# Patient Record
Sex: FEMALE | Race: WHITE | NOT HISPANIC OR LATINO
[De-identification: names, ages, dates, MRNs, and addresses within clinical notes are randomized per-mention and may not be internally consistent; named-entity substitution may affect disease eponyms.]

---

## 2018-02-27 ENCOUNTER — RESULT REVIEW (OUTPATIENT)
Age: 63
End: 2018-02-27

## 2020-02-10 ENCOUNTER — RESULT REVIEW (OUTPATIENT)
Age: 65
End: 2020-02-10

## 2021-04-12 ENCOUNTER — RESULT REVIEW (OUTPATIENT)
Age: 66
End: 2021-04-12

## 2022-07-09 ENCOUNTER — TRANSCRIPTION ENCOUNTER (OUTPATIENT)
Age: 67
End: 2022-07-09

## 2022-07-10 ENCOUNTER — RESULT REVIEW (OUTPATIENT)
Age: 67
End: 2022-07-10

## 2022-07-10 ENCOUNTER — TRANSCRIPTION ENCOUNTER (OUTPATIENT)
Age: 67
End: 2022-07-10

## 2022-07-10 ENCOUNTER — INPATIENT (INPATIENT)
Facility: HOSPITAL | Age: 67
LOS: 4 days | Discharge: HOME | DRG: 329 | End: 2022-07-15
Attending: SURGERY | Admitting: SURGERY
Payer: MEDICARE

## 2022-07-10 VITALS
OXYGEN SATURATION: 99 % | RESPIRATION RATE: 18 BRPM | WEIGHT: 143.96 LBS | SYSTOLIC BLOOD PRESSURE: 146 MMHG | DIASTOLIC BLOOD PRESSURE: 95 MMHG | HEART RATE: 88 BPM | TEMPERATURE: 98 F | HEIGHT: 62 IN

## 2022-07-10 DIAGNOSIS — E78.5 HYPERLIPIDEMIA, UNSPECIFIED: ICD-10-CM

## 2022-07-10 DIAGNOSIS — E03.9 HYPOTHYROIDISM, UNSPECIFIED: ICD-10-CM

## 2022-07-10 DIAGNOSIS — Z98.89 OTHER SPECIFIED POSTPROCEDURAL STATES: Chronic | ICD-10-CM

## 2022-07-10 DIAGNOSIS — Z90.49 ACQUIRED ABSENCE OF OTHER SPECIFIED PARTS OF DIGESTIVE TRACT: Chronic | ICD-10-CM

## 2022-07-10 DIAGNOSIS — K56.609 UNSPECIFIED INTESTINAL OBSTRUCTION, UNSPECIFIED AS TO PARTIAL VERSUS COMPLETE OBSTRUCTION: ICD-10-CM

## 2022-07-10 LAB
ALBUMIN SERPL ELPH-MCNC: 4.2 G/DL — SIGNIFICANT CHANGE UP (ref 3.3–5)
ALP SERPL-CCNC: 111 U/L — SIGNIFICANT CHANGE UP (ref 40–120)
ALT FLD-CCNC: 56 U/L — SIGNIFICANT CHANGE UP (ref 12–78)
ANION GAP SERPL CALC-SCNC: 7 MMOL/L — SIGNIFICANT CHANGE UP (ref 5–17)
APPEARANCE UR: ABNORMAL
APTT BLD: 32.7 SEC — SIGNIFICANT CHANGE UP (ref 27.5–35.5)
AST SERPL-CCNC: 37 U/L — SIGNIFICANT CHANGE UP (ref 15–37)
BACTERIA # UR AUTO: ABNORMAL
BASOPHILS # BLD AUTO: 0.03 K/UL — SIGNIFICANT CHANGE UP (ref 0–0.2)
BASOPHILS NFR BLD AUTO: 0.3 % — SIGNIFICANT CHANGE UP (ref 0–2)
BILIRUB SERPL-MCNC: 1 MG/DL — SIGNIFICANT CHANGE UP (ref 0.2–1.2)
BILIRUB UR-MCNC: NEGATIVE — SIGNIFICANT CHANGE UP
BLD GP AB SCN SERPL QL: SIGNIFICANT CHANGE UP
BUN SERPL-MCNC: 21 MG/DL — SIGNIFICANT CHANGE UP (ref 7–23)
CALCIUM SERPL-MCNC: 11.4 MG/DL — HIGH (ref 8.5–10.1)
CHLORIDE SERPL-SCNC: 99 MMOL/L — SIGNIFICANT CHANGE UP (ref 96–108)
CO2 SERPL-SCNC: 32 MMOL/L — HIGH (ref 22–31)
COLOR SPEC: YELLOW — SIGNIFICANT CHANGE UP
COMMENT - URINE: SIGNIFICANT CHANGE UP
CREAT SERPL-MCNC: 0.72 MG/DL — SIGNIFICANT CHANGE UP (ref 0.5–1.3)
DIFF PNL FLD: ABNORMAL
EGFR: 92 ML/MIN/1.73M2 — SIGNIFICANT CHANGE UP
EOSINOPHIL # BLD AUTO: 0.08 K/UL — SIGNIFICANT CHANGE UP (ref 0–0.5)
EOSINOPHIL NFR BLD AUTO: 0.9 % — SIGNIFICANT CHANGE UP (ref 0–6)
EPI CELLS # UR: SIGNIFICANT CHANGE UP
GLUCOSE SERPL-MCNC: 148 MG/DL — HIGH (ref 70–99)
GLUCOSE UR QL: NEGATIVE — SIGNIFICANT CHANGE UP
HCT VFR BLD CALC: 43.9 % — SIGNIFICANT CHANGE UP (ref 34.5–45)
HGB BLD-MCNC: 14.5 G/DL — SIGNIFICANT CHANGE UP (ref 11.5–15.5)
IMM GRANULOCYTES NFR BLD AUTO: 0.4 % — SIGNIFICANT CHANGE UP (ref 0–1.5)
INR BLD: 0.94 RATIO — SIGNIFICANT CHANGE UP (ref 0.88–1.16)
KETONES UR-MCNC: NEGATIVE — SIGNIFICANT CHANGE UP
LEUKOCYTE ESTERASE UR-ACNC: ABNORMAL
LIDOCAIN IGE QN: 172 U/L — SIGNIFICANT CHANGE UP (ref 73–393)
LYMPHOCYTES # BLD AUTO: 1.84 K/UL — SIGNIFICANT CHANGE UP (ref 1–3.3)
LYMPHOCYTES # BLD AUTO: 20 % — SIGNIFICANT CHANGE UP (ref 13–44)
MCHC RBC-ENTMCNC: 31.7 PG — SIGNIFICANT CHANGE UP (ref 27–34)
MCHC RBC-ENTMCNC: 33 GM/DL — SIGNIFICANT CHANGE UP (ref 32–36)
MCV RBC AUTO: 96.1 FL — SIGNIFICANT CHANGE UP (ref 80–100)
MONOCYTES # BLD AUTO: 0.82 K/UL — SIGNIFICANT CHANGE UP (ref 0–0.9)
MONOCYTES NFR BLD AUTO: 8.9 % — SIGNIFICANT CHANGE UP (ref 2–14)
NEUTROPHILS # BLD AUTO: 6.38 K/UL — SIGNIFICANT CHANGE UP (ref 1.8–7.4)
NEUTROPHILS NFR BLD AUTO: 69.5 % — SIGNIFICANT CHANGE UP (ref 43–77)
NITRITE UR-MCNC: NEGATIVE — SIGNIFICANT CHANGE UP
NRBC # BLD: 0 /100 WBCS — SIGNIFICANT CHANGE UP (ref 0–0)
PH UR: 7 — SIGNIFICANT CHANGE UP (ref 5–8)
PLATELET # BLD AUTO: 256 K/UL — SIGNIFICANT CHANGE UP (ref 150–400)
POTASSIUM SERPL-MCNC: 4.5 MMOL/L — SIGNIFICANT CHANGE UP (ref 3.5–5.3)
POTASSIUM SERPL-SCNC: 4.5 MMOL/L — SIGNIFICANT CHANGE UP (ref 3.5–5.3)
PROT SERPL-MCNC: 8 G/DL — SIGNIFICANT CHANGE UP (ref 6–8.3)
PROT UR-MCNC: 15
PROTHROM AB SERPL-ACNC: 11 SEC — SIGNIFICANT CHANGE UP (ref 10.5–13.4)
RBC # BLD: 4.57 M/UL — SIGNIFICANT CHANGE UP (ref 3.8–5.2)
RBC # FLD: 11.9 % — SIGNIFICANT CHANGE UP (ref 10.3–14.5)
RBC CASTS # UR COMP ASSIST: SIGNIFICANT CHANGE UP /HPF (ref 0–4)
SARS-COV-2 RNA SPEC QL NAA+PROBE: SIGNIFICANT CHANGE UP
SODIUM SERPL-SCNC: 138 MMOL/L — SIGNIFICANT CHANGE UP (ref 135–145)
SP GR SPEC: 1.01 — SIGNIFICANT CHANGE UP (ref 1.01–1.02)
UROBILINOGEN FLD QL: NEGATIVE — SIGNIFICANT CHANGE UP
WBC # BLD: 9.19 K/UL — SIGNIFICANT CHANGE UP (ref 3.8–10.5)
WBC # FLD AUTO: 9.19 K/UL — SIGNIFICANT CHANGE UP (ref 3.8–10.5)
WBC UR QL: SIGNIFICANT CHANGE UP

## 2022-07-10 PROCEDURE — G1004: CPT

## 2022-07-10 PROCEDURE — 93010 ELECTROCARDIOGRAM REPORT: CPT

## 2022-07-10 PROCEDURE — 74177 CT ABD & PELVIS W/CONTRAST: CPT | Mod: 26,ME

## 2022-07-10 PROCEDURE — 71045 X-RAY EXAM CHEST 1 VIEW: CPT | Mod: 26

## 2022-07-10 PROCEDURE — 99222 1ST HOSP IP/OBS MODERATE 55: CPT

## 2022-07-10 PROCEDURE — 99285 EMERGENCY DEPT VISIT HI MDM: CPT

## 2022-07-10 PROCEDURE — 88307 TISSUE EXAM BY PATHOLOGIST: CPT | Mod: 26

## 2022-07-10 PROCEDURE — 76705 ECHO EXAM OF ABDOMEN: CPT | Mod: 26

## 2022-07-10 PROCEDURE — 12345: CPT | Mod: NC,GC

## 2022-07-10 DEVICE — STAPLER COVIDIEN ENDO GIA 60-3.8MM BLUE: Type: IMPLANTABLE DEVICE | Status: FUNCTIONAL

## 2022-07-10 DEVICE — STAPLER COVIDIEN TA 45 BLUE: Type: IMPLANTABLE DEVICE | Status: FUNCTIONAL

## 2022-07-10 DEVICE — SEALANT VISTASEAL FIBRIN HUMAN 10ML 12/KIT: Type: IMPLANTABLE DEVICE | Status: FUNCTIONAL

## 2022-07-10 DEVICE — STAPLER COVIDIEN ENDO GIA 60-3.8MM BLUE RELOAD: Type: IMPLANTABLE DEVICE | Status: FUNCTIONAL

## 2022-07-10 RX ORDER — MORPHINE SULFATE 50 MG/1
4 CAPSULE, EXTENDED RELEASE ORAL ONCE
Refills: 0 | Status: DISCONTINUED | OUTPATIENT
Start: 2022-07-10 | End: 2022-07-10

## 2022-07-10 RX ORDER — LEVOTHYROXINE SODIUM 125 MCG
44 TABLET ORAL AT BEDTIME
Refills: 0 | Status: DISCONTINUED | OUTPATIENT
Start: 2022-07-10 | End: 2022-07-10

## 2022-07-10 RX ORDER — MORPHINE SULFATE 50 MG/1
2 CAPSULE, EXTENDED RELEASE ORAL EVERY 4 HOURS
Refills: 0 | Status: DISCONTINUED | OUTPATIENT
Start: 2022-07-10 | End: 2022-07-15

## 2022-07-10 RX ORDER — KETOROLAC TROMETHAMINE 30 MG/ML
30 SYRINGE (ML) INJECTION ONCE
Refills: 0 | Status: DISCONTINUED | OUTPATIENT
Start: 2022-07-10 | End: 2022-07-10

## 2022-07-10 RX ORDER — ACETAMINOPHEN 500 MG
1000 TABLET ORAL ONCE
Refills: 0 | Status: COMPLETED | OUTPATIENT
Start: 2022-07-11 | End: 2022-07-11

## 2022-07-10 RX ORDER — SODIUM CHLORIDE 9 MG/ML
1000 INJECTION INTRAMUSCULAR; INTRAVENOUS; SUBCUTANEOUS
Refills: 0 | Status: DISCONTINUED | OUTPATIENT
Start: 2022-07-10 | End: 2022-07-15

## 2022-07-10 RX ORDER — DEXTROSE MONOHYDRATE, SODIUM CHLORIDE, AND POTASSIUM CHLORIDE 50; .745; 4.5 G/1000ML; G/1000ML; G/1000ML
1000 INJECTION, SOLUTION INTRAVENOUS
Refills: 0 | Status: DISCONTINUED | OUTPATIENT
Start: 2022-07-10 | End: 2022-07-10

## 2022-07-10 RX ORDER — MORPHINE SULFATE 50 MG/1
2 CAPSULE, EXTENDED RELEASE ORAL EVERY 4 HOURS
Refills: 0 | Status: DISCONTINUED | OUTPATIENT
Start: 2022-07-10 | End: 2022-07-10

## 2022-07-10 RX ORDER — KETOROLAC TROMETHAMINE 30 MG/ML
15 SYRINGE (ML) INJECTION EVERY 6 HOURS
Refills: 0 | Status: DISCONTINUED | OUTPATIENT
Start: 2022-07-10 | End: 2022-07-11

## 2022-07-10 RX ORDER — ACETAMINOPHEN 500 MG
1000 TABLET ORAL ONCE
Refills: 0 | Status: COMPLETED | OUTPATIENT
Start: 2022-07-10 | End: 2022-07-10

## 2022-07-10 RX ORDER — PANTOPRAZOLE SODIUM 20 MG/1
40 TABLET, DELAYED RELEASE ORAL DAILY
Refills: 0 | Status: DISCONTINUED | OUTPATIENT
Start: 2022-07-10 | End: 2022-07-10

## 2022-07-10 RX ORDER — SODIUM CHLORIDE 9 MG/ML
1000 INJECTION, SOLUTION INTRAVENOUS
Refills: 0 | Status: DISCONTINUED | OUTPATIENT
Start: 2022-07-10 | End: 2022-07-10

## 2022-07-10 RX ORDER — LEVOTHYROXINE SODIUM 125 MCG
44 TABLET ORAL AT BEDTIME
Refills: 0 | Status: DISCONTINUED | OUTPATIENT
Start: 2022-07-10 | End: 2022-07-12

## 2022-07-10 RX ORDER — DIPHENHYDRAMINE HCL 50 MG
25 CAPSULE ORAL EVERY 6 HOURS
Refills: 0 | Status: DISCONTINUED | OUTPATIENT
Start: 2022-07-10 | End: 2022-07-10

## 2022-07-10 RX ORDER — METOCLOPRAMIDE HCL 10 MG
10 TABLET ORAL ONCE
Refills: 0 | Status: COMPLETED | OUTPATIENT
Start: 2022-07-10 | End: 2022-07-10

## 2022-07-10 RX ORDER — MORPHINE SULFATE 50 MG/1
4 CAPSULE, EXTENDED RELEASE ORAL EVERY 4 HOURS
Refills: 0 | Status: DISCONTINUED | OUTPATIENT
Start: 2022-07-10 | End: 2022-07-15

## 2022-07-10 RX ORDER — PANTOPRAZOLE SODIUM 20 MG/1
40 TABLET, DELAYED RELEASE ORAL DAILY
Refills: 0 | Status: DISCONTINUED | OUTPATIENT
Start: 2022-07-10 | End: 2022-07-12

## 2022-07-10 RX ORDER — MORPHINE SULFATE 50 MG/1
4 CAPSULE, EXTENDED RELEASE ORAL EVERY 4 HOURS
Refills: 0 | Status: DISCONTINUED | OUTPATIENT
Start: 2022-07-10 | End: 2022-07-10

## 2022-07-10 RX ORDER — HYDROMORPHONE HYDROCHLORIDE 2 MG/ML
0.5 INJECTION INTRAMUSCULAR; INTRAVENOUS; SUBCUTANEOUS
Refills: 0 | Status: DISCONTINUED | OUTPATIENT
Start: 2022-07-10 | End: 2022-07-10

## 2022-07-10 RX ORDER — KETOROLAC TROMETHAMINE 30 MG/ML
15 SYRINGE (ML) INJECTION EVERY 4 HOURS
Refills: 0 | Status: DISCONTINUED | OUTPATIENT
Start: 2022-07-10 | End: 2022-07-10

## 2022-07-10 RX ORDER — PIPERACILLIN AND TAZOBACTAM 4; .5 G/20ML; G/20ML
3.38 INJECTION, POWDER, LYOPHILIZED, FOR SOLUTION INTRAVENOUS EVERY 8 HOURS
Refills: 0 | Status: DISCONTINUED | OUTPATIENT
Start: 2022-07-10 | End: 2022-07-12

## 2022-07-10 RX ORDER — KETOROLAC TROMETHAMINE 30 MG/ML
15 SYRINGE (ML) INJECTION EVERY 6 HOURS
Refills: 0 | Status: DISCONTINUED | OUTPATIENT
Start: 2022-07-10 | End: 2022-07-10

## 2022-07-10 RX ORDER — MORPHINE SULFATE 50 MG/1
1 CAPSULE, EXTENDED RELEASE ORAL EVERY 4 HOURS
Refills: 0 | Status: DISCONTINUED | OUTPATIENT
Start: 2022-07-10 | End: 2022-07-10

## 2022-07-10 RX ORDER — ENOXAPARIN SODIUM 100 MG/ML
40 INJECTION SUBCUTANEOUS EVERY 24 HOURS
Refills: 0 | Status: DISCONTINUED | OUTPATIENT
Start: 2022-07-11 | End: 2022-07-15

## 2022-07-10 RX ORDER — DIPHENHYDRAMINE HCL 50 MG
25 CAPSULE ORAL AT BEDTIME
Refills: 0 | Status: DISCONTINUED | OUTPATIENT
Start: 2022-07-10 | End: 2022-07-15

## 2022-07-10 RX ORDER — ONDANSETRON 8 MG/1
4 TABLET, FILM COATED ORAL EVERY 6 HOURS
Refills: 0 | Status: DISCONTINUED | OUTPATIENT
Start: 2022-07-10 | End: 2022-07-15

## 2022-07-10 RX ORDER — ACETAMINOPHEN 500 MG
1000 TABLET ORAL ONCE
Refills: 0 | Status: DISCONTINUED | OUTPATIENT
Start: 2022-07-10 | End: 2022-07-10

## 2022-07-10 RX ORDER — CEFOTETAN DISODIUM 1 G
2 VIAL (EA) INJECTION EVERY 12 HOURS
Refills: 0 | Status: DISCONTINUED | OUTPATIENT
Start: 2022-07-10 | End: 2022-07-10

## 2022-07-10 RX ORDER — ONDANSETRON 8 MG/1
4 TABLET, FILM COATED ORAL ONCE
Refills: 0 | Status: COMPLETED | OUTPATIENT
Start: 2022-07-10 | End: 2022-07-10

## 2022-07-10 RX ORDER — ONDANSETRON 8 MG/1
4 TABLET, FILM COATED ORAL ONCE
Refills: 0 | Status: DISCONTINUED | OUTPATIENT
Start: 2022-07-10 | End: 2022-07-10

## 2022-07-10 RX ORDER — ONDANSETRON 8 MG/1
4 TABLET, FILM COATED ORAL EVERY 6 HOURS
Refills: 0 | Status: DISCONTINUED | OUTPATIENT
Start: 2022-07-10 | End: 2022-07-10

## 2022-07-10 RX ORDER — ATORVASTATIN CALCIUM 80 MG/1
20 TABLET, FILM COATED ORAL AT BEDTIME
Refills: 0 | Status: DISCONTINUED | OUTPATIENT
Start: 2022-07-10 | End: 2022-07-12

## 2022-07-10 RX ORDER — SODIUM CHLORIDE 9 MG/ML
1000 INJECTION INTRAMUSCULAR; INTRAVENOUS; SUBCUTANEOUS
Refills: 0 | Status: DISCONTINUED | OUTPATIENT
Start: 2022-07-10 | End: 2022-07-10

## 2022-07-10 RX ORDER — PIPERACILLIN AND TAZOBACTAM 4; .5 G/20ML; G/20ML
3.38 INJECTION, POWDER, LYOPHILIZED, FOR SOLUTION INTRAVENOUS ONCE
Refills: 0 | Status: COMPLETED | OUTPATIENT
Start: 2022-07-10 | End: 2022-07-10

## 2022-07-10 RX ORDER — SODIUM CHLORIDE 9 MG/ML
1000 INJECTION INTRAMUSCULAR; INTRAVENOUS; SUBCUTANEOUS ONCE
Refills: 0 | Status: COMPLETED | OUTPATIENT
Start: 2022-07-10 | End: 2022-07-10

## 2022-07-10 RX ADMIN — Medication 1000 MILLIGRAM(S): at 05:21

## 2022-07-10 RX ADMIN — MORPHINE SULFATE 1 MILLIGRAM(S): 50 CAPSULE, EXTENDED RELEASE ORAL at 13:19

## 2022-07-10 RX ADMIN — MORPHINE SULFATE 1 MILLIGRAM(S): 50 CAPSULE, EXTENDED RELEASE ORAL at 13:01

## 2022-07-10 RX ADMIN — MORPHINE SULFATE 2 MILLIGRAM(S): 50 CAPSULE, EXTENDED RELEASE ORAL at 15:49

## 2022-07-10 RX ADMIN — MORPHINE SULFATE 2 MILLIGRAM(S): 50 CAPSULE, EXTENDED RELEASE ORAL at 20:01

## 2022-07-10 RX ADMIN — SODIUM CHLORIDE 1000 MILLILITER(S): 9 INJECTION INTRAMUSCULAR; INTRAVENOUS; SUBCUTANEOUS at 02:19

## 2022-07-10 RX ADMIN — HYDROMORPHONE HYDROCHLORIDE 0.5 MILLIGRAM(S): 2 INJECTION INTRAMUSCULAR; INTRAVENOUS; SUBCUTANEOUS at 11:37

## 2022-07-10 RX ADMIN — MORPHINE SULFATE 4 MILLIGRAM(S): 50 CAPSULE, EXTENDED RELEASE ORAL at 04:21

## 2022-07-10 RX ADMIN — MORPHINE SULFATE 4 MILLIGRAM(S): 50 CAPSULE, EXTENDED RELEASE ORAL at 22:07

## 2022-07-10 RX ADMIN — PANTOPRAZOLE SODIUM 40 MILLIGRAM(S): 20 TABLET, DELAYED RELEASE ORAL at 15:48

## 2022-07-10 RX ADMIN — MORPHINE SULFATE 4 MILLIGRAM(S): 50 CAPSULE, EXTENDED RELEASE ORAL at 02:23

## 2022-07-10 RX ADMIN — HYDROMORPHONE HYDROCHLORIDE 0.5 MILLIGRAM(S): 2 INJECTION INTRAMUSCULAR; INTRAVENOUS; SUBCUTANEOUS at 11:26

## 2022-07-10 RX ADMIN — Medication 10 MILLIGRAM(S): at 02:08

## 2022-07-10 RX ADMIN — Medication 15 MILLIGRAM(S): at 05:20

## 2022-07-10 RX ADMIN — SODIUM CHLORIDE 75 MILLILITER(S): 9 INJECTION INTRAMUSCULAR; INTRAVENOUS; SUBCUTANEOUS at 06:25

## 2022-07-10 RX ADMIN — Medication 15 MILLIGRAM(S): at 21:12

## 2022-07-10 RX ADMIN — Medication 1000 MILLIGRAM(S): at 18:38

## 2022-07-10 RX ADMIN — Medication 15 MILLIGRAM(S): at 05:05

## 2022-07-10 RX ADMIN — SODIUM CHLORIDE 100 MILLILITER(S): 9 INJECTION, SOLUTION INTRAVENOUS at 10:45

## 2022-07-10 RX ADMIN — Medication 400 MILLIGRAM(S): at 05:06

## 2022-07-10 RX ADMIN — Medication 400 MILLIGRAM(S): at 23:48

## 2022-07-10 RX ADMIN — Medication 400 MILLIGRAM(S): at 18:00

## 2022-07-10 RX ADMIN — SODIUM CHLORIDE 80 MILLILITER(S): 9 INJECTION INTRAMUSCULAR; INTRAVENOUS; SUBCUTANEOUS at 23:18

## 2022-07-10 RX ADMIN — ONDANSETRON 4 MILLIGRAM(S): 8 TABLET, FILM COATED ORAL at 04:05

## 2022-07-10 RX ADMIN — MORPHINE SULFATE 4 MILLIGRAM(S): 50 CAPSULE, EXTENDED RELEASE ORAL at 04:06

## 2022-07-10 RX ADMIN — PIPERACILLIN AND TAZOBACTAM 25 GRAM(S): 4; .5 INJECTION, POWDER, LYOPHILIZED, FOR SOLUTION INTRAVENOUS at 18:00

## 2022-07-10 RX ADMIN — MORPHINE SULFATE 4 MILLIGRAM(S): 50 CAPSULE, EXTENDED RELEASE ORAL at 02:08

## 2022-07-10 RX ADMIN — Medication 400 MILLIGRAM(S): at 11:37

## 2022-07-10 RX ADMIN — Medication 25 MILLIGRAM(S): at 22:08

## 2022-07-10 RX ADMIN — ONDANSETRON 4 MILLIGRAM(S): 8 TABLET, FILM COATED ORAL at 15:48

## 2022-07-10 RX ADMIN — Medication 44 MICROGRAM(S): at 21:20

## 2022-07-10 RX ADMIN — Medication 100 GRAM(S): at 05:55

## 2022-07-10 RX ADMIN — PIPERACILLIN AND TAZOBACTAM 200 GRAM(S): 4; .5 INJECTION, POWDER, LYOPHILIZED, FOR SOLUTION INTRAVENOUS at 10:59

## 2022-07-10 RX ADMIN — SODIUM CHLORIDE 1000 MILLILITER(S): 9 INJECTION INTRAMUSCULAR; INTRAVENOUS; SUBCUTANEOUS at 01:19

## 2022-07-10 RX ADMIN — ONDANSETRON 4 MILLIGRAM(S): 8 TABLET, FILM COATED ORAL at 01:19

## 2022-07-10 NOTE — PROGRESS NOTE ADULT - SUBJECTIVE AND OBJECTIVE BOX
Patient is a 66y old  Female who presents with a chief complaint of High grade SBO (10 Jul 2022 05:59)      INTERVAL HPI/OVERNIGHT EVENTS: Pt was seen and examined accompanied by  at bedside. Pt is s/p ex-lap, LINDSAY and small bowel resection with Dr. Yanez this morning. Abdominal dressing c/d/i. NGT connected to wall suction, pappas in place. Pt is on a course of IV zosyn with anticipated stop date . Pt denies fevers, chills, ha, dizziness, cp, sob, nausea, vomitting, calf pain.       MEDICATIONS  (STANDING):  acetaminophen   IVPB .. 1000 milliGRAM(s) IV Intermittent once  acetaminophen   IVPB .. 1000 milliGRAM(s) IV Intermittent once  atorvastatin 20 milliGRAM(s) Oral at bedtime  lactated ringers. 1000 milliLiter(s) (75 mL/Hr) IV Continuous <Continuous>  lactated ringers. 1000 milliLiter(s) (100 mL/Hr) IV Continuous <Continuous>  levothyroxine Injectable 44 MICROGram(s) IV Push at bedtime  pantoprazole  Injectable 40 milliGRAM(s) IV Push daily  piperacillin/tazobactam IVPB.. 3.375 Gram(s) IV Intermittent every 8 hours    MEDICATIONS  (PRN):  diphenhydrAMINE Injectable 25 milliGRAM(s) IntraMuscular every 6 hours PRN Rash and/or Itching  HYDROmorphone  Injectable 0.5 milliGRAM(s) IV Push every 10 minutes PRN Moderate Pain (4 - 6)  ketorolac   Injectable 15 milliGRAM(s) IV Push every 6 hours PRN Moderate Pain (4 - 6)  morphine  - Injectable 1 milliGRAM(s) IV Push every 4 hours PRN Severe Pain (7 - 10)  ondansetron Injectable 4 milliGRAM(s) IV Push once PRN Nausea and/or Vomiting  ondansetron Injectable 4 milliGRAM(s) IV Push every 6 hours PRN Nausea and/or Vomiting      Allergies    tetanus toxoid (Anaphylaxis)    Intolerances        REVIEW OF SYSTEMS:  CONSTITUTIONAL: No fever or chills  HEENT:  No headache, no sore throat  RESPIRATORY: No cough, wheezing, or shortness of breath  CARDIOVASCULAR: No chest pain, palpitations  GASTROINTESTINAL: Postoperative abd pain, denies: nausea, vomiting, or diarrhea  GENITOURINARY: No dysuria, frequency, or hematuria  NEUROLOGICAL: no focal weakness or dizziness  MUSCULOSKELETAL: no myalgias     Vital Signs Last 24 Hrs  T(C): 37.2 (10 Jul 2022 12:49), Max: 37.2 (10 Jul 2022 12:49)  T(F): 98.9 (10 Jul 2022 12:49), Max: 98.9 (10 Jul 2022 12:49)  HR: 94 (10 Jul 2022 12:49) (73 - 103)  BP: 123/75 (10 Jul 2022 12:49) (120/72 - 157/90)  BP(mean): --  RR: 18 (10 Jul 2022 12:49) (14 - 18)  SpO2: 94% (10 Jul 2022 12:49) (94% - 100%)    Parameters below as of 10 Jul 2022 12:49  Patient On (Oxygen Delivery Method): nasal cannula  O2 Flow (L/min): 2      PHYSICAL EXAM:  GENERAL: NAD, NGT to wall suction  HEENT:  anicteric, moist mucous membranes  CHEST/LUNG:  CTA b/l, no rales, wheezes, or rhonchi  HEART:  RRR, S1, S2  ABDOMEN:  BS+, mildly tender 2/2 postoperative pain, nondistended  : pappas in place   EXTREMITIES: no edema, cyanosis, or calf tenderness  NERVOUS SYSTEM: answers questions and follows commands appropriately    LABS:                        14.5   9.19  )-----------( 256      ( 10 Jul 2022 01:22 )             43.9     CBC Full  -  ( 10 Jul 2022 01:22 )  WBC Count : 9.19 K/uL  Hemoglobin : 14.5 g/dL  Hematocrit : 43.9 %  Platelet Count - Automated : 256 K/uL  Mean Cell Volume : 96.1 fl  Mean Cell Hemoglobin : 31.7 pg  Mean Cell Hemoglobin Concentration : 33.0 gm/dL  Auto Neutrophil # : 6.38 K/uL  Auto Lymphocyte # : 1.84 K/uL  Auto Monocyte # : 0.82 K/uL  Auto Eosinophil # : 0.08 K/uL  Auto Basophil # : 0.03 K/uL  Auto Neutrophil % : 69.5 %  Auto Lymphocyte % : 20.0 %  Auto Monocyte % : 8.9 %  Auto Eosinophil % : 0.9 %  Auto Basophil % : 0.3 %    10 Jul 2022 01:22    138    |  99     |  21     ----------------------------<  148    4.5     |  32     |  0.72     Ca    11.4       10 Jul 2022 01:22    TPro  8.0    /  Alb  4.2    /  TBili  1.0    /  DBili  x      /  AST  37     /  ALT  56     /  AlkPhos  111    10 Jul 2022 01:22    PT/INR - ( 10 Jul 2022 01:22 )   PT: 11.0 sec;   INR: 0.94 ratio         PTT - ( 10 Jul 2022 01:22 )  PTT:32.7 sec  Urinalysis Basic - ( 10 Jul 2022 02:44 )    Color: Yellow / Appearance: Slightly Turbid / S.010 / pH: x  Gluc: x / Ketone: Negative  / Bili: Negative / Urobili: Negative   Blood: x / Protein: 15 / Nitrite: Negative   Leuk Esterase: Trace / RBC: 0-2 /HPF / WBC 3-5   Sq Epi: x / Non Sq Epi: Few / Bacteria: Few                  RADIOLOGY & ADDITIONAL TESTS:    Personally reviewed.     Consultant(s) Notes Reviewed:  [x] YES  [ ] NO     Patient is a 66y old  Female who presents with a chief complaint of High grade SBO (10 Jul 2022 05:59)      INTERVAL HPI/OVERNIGHT EVENTS: Pt was seen and examined accompanied by  at bedside. Pt is s/p ex-lap, LINDSAY and small bowel resection with Dr. Yanez this morning. Abdominal dressing c/d/i. NGT connected to wall suction, pappas in place. Pt is on a course of IV zosyn. Pt denies fevers, chills, ha, dizziness, cp, sob, nausea, vomiting, calf pain.       MEDICATIONS  (STANDING):  acetaminophen   IVPB .. 1000 milliGRAM(s) IV Intermittent once  acetaminophen   IVPB .. 1000 milliGRAM(s) IV Intermittent once  atorvastatin 20 milliGRAM(s) Oral at bedtime  lactated ringers. 1000 milliLiter(s) (75 mL/Hr) IV Continuous <Continuous>  lactated ringers. 1000 milliLiter(s) (100 mL/Hr) IV Continuous <Continuous>  levothyroxine Injectable 44 MICROGram(s) IV Push at bedtime  pantoprazole  Injectable 40 milliGRAM(s) IV Push daily  piperacillin/tazobactam IVPB.. 3.375 Gram(s) IV Intermittent every 8 hours    MEDICATIONS  (PRN):  diphenhydrAMINE Injectable 25 milliGRAM(s) IntraMuscular every 6 hours PRN Rash and/or Itching  HYDROmorphone  Injectable 0.5 milliGRAM(s) IV Push every 10 minutes PRN Moderate Pain (4 - 6)  ketorolac   Injectable 15 milliGRAM(s) IV Push every 6 hours PRN Moderate Pain (4 - 6)  morphine  - Injectable 1 milliGRAM(s) IV Push every 4 hours PRN Severe Pain (7 - 10)  ondansetron Injectable 4 milliGRAM(s) IV Push once PRN Nausea and/or Vomiting  ondansetron Injectable 4 milliGRAM(s) IV Push every 6 hours PRN Nausea and/or Vomiting      Allergies    tetanus toxoid (Anaphylaxis)    Intolerances        REVIEW OF SYSTEMS:  CONSTITUTIONAL: No fever or chills  HEENT:  No headache, no sore throat  RESPIRATORY: No cough, wheezing, or shortness of breath  CARDIOVASCULAR: No chest pain, palpitations  GASTROINTESTINAL: Postoperative abd pain, denies: nausea, vomiting, or diarrhea  GENITOURINARY: No dysuria, frequency, or hematuria  NEUROLOGICAL: no focal weakness or dizziness  MUSCULOSKELETAL: no myalgias     Vital Signs Last 24 Hrs  T(C): 37.2 (10 Jul 2022 12:49), Max: 37.2 (10 Jul 2022 12:49)  T(F): 98.9 (10 Jul 2022 12:49), Max: 98.9 (10 Jul 2022 12:49)  HR: 94 (10 Jul 2022 12:49) (73 - 103)  BP: 123/75 (10 Jul 2022 12:49) (120/72 - 157/90)  BP(mean): --  RR: 18 (10 Jul 2022 12:49) (14 - 18)  SpO2: 94% (10 Jul 2022 12:49) (94% - 100%)    Parameters below as of 10 Jul 2022 12:49  Patient On (Oxygen Delivery Method): nasal cannula  O2 Flow (L/min): 2      PHYSICAL EXAM:  GENERAL: NAD, NGT to wall suction  HEENT:  anicteric, moist mucous membranes  CHEST/LUNG:  CTA b/l, no rales, wheezes, or rhonchi  HEART:  RRR, S1, S2  ABDOMEN:  BS+, mildly tender 2/2 postoperative pain, nondistended  : pappas in place   EXTREMITIES: no edema, cyanosis, or calf tenderness  NERVOUS SYSTEM: answers questions and follows commands appropriately    LABS:                        14.5   9.19  )-----------( 256      ( 10 Jul 2022 01:22 )             43.9     CBC Full  -  ( 10 Jul 2022 01:22 )  WBC Count : 9.19 K/uL  Hemoglobin : 14.5 g/dL  Hematocrit : 43.9 %  Platelet Count - Automated : 256 K/uL  Mean Cell Volume : 96.1 fl  Mean Cell Hemoglobin : 31.7 pg  Mean Cell Hemoglobin Concentration : 33.0 gm/dL  Auto Neutrophil # : 6.38 K/uL  Auto Lymphocyte # : 1.84 K/uL  Auto Monocyte # : 0.82 K/uL  Auto Eosinophil # : 0.08 K/uL  Auto Basophil # : 0.03 K/uL  Auto Neutrophil % : 69.5 %  Auto Lymphocyte % : 20.0 %  Auto Monocyte % : 8.9 %  Auto Eosinophil % : 0.9 %  Auto Basophil % : 0.3 %    10 Jul 2022 01:22    138    |  99     |  21     ----------------------------<  148    4.5     |  32     |  0.72     Ca    11.4       10 Jul 2022 01:22    TPro  8.0    /  Alb  4.2    /  TBili  1.0    /  DBili  x      /  AST  37     /  ALT  56     /  AlkPhos  111    10 Jul 2022 01:22    PT/INR - ( 10 Jul 2022 01:22 )   PT: 11.0 sec;   INR: 0.94 ratio         PTT - ( 10 Jul 2022 01:22 )  PTT:32.7 sec  Urinalysis Basic - ( 10 Jul 2022 02:44 )    Color: Yellow / Appearance: Slightly Turbid / S.010 / pH: x  Gluc: x / Ketone: Negative  / Bili: Negative / Urobili: Negative   Blood: x / Protein: 15 / Nitrite: Negative   Leuk Esterase: Trace / RBC: 0-2 /HPF / WBC 3-5   Sq Epi: x / Non Sq Epi: Few / Bacteria: Few                  RADIOLOGY & ADDITIONAL TESTS:    Personally reviewed.     Consultant(s) Notes Reviewed:  [x] YES  [ ] NO

## 2022-07-10 NOTE — ED ADULT NURSE REASSESSMENT NOTE - NS ED NURSE REASSESS COMMENT FT1
16 [place] nasogastric tube placed in left nare by Surgical SINDY Boykin. Light brown drainage in suction canister. Pending confirmation x-ray. Lizzeth SMITH
Patient complaining of abdominal pain and dry heaving. Dr. Patton made aware. 4 mg morphine and 10 mg Reglan given per order. Pending Ultrasound result. Rani SMITH
received Pt a/ox4, NGT to low intermittent  wall suction, awaiting OR.  no signs of acute distress noted.

## 2022-07-10 NOTE — CONSULT NOTE ADULT - ASSESSMENT
66y year old Female with hypothyroidism (Hashimoto's), HLD, GERD, dry eyes, hx of colon resection in 2017 ( for repair of rectal prolapse), presenting with diffuse abd pain, nausea, vomiting.   CT abd/pelvis reports high-grade small bowel obstruction.   NGT was placed in the ED, pt drained about 600cc fluid.    High grade small bowel obstruction  Pt with Hypothyroidism, HLD, stable medical issues    Pt is admitted to Surg service  NGT, NPO  IVfluids, analgesics prn, antiemetics prn  Cont Levothyroxine - will give IV while NPO (44 mcg), once able to take po, will resume oral dose of 88 mcg  Would cont Pantoprazole- would give IV while NPO  Pt on statin and fish oil, while NPO, it's ok to skip dose  Cont eye drop regimen  Pt is ASA II, she is medically stable and cleared for the possible surgery today.  66y year old Female with hypothyroidism (Hashimoto's), HLD, GERD, dry eyes, hx of colon resection in 2017 ( for repair of rectal prolapse), presenting with diffuse abd pain, nausea, vomiting.   CT abd/pelvis reports high-grade small bowel obstruction.   NGT was placed in the ED, pt drained about 600cc fluid.    High grade small bowel obstruction  Pt with Hypothyroidism, HLD, stable medical issues    Pt is admitted to Surg service  NGT, NPO  IVfluids, analgesics prn, antiemetics prn  Cont Levothyroxine - will give IV while NPO (44 mcg), once able to take po, will resume oral dose of 88 mcg  Would cont Pantoprazole- would give IV while NPO  Pt on statin and fish oil, while NPO, it's ok to skip dose  Cont eye drop regimen  Pt is ASA II, she is medically stable and cleared for the possible surgery (explor lab w/LINDSAY) today.  66y year old Female with hypothyroidism (Hashimoto's), HLD, GERD, dry eyes, hx of colon resection in 2017 ( for repair of rectal prolapse), presenting with diffuse abd pain, nausea, vomiting.   CT abd/pelvis reports high-grade small bowel obstruction.   NGT was placed in the ED, pt drained about 600cc fluid.    Small bowel obstruction  Pt with Hypothyroidism, HLD, GERD, stable    Pt is admitted to Surg service  NGT, NPO  IVfluids, analgesics prn, antiemetics prn  Cont Levothyroxine - will give IV while NPO (44 mcg), once able to take po, will resume oral dose of 88 mcg  Would cont Pantoprazole- would give IV while NPO  Pt on statin and fish oil, while NPO, it's ok to skip dose  Cont eye drop regimen  Pt is ASA II, she is medically stable and cleared for the possible surgery (explor lab w/LINDSAY) today.  66y year old Female with hypothyroidism (Hashimoto's), HLD, GERD, dry eyes, hx of colon resection in 2017 ( for repair of rectal prolapse), presenting with diffuse abd pain, nausea, vomiting.   CT abd/pelvis reports high-grade small bowel obstruction.   NGT was placed in the ED, pt drained about 600cc fluid.    Small bowel obstruction  Pt with Hypothyroidism, HLD, GERD, stable    Pt is admitted to Surg service  NGT, NPO  IVfluids, analgesics prn, antiemetics prn  Preop antibx per Surg  Cont Levothyroxine - will give IV while NPO (44 mcg), once able to take po, will resume oral dose of 88 mcg  Would cont Pantoprazole- would give IV while NPO  Pt on statin and fish oil, while NPO, it's ok to skip dose  Cont eye drop regimen  Pt is ASA II, she is medically stable and cleared for the possible surgery (explor lab w/LINDSAY) today.

## 2022-07-10 NOTE — ED PROVIDER NOTE - OBJECTIVE STATEMENT
67yo female with sudden onset of abd pain, diffuse upper pain, non radiating with nausea and vomiting no fever, chills, no sick contacts or recent travel

## 2022-07-10 NOTE — H&P ADULT - NSHPPHYSICALEXAM_GEN_ALL_CORE
GENERAL: No acute distress, well-developed  HEAD:  Atraumatic, Normocephalic  ABDOMEN: Soft, diffusely tender, distended, midline incision infraumbilically and one port incision in upper abdomen.  NEUROLOGY: A&O x 3, no focal deficits

## 2022-07-10 NOTE — DISCHARGE NOTE PROVIDER - NSDCFUADDAPPT_GEN_ALL_CORE_FT
Please make an appointment to follow up with Dr. Yanez outpatient. Follow up with Dr. Yanez in 7-10 days. Call the office to schedule an appointment.

## 2022-07-10 NOTE — H&P ADULT - ASSESSMENT
This is a 66y year old Female with PMHx of hashimoto's dz, HLD, rectal prolapse s/p colon resection (approx 2017) presenting with HgSBO.

## 2022-07-10 NOTE — H&P ADULT - NSHPLABSRESULTS_GEN_ALL_CORE
Data:  T(C): 36.8 (07-10-22 @ 03:59), Max: 36.9 (07-10-22 @ 00:54)  HR: 90 (07-10-22 @ 03:59) (88 - 90)  BP: 157/90 (07-10-22 @ 03:59) (146/95 - 157/90)  RR: 17 (07-10-22 @ 03:59) (17 - 18)  SpO2: 98% (07-10-22 @ 03:59) (98% - 99%)                        14.5   9.19  )-----------( 256      ( 10 Jul 2022 01:22 )             43.9     07-10    138  |  99  |  21  ----------------------------<  148<H>  4.5   |  32<H>  |  0.72    Ca    11.4<H>      10 Jul 2022 01:22    TPro  8.0  /  Alb  4.2  /  TBili  1.0  /  DBili  x   /  AST  37  /  ALT  56  /  AlkPhos  111  07-10      LIVER FUNCTIONS - ( 10 Jul 2022 01:22 )  Alb: 4.2 g/dL / Pro: 8.0 g/dL / ALK PHOS: 111 U/L / ALT: 56 U/L / AST: 37 U/L / GGT: x           Urinalysis Basic - ( 10 Jul 2022 02:44 )    Color: Yellow / Appearance: Slightly Turbid / S.010 / pH: x  Gluc: x / Ketone: Negative  / Bili: Negative / Urobili: Negative   Blood: x / Protein: 15 / Nitrite: Negative   Leuk Esterase: Trace / RBC: 0-2 /HPF / WBC 3-5   Sq Epi: x / Non Sq Epi: Few / Bacteria: Few    Radiology:  < from: CT Abdomen and Pelvis w/ IV Cont (07.10.22 @ 03:33) >    FINDINGS:  LOWER CHEST: No consolidation or pleuraleffusion. Small hiatal hernia or   wall thickening of the distal thoracic esophagus.    LIVER: Steatosis.  BILE DUCTS: Normal caliber.  GALLBLADDER: Within normal limits.  SPLEEN: Within normal limits.  PANCREAS: Within normal limits.  ADRENALS: Within normal limits.  KIDNEYS/URETERS: Within normal limits.    BLADDER: Mild wall thickening, difficult to assess secondary to   inadequate distention.  REPRODUCTIVE ORGANS: Calcified fibroid uterus. No suspicious adnexal   masses.    BOWEL: Multiple dilated fluid-filled small bowel loops throughout the   abdomen, compatible with high-grade small bowel obstruction. Transition   point identified at the level of the distal jejunal folds in the left   lower abdomen (28:602). There is wall thickening/hyperemia of jejunal   fold distal to the transition point with mild mesenteric edema. Query   pneumatosis involving distal jejunal folds in the left lower quadrant.   Surgical anastomosis identified at the level of the rectosigmoid   junction. Wall thickening of the body/antrum portion of the stomach for   which nonemergent upper endoscopy evaluation is advised as underlying   malignancy. Normal appendix.  PERITONEUM: Mild ascites.  VESSELS: Atherosclerotic changes. Normal aortic caliber. 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 SMA axis appears patent.  RETROPERITONEUM/LYMPH NODES: No lymphadenopathy.  ABDOMINAL WALL: Within normal limits.  BONES: Degenerative changes.    IMPRESSION:    Findings compatible with high-grade small bowel obstruction. Transition   point identified at the level of the distal jejunal folds in the left   lower abdomen (28:602). Wall thickening/hyperemia of jejunal fold distal   to the transition point with mild mesenteric edema. Query pneumatosis   involving distal jejunal folds in the left lower quadrant. Vascular   insult in appropriate clinical setting difficult to exclude. Correlate   with lactic acid.    Wall thickening of the body/antrum portion of the stomach for which   nonemergent upper endoscopy evaluation is advised as underlying   malignancy.    These results were discussed via telephone at 7/10/2022 4:04 AM by Dr. Wade of radiology with Dr. Long, read-back was followed.    --- End of Report ---    CALVIN WADE MD; Attending Radiologist  This document has been electronically signed. Jul 10 2022  4:06AM    < end of copied text >

## 2022-07-10 NOTE — DISCHARGE NOTE PROVIDER - NSDCFUADDINST_GEN_ALL_CORE_FT
May shower- allow soapy water to run over abdomen and pat dry  Ambulation- regularly  Deep breathing exercises/Incentive spirometer  Low fiber diet.   May take tylenol- 325 2 tabs every 4 hours, or 500 mg 2 tabs every 6 hours- Do not exceed 4000mg of acetaminophen (tylenol) daily  Drink plenty of fluids  No strenuous activity  No pools, hot tubs, or large bodies of latent water, no beaches   May shower- allow soapy water to run over abdomen and pat dry.  Ambulation- regularly.  Deep breathing exercises/Incentive spirometer.  Low fiber diet.  May take tylenol- up to 1000mg every 6 hours- Do not exceed 4000mg of acetaminophen (tylenol) daily. You may take motrin 600mg every 6 hours as needed for pain.  Drink plenty of fluids.  No strenuous activity.  No pools, hot tubs, or large bodies of latent water, no beaches.   May shower- allow soapy water to run over abdomen and pat dry.  Ambulation- regularly.  Deep breathing exercises/Incentive spirometer.  Low fiber diet.    May take tylenol- up to 1000mg every 6 hours- Do not exceed 4000mg of acetaminophen (tylenol) daily.  You may take motrin 600mg every 6 hours as needed for pain.  A prescription for percocet was sent to your pharmacy to take as needed for severe pain.    Drink plenty of fluids.  No strenuous activity.  No pools, hot tubs, or large bodies of latent water, no beaches.

## 2022-07-10 NOTE — ED ADULT NURSE NOTE - OBJECTIVE STATEMENT
Patient alert and oriented X 3. Complaining of epigastric pain, nausea since yesterday. Denies chest pain, shortness of breath, headache, dizziness and fever. Lungs clears bilaterally. Respirations even and not labored. Abdomen soft  tender to lower abdomen.

## 2022-07-10 NOTE — PROGRESS NOTE ADULT - SUBJECTIVE AND OBJECTIVE BOX
POST OPERATIVE NOTE  Patient: JONNA KANG 66y (1955) Female   MRN: [id number]  Visit: 07-10-22 Inpatient  Date: 07-10-22 @ 15:40    Procedure: S/P ex lap with SBR & LINDSAY.    Subjective: Patient seen and examined at bedside. Reports feeling uncomfortable, admits to moderate abdominal pain, localized to the surgical incision  NPO with NGT to suction (about 200 cc beige gastric contents in canister), pappas catheter in place (300 cc in bag).  Denies dizziness, chest pain, palpitations, SOB, nausea, vomiting, or urinary complaints.    Objective:  Vitals: T(F): 98.9 (07-10-22 @ 12:49), Max: 98.9 (07-10-22 @ 12:49)  HR: 94 (07-10-22 @ 12:49)  BP: 123/75 (07-10-22 @ 12:49) (120/72 - 157/90)  RR: 18 (07-10-22 @ 12:49)  SpO2: 94% (07-10-22 @ 12:49)    In:   07-10-22 @ 07:01  -  07-10-22 @ 15:40  --------------------------------------------------------  IN: 300 mL    IV Fluids: lactated ringers. 1000 milliLiter(s) (75 mL/Hr) IV Continuous <Continuous>  lactated ringers. 1000 milliLiter(s) (100 mL/Hr) IV Continuous <Continuous>    Out:   07-10-22 @ 07:01  -  07-10-22 @ 15:40  --------------------------------------------------------  OUT: 70 mL    Physical Exam:  GENERAL: NAD, resting comfortably in bed  HEENT:  Atraumatic, normocephalic; conjunctiva and sclera clear; neck supple; NGT in place  CHEST/LUNG: CTA B/L, good inspiratory effort  HEART: Rate & rhythm regular; +S1/S2  ABDOMEN: Soft, nondistended, tender to palpation, dressing in place midline - clean, dry & intact  EXTREMITIES:  2+ peripheral pulses, no clubbing, cyanosis, or edema  PSYCH: A&O x3  NEUROLOGY: Motor & sensory grossly intact  SKIN: Warm & dry    Medications: [Standing]  acetaminophen   IVPB .. 1000 milliGRAM(s) IV Intermittent once  acetaminophen   IVPB .. 1000 milliGRAM(s) IV Intermittent once  atorvastatin 20 milliGRAM(s) Oral at bedtime  diphenhydrAMINE Injectable 25 milliGRAM(s) IntraMuscular every 6 hours PRN  HYDROmorphone  Injectable 0.5 milliGRAM(s) IV Push every 10 minutes PRN  ketorolac   Injectable 15 milliGRAM(s) IV Push every 6 hours PRN  lactated ringers. 1000 milliLiter(s) IV Continuous <Continuous>  lactated ringers. 1000 milliLiter(s) IV Continuous <Continuous>  levothyroxine Injectable 44 MICROGram(s) IV Push at bedtime  morphine  - Injectable 2 milliGRAM(s) IV Push every 4 hours PRN  ondansetron Injectable 4 milliGRAM(s) IV Push once PRN  ondansetron Injectable 4 milliGRAM(s) IV Push every 6 hours PRN  pantoprazole  Injectable 40 milliGRAM(s) IV Push daily  piperacillin/tazobactam IVPB.. 3.375 Gram(s) IV Intermittent every 8 hours    Medications: [PRN]  acetaminophen   IVPB .. 1000 milliGRAM(s) IV Intermittent once  acetaminophen   IVPB .. 1000 milliGRAM(s) IV Intermittent once  atorvastatin 20 milliGRAM(s) Oral at bedtime  diphenhydrAMINE Injectable 25 milliGRAM(s) IntraMuscular every 6 hours PRN  HYDROmorphone  Injectable 0.5 milliGRAM(s) IV Push every 10 minutes PRN  ketorolac   Injectable 15 milliGRAM(s) IV Push every 6 hours PRN  lactated ringers. 1000 milliLiter(s) IV Continuous <Continuous>  lactated ringers. 1000 milliLiter(s) IV Continuous <Continuous>  levothyroxine Injectable 44 MICROGram(s) IV Push at bedtime  morphine  - Injectable 2 milliGRAM(s) IV Push every 4 hours PRN  ondansetron Injectable 4 milliGRAM(s) IV Push once PRN  ondansetron Injectable 4 milliGRAM(s) IV Push every 6 hours PRN  pantoprazole  Injectable 40 milliGRAM(s) IV Push daily  piperacillin/tazobactam IVPB.. 3.375 Gram(s) IV Intermittent every 8 hours    Labs:                        14.5   9.19  )-----------( 256      ( 10 Jul 2022 01:22 )             43.9     07-10    138  |  99  |  21  ----------------------------<  148<H>  4.5   |  32<H>  |  0.72    Ca    11.4<H>      10 Jul 2022 01:22    TPro  8.0  /  Alb  4.2  /  TBili  1.0  /  DBili  x   /  AST  37  /  ALT  56  /  AlkPhos  111  07-10    PT/INR - ( 10 Jul 2022 01:22 )   PT: 11.0 sec;   INR: 0.94 ratio    PTT - ( 10 Jul 2022 01:22 )  PTT:32.7 sec    Imaging:  No post-op imaging studies

## 2022-07-10 NOTE — ED ADULT TRIAGE NOTE - CHIEF COMPLAINT QUOTE
Patient A/Ox4 with clear speech and a steady gait. C/o epigastric pain that began yesterday. Denies radiation of pain. Also endorses diarrhea. Denies fever, n/v. Pain is worse after eating. Accompanied by her . Last took Tylenol at 2000h. Patient A/Ox4 with clear speech and a steady gait. C/o intermittent epigastric "squeezing" pain that began yesterday. Denies radiation of pain. Also endorses diarrhea. Denies fever, n/v. Pain is worse after eating. Accompanied by her . Last took Tylenol at 2000h. Patient A/Ox4 with clear speech and a steady gait. C/o intermittent epigastric "squeezing" pain that began yesterday. Denies radiation of pain. Also endorses bloating and diarrhea. Denies fever, n/v. Pain is worse after eating. Accompanied by her . Last took Tylenol at 2000h. Patient A/Ox4 with clear speech and a steady gait. C/o intermittent epigastric "squeezing" pain that began yesterday. Denies radiation of pain. Also endorses bloating and diarrhea. Denies fever, n/v. Pain is worse after eating and area is tender to palpation. Accompanied by her . Last took Tylenol at 2000h.

## 2022-07-10 NOTE — DISCHARGE NOTE PROVIDER - CARE PROVIDER_API CALL
Dustin Yanez (MD)  Surgery  [address] [facility], [address]  [place] [address]  Phone: [phone]  Fax: [phone]  Follow Up Time:

## 2022-07-10 NOTE — H&P ADULT - PROBLEM SELECTOR PLAN 1
-Discussed with Dr. Yanez  -Plan to go to OR 8AM for ex lap, LINDSAY poss SBR  -NPO, IVF, supportive care  -IV antibiotics started    Surgical Team Contact Information  Spectralink: Ext: [phone] or [phone]  Pager: [phone]

## 2022-07-10 NOTE — DISCHARGE NOTE PROVIDER - HOSPITAL COURSE
HPI: This is a 66y year old Female with PMHx of hashimoto's dz, HLD, rectal prolapse s/p colon resection (approx 2017) presenting with complaint of abdominal pain today. Pt states she was in [facility] until Wednesday when she began feeling bloated/gassy. Pt today began to have twisting, 10/10, generalized mid-abdominal discomfort and decided to come to ER. Pt had limited her intake of food since Wednesday providing mild relief, but had a bagel today and became nauseous with bloating. This evening pt began having nausea vomiting and came to ER. Last colonoscopy/EGD approx 5 years ago, normal. Denies history of, chest pain, shortness of breath, urinary complaints.  (10 Jul 2022 04:42)    Hospital Course:  Pt was found to have a high grade SBO on CT. She underwent successful ex lap with small bowel resection & lysis of adhesions, tolerated it well and was transferred to the PACU then to the floor for monitoring.   Once hemodynamically stable, pt was able to ambulate with assistance.  Mg removed on POD# __  NGT was clamped and removed on POD#__.    Throughout hospital stay, patient was continued on antibiotics, pain was managed adequately.   Diet was advanced appropriately until return of normal GI function was obtained as evidence by passing of flatus and BM in addition to toleration of diet.  Lab values were monitored with resolution of elevated Wc, electrolytes were repleted as indicated.    Dispo: discharge home, to follow up with Dr. Yanez outpatient in 1 week.     HPI: This is a 66y year old Female with PMHx of hashimoto's dz, HLD, rectal prolapse s/p colon resection (approx 2017) presenting with complaint of abdominal pain today. Pt states she was in [facility] until Wednesday when she began feeling bloated/gassy. Pt today began to have twisting, 10/10, generalized mid-abdominal discomfort and decided to come to ER. Pt had limited her intake of food since Wednesday providing mild relief, but had a bagel today and became nauseous with bloating. This evening pt began having nausea vomiting and came to ER. Last colonoscopy/EGD approx 5 years ago, normal. Denies history of, chest pain, shortness of breath, urinary complaints.  (10 Jul 2022 04:42)    Hospital Course:  Pt was found to have a high grade SBO on CT. She underwent successful ex lap with small bowel resection & lysis of adhesions, tolerated it well and was transferred to the PACU then to the floor for monitoring.   Once hemodynamically stable, pt was able to ambulate with assistance.  Mg removed on POD#1 with successful trial of void.   NGT was clamped and removed on POD#2.    Throughout hospital stay, patient was continued on antibiotics, pain was managed adequately.   Diet was advanced appropriately until return of normal GI function was obtained as evidence by passing of flatus and BM in addition to toleration of diet.  Lab values were monitored with resolution of elevated Wc, electrolytes were repleted as indicated.    Dispo: discharge home, to follow up with Dr. Yanez outpatient in 1 week.     HPI: This is a 66y year old Female with PMHx of hashimoto's dz, HLD, rectal prolapse s/p colon resection (approx 2017) presenting with complaint of abdominal pain today. Pt states she was in [facility] until Wednesday when she began feeling bloated/gassy. Pt today began to have twisting, 10/10, generalized mid-abdominal discomfort and decided to come to ER. Pt had limited her intake of food since Wednesday providing mild relief, but had a bagel today and became nauseous with bloating. This evening pt began having nausea vomiting and came to ER. Last colonoscopy/EGD approx 5 years ago, normal. Denies history of, chest pain, shortness of breath, urinary complaints.  (10 Jul 2022 04:42)    Hospital Course:  Pt was found to have a high grade SBO on CT. She underwent successful ex lap with small bowel resection & lysis of adhesions, tolerated it well and was transferred to the PACU then to the floor for monitoring.   Once hemodynamically stable, pt was able to ambulate with assistance.  Mg removed on POD#1 with successful trial of void.   NGT was clamped and removed on POD#2.    Throughout hospital stay, patient was continued on antibiotics, pain was managed adequately.   Diet was advanced appropriately until return of normal GI function was obtained as evidence by passing of flatus and BM in addition to toleration of diet.  Lab values were monitored with resolution of elevated Wc, electrolytes were repleted as indicated.    Dispo: discharge home, to follow up with Dr. Yanez outpatient in 7-10 days.

## 2022-07-10 NOTE — ED ADULT TRIAGE NOTE - NS ED TRIAGE AVPU SCALE
Initial Anesthesia Post-op Note    Patient: Xin Guzman  Procedure(s) Performed: TOTAL COLECTOMYILEOSTOMY  Anesthesia type: General      PATIENT LOCATION: ICU  POST-OP VITAL SIGNS: stable  LEVEL OF CONSCIOUSNESS: alert  RESPIRATORY STATUS: spontaneous ventilation  CARDIOVASCULAR: stable  HYDRATION: euvolemic    PAIN MANAGEMENT: adequately controlled  NAUSEA: None  AIRWAY PATENCY: patent  POST-OP ASSESSMENT: no complications  COMPLICATIONS: none  HANDOFF:  Handoff to receiving nurse was performed and questions were answered       Alert-The patient is alert, awake and responds to voice. The patient is oriented to time, place, and person. The triage nurse is able to obtain subjective information.

## 2022-07-10 NOTE — BRIEF OPERATIVE NOTE - NSICDXBRIEFPROCEDURE_GEN_ALL_CORE_FT
PROCEDURES:  Laparotomy, exploratory 10-Jul-2022 10:03:10  Dustin Yanez  Open lysis of intestinal adhesions 10-Jul-2022 10:03:28  Dustin Yanez  Small bowel resection 10-Jul-2022 10:03:36  Dustin Yanez

## 2022-07-10 NOTE — DISCHARGE NOTE PROVIDER - NSDCCPTREATMENT_GEN_ALL_CORE_FT
PRINCIPAL PROCEDURE  Procedure: Laparotomy, exploratory  Findings and Treatment:       SECONDARY PROCEDURE  Procedure: Small bowel resection  Findings and Treatment:

## 2022-07-10 NOTE — CONSULT NOTE ADULT - NSCONSULTADDITIONALINFOA_GEN_ALL_CORE
Labs reviewed:        7/10/22               14.5   9.19  )-----------( 256                   43.9   138  |  99  |  21  ----------------------------<  148<H>  4.5   |  32<H>  |  0.72    Ca    11.4<H>       TPro  8.0  /  Alb  4.2  /  TBili  1.0  /  DBili  x   /  AST  37  /  ALT  56  /  AlkPhos  111  07-10  PT/INR -   PT: 11.0 sec;   INR: 0.94 ratio    PTT -  PTT:32.7 sec    EK/10/22 NSR 92/min  Cxray: Unremarkable

## 2022-07-10 NOTE — ED ADULT NURSE NOTE - CHIEF COMPLAINT QUOTE
Patient A/Ox4 with clear speech and a steady gait. C/o epigastric pain that began yesterday. Denies radiation of pain. Also endorses diarrhea. Denies fever, n/v. Pain is worse after eating. Accompanied by her . Last took Tylenol at 2000h.

## 2022-07-10 NOTE — PROGRESS NOTE ADULT - PROBLEM SELECTOR PLAN 1
-Discussed with Dr. Yanez  -Ex lap, LINDSAY poss SBR with Dr. Yanez 7/10  -NPO, IVF, supportive care  -IV antibiotics started Zosyn (stop date 7/17)    Surgical Team Contact Information  Spectralink: Ext: [phone] or [phone]  Pager: [phone] -CT showing high grade SBO  -Patient POD#0 s/p ex-lap, lysis of adhesions and small bowel resection  -NPO, IVF, supportive care  -Started on IV zosyn, continue  -monitor NGT output  -Pain control per primary team

## 2022-07-10 NOTE — H&P ADULT - HISTORY OF PRESENT ILLNESS
This is a 66y year old Female with PMHx of hashimoto's dz, HLD, rectal prolapse s/p colon resection (approx 2017) presenting with complaint of abdominal pain today. Pt states she was in [facility] until Wednesday when she began feeling bloated/gassy. Pt today began to have twisting, 10/10, generalized mid-abdominal discomfort and decided to come to ER. Pt had limited her intake of food since Wednesday providing mild relief, but had a bagel today and became nauseous with bloating. This evening pt began having nausea vomiting and came to ER. Last colonoscopy/EGD approx 5 years ago, normal. Denies history of, chest pain, shortness of breath, urinary complaints.

## 2022-07-10 NOTE — DISCHARGE NOTE PROVIDER - NSDCMRMEDTOKEN_GEN_ALL_CORE_FT
levothyroxine 88 mcg (0.088 mg) oral tablet: 1 tab(s) orally once a day  pantoprazole 40 mg oral delayed release tablet: 1 tab(s) orally once a day  rosuvastatin 5 mg oral tablet: 1 tab(s) orally once a day   levothyroxine 88 mcg (0.088 mg) oral tablet: 1 tab(s) orally once a day  oxycodone-acetaminophen 5 mg-325 mg oral tablet: 1-2 tab(s) orally every 4-6 hours, As Needed -for severe pain MDD:6  pantoprazole 40 mg oral delayed release tablet: 1 tab(s) orally once a day  rosuvastatin 5 mg oral tablet: 1 tab(s) orally once a day

## 2022-07-11 LAB
ALBUMIN SERPL ELPH-MCNC: 3 G/DL — LOW (ref 3.3–5)
ALP SERPL-CCNC: 63 U/L — SIGNIFICANT CHANGE UP (ref 40–120)
ALT FLD-CCNC: 36 U/L — SIGNIFICANT CHANGE UP (ref 12–78)
ANION GAP SERPL CALC-SCNC: 7 MMOL/L — SIGNIFICANT CHANGE UP (ref 5–17)
AST SERPL-CCNC: 25 U/L — SIGNIFICANT CHANGE UP (ref 15–37)
BASOPHILS # BLD AUTO: 0.01 K/UL — SIGNIFICANT CHANGE UP (ref 0–0.2)
BASOPHILS NFR BLD AUTO: 0.1 % — SIGNIFICANT CHANGE UP (ref 0–2)
BILIRUB SERPL-MCNC: 1.5 MG/DL — HIGH (ref 0.2–1.2)
BUN SERPL-MCNC: 16 MG/DL — SIGNIFICANT CHANGE UP (ref 7–23)
CALCIUM SERPL-MCNC: 8.8 MG/DL — SIGNIFICANT CHANGE UP (ref 8.5–10.1)
CHLORIDE SERPL-SCNC: 107 MMOL/L — SIGNIFICANT CHANGE UP (ref 96–108)
CO2 SERPL-SCNC: 27 MMOL/L — SIGNIFICANT CHANGE UP (ref 22–31)
CREAT SERPL-MCNC: 0.69 MG/DL — SIGNIFICANT CHANGE UP (ref 0.5–1.3)
EGFR: 96 ML/MIN/1.73M2 — SIGNIFICANT CHANGE UP
EOSINOPHIL # BLD AUTO: 0.01 K/UL — SIGNIFICANT CHANGE UP (ref 0–0.5)
EOSINOPHIL NFR BLD AUTO: 0.1 % — SIGNIFICANT CHANGE UP (ref 0–6)
GLUCOSE SERPL-MCNC: 108 MG/DL — HIGH (ref 70–99)
HCT VFR BLD CALC: 30.5 % — LOW (ref 34.5–45)
HCT VFR BLD CALC: 31.5 % — LOW (ref 34.5–45)
HCT VFR BLD CALC: 33.4 % — LOW (ref 34.5–45)
HCV AB S/CO SERPL IA: 0.09 S/CO — SIGNIFICANT CHANGE UP (ref 0–0.99)
HCV AB SERPL-IMP: SIGNIFICANT CHANGE UP
HGB BLD-MCNC: 10.9 G/DL — LOW (ref 11.5–15.5)
HGB BLD-MCNC: 9.7 G/DL — LOW (ref 11.5–15.5)
HGB BLD-MCNC: 9.9 G/DL — LOW (ref 11.5–15.5)
IMM GRANULOCYTES NFR BLD AUTO: 0.5 % — SIGNIFICANT CHANGE UP (ref 0–1.5)
LYMPHOCYTES # BLD AUTO: 1.37 K/UL — SIGNIFICANT CHANGE UP (ref 1–3.3)
LYMPHOCYTES # BLD AUTO: 16.2 % — SIGNIFICANT CHANGE UP (ref 13–44)
MAGNESIUM SERPL-MCNC: 2.4 MG/DL — SIGNIFICANT CHANGE UP (ref 1.6–2.6)
MCHC RBC-ENTMCNC: 31.4 GM/DL — LOW (ref 32–36)
MCHC RBC-ENTMCNC: 31.4 PG — SIGNIFICANT CHANGE UP (ref 27–34)
MCHC RBC-ENTMCNC: 31.7 PG — SIGNIFICANT CHANGE UP (ref 27–34)
MCHC RBC-ENTMCNC: 31.8 GM/DL — LOW (ref 32–36)
MCHC RBC-ENTMCNC: 31.8 PG — SIGNIFICANT CHANGE UP (ref 27–34)
MCHC RBC-ENTMCNC: 32.6 GM/DL — SIGNIFICANT CHANGE UP (ref 32–36)
MCV RBC AUTO: 100 FL — SIGNIFICANT CHANGE UP (ref 80–100)
MCV RBC AUTO: 97.4 FL — SIGNIFICANT CHANGE UP (ref 80–100)
MCV RBC AUTO: 99.7 FL — SIGNIFICANT CHANGE UP (ref 80–100)
MONOCYTES # BLD AUTO: 0.71 K/UL — SIGNIFICANT CHANGE UP (ref 0–0.9)
MONOCYTES NFR BLD AUTO: 8.4 % — SIGNIFICANT CHANGE UP (ref 2–14)
NEUTROPHILS # BLD AUTO: 6.32 K/UL — SIGNIFICANT CHANGE UP (ref 1.8–7.4)
NEUTROPHILS NFR BLD AUTO: 74.7 % — SIGNIFICANT CHANGE UP (ref 43–77)
NRBC # BLD: 0 /100 WBCS — SIGNIFICANT CHANGE UP (ref 0–0)
PHOSPHATE SERPL-MCNC: 3.6 MG/DL — SIGNIFICANT CHANGE UP (ref 2.5–4.5)
PLATELET # BLD AUTO: 167 K/UL — SIGNIFICANT CHANGE UP (ref 150–400)
PLATELET # BLD AUTO: 177 K/UL — SIGNIFICANT CHANGE UP (ref 150–400)
PLATELET # BLD AUTO: 199 K/UL — SIGNIFICANT CHANGE UP (ref 150–400)
POTASSIUM SERPL-MCNC: 3.6 MMOL/L — SIGNIFICANT CHANGE UP (ref 3.5–5.3)
POTASSIUM SERPL-SCNC: 3.6 MMOL/L — SIGNIFICANT CHANGE UP (ref 3.5–5.3)
PROT SERPL-MCNC: 6.2 G/DL — SIGNIFICANT CHANGE UP (ref 6–8.3)
RBC # BLD: 3.06 M/UL — LOW (ref 3.8–5.2)
RBC # BLD: 3.15 M/UL — LOW (ref 3.8–5.2)
RBC # BLD: 3.43 M/UL — LOW (ref 3.8–5.2)
RBC # FLD: 12.1 % — SIGNIFICANT CHANGE UP (ref 10.3–14.5)
SODIUM SERPL-SCNC: 141 MMOL/L — SIGNIFICANT CHANGE UP (ref 135–145)
WBC # BLD: 7.84 K/UL — SIGNIFICANT CHANGE UP (ref 3.8–10.5)
WBC # BLD: 7.87 K/UL — SIGNIFICANT CHANGE UP (ref 3.8–10.5)
WBC # BLD: 8.46 K/UL — SIGNIFICANT CHANGE UP (ref 3.8–10.5)
WBC # FLD AUTO: 7.84 K/UL — SIGNIFICANT CHANGE UP (ref 3.8–10.5)
WBC # FLD AUTO: 7.87 K/UL — SIGNIFICANT CHANGE UP (ref 3.8–10.5)
WBC # FLD AUTO: 8.46 K/UL — SIGNIFICANT CHANGE UP (ref 3.8–10.5)

## 2022-07-11 PROCEDURE — 99233 SBSQ HOSP IP/OBS HIGH 50: CPT | Mod: GC

## 2022-07-11 PROCEDURE — 93010 ELECTROCARDIOGRAM REPORT: CPT

## 2022-07-11 RX ORDER — ACETAMINOPHEN 500 MG
1000 TABLET ORAL ONCE
Refills: 0 | Status: COMPLETED | OUTPATIENT
Start: 2022-07-12 | End: 2022-07-12

## 2022-07-11 RX ORDER — ACETAMINOPHEN 500 MG
1000 TABLET ORAL ONCE
Refills: 0 | Status: COMPLETED | OUTPATIENT
Start: 2022-07-11 | End: 2022-07-11

## 2022-07-11 RX ORDER — KETOROLAC TROMETHAMINE 30 MG/ML
15 SYRINGE (ML) INJECTION EVERY 6 HOURS
Refills: 0 | Status: DISCONTINUED | OUTPATIENT
Start: 2022-07-11 | End: 2022-07-11

## 2022-07-11 RX ORDER — KETOROLAC TROMETHAMINE 30 MG/ML
15 SYRINGE (ML) INJECTION EVERY 6 HOURS
Refills: 0 | Status: DISCONTINUED | OUTPATIENT
Start: 2022-07-11 | End: 2022-07-15

## 2022-07-11 RX ADMIN — MORPHINE SULFATE 4 MILLIGRAM(S): 50 CAPSULE, EXTENDED RELEASE ORAL at 16:11

## 2022-07-11 RX ADMIN — MORPHINE SULFATE 4 MILLIGRAM(S): 50 CAPSULE, EXTENDED RELEASE ORAL at 02:07

## 2022-07-11 RX ADMIN — Medication 400 MILLIGRAM(S): at 21:47

## 2022-07-11 RX ADMIN — MORPHINE SULFATE 4 MILLIGRAM(S): 50 CAPSULE, EXTENDED RELEASE ORAL at 21:07

## 2022-07-11 RX ADMIN — Medication 15 MILLIGRAM(S): at 04:52

## 2022-07-11 RX ADMIN — PIPERACILLIN AND TAZOBACTAM 25 GRAM(S): 4; .5 INJECTION, POWDER, LYOPHILIZED, FOR SOLUTION INTRAVENOUS at 02:05

## 2022-07-11 RX ADMIN — SODIUM CHLORIDE 100 MILLILITER(S): 9 INJECTION INTRAMUSCULAR; INTRAVENOUS; SUBCUTANEOUS at 13:11

## 2022-07-11 RX ADMIN — MORPHINE SULFATE 4 MILLIGRAM(S): 50 CAPSULE, EXTENDED RELEASE ORAL at 21:22

## 2022-07-11 RX ADMIN — MORPHINE SULFATE 4 MILLIGRAM(S): 50 CAPSULE, EXTENDED RELEASE ORAL at 03:41

## 2022-07-11 RX ADMIN — Medication 15 MILLIGRAM(S): at 14:32

## 2022-07-11 RX ADMIN — PIPERACILLIN AND TAZOBACTAM 25 GRAM(S): 4; .5 INJECTION, POWDER, LYOPHILIZED, FOR SOLUTION INTRAVENOUS at 09:55

## 2022-07-11 RX ADMIN — Medication 400 MILLIGRAM(S): at 05:02

## 2022-07-11 RX ADMIN — PANTOPRAZOLE SODIUM 40 MILLIGRAM(S): 20 TABLET, DELAYED RELEASE ORAL at 13:11

## 2022-07-11 RX ADMIN — Medication 44 MICROGRAM(S): at 21:47

## 2022-07-11 RX ADMIN — MORPHINE SULFATE 4 MILLIGRAM(S): 50 CAPSULE, EXTENDED RELEASE ORAL at 11:15

## 2022-07-11 RX ADMIN — Medication 400 MILLIGRAM(S): at 17:17

## 2022-07-11 RX ADMIN — Medication 15 MILLIGRAM(S): at 20:16

## 2022-07-11 RX ADMIN — Medication 1000 MILLIGRAM(S): at 18:00

## 2022-07-11 RX ADMIN — Medication 1 DROP(S): at 20:16

## 2022-07-11 RX ADMIN — MORPHINE SULFATE 2 MILLIGRAM(S): 50 CAPSULE, EXTENDED RELEASE ORAL at 03:41

## 2022-07-11 RX ADMIN — PIPERACILLIN AND TAZOBACTAM 25 GRAM(S): 4; .5 INJECTION, POWDER, LYOPHILIZED, FOR SOLUTION INTRAVENOUS at 17:03

## 2022-07-11 RX ADMIN — MORPHINE SULFATE 4 MILLIGRAM(S): 50 CAPSULE, EXTENDED RELEASE ORAL at 17:04

## 2022-07-11 RX ADMIN — ONDANSETRON 4 MILLIGRAM(S): 8 TABLET, FILM COATED ORAL at 02:15

## 2022-07-11 RX ADMIN — Medication 15 MILLIGRAM(S): at 03:41

## 2022-07-11 RX ADMIN — MORPHINE SULFATE 4 MILLIGRAM(S): 50 CAPSULE, EXTENDED RELEASE ORAL at 10:45

## 2022-07-11 RX ADMIN — Medication 1000 MILLIGRAM(S): at 03:41

## 2022-07-11 RX ADMIN — Medication 25 MILLIGRAM(S): at 21:47

## 2022-07-11 RX ADMIN — MORPHINE SULFATE 4 MILLIGRAM(S): 50 CAPSULE, EXTENDED RELEASE ORAL at 06:45

## 2022-07-11 RX ADMIN — Medication 15 MILLIGRAM(S): at 05:22

## 2022-07-11 RX ADMIN — Medication 15 MILLIGRAM(S): at 20:20

## 2022-07-11 RX ADMIN — Medication 1000 MILLIGRAM(S): at 22:15

## 2022-07-11 RX ADMIN — Medication 15 MILLIGRAM(S): at 13:32

## 2022-07-11 RX ADMIN — ENOXAPARIN SODIUM 40 MILLIGRAM(S): 100 INJECTION SUBCUTANEOUS at 02:18

## 2022-07-11 NOTE — PROGRESS NOTE ADULT - PROBLEM SELECTOR PLAN 1
-CT showing high grade SBO  -Patient POD#1 s/p ex-lap, lysis of adhesions and small bowel resection  -NPO, IVF, supportive care  -Started on IV zosyn, continue  -monitor NGT output  -Pain control per primary team  - Hgb 10.4 (7/11) down from 14.9 (7/10) f/u repeat cbc   - f/u urination/stooling/pass gas

## 2022-07-11 NOTE — PROGRESS NOTE ADULT - SUBJECTIVE AND OBJECTIVE BOX
Patient is a 66y old  Female who presents with a chief complaint of HgSBO (2022 07:21)      INTERVAL HPI/OVERNIGHT EVENTS: Pt was seen and examined at bedside. Pt POD1 ex-lap, LINDSAY and SBR. No overnight events. Pt is resting comfortably in bed, NGT on wall suction, abdominal binder in place. Pappas removed. Pt reports pain is controlled. Complaining of mild bloating and midline incisional pain. Pt denies urinating, stooling or passing gas at this time. Pt denies ha, dizziness, cp, sob, fevers, chills, n/v/d, calf pain. Pt's Hgb dropped from 14.9 to 10.9, repeat cbc pending.    MEDICATIONS  (STANDING):  atorvastatin 20 milliGRAM(s) Oral at bedtime  enoxaparin Injectable 40 milliGRAM(s) SubCutaneous every 24 hours  levothyroxine Injectable 44 MICROGram(s) IV Push at bedtime  pantoprazole  Injectable 40 milliGRAM(s) IV Push daily  piperacillin/tazobactam IVPB.. 3.375 Gram(s) IV Intermittent every 8 hours  sodium chloride 0.9%. 1000 milliLiter(s) (100 mL/Hr) IV Continuous <Continuous>    MEDICATIONS  (PRN):  diphenhydrAMINE Injectable 25 milliGRAM(s) IV Push at bedtime PRN Insomnia  ketorolac   Injectable 15 milliGRAM(s) IV Push every 6 hours PRN Mild Pain (1 - 3)  morphine  - Injectable 2 milliGRAM(s) IV Push every 4 hours PRN Moderate Pain (4 - 6)  morphine  - Injectable 4 milliGRAM(s) IV Push every 4 hours PRN Severe Pain (7 - 10)  ondansetron Injectable 4 milliGRAM(s) IV Push every 6 hours PRN Nausea and/or Vomiting      Allergies    tetanus toxoid (Anaphylaxis)    Intolerances        REVIEW OF SYSTEMS:  CONSTITUTIONAL: No fever or chills  HEENT:  No headache, no sore throat  RESPIRATORY: No cough, wheezing, or shortness of breath  CARDIOVASCULAR: No chest pain, palpitations  GASTROINTESTINAL: Incisional abd pain, complains of mild bloating. Denies nausea, vomiting, or diarrhea. Denies stooling or passing gas  GENITOURINARY: pappas no longer in place, denies urinating on own at this time  NEUROLOGICAL: no focal weakness or dizziness  MUSCULOSKELETAL: no myalgias     Vital Signs Last 24 Hrs  T(C): 36.7 (2022 05:39), Max: 37.5 (10 Jul 2022 20:39)  T(F): 98 (2022 05:39), Max: 99.5 (10 Jul 2022 20:39)  HR: 78 (2022 05:39) (78 - 98)  BP: 101/63 (2022 05:39) (93/51 - 123/75)  BP(mean): --  RR: 20 (2022 05:39) (18 - 20)  SpO2: 94% (2022 05:39) (94% - 94%)    Parameters below as of 2022 05:39  Patient On (Oxygen Delivery Method): nasal cannula  O2 Flow (L/min): 2      PHYSICAL EXAM:  GENERAL: NAD, NGT to wall suction, abd binder in place  HEENT:  anicteric, moist mucous membranes  CHEST/LUNG:  CTA b/l, no rales, wheezes, or rhonchi  HEART:  RRR, S1, S2  ABDOMEN:  BS+, soft, mildly tender to palpation, midline incision tenderness, nondistended  EXTREMITIES: no edema, cyanosis, or calf tenderness  NERVOUS SYSTEM: answers questions and follows commands appropriately    LABS:                        10.9   8.46  )-----------( 199      ( 2022 08:15 )             33.4     CBC Full  -  ( 2022 08:15 )  WBC Count : 8.46 K/uL  Hemoglobin : 10.9 g/dL  Hematocrit : 33.4 %  Platelet Count - Automated : 199 K/uL  Mean Cell Volume : 97.4 fl  Mean Cell Hemoglobin : 31.8 pg  Mean Cell Hemoglobin Concentration : 32.6 gm/dL  Auto Neutrophil # : 6.32 K/uL  Auto Lymphocyte # : 1.37 K/uL  Auto Monocyte # : 0.71 K/uL  Auto Eosinophil # : 0.01 K/uL  Auto Basophil # : 0.01 K/uL  Auto Neutrophil % : 74.7 %  Auto Lymphocyte % : 16.2 %  Auto Monocyte % : 8.4 %  Auto Eosinophil % : 0.1 %  Auto Basophil % : 0.1 %    2022 08:15    141    |  107    |  16     ----------------------------<  108    3.6     |  27     |  0.69     Ca    8.8        2022 08:15  Phos  3.6       2022 08:15  Mg     2.4       2022 08:15    TPro  6.2    /  Alb  3.0    /  TBili  1.5    /  DBili  x      /  AST  25     /  ALT  36     /  AlkPhos  63     2022 08:15    PT/INR - ( 10 Jul 2022 01:22 )   PT: 11.0 sec;   INR: 0.94 ratio         PTT - ( 10 Jul 2022 01:22 )  PTT:32.7 sec  Urinalysis Basic - ( 10 Jul 2022 02:44 )    Color: Yellow / Appearance: Slightly Turbid / S.010 / pH: x  Gluc: x / Ketone: Negative  / Bili: Negative / Urobili: Negative   Blood: x / Protein: 15 / Nitrite: Negative   Leuk Esterase: Trace / RBC: 0-2 /HPF / WBC 3-5   Sq Epi: x / Non Sq Epi: Few / Bacteria: Few      CAPILLARY BLOOD GLUCOSE              RADIOLOGY & ADDITIONAL TESTS:    Personally reviewed.     Consultant(s) Notes Reviewed:  [x] YES  [ ] NO

## 2022-07-11 NOTE — PROGRESS NOTE ADULT - SUBJECTIVE AND OBJECTIVE BOX
SURGERY PA NOTE ON BEHALF OF DR. YANEZ    Patient seen and examined at bedside.  States her pain is controlled, with reduced abdominal pain this am.  NGT tube in place, pappas catheter removed this am.  Denies N/V, flatus, bowel movements, chest pain, SOB, calf pain.      MEDICATIONS:  atorvastatin 20 milliGRAM(s) Oral at bedtime  diphenhydrAMINE Injectable 25 milliGRAM(s) IV Push at bedtime PRN  enoxaparin Injectable 40 milliGRAM(s) SubCutaneous every 24 hours  ketorolac   Injectable 15 milliGRAM(s) IV Push every 6 hours PRN  levothyroxine Injectable 44 MICROGram(s) IV Push at bedtime  morphine  - Injectable 2 milliGRAM(s) IV Push every 4 hours PRN  morphine  - Injectable 4 milliGRAM(s) IV Push every 4 hours PRN  ondansetron Injectable 4 milliGRAM(s) IV Push every 6 hours PRN  pantoprazole  Injectable 40 milliGRAM(s) IV Push daily  piperacillin/tazobactam IVPB.. 3.375 Gram(s) IV Intermittent every 8 hours  sodium chloride 0.9%. 1000 milliLiter(s) IV Continuous <Continuous>      O:  Vital Signs Last 24 Hrs  T(C): 36.7 (11 Jul 2022 05:39), Max: 37.5 (10 Jul 2022 20:39)  T(F): 98 (11 Jul 2022 05:39), Max: 99.5 (10 Jul 2022 20:39)  HR: 78 (11 Jul 2022 05:39) (73 - 98)  BP: 101/63 (11 Jul 2022 05:39) (93/51 - 154/76)  BP(mean): --  RR: 20 (11 Jul 2022 05:39) (14 - 20)  SpO2: 94% (11 Jul 2022 05:39) (94% - 100%)    Parameters below as of 11 Jul 2022 05:39  Patient On (Oxygen Delivery Method): nasal cannula  O2 Flow (L/min): 2      I&O SUMMARY:    07-10-22 @ 07:01  -  07-11-22 @ 07:00  --------------------------------------------------------  IN: 300 mL / OUT: 1270 mL / NET: -970 mL        PHYSICAL EXAM:  Lungs: CTA bilat without W/R/R  Card: S1S2  Abd: Moderate tenderness in all 4 quadrants.  +BS x 4.  No rebound/guarding.    Ext: Calves soft, NT, without edema bilat    LABS:                        14.5   9.19  )-----------( 256      ( 10 Jul 2022 01:22 )             43.9     07-10    138  |  99  |  21  ----------------------------<  148<H>  4.5   |  32<H>  |  0.72    Ca    11.4<H>      10 Jul 2022 01:22    TPro  8.0  /  Alb  4.2  /  TBili  1.0  /  DBili  x   /  AST  37  /  ALT  56  /  AlkPhos  111  07-10    PT/INR - ( 10 Jul 2022 01:22 )   PT: 11.0 sec;   INR: 0.94 ratio         PTT - ( 10 Jul 2022 01:22 )  PTT:32.7 sec    RADIOLOGY:    Assessment:  66 year old female s/p ex lap with SBR & LINDSAY POD #1 clinically improving    Plan:  - Reassuring Vital signs   - D/C pappas   - F/U am labs  - Continue NGT to suction, monitor output, consider clamp trial?    - NPO  - IVF  - Pain control/OOB  - Encouraged IS use   - SCDs for DVT ppx  - Will discuss above with Dr. Yanez

## 2022-07-12 LAB
ALBUMIN SERPL ELPH-MCNC: 2.8 G/DL — LOW (ref 3.3–5)
ALP SERPL-CCNC: 66 U/L — SIGNIFICANT CHANGE UP (ref 40–120)
ALT FLD-CCNC: 33 U/L — SIGNIFICANT CHANGE UP (ref 12–78)
ANION GAP SERPL CALC-SCNC: 7 MMOL/L — SIGNIFICANT CHANGE UP (ref 5–17)
AST SERPL-CCNC: 30 U/L — SIGNIFICANT CHANGE UP (ref 15–37)
BILIRUB SERPL-MCNC: 1 MG/DL — SIGNIFICANT CHANGE UP (ref 0.2–1.2)
BUN SERPL-MCNC: 16 MG/DL — SIGNIFICANT CHANGE UP (ref 7–23)
CALCIUM SERPL-MCNC: 8.6 MG/DL — SIGNIFICANT CHANGE UP (ref 8.5–10.1)
CHLORIDE SERPL-SCNC: 109 MMOL/L — HIGH (ref 96–108)
CO2 SERPL-SCNC: 26 MMOL/L — SIGNIFICANT CHANGE UP (ref 22–31)
CREAT SERPL-MCNC: 0.59 MG/DL — SIGNIFICANT CHANGE UP (ref 0.5–1.3)
EGFR: 99 ML/MIN/1.73M2 — SIGNIFICANT CHANGE UP
GLUCOSE SERPL-MCNC: 74 MG/DL — SIGNIFICANT CHANGE UP (ref 70–99)
HCT VFR BLD CALC: 32.1 % — LOW (ref 34.5–45)
HGB BLD-MCNC: 10 G/DL — LOW (ref 11.5–15.5)
MAGNESIUM SERPL-MCNC: 2.4 MG/DL — SIGNIFICANT CHANGE UP (ref 1.6–2.6)
MCHC RBC-ENTMCNC: 31.2 GM/DL — LOW (ref 32–36)
MCHC RBC-ENTMCNC: 31.6 PG — SIGNIFICANT CHANGE UP (ref 27–34)
MCV RBC AUTO: 101.6 FL — HIGH (ref 80–100)
NRBC # BLD: 0 /100 WBCS — SIGNIFICANT CHANGE UP (ref 0–0)
PHOSPHATE SERPL-MCNC: 2.5 MG/DL — SIGNIFICANT CHANGE UP (ref 2.5–4.5)
PLATELET # BLD AUTO: 170 K/UL — SIGNIFICANT CHANGE UP (ref 150–400)
POTASSIUM SERPL-MCNC: 3.4 MMOL/L — LOW (ref 3.5–5.3)
POTASSIUM SERPL-SCNC: 3.4 MMOL/L — LOW (ref 3.5–5.3)
PROT SERPL-MCNC: 6.3 G/DL — SIGNIFICANT CHANGE UP (ref 6–8.3)
RBC # BLD: 3.16 M/UL — LOW (ref 3.8–5.2)
RBC # FLD: 12.1 % — SIGNIFICANT CHANGE UP (ref 10.3–14.5)
SODIUM SERPL-SCNC: 142 MMOL/L — SIGNIFICANT CHANGE UP (ref 135–145)
WBC # BLD: 7.82 K/UL — SIGNIFICANT CHANGE UP (ref 3.8–10.5)
WBC # FLD AUTO: 7.82 K/UL — SIGNIFICANT CHANGE UP (ref 3.8–10.5)

## 2022-07-12 PROCEDURE — 74018 RADEX ABDOMEN 1 VIEW: CPT | Mod: 26

## 2022-07-12 PROCEDURE — 99232 SBSQ HOSP IP/OBS MODERATE 35: CPT | Mod: GC

## 2022-07-12 RX ORDER — PANTOPRAZOLE SODIUM 20 MG/1
40 TABLET, DELAYED RELEASE ORAL
Refills: 0 | Status: DISCONTINUED | OUTPATIENT
Start: 2022-07-12 | End: 2022-07-15

## 2022-07-12 RX ORDER — LEVOTHYROXINE SODIUM 125 MCG
88 TABLET ORAL DAILY
Refills: 0 | Status: DISCONTINUED | OUTPATIENT
Start: 2022-07-12 | End: 2022-07-15

## 2022-07-12 RX ORDER — ATORVASTATIN CALCIUM 80 MG/1
20 TABLET, FILM COATED ORAL AT BEDTIME
Refills: 0 | Status: DISCONTINUED | OUTPATIENT
Start: 2022-07-12 | End: 2022-07-15

## 2022-07-12 RX ADMIN — MORPHINE SULFATE 4 MILLIGRAM(S): 50 CAPSULE, EXTENDED RELEASE ORAL at 12:28

## 2022-07-12 RX ADMIN — Medication 1 DROP(S): at 11:13

## 2022-07-12 RX ADMIN — Medication 15 MILLIGRAM(S): at 08:28

## 2022-07-12 RX ADMIN — Medication 15 MILLIGRAM(S): at 13:31

## 2022-07-12 RX ADMIN — ENOXAPARIN SODIUM 40 MILLIGRAM(S): 100 INJECTION SUBCUTANEOUS at 02:50

## 2022-07-12 RX ADMIN — Medication 25 MILLIGRAM(S): at 21:34

## 2022-07-12 RX ADMIN — SODIUM CHLORIDE 100 MILLILITER(S): 9 INJECTION INTRAMUSCULAR; INTRAVENOUS; SUBCUTANEOUS at 07:55

## 2022-07-12 RX ADMIN — Medication 15 MILLIGRAM(S): at 07:54

## 2022-07-12 RX ADMIN — MORPHINE SULFATE 4 MILLIGRAM(S): 50 CAPSULE, EXTENDED RELEASE ORAL at 11:26

## 2022-07-12 RX ADMIN — Medication 15 MILLIGRAM(S): at 21:00

## 2022-07-12 RX ADMIN — Medication 88 MICROGRAM(S): at 11:13

## 2022-07-12 RX ADMIN — PIPERACILLIN AND TAZOBACTAM 25 GRAM(S): 4; .5 INJECTION, POWDER, LYOPHILIZED, FOR SOLUTION INTRAVENOUS at 00:51

## 2022-07-12 RX ADMIN — Medication 15 MILLIGRAM(S): at 02:50

## 2022-07-12 RX ADMIN — MORPHINE SULFATE 4 MILLIGRAM(S): 50 CAPSULE, EXTENDED RELEASE ORAL at 18:11

## 2022-07-12 RX ADMIN — Medication 400 MILLIGRAM(S): at 11:12

## 2022-07-12 RX ADMIN — Medication 1000 MILLIGRAM(S): at 13:28

## 2022-07-12 RX ADMIN — Medication 15 MILLIGRAM(S): at 20:16

## 2022-07-12 RX ADMIN — Medication 400 MILLIGRAM(S): at 04:20

## 2022-07-12 RX ADMIN — MORPHINE SULFATE 4 MILLIGRAM(S): 50 CAPSULE, EXTENDED RELEASE ORAL at 18:45

## 2022-07-12 RX ADMIN — PANTOPRAZOLE SODIUM 40 MILLIGRAM(S): 20 TABLET, DELAYED RELEASE ORAL at 11:13

## 2022-07-12 RX ADMIN — Medication 15 MILLIGRAM(S): at 14:00

## 2022-07-12 RX ADMIN — Medication 1000 MILLIGRAM(S): at 04:50

## 2022-07-12 RX ADMIN — Medication 15 MILLIGRAM(S): at 03:25

## 2022-07-12 RX ADMIN — ATORVASTATIN CALCIUM 20 MILLIGRAM(S): 80 TABLET, FILM COATED ORAL at 21:34

## 2022-07-12 RX ADMIN — MORPHINE SULFATE 4 MILLIGRAM(S): 50 CAPSULE, EXTENDED RELEASE ORAL at 01:15

## 2022-07-12 RX ADMIN — SODIUM CHLORIDE 50 MILLILITER(S): 9 INJECTION INTRAMUSCULAR; INTRAVENOUS; SUBCUTANEOUS at 11:26

## 2022-07-12 RX ADMIN — MORPHINE SULFATE 4 MILLIGRAM(S): 50 CAPSULE, EXTENDED RELEASE ORAL at 01:40

## 2022-07-12 NOTE — PROGRESS NOTE ADULT - ATTENDING COMMENTS
This is a 66y year old Female with PMHx of hashimoto's dz, HLD, rectal prolapse s/p colon resection (approx 2017) presenting with high grade SBO.    Patient seen and examined at bedside post-op. Patient is POD#0 s/p ex-lap with lysis of adhesions and small bowel resection. Patient complaining of mild abdominal pain, denies any chest pain, SOB. Patient with NGT in place, currently NPO. Pain control and management of NGT per surgery. Continue levothyroxine- switched to IV as patient NPO. Continue protonix IV.
This is a 66y year old Female with PMHx of hashimoto's dz, HLD, rectal prolapse s/p colon resection (approx 2017) presenting with high grade SBO.    Patient seen and examined at bedside post-op. Patient is POD#1 s/p ex-lap with lysis of adhesions and small bowel resection. Patient complaining of mild abdominal pain, denies any chest pain, SOB. Patient with NGT in place, currently NPO. Pain control and management of NGT per surgery. Continue levothyroxine- switched to IV as patient NPO. Continue protonix IV. Patient noted to have drop in Hgb, repeat Hgb with further drop. Patient evaluated in the afternoon, complaining of feeling weak and jittery, will transfuse 1 unit PRBC as patient with likely symptomatic anemia, patient agreeable to blood transfusion.
This is a 66y year old Female with PMHx of hashimoto's dz, HLD, rectal prolapse s/p colon resection (approx 2017) presenting with high grade SBO.    Patient seen and examined at bedside. Patient is POD#2 s/p ex-lap with lysis of adhesions and small bowel resection. Patient states she feels a bit better today, passing gas, tolerated clear liquids for breakfast (NGT removed by Surgery this AM). States she has minimal abdominal pain. Patient with stable Hgb, did not receive blood transfusion yesterday. Switch IV meds to PO. Encourage incentive spirometer, OOB and ambulation. Advance diet per Surgery. Will sign off at this time. Please reconsult as needed.

## 2022-07-12 NOTE — PROGRESS NOTE ADULT - SUBJECTIVE AND OBJECTIVE BOX
Patient is a 66y old  Female who presents with a chief complaint of HgSBO (12 Jul 2022 07:27)      INTERVAL HPI/OVERNIGHT EVENTS: Patient seen and examined at bedside. Patient's repeat H/H from yesterday showed stable hgb of 9.7, no unit was given. Patient has no complaints at this time other than expected abdominal pain from surgery. Denies fevers, chills, headache, lightheadedness, chest pain, dyspnea, n/v/d/c. Patient states she feels better, admits to passing gas. NGT currently clamped.     MEDICATIONS  (STANDING):  acetaminophen   IVPB .. 1000 milliGRAM(s) IV Intermittent once  artificial  tears Solution 1 Drop(s) Both EYES daily  atorvastatin 20 milliGRAM(s) Oral at bedtime  enoxaparin Injectable 40 milliGRAM(s) SubCutaneous every 24 hours  ketorolac   Injectable 15 milliGRAM(s) IV Push every 6 hours  levothyroxine Injectable 44 MICROGram(s) IV Push at bedtime  pantoprazole  Injectable 40 milliGRAM(s) IV Push daily  piperacillin/tazobactam IVPB.. 3.375 Gram(s) IV Intermittent every 8 hours  sodium chloride 0.9%. 1000 milliLiter(s) (100 mL/Hr) IV Continuous <Continuous>    MEDICATIONS  (PRN):  diphenhydrAMINE Injectable 25 milliGRAM(s) IV Push at bedtime PRN Insomnia  morphine  - Injectable 2 milliGRAM(s) IV Push every 4 hours PRN Moderate Pain (4 - 6)  morphine  - Injectable 4 milliGRAM(s) IV Push every 4 hours PRN Severe Pain (7 - 10)  ondansetron Injectable 4 milliGRAM(s) IV Push every 6 hours PRN Nausea and/or Vomiting      Allergies    tetanus toxoid (Anaphylaxis)    Intolerances        REVIEW OF SYSTEMS:  CONSTITUTIONAL: No fever or chills  HEENT:  No headache, no sore throat  RESPIRATORY: No cough, wheezing, or shortness of breath  CARDIOVASCULAR: No chest pain, palpitations  GASTROINTESTINAL: +abd pain, no nausea, vomiting, or diarrhea  GENITOURINARY: No dysuria, frequency, or hematuria  NEUROLOGICAL: no focal weakness or dizziness  MUSCULOSKELETAL: no myalgias     Vital Signs Last 24 Hrs  T(C): 36.7 (12 Jul 2022 05:13), Max: 36.9 (11 Jul 2022 20:07)  T(F): 98 (12 Jul 2022 05:13), Max: 98.4 (11 Jul 2022 20:07)  HR: 69 (12 Jul 2022 05:13) (67 - 69)  BP: 99/61 (12 Jul 2022 05:13) (95/57 - 102/56)  BP(mean): --  RR: 18 (12 Jul 2022 05:13) (18 - 18)  SpO2: 96% (12 Jul 2022 05:13) (96% - 97%)    Parameters below as of 12 Jul 2022 05:13  Patient On (Oxygen Delivery Method): nasal cannula  O2 Flow (L/min): 2      PHYSICAL EXAM:  GENERAL: NAD, NGT clamped  HEENT:  anicteric, moist mucous membranes, PERRL  CHEST/LUNG:  CTA b/l, no rales, wheezes, or rhonchi  HEART:  RRR, S1, S2  ABDOMEN:  BS+, soft, mildly tender to palpation, midline incision tenderness, nondistended  EXTREMITIES: no edema, cyanosis, or calf tenderness  NERVOUS SYSTEM: A&Ox3, no focal sensory deficits, good muscle strenght of UE and LE, answers questions and follows commands appropriately    LABS:                        9.7    7.84  )-----------( 167      ( 11 Jul 2022 18:20 )             30.5     CBC Full  -  ( 11 Jul 2022 18:20 )  WBC Count : 7.84 K/uL  Hemoglobin : 9.7 g/dL  Hematocrit : 30.5 %  Platelet Count - Automated : 167 K/uL  Mean Cell Volume : 99.7 fl  Mean Cell Hemoglobin : 31.7 pg  Mean Cell Hemoglobin Concentration : 31.8 gm/dL  Auto Neutrophil # : x  Auto Lymphocyte # : x  Auto Monocyte # : x  Auto Eosinophil # : x  Auto Basophil # : x  Auto Neutrophil % : x  Auto Lymphocyte % : x  Auto Monocyte % : x  Auto Eosinophil % : x  Auto Basophil % : x      Ca    8.8        11 Jul 2022 08:15      RADIOLOGY & ADDITIONAL TESTS:  No new studies in the past 24 hours  Personally reviewed.     Consultant(s) Notes Reviewed:  [x] YES  [ ] NO

## 2022-07-12 NOTE — PROGRESS NOTE ADULT - PROBLEM SELECTOR PLAN 3
-On statin at home, can continue via NGT if ok with primary team

## 2022-07-12 NOTE — PROGRESS NOTE ADULT - PROBLEM SELECTOR PLAN 2
-History of Hashimoto's disease  -On levothyroxine, switched to IV as unable to tolerate PO currently, switch back to PO when able to tolerate diet

## 2022-07-12 NOTE — PROGRESS NOTE ADULT - PROBLEM SELECTOR PLAN 1
Pain control, supportive care  OOB/Ambulate as tolerated  NG clamped, possible removal later today  NPO, IV fluids, possible advancement to clears later today  Incentive spirometry   Continue Zosyn  Continue Lovenox, SCDs for DVT ppx,   F/U AM labs, trend H/H, replete lytes PRN  F/U AM [place]  Will discuss with Dr. Yanez

## 2022-07-12 NOTE — PROGRESS NOTE ADULT - SUBJECTIVE AND OBJECTIVE BOX
SUBJECTIVE:  Seen and examined at bedside. Patient feeling much better this morning, states she got a good night sleep. She is c/o of some abdominal pain, but reports feeling less bloated. Admits to passing gas. Denies fever, nausea, vomiting, BM.    Vital Signs Last 24 Hrs  T(C): 36.7 (12 Jul 2022 05:13), Max: 36.9 (11 Jul 2022 20:07)  T(F): 98 (12 Jul 2022 05:13), Max: 98.4 (11 Jul 2022 20:07)  HR: 69 (12 Jul 2022 05:13) (67 - 69)  BP: 99/61 (12 Jul 2022 05:13) (95/57 - 102/56)  BP(mean): --  RR: 18 (12 Jul 2022 05:13) (18 - 18)  SpO2: 96% (12 Jul 2022 05:13) (96% - 97%)    Parameters below as of 12 Jul 2022 05:13  Patient On (Oxygen Delivery Method): nasal cannula  O2 Flow (L/min): 2    PHYSICAL EXAM:  GENERAL: NAD, resting comfortably. NG in place.   HEAD:  Atraumatic, Normocephalic  ABDOMEN: Dressing to midline incision, clean, dry and intact. Soft, mildly distended. Mild tenderness surrounding incision. +BS  EXT: NO calf tenderness b/l.   NEUROLOGY: A&O x 3    MEDICATIONS  (STANDING):  acetaminophen   IVPB .. 1000 milliGRAM(s) IV Intermittent once  artificial  tears Solution 1 Drop(s) Both EYES daily  atorvastatin 20 milliGRAM(s) Oral at bedtime  enoxaparin Injectable 40 milliGRAM(s) SubCutaneous every 24 hours  ketorolac   Injectable 15 milliGRAM(s) IV Push every 6 hours  levothyroxine Injectable 44 MICROGram(s) IV Push at bedtime  pantoprazole  Injectable 40 milliGRAM(s) IV Push daily  piperacillin/tazobactam IVPB.. 3.375 Gram(s) IV Intermittent every 8 hours  sodium chloride 0.9%. 1000 milliLiter(s) (100 mL/Hr) IV Continuous <Continuous>    MEDICATIONS  (PRN):  diphenhydrAMINE Injectable 25 milliGRAM(s) IV Push at bedtime PRN Insomnia  morphine  - Injectable 2 milliGRAM(s) IV Push every 4 hours PRN Moderate Pain (4 - 6)  morphine  - Injectable 4 milliGRAM(s) IV Push every 4 hours PRN Severe Pain (7 - 10)  ondansetron Injectable 4 milliGRAM(s) IV Push every 6 hours PRN Nausea and/or Vomiting      LABS: 7/12 AM labs pending     RADIOLOGY: AM KUB pending

## 2022-07-12 NOTE — PROGRESS NOTE ADULT - PROBLEM SELECTOR PLAN 1
-CT showing high grade SBO  -Patient POD#1 s/p ex-lap, lysis of adhesions and small bowel resection  -NPO, IVF, supportive care  -Started on IV zosyn, continue  -NGT clamped, possible removal later today   -Pain control per primary team  - Hgb 10.4 (7/11) down from 14.9 (7/10), repeat CBC showed hgb stable in high 9s, no unit given   - patient passing gas this AM

## 2022-07-13 LAB
ANION GAP SERPL CALC-SCNC: 6 MMOL/L — SIGNIFICANT CHANGE UP (ref 5–17)
BUN SERPL-MCNC: 7 MG/DL — SIGNIFICANT CHANGE UP (ref 7–23)
CALCIUM SERPL-MCNC: 9 MG/DL — SIGNIFICANT CHANGE UP (ref 8.5–10.1)
CHLORIDE SERPL-SCNC: 109 MMOL/L — HIGH (ref 96–108)
CO2 SERPL-SCNC: 27 MMOL/L — SIGNIFICANT CHANGE UP (ref 22–31)
CREAT SERPL-MCNC: 0.59 MG/DL — SIGNIFICANT CHANGE UP (ref 0.5–1.3)
EGFR: 99 ML/MIN/1.73M2 — SIGNIFICANT CHANGE UP
GLUCOSE SERPL-MCNC: 114 MG/DL — HIGH (ref 70–99)
HCT VFR BLD CALC: 35.4 % — SIGNIFICANT CHANGE UP (ref 34.5–45)
HGB BLD-MCNC: 11.1 G/DL — LOW (ref 11.5–15.5)
MAGNESIUM SERPL-MCNC: 2.4 MG/DL — SIGNIFICANT CHANGE UP (ref 1.6–2.6)
MCHC RBC-ENTMCNC: 30.8 PG — SIGNIFICANT CHANGE UP (ref 27–34)
MCHC RBC-ENTMCNC: 31.4 GM/DL — LOW (ref 32–36)
MCV RBC AUTO: 98.3 FL — SIGNIFICANT CHANGE UP (ref 80–100)
NRBC # BLD: 0 /100 WBCS — SIGNIFICANT CHANGE UP (ref 0–0)
PHOSPHATE SERPL-MCNC: 2.1 MG/DL — LOW (ref 2.5–4.5)
PLATELET # BLD AUTO: 230 K/UL — SIGNIFICANT CHANGE UP (ref 150–400)
POTASSIUM SERPL-MCNC: 3.1 MMOL/L — LOW (ref 3.5–5.3)
POTASSIUM SERPL-SCNC: 3.1 MMOL/L — LOW (ref 3.5–5.3)
RBC # BLD: 3.6 M/UL — LOW (ref 3.8–5.2)
RBC # FLD: 11.9 % — SIGNIFICANT CHANGE UP (ref 10.3–14.5)
SODIUM SERPL-SCNC: 142 MMOL/L — SIGNIFICANT CHANGE UP (ref 135–145)
WBC # BLD: 5.47 K/UL — SIGNIFICANT CHANGE UP (ref 3.8–10.5)
WBC # FLD AUTO: 5.47 K/UL — SIGNIFICANT CHANGE UP (ref 3.8–10.5)

## 2022-07-13 RX ADMIN — MORPHINE SULFATE 4 MILLIGRAM(S): 50 CAPSULE, EXTENDED RELEASE ORAL at 13:01

## 2022-07-13 RX ADMIN — ENOXAPARIN SODIUM 40 MILLIGRAM(S): 100 INJECTION SUBCUTANEOUS at 02:50

## 2022-07-13 RX ADMIN — Medication 25 MILLIGRAM(S): at 22:31

## 2022-07-13 RX ADMIN — PANTOPRAZOLE SODIUM 40 MILLIGRAM(S): 20 TABLET, DELAYED RELEASE ORAL at 05:32

## 2022-07-13 RX ADMIN — ATORVASTATIN CALCIUM 20 MILLIGRAM(S): 80 TABLET, FILM COATED ORAL at 21:17

## 2022-07-13 RX ADMIN — Medication 15 MILLIGRAM(S): at 11:59

## 2022-07-13 RX ADMIN — Medication 88 MICROGRAM(S): at 05:32

## 2022-07-13 RX ADMIN — Medication 15 MILLIGRAM(S): at 16:13

## 2022-07-13 RX ADMIN — MORPHINE SULFATE 4 MILLIGRAM(S): 50 CAPSULE, EXTENDED RELEASE ORAL at 17:46

## 2022-07-13 RX ADMIN — Medication 15 MILLIGRAM(S): at 17:10

## 2022-07-13 RX ADMIN — Medication 1 DROP(S): at 11:23

## 2022-07-13 RX ADMIN — Medication 15 MILLIGRAM(S): at 11:05

## 2022-07-13 RX ADMIN — Medication 15 MILLIGRAM(S): at 21:40

## 2022-07-13 RX ADMIN — Medication 15 MILLIGRAM(S): at 04:30

## 2022-07-13 RX ADMIN — MORPHINE SULFATE 4 MILLIGRAM(S): 50 CAPSULE, EXTENDED RELEASE ORAL at 01:41

## 2022-07-13 RX ADMIN — MORPHINE SULFATE 4 MILLIGRAM(S): 50 CAPSULE, EXTENDED RELEASE ORAL at 12:06

## 2022-07-13 RX ADMIN — SODIUM CHLORIDE 50 MILLILITER(S): 9 INJECTION INTRAMUSCULAR; INTRAVENOUS; SUBCUTANEOUS at 11:06

## 2022-07-13 RX ADMIN — Medication 15 MILLIGRAM(S): at 21:17

## 2022-07-13 RX ADMIN — Medication 10 MILLIGRAM(S): at 09:46

## 2022-07-13 RX ADMIN — Medication 15 MILLIGRAM(S): at 04:06

## 2022-07-13 RX ADMIN — MORPHINE SULFATE 4 MILLIGRAM(S): 50 CAPSULE, EXTENDED RELEASE ORAL at 02:15

## 2022-07-13 NOTE — DIETITIAN INITIAL EVALUATION ADULT - PERTINENT MEDS FT
MEDICATIONS  (STANDING):  artificial  tears Solution 1 Drop(s) Both EYES daily  atorvastatin 20 milliGRAM(s) Oral at bedtime  bisacodyl Suppository 10 milliGRAM(s) Rectal once  enoxaparin Injectable 40 milliGRAM(s) SubCutaneous every 24 hours  ketorolac   Injectable 15 milliGRAM(s) IV Push every 6 hours  levothyroxine 88 MICROGram(s) Oral daily  pantoprazole    Tablet 40 milliGRAM(s) Oral before breakfast  sodium chloride 0.9%. 1000 milliLiter(s) (50 mL/Hr) IV Continuous <Continuous>    MEDICATIONS  (PRN):  diphenhydrAMINE Injectable 25 milliGRAM(s) IV Push at bedtime PRN Insomnia  morphine  - Injectable 2 milliGRAM(s) IV Push every 4 hours PRN Moderate Pain (4 - 6)  morphine  - Injectable 4 milliGRAM(s) IV Push every 4 hours PRN Severe Pain (7 - 10)  ondansetron Injectable 4 milliGRAM(s) IV Push every 6 hours PRN Nausea and/or Vomiting

## 2022-07-13 NOTE — DIETITIAN INITIAL EVALUATION ADULT - FUNCTIONAL SCREEN CURRENT LEVEL: SWALLOWING (IF SCORE 2 OR MORE FOR ANY ITEM, CONSULT REHAB SERVICES), MLM)
0 = swallows foods/liquids without difficulty [Negative] : Heme/Lymph [As Per HPI] : Genitourinary as per HPI.

## 2022-07-13 NOTE — PROGRESS NOTE ADULT - ASSESSMENT
65 y/o F POD2 s/p ex-lap with SBR & LINDSAY            
66y Female patient S/P ex lap with SBR & LINDSAY.    Plan:  - Remain NPO with NGT to low suction  - Monitor I&Os, pappas catheter  - Continue Zosyn  - Pain control PRN, Zofran PRN, supportive care  - Incentive spirometry, abd binder, encourage safe ambulation  - SCDs  - Follow up AM labs  - Will continue to monitor    Surgical Team: [id number]
This is a 66y year old Female with PMHx of hashimoto's dz, HLD, rectal prolapse s/p colon resection (approx 2017) presenting with HgSBO.
This is a 66y year old Female with PMHx of hashimoto's dz, HLD, rectal prolapse s/p colon resection (approx 2017) presenting with HgSBO.
65 YO Female POD#3 s/p Ex lap w/ SBR & LINDSAY. Pt passing flatus but no BMs. Vitals stable at present. 
This is a 66y year old Female with PMHx of hashimoto's dz, HLD, rectal prolapse s/p colon resection (approx 2017) presenting with HgSBO.

## 2022-07-13 NOTE — DIETITIAN INITIAL EVALUATION ADULT - OTHER INFO
67 YO Female POD#3 s/p Ex lap w/ SBR & LINDSAY. Pt passing flatus but no BMs. Vitals stable at present.        Problem/Plan - 1:  ·  Problem: SBO (small bowel obstruction).  ( excerpt from MD progress note ) 65 YO Female POD#3 s/p Ex lap w/ SBR & LINDSAY. Pt passing flatus but no BMs. Vitals stable at present.     Pt reports she is 5'2", -142# does not know of any recent wt change " might have lost a few due to this surgery and still on clears "   diet office called by undersigned RD as pt has preferences within the clear liquid diet

## 2022-07-13 NOTE — PROGRESS NOTE ADULT - PROBLEM SELECTOR PLAN 1
- F/u AM labs  - Cont CLD. Consider advancement  - Monitor GI fxn  - DVT ppx: scds, LVX  - pain control, supportive care, OOB, incentive spirometer  - Case to be discussed with attending who is covering for Dr. Yanez    Surgical Team Contact Information  Spectralink: Ext: [phone] or [phone]  Pager: [phone].

## 2022-07-13 NOTE — DIETITIAN INITIAL EVALUATION ADULT - PERTINENT LABORATORY DATA
07-12    142  |  109<H>  |  16  ----------------------------<  74  3.4<L>   |  26  |  0.59    Ca    8.6      12 Jul 2022 07:50  Phos  2.5     07-12  Mg     2.4     07-12    TPro  6.3  /  Alb  2.8<L>  /  TBili  1.0  /  DBili  x   /  AST  30  /  ALT  33  /  AlkPhos  66  07-12

## 2022-07-13 NOTE — PROGRESS NOTE ADULT - SUBJECTIVE AND OBJECTIVE BOX
POST OPERATIVE DAY #3  STATUS POST: Ex lap w/ SBR & LINDSAY    SUBJECTIVE: Patient seen and examined at bedside.   Vital Signs Last 24 Hrs  T(C): 37 (13 Jul 2022 04:41), Max: 37 (13 Jul 2022 04:41)  T(F): 98.6 (13 Jul 2022 04:41), Max: 98.6 (13 Jul 2022 04:41)  HR: 67 (13 Jul 2022 04:41) (67 - 68)  BP: 137/82 (13 Jul 2022 04:41) (110/71 - 137/82)  BP(mean): --  RR: 18 (13 Jul 2022 04:41) (18 - 18)  SpO2: 94% (13 Jul 2022 04:41) (92% - 96%)    Parameters below as of 13 Jul 2022 04:41  Patient On (Oxygen Delivery Method): nasal cannula    PHYSICAL EXAM:  GENERAL: No acute distress, well-developed  HEAD:  Atraumatic, Normocephalic  ABDOMEN:   NEUROLOGY: A&O x 3, no focal deficits    I&O's Summary    11 Jul 2022 07:01  -  12 Jul 2022 07:00  --------------------------------------------------------  IN: 0 mL / OUT: 150 mL / NET: -150 mL    12 Jul 2022 07:01  -  13 Jul 2022 05:42  --------------------------------------------------------  IN: 800 mL / OUT: 0 mL / NET: 800 mL      MEDICATIONS  (STANDING):  artificial  tears Solution 1 Drop(s) Both EYES daily  atorvastatin 20 milliGRAM(s) Oral at bedtime  enoxaparin Injectable 40 milliGRAM(s) SubCutaneous every 24 hours  ketorolac   Injectable 15 milliGRAM(s) IV Push every 6 hours  levothyroxine 88 MICROGram(s) Oral daily  pantoprazole    Tablet 40 milliGRAM(s) Oral before breakfast  sodium chloride 0.9%. 1000 milliLiter(s) (50 mL/Hr) IV Continuous <Continuous>    MEDICATIONS  (PRN):  diphenhydrAMINE Injectable 25 milliGRAM(s) IV Push at bedtime PRN Insomnia  morphine  - Injectable 2 milliGRAM(s) IV Push every 4 hours PRN Moderate Pain (4 - 6)  morphine  - Injectable 4 milliGRAM(s) IV Push every 4 hours PRN Severe Pain (7 - 10)  ondansetron Injectable 4 milliGRAM(s) IV Push every 6 hours PRN Nausea and/or Vomiting    LABS:                        10.0   7.82  )-----------( 170      ( 12 Jul 2022 07:50 )             32.1     07-12    142  |  109<H>  |  16  ----------------------------<  74  3.4<L>   |  26  |  0.59    Ca    8.6      12 Jul 2022 07:50  Phos  2.5     07-12  Mg     2.4     07-12    TPro  6.3  /  Alb  2.8<L>  /  TBili  1.0  /  DBili  x   /  AST  30  /  ALT  33  /  AlkPhos  66  07-12    RADIOLOGY & ADDITIONAL STUDIES:     POST OPERATIVE DAY #3  STATUS POST: Ex lap w/ SBR & LINDSAY    SUBJECTIVE: Patient seen and examined at bedside. Pt states that she feels well, c/o bloating after eating. Ambulating well and voiding. Tolerating CLD, admits to passing a "substantial amount of" flatus but no BM. Denies abdominal pain, N/V.     Vital Signs Last 24 Hrs  T(C): 37 (13 Jul 2022 04:41), Max: 37 (13 Jul 2022 04:41)  T(F): 98.6 (13 Jul 2022 04:41), Max: 98.6 (13 Jul 2022 04:41)  HR: 67 (13 Jul 2022 04:41) (67 - 68)  BP: 137/82 (13 Jul 2022 04:41) (110/71 - 137/82)  BP(mean): --  RR: 18 (13 Jul 2022 04:41) (18 - 18)  SpO2: 94% (13 Jul 2022 04:41) (92% - 96%)    Parameters below as of 13 Jul 2022 04:41  Patient On (Oxygen Delivery Method): nasal cannula    PHYSICAL EXAM:  GENERAL: No acute distress, well-developed  HEAD:  Atraumatic, Normocephalic  ABDOMEN: Softly distended, appropriate bonifacio-incisional ttp. Dressing over incision site C/D/I. Erythema noted at skin surrounding dressing.   NEUROLOGY: A&O x 3, no focal deficits    I&O's Summary    11 Jul 2022 07:01  -  12 Jul 2022 07:00  --------------------------------------------------------  IN: 0 mL / OUT: 150 mL / NET: -150 mL    12 Jul 2022 07:01  -  13 Jul 2022 05:42  --------------------------------------------------------  IN: 800 mL / OUT: 0 mL / NET: 800 mL      MEDICATIONS  (STANDING):  artificial  tears Solution 1 Drop(s) Both EYES daily  atorvastatin 20 milliGRAM(s) Oral at bedtime  enoxaparin Injectable 40 milliGRAM(s) SubCutaneous every 24 hours  ketorolac   Injectable 15 milliGRAM(s) IV Push every 6 hours  levothyroxine 88 MICROGram(s) Oral daily  pantoprazole    Tablet 40 milliGRAM(s) Oral before breakfast  sodium chloride 0.9%. 1000 milliLiter(s) (50 mL/Hr) IV Continuous <Continuous>    MEDICATIONS  (PRN):  diphenhydrAMINE Injectable 25 milliGRAM(s) IV Push at bedtime PRN Insomnia  morphine  - Injectable 2 milliGRAM(s) IV Push every 4 hours PRN Moderate Pain (4 - 6)  morphine  - Injectable 4 milliGRAM(s) IV Push every 4 hours PRN Severe Pain (7 - 10)  ondansetron Injectable 4 milliGRAM(s) IV Push every 6 hours PRN Nausea and/or Vomiting    LABS:                        10.0   7.82  )-----------( 170      ( 12 Jul 2022 07:50 )             32.1     07-12    142  |  109<H>  |  16  ----------------------------<  74  3.4<L>   |  26  |  0.59    Ca    8.6      12 Jul 2022 07:50  Phos  2.5     07-12  Mg     2.4     07-12    TPro  6.3  /  Alb  2.8<L>  /  TBili  1.0  /  DBili  x   /  AST  30  /  ALT  33  /  AlkPhos  66  07-12    RADIOLOGY & ADDITIONAL STUDIES:

## 2022-07-14 LAB
ANION GAP SERPL CALC-SCNC: 7 MMOL/L — SIGNIFICANT CHANGE UP (ref 5–17)
BUN SERPL-MCNC: 6 MG/DL — LOW (ref 7–23)
CALCIUM SERPL-MCNC: 9.1 MG/DL — SIGNIFICANT CHANGE UP (ref 8.5–10.1)
CHLORIDE SERPL-SCNC: 109 MMOL/L — HIGH (ref 96–108)
CO2 SERPL-SCNC: 28 MMOL/L — SIGNIFICANT CHANGE UP (ref 22–31)
CREAT SERPL-MCNC: 0.53 MG/DL — SIGNIFICANT CHANGE UP (ref 0.5–1.3)
EGFR: 102 ML/MIN/1.73M2 — SIGNIFICANT CHANGE UP
GLUCOSE SERPL-MCNC: 119 MG/DL — HIGH (ref 70–99)
HCT VFR BLD CALC: 33.8 % — LOW (ref 34.5–45)
HGB BLD-MCNC: 11 G/DL — LOW (ref 11.5–15.5)
MAGNESIUM SERPL-MCNC: 2.3 MG/DL — SIGNIFICANT CHANGE UP (ref 1.6–2.6)
MCHC RBC-ENTMCNC: 31.5 PG — SIGNIFICANT CHANGE UP (ref 27–34)
MCHC RBC-ENTMCNC: 32.5 GM/DL — SIGNIFICANT CHANGE UP (ref 32–36)
MCV RBC AUTO: 96.8 FL — SIGNIFICANT CHANGE UP (ref 80–100)
NRBC # BLD: 0 /100 WBCS — SIGNIFICANT CHANGE UP (ref 0–0)
PHOSPHATE SERPL-MCNC: 2.5 MG/DL — SIGNIFICANT CHANGE UP (ref 2.5–4.5)
PLATELET # BLD AUTO: 263 K/UL — SIGNIFICANT CHANGE UP (ref 150–400)
POTASSIUM SERPL-MCNC: 3.3 MMOL/L — LOW (ref 3.5–5.3)
POTASSIUM SERPL-SCNC: 3.3 MMOL/L — LOW (ref 3.5–5.3)
RBC # BLD: 3.49 M/UL — LOW (ref 3.8–5.2)
RBC # FLD: 11.9 % — SIGNIFICANT CHANGE UP (ref 10.3–14.5)
SODIUM SERPL-SCNC: 144 MMOL/L — SIGNIFICANT CHANGE UP (ref 135–145)
WBC # BLD: 5.2 K/UL — SIGNIFICANT CHANGE UP (ref 3.8–10.5)
WBC # FLD AUTO: 5.2 K/UL — SIGNIFICANT CHANGE UP (ref 3.8–10.5)

## 2022-07-14 RX ORDER — POTASSIUM CHLORIDE 20 MEQ
40 PACKET (EA) ORAL EVERY 4 HOURS
Refills: 0 | Status: COMPLETED | OUTPATIENT
Start: 2022-07-14 | End: 2022-07-14

## 2022-07-14 RX ADMIN — MORPHINE SULFATE 4 MILLIGRAM(S): 50 CAPSULE, EXTENDED RELEASE ORAL at 09:34

## 2022-07-14 RX ADMIN — Medication 88 MICROGRAM(S): at 05:24

## 2022-07-14 RX ADMIN — Medication 15 MILLIGRAM(S): at 11:40

## 2022-07-14 RX ADMIN — MORPHINE SULFATE 4 MILLIGRAM(S): 50 CAPSULE, EXTENDED RELEASE ORAL at 03:02

## 2022-07-14 RX ADMIN — Medication 15 MILLIGRAM(S): at 05:25

## 2022-07-14 RX ADMIN — MORPHINE SULFATE 4 MILLIGRAM(S): 50 CAPSULE, EXTENDED RELEASE ORAL at 21:00

## 2022-07-14 RX ADMIN — Medication 40 MILLIEQUIVALENT(S): at 15:09

## 2022-07-14 RX ADMIN — Medication 15 MILLIGRAM(S): at 18:28

## 2022-07-14 RX ADMIN — MORPHINE SULFATE 4 MILLIGRAM(S): 50 CAPSULE, EXTENDED RELEASE ORAL at 15:03

## 2022-07-14 RX ADMIN — PANTOPRAZOLE SODIUM 40 MILLIGRAM(S): 20 TABLET, DELAYED RELEASE ORAL at 05:24

## 2022-07-14 RX ADMIN — MORPHINE SULFATE 4 MILLIGRAM(S): 50 CAPSULE, EXTENDED RELEASE ORAL at 03:30

## 2022-07-14 RX ADMIN — MORPHINE SULFATE 4 MILLIGRAM(S): 50 CAPSULE, EXTENDED RELEASE ORAL at 09:49

## 2022-07-14 RX ADMIN — Medication 15 MILLIGRAM(S): at 06:00

## 2022-07-14 RX ADMIN — ATORVASTATIN CALCIUM 20 MILLIGRAM(S): 80 TABLET, FILM COATED ORAL at 21:31

## 2022-07-14 RX ADMIN — Medication 1 DROP(S): at 11:39

## 2022-07-14 RX ADMIN — ENOXAPARIN SODIUM 40 MILLIGRAM(S): 100 INJECTION SUBCUTANEOUS at 05:25

## 2022-07-14 RX ADMIN — Medication 25 MILLIGRAM(S): at 22:34

## 2022-07-14 RX ADMIN — Medication 40 MILLIEQUIVALENT(S): at 18:28

## 2022-07-14 RX ADMIN — MORPHINE SULFATE 4 MILLIGRAM(S): 50 CAPSULE, EXTENDED RELEASE ORAL at 20:33

## 2022-07-14 NOTE — PROGRESS NOTE ADULT - SUBJECTIVE AND OBJECTIVE BOX
SURGERY PA NOTE ON BEHALF OF DR. MCKINNEY:    S: Patient seen and examined at bedside.   No acute events overnight.  Patient reports less abdominal pain this am, voiding, ambulating the unit, passing flatus, small BM yesterday.  Denies fevers, chills, N/V/C/D, chest pain, SOB, palpitations, calf pain.      MEDICATIONS:  artificial  tears Solution 1 Drop(s) Both EYES daily  atorvastatin 20 milliGRAM(s) Oral at bedtime  diphenhydrAMINE Injectable 25 milliGRAM(s) IV Push at bedtime PRN  enoxaparin Injectable 40 milliGRAM(s) SubCutaneous every 24 hours  ketorolac   Injectable 15 milliGRAM(s) IV Push every 6 hours  levothyroxine 88 MICROGram(s) Oral daily  morphine  - Injectable 2 milliGRAM(s) IV Push every 4 hours PRN  morphine  - Injectable 4 milliGRAM(s) IV Push every 4 hours PRN  ondansetron Injectable 4 milliGRAM(s) IV Push every 6 hours PRN  pantoprazole    Tablet 40 milliGRAM(s) Oral before breakfast  sodium chloride 0.9%. 1000 milliLiter(s) IV Continuous <Continuous>      O:  Vital Signs Last 24 Hrs  T(C): 37.1 (14 Jul 2022 05:01), Max: 37.2 (13 Jul 2022 20:25)  T(F): 98.8 (14 Jul 2022 05:01), Max: 98.9 (13 Jul 2022 20:25)  HR: 68 (14 Jul 2022 05:01) (67 - 78)  BP: 134/82 (14 Jul 2022 05:01) (122/76 - 134/82)  BP(mean): --  RR: 18 (14 Jul 2022 05:01) (18 - 18)  SpO2: 96% (14 Jul 2022 05:01) (92% - 96%)    Parameters below as of 14 Jul 2022 05:01  Patient On (Oxygen Delivery Method): room air        I&O SUMMARY:    07-13-22 @ 07:01  -  07-14-22 @ 07:00  --------------------------------------------------------  IN: 1800 mL / OUT: 0 mL / NET: 1800 mL        PHYSICAL EXAM:  Lungs: CTA bilat without W/R/R  Card: S1S2  Abd: Soft, NT, ND.  +BS x 4.  No rebound/guarding.    Ext: Calves soft, NT, without edema bilat    LABS:                        11.1   5.47  )-----------( 230      ( 13 Jul 2022 09:00 )             35.4     07-13    142  |  109<H>  |  7   ----------------------------<  114<H>  3.1<L>   |  27  |  0.59    Ca    9.0      13 Jul 2022 09:00  Phos  2.1     07-13  Mg     2.4     07-13      RADIOLOGY:    Assessment:  66 year old female admitted for HgSBO now s/p ex lap with SBR & LINDSAY POD # 4 progressing well in recovery.      Plan:  - consider advance diet to full liquids  - f/u am labs   - Monitor GI function   - Encouraged IS use  - OOB/Ambulate  - Lovenox/SCDs for DVT ppx   - Will discuss above with Dr. Mckinney (covering for Dr. Yanez)

## 2022-07-15 ENCOUNTER — TRANSCRIPTION ENCOUNTER (OUTPATIENT)
Age: 67
End: 2022-07-15

## 2022-07-15 VITALS
RESPIRATION RATE: 18 BRPM | SYSTOLIC BLOOD PRESSURE: 138 MMHG | OXYGEN SATURATION: 95 % | DIASTOLIC BLOOD PRESSURE: 66 MMHG | HEART RATE: 78 BPM | TEMPERATURE: 98 F

## 2022-07-15 LAB
ANION GAP SERPL CALC-SCNC: 6 MMOL/L — SIGNIFICANT CHANGE UP (ref 5–17)
BUN SERPL-MCNC: 6 MG/DL — LOW (ref 7–23)
CALCIUM SERPL-MCNC: 9.2 MG/DL — SIGNIFICANT CHANGE UP (ref 8.5–10.1)
CHLORIDE SERPL-SCNC: 108 MMOL/L — SIGNIFICANT CHANGE UP (ref 96–108)
CO2 SERPL-SCNC: 27 MMOL/L — SIGNIFICANT CHANGE UP (ref 22–31)
CREAT SERPL-MCNC: 0.52 MG/DL — SIGNIFICANT CHANGE UP (ref 0.5–1.3)
EGFR: 102 ML/MIN/1.73M2 — SIGNIFICANT CHANGE UP
GLUCOSE SERPL-MCNC: 89 MG/DL — SIGNIFICANT CHANGE UP (ref 70–99)
HCT VFR BLD CALC: 35.8 % — SIGNIFICANT CHANGE UP (ref 34.5–45)
HGB BLD-MCNC: 11.5 G/DL — SIGNIFICANT CHANGE UP (ref 11.5–15.5)
MAGNESIUM SERPL-MCNC: 2.1 MG/DL — SIGNIFICANT CHANGE UP (ref 1.6–2.6)
MCHC RBC-ENTMCNC: 30.9 PG — SIGNIFICANT CHANGE UP (ref 27–34)
MCHC RBC-ENTMCNC: 32.1 GM/DL — SIGNIFICANT CHANGE UP (ref 32–36)
MCV RBC AUTO: 96.2 FL — SIGNIFICANT CHANGE UP (ref 80–100)
NRBC # BLD: 0 /100 WBCS — SIGNIFICANT CHANGE UP (ref 0–0)
PHOSPHATE SERPL-MCNC: 3 MG/DL — SIGNIFICANT CHANGE UP (ref 2.5–4.5)
PLATELET # BLD AUTO: 301 K/UL — SIGNIFICANT CHANGE UP (ref 150–400)
POTASSIUM SERPL-MCNC: 3.6 MMOL/L — SIGNIFICANT CHANGE UP (ref 3.5–5.3)
POTASSIUM SERPL-SCNC: 3.6 MMOL/L — SIGNIFICANT CHANGE UP (ref 3.5–5.3)
RBC # BLD: 3.72 M/UL — LOW (ref 3.8–5.2)
RBC # FLD: 11.8 % — SIGNIFICANT CHANGE UP (ref 10.3–14.5)
SODIUM SERPL-SCNC: 141 MMOL/L — SIGNIFICANT CHANGE UP (ref 135–145)
WBC # BLD: 6.55 K/UL — SIGNIFICANT CHANGE UP (ref 3.8–10.5)
WBC # FLD AUTO: 6.55 K/UL — SIGNIFICANT CHANGE UP (ref 3.8–10.5)

## 2022-07-15 PROCEDURE — 83690 ASSAY OF LIPASE: CPT

## 2022-07-15 PROCEDURE — 85025 COMPLETE CBC W/AUTO DIFF WBC: CPT

## 2022-07-15 PROCEDURE — 76705 ECHO EXAM OF ABDOMEN: CPT

## 2022-07-15 PROCEDURE — 84100 ASSAY OF PHOSPHORUS: CPT

## 2022-07-15 PROCEDURE — 86850 RBC ANTIBODY SCREEN: CPT

## 2022-07-15 PROCEDURE — 81001 URINALYSIS AUTO W/SCOPE: CPT

## 2022-07-15 PROCEDURE — 74018 RADEX ABDOMEN 1 VIEW: CPT

## 2022-07-15 PROCEDURE — 85027 COMPLETE CBC AUTOMATED: CPT

## 2022-07-15 PROCEDURE — 80053 COMPREHEN METABOLIC PANEL: CPT

## 2022-07-15 PROCEDURE — 87635 SARS-COV-2 COVID-19 AMP PRB: CPT

## 2022-07-15 PROCEDURE — 85730 THROMBOPLASTIN TIME PARTIAL: CPT

## 2022-07-15 PROCEDURE — 80048 BASIC METABOLIC PNL TOTAL CA: CPT

## 2022-07-15 PROCEDURE — 86901 BLOOD TYPING SEROLOGIC RH(D): CPT

## 2022-07-15 PROCEDURE — 83735 ASSAY OF MAGNESIUM: CPT

## 2022-07-15 PROCEDURE — C1889: CPT

## 2022-07-15 PROCEDURE — 96374 THER/PROPH/DIAG INJ IV PUSH: CPT

## 2022-07-15 PROCEDURE — 36415 COLL VENOUS BLD VENIPUNCTURE: CPT

## 2022-07-15 PROCEDURE — 71045 X-RAY EXAM CHEST 1 VIEW: CPT

## 2022-07-15 PROCEDURE — 86900 BLOOD TYPING SEROLOGIC ABO: CPT

## 2022-07-15 PROCEDURE — 85610 PROTHROMBIN TIME: CPT

## 2022-07-15 PROCEDURE — 99285 EMERGENCY DEPT VISIT HI MDM: CPT | Mod: 25

## 2022-07-15 PROCEDURE — 96375 TX/PRO/DX INJ NEW DRUG ADDON: CPT

## 2022-07-15 PROCEDURE — 96376 TX/PRO/DX INJ SAME DRUG ADON: CPT

## 2022-07-15 PROCEDURE — 88307 TISSUE EXAM BY PATHOLOGIST: CPT

## 2022-07-15 PROCEDURE — 74177 CT ABD & PELVIS W/CONTRAST: CPT | Mod: ME

## 2022-07-15 PROCEDURE — G1004: CPT

## 2022-07-15 PROCEDURE — 93005 ELECTROCARDIOGRAM TRACING: CPT

## 2022-07-15 PROCEDURE — 86803 HEPATITIS C AB TEST: CPT

## 2022-07-15 RX ADMIN — Medication 1 DROP(S): at 12:25

## 2022-07-15 RX ADMIN — MORPHINE SULFATE 4 MILLIGRAM(S): 50 CAPSULE, EXTENDED RELEASE ORAL at 02:05

## 2022-07-15 RX ADMIN — PANTOPRAZOLE SODIUM 40 MILLIGRAM(S): 20 TABLET, DELAYED RELEASE ORAL at 06:08

## 2022-07-15 RX ADMIN — ENOXAPARIN SODIUM 40 MILLIGRAM(S): 100 INJECTION SUBCUTANEOUS at 06:08

## 2022-07-15 RX ADMIN — MORPHINE SULFATE 4 MILLIGRAM(S): 50 CAPSULE, EXTENDED RELEASE ORAL at 06:07

## 2022-07-15 RX ADMIN — MORPHINE SULFATE 4 MILLIGRAM(S): 50 CAPSULE, EXTENDED RELEASE ORAL at 11:22

## 2022-07-15 RX ADMIN — Medication 88 MICROGRAM(S): at 06:08

## 2022-07-15 RX ADMIN — MORPHINE SULFATE 4 MILLIGRAM(S): 50 CAPSULE, EXTENDED RELEASE ORAL at 01:45

## 2022-07-15 NOTE — PROGRESS NOTE ADULT - SUBJECTIVE AND OBJECTIVE BOX
SURGERY PA NOTE ON BEHALF OF DR. MCKINNEY:    S: Patient seen and examined at bedside.   No acute events overnight.  Patient states abdominal pain is improving, she is tolerating low residue diet, ambulating, voiding, no BMs yesterday.  Patient denies fevers, chills, N/V, chest pain, SOB, palpitations, calf pain.      MEDICATIONS:  artificial  tears Solution 1 Drop(s) Both EYES daily  atorvastatin 20 milliGRAM(s) Oral at bedtime  diphenhydrAMINE Injectable 25 milliGRAM(s) IV Push at bedtime PRN  enoxaparin Injectable 40 milliGRAM(s) SubCutaneous every 24 hours  ketorolac   Injectable 15 milliGRAM(s) IV Push every 6 hours  levothyroxine 88 MICROGram(s) Oral daily  morphine  - Injectable 2 milliGRAM(s) IV Push every 4 hours PRN  morphine  - Injectable 4 milliGRAM(s) IV Push every 4 hours PRN  ondansetron Injectable 4 milliGRAM(s) IV Push every 6 hours PRN  pantoprazole    Tablet 40 milliGRAM(s) Oral before breakfast  sodium chloride 0.9%. 1000 milliLiter(s) IV Continuous <Continuous>      O:  Vital Signs Last 24 Hrs  T(C): 36.8 (15 Jul 2022 04:40), Max: 37.1 (14 Jul 2022 13:51)  T(F): 98.3 (15 Jul 2022 04:40), Max: 98.8 (14 Jul 2022 13:51)  HR: 68 (15 Jul 2022 04:40) (68 - 77)  BP: 123/70 (15 Jul 2022 04:40) (105/53 - 123/70)  BP(mean): --  RR: 19 (15 Jul 2022 04:40) (17 - 19)  SpO2: 94% (15 Jul 2022 04:40) (94% - 96%)    Parameters below as of 15 Jul 2022 04:40  Patient On (Oxygen Delivery Method): room air        I&O SUMMARY:      PHYSICAL EXAM:  Lungs: CTA bilat without W/R/R  Card: S1S2  Abd: Soft, NT, ND.  +BS x 4.  No rebound/guarding.  Midline abdominal incision clean, dry, intact   Ext: Calves soft, NT, without edema bilat    LABS:                        11.0   5.20  )-----------( 263      ( 14 Jul 2022 08:15 )             33.8     07-14    144  |  109<H>  |  6<L>  ----------------------------<  119<H>  3.3<L>   |  28  |  0.53    Ca    9.1      14 Jul 2022 08:15  Phos  2.5     07-14  Mg     2.3     07-14      Assessment:  66 year old female admitted for HgSBO now s/p ex lap with SBR & LINDSAY POD # 5 progressing well in recovery now tolerating solid PO diet.      Plan:  - Discharge planning   - C/w low residue diet   - monitor for BMs/ GI function   - f/u am labs   - Encouraged IS use  - OOB/Ambulate  - Lovenox/SCDs for DVT ppx   - Will discuss above with Dr. Mckinney (covering for Dr. Yanez)

## 2022-07-15 NOTE — DISCHARGE NOTE NURSING/CASE MANAGEMENT/SOCIAL WORK - PATIENT PORTAL LINK FT
You can access the FollowMyHealth Patient Portal offered by [facility] by registering at the following website: [email or web address]. By joining [employer]s FollowMyHealth portal, you will also be able to view your health information using other applications (apps) compatible with our system.

## 2022-08-24 PROBLEM — E78.5 HYPERLIPIDEMIA, UNSPECIFIED: Chronic | Status: ACTIVE | Noted: 2022-07-10

## 2022-09-04 ENCOUNTER — INPATIENT (INPATIENT)
Facility: HOSPITAL | Age: 67
LOS: 5 days | Discharge: HOME | DRG: 390 | End: 2022-09-10
Attending: SURGERY | Admitting: SURGERY
Payer: MEDICARE

## 2022-09-04 VITALS
RESPIRATION RATE: 18 BRPM | HEIGHT: 62 IN | HEART RATE: 99 BPM | WEIGHT: 139.99 LBS | TEMPERATURE: 97 F | DIASTOLIC BLOOD PRESSURE: 117 MMHG | SYSTOLIC BLOOD PRESSURE: 170 MMHG | OXYGEN SATURATION: 97 %

## 2022-09-04 DIAGNOSIS — Z90.49 ACQUIRED ABSENCE OF OTHER SPECIFIED PARTS OF DIGESTIVE TRACT: Chronic | ICD-10-CM

## 2022-09-04 DIAGNOSIS — K56.609 UNSPECIFIED INTESTINAL OBSTRUCTION, UNSPECIFIED AS TO PARTIAL VERSUS COMPLETE OBSTRUCTION: ICD-10-CM

## 2022-09-04 DIAGNOSIS — Z98.89 OTHER SPECIFIED POSTPROCEDURAL STATES: Chronic | ICD-10-CM

## 2022-09-04 LAB
ALBUMIN SERPL ELPH-MCNC: 4 G/DL — SIGNIFICANT CHANGE UP (ref 3.3–5)
ALP SERPL-CCNC: 104 U/L — SIGNIFICANT CHANGE UP (ref 40–120)
ALT FLD-CCNC: 75 U/L — SIGNIFICANT CHANGE UP (ref 12–78)
ANION GAP SERPL CALC-SCNC: 6 MMOL/L — SIGNIFICANT CHANGE UP (ref 5–17)
APPEARANCE UR: CLEAR — SIGNIFICANT CHANGE UP
APTT BLD: 30.4 SEC — SIGNIFICANT CHANGE UP (ref 27.5–35.5)
AST SERPL-CCNC: 49 U/L — HIGH (ref 15–37)
BASOPHILS # BLD AUTO: 0.04 K/UL — SIGNIFICANT CHANGE UP (ref 0–0.2)
BASOPHILS NFR BLD AUTO: 0.6 % — SIGNIFICANT CHANGE UP (ref 0–2)
BILIRUB SERPL-MCNC: 0.7 MG/DL — SIGNIFICANT CHANGE UP (ref 0.2–1.2)
BILIRUB UR-MCNC: NEGATIVE — SIGNIFICANT CHANGE UP
BLD GP AB SCN SERPL QL: SIGNIFICANT CHANGE UP
BUN SERPL-MCNC: 17 MG/DL — SIGNIFICANT CHANGE UP (ref 7–23)
CALCIUM SERPL-MCNC: 9 MG/DL — SIGNIFICANT CHANGE UP (ref 8.5–10.1)
CHLORIDE SERPL-SCNC: 106 MMOL/L — SIGNIFICANT CHANGE UP (ref 96–108)
CO2 SERPL-SCNC: 30 MMOL/L — SIGNIFICANT CHANGE UP (ref 22–31)
COLOR SPEC: SIGNIFICANT CHANGE UP
CREAT SERPL-MCNC: 0.71 MG/DL — SIGNIFICANT CHANGE UP (ref 0.5–1.3)
DIFF PNL FLD: NEGATIVE — SIGNIFICANT CHANGE UP
EGFR: 93 ML/MIN/1.73M2 — SIGNIFICANT CHANGE UP
EOSINOPHIL # BLD AUTO: 0.09 K/UL — SIGNIFICANT CHANGE UP (ref 0–0.5)
EOSINOPHIL NFR BLD AUTO: 1.4 % — SIGNIFICANT CHANGE UP (ref 0–6)
EPI CELLS # UR: SIGNIFICANT CHANGE UP
GLUCOSE SERPL-MCNC: 131 MG/DL — HIGH (ref 70–99)
GLUCOSE UR QL: NEGATIVE — SIGNIFICANT CHANGE UP
HCT VFR BLD CALC: 41.5 % — SIGNIFICANT CHANGE UP (ref 34.5–45)
HGB BLD-MCNC: 13.3 G/DL — SIGNIFICANT CHANGE UP (ref 11.5–15.5)
IMM GRANULOCYTES NFR BLD AUTO: 0.6 % — SIGNIFICANT CHANGE UP (ref 0–1.5)
INR BLD: 0.88 RATIO — SIGNIFICANT CHANGE UP (ref 0.88–1.16)
KETONES UR-MCNC: NEGATIVE — SIGNIFICANT CHANGE UP
LACTATE SERPL-SCNC: 1.2 MMOL/L — SIGNIFICANT CHANGE UP (ref 0.7–2)
LEUKOCYTE ESTERASE UR-ACNC: ABNORMAL
LIDOCAIN IGE QN: 289 U/L — SIGNIFICANT CHANGE UP (ref 73–393)
LYMPHOCYTES # BLD AUTO: 1.95 K/UL — SIGNIFICANT CHANGE UP (ref 1–3.3)
LYMPHOCYTES # BLD AUTO: 30.3 % — SIGNIFICANT CHANGE UP (ref 13–44)
MCHC RBC-ENTMCNC: 30.5 PG — SIGNIFICANT CHANGE UP (ref 27–34)
MCHC RBC-ENTMCNC: 32 GM/DL — SIGNIFICANT CHANGE UP (ref 32–36)
MCV RBC AUTO: 95.2 FL — SIGNIFICANT CHANGE UP (ref 80–100)
MONOCYTES # BLD AUTO: 0.57 K/UL — SIGNIFICANT CHANGE UP (ref 0–0.9)
MONOCYTES NFR BLD AUTO: 8.9 % — SIGNIFICANT CHANGE UP (ref 2–14)
NEUTROPHILS # BLD AUTO: 3.74 K/UL — SIGNIFICANT CHANGE UP (ref 1.8–7.4)
NEUTROPHILS NFR BLD AUTO: 58.2 % — SIGNIFICANT CHANGE UP (ref 43–77)
NITRITE UR-MCNC: NEGATIVE — SIGNIFICANT CHANGE UP
NRBC # BLD: 0 /100 WBCS — SIGNIFICANT CHANGE UP (ref 0–0)
PH UR: 6.5 — SIGNIFICANT CHANGE UP (ref 5–8)
PLATELET # BLD AUTO: 272 K/UL — SIGNIFICANT CHANGE UP (ref 150–400)
POTASSIUM SERPL-MCNC: 3.8 MMOL/L — SIGNIFICANT CHANGE UP (ref 3.5–5.3)
POTASSIUM SERPL-SCNC: 3.8 MMOL/L — SIGNIFICANT CHANGE UP (ref 3.5–5.3)
PROT SERPL-MCNC: 7.9 G/DL — SIGNIFICANT CHANGE UP (ref 6–8.3)
PROT UR-MCNC: NEGATIVE — SIGNIFICANT CHANGE UP
PROTHROM AB SERPL-ACNC: 10.3 SEC — LOW (ref 10.5–13.4)
RBC # BLD: 4.36 M/UL — SIGNIFICANT CHANGE UP (ref 3.8–5.2)
RBC # FLD: 13.2 % — SIGNIFICANT CHANGE UP (ref 10.3–14.5)
RBC CASTS # UR COMP ASSIST: SIGNIFICANT CHANGE UP /HPF (ref 0–4)
SARS-COV-2 RNA SPEC QL NAA+PROBE: SIGNIFICANT CHANGE UP
SODIUM SERPL-SCNC: 142 MMOL/L — SIGNIFICANT CHANGE UP (ref 135–145)
SP GR SPEC: 1.01 — SIGNIFICANT CHANGE UP (ref 1.01–1.02)
UROBILINOGEN FLD QL: NEGATIVE — SIGNIFICANT CHANGE UP
WBC # BLD: 6.43 K/UL — SIGNIFICANT CHANGE UP (ref 3.8–10.5)
WBC # FLD AUTO: 6.43 K/UL — SIGNIFICANT CHANGE UP (ref 3.8–10.5)
WBC UR QL: SIGNIFICANT CHANGE UP

## 2022-09-04 PROCEDURE — 99285 EMERGENCY DEPT VISIT HI MDM: CPT

## 2022-09-04 PROCEDURE — 74019 RADEX ABDOMEN 2 VIEWS: CPT | Mod: 26

## 2022-09-04 PROCEDURE — 71046 X-RAY EXAM CHEST 2 VIEWS: CPT | Mod: 26

## 2022-09-04 PROCEDURE — 74177 CT ABD & PELVIS W/CONTRAST: CPT | Mod: 26,MA

## 2022-09-04 RX ORDER — ONDANSETRON 8 MG/1
4 TABLET, FILM COATED ORAL ONCE
Refills: 0 | Status: COMPLETED | OUTPATIENT
Start: 2022-09-04 | End: 2022-09-04

## 2022-09-04 RX ORDER — INFLUENZA VIRUS VACCINE 15; 15; 15; 15 UG/.5ML; UG/.5ML; UG/.5ML; UG/.5ML
0.7 SUSPENSION INTRAMUSCULAR ONCE
Refills: 0 | Status: DISCONTINUED | OUTPATIENT
Start: 2022-09-04 | End: 2022-09-10

## 2022-09-04 RX ORDER — SODIUM CHLORIDE 9 MG/ML
1000 INJECTION INTRAMUSCULAR; INTRAVENOUS; SUBCUTANEOUS
Refills: 0 | Status: DISCONTINUED | OUTPATIENT
Start: 2022-09-04 | End: 2022-09-06

## 2022-09-04 RX ORDER — HYDROMORPHONE HYDROCHLORIDE 2 MG/ML
1 INJECTION INTRAMUSCULAR; INTRAVENOUS; SUBCUTANEOUS EVERY 4 HOURS
Refills: 0 | Status: DISCONTINUED | OUTPATIENT
Start: 2022-09-04 | End: 2022-09-05

## 2022-09-04 RX ORDER — LEVOTHYROXINE SODIUM 125 MCG
88 TABLET ORAL DAILY
Refills: 0 | Status: DISCONTINUED | OUTPATIENT
Start: 2022-09-04 | End: 2022-09-10

## 2022-09-04 RX ORDER — SODIUM CHLORIDE 9 MG/ML
1000 INJECTION INTRAMUSCULAR; INTRAVENOUS; SUBCUTANEOUS
Refills: 0 | Status: COMPLETED | OUTPATIENT
Start: 2022-09-04 | End: 2022-09-04

## 2022-09-04 RX ORDER — MORPHINE SULFATE 50 MG/1
4 CAPSULE, EXTENDED RELEASE ORAL ONCE
Refills: 0 | Status: DISCONTINUED | OUTPATIENT
Start: 2022-09-04 | End: 2022-09-04

## 2022-09-04 RX ORDER — PANTOPRAZOLE SODIUM 20 MG/1
40 TABLET, DELAYED RELEASE ORAL
Refills: 0 | Status: DISCONTINUED | OUTPATIENT
Start: 2022-09-04 | End: 2022-09-10

## 2022-09-04 RX ORDER — HEPARIN SODIUM 5000 [USP'U]/ML
5000 INJECTION INTRAVENOUS; SUBCUTANEOUS EVERY 8 HOURS
Refills: 0 | Status: DISCONTINUED | OUTPATIENT
Start: 2022-09-04 | End: 2022-09-10

## 2022-09-04 RX ORDER — ONDANSETRON 8 MG/1
4 TABLET, FILM COATED ORAL EVERY 6 HOURS
Refills: 0 | Status: DISCONTINUED | OUTPATIENT
Start: 2022-09-04 | End: 2022-09-10

## 2022-09-04 RX ORDER — ATORVASTATIN CALCIUM 80 MG/1
20 TABLET, FILM COATED ORAL AT BEDTIME
Refills: 0 | Status: DISCONTINUED | OUTPATIENT
Start: 2022-09-04 | End: 2022-09-10

## 2022-09-04 RX ORDER — HYDROMORPHONE HYDROCHLORIDE 2 MG/ML
0.5 INJECTION INTRAMUSCULAR; INTRAVENOUS; SUBCUTANEOUS ONCE
Refills: 0 | Status: DISCONTINUED | OUTPATIENT
Start: 2022-09-04 | End: 2022-09-04

## 2022-09-04 RX ORDER — DIATRIZOATE MEGLUMINE 180 MG/ML
30 INJECTION, SOLUTION INTRAVESICAL ONCE
Refills: 0 | Status: COMPLETED | OUTPATIENT
Start: 2022-09-04 | End: 2022-09-04

## 2022-09-04 RX ADMIN — SODIUM CHLORIDE 1000 MILLILITER(S): 9 INJECTION INTRAMUSCULAR; INTRAVENOUS; SUBCUTANEOUS at 08:41

## 2022-09-04 RX ADMIN — MORPHINE SULFATE 4 MILLIGRAM(S): 50 CAPSULE, EXTENDED RELEASE ORAL at 09:15

## 2022-09-04 RX ADMIN — DIATRIZOATE MEGLUMINE 30 MILLILITER(S): 180 INJECTION, SOLUTION INTRAVESICAL at 08:21

## 2022-09-04 RX ADMIN — SODIUM CHLORIDE 100 MILLILITER(S): 9 INJECTION INTRAMUSCULAR; INTRAVENOUS; SUBCUTANEOUS at 17:07

## 2022-09-04 RX ADMIN — ONDANSETRON 4 MILLIGRAM(S): 8 TABLET, FILM COATED ORAL at 13:01

## 2022-09-04 RX ADMIN — HYDROMORPHONE HYDROCHLORIDE 0.5 MILLIGRAM(S): 2 INJECTION INTRAMUSCULAR; INTRAVENOUS; SUBCUTANEOUS at 20:11

## 2022-09-04 RX ADMIN — SODIUM CHLORIDE 1000 MILLILITER(S): 9 INJECTION INTRAMUSCULAR; INTRAVENOUS; SUBCUTANEOUS at 08:43

## 2022-09-04 RX ADMIN — ATORVASTATIN CALCIUM 20 MILLIGRAM(S): 80 TABLET, FILM COATED ORAL at 22:10

## 2022-09-04 RX ADMIN — PANTOPRAZOLE SODIUM 40 MILLIGRAM(S): 20 TABLET, DELAYED RELEASE ORAL at 17:07

## 2022-09-04 RX ADMIN — HYDROMORPHONE HYDROCHLORIDE 1 MILLIGRAM(S): 2 INJECTION INTRAMUSCULAR; INTRAVENOUS; SUBCUTANEOUS at 17:27

## 2022-09-04 RX ADMIN — MORPHINE SULFATE 4 MILLIGRAM(S): 50 CAPSULE, EXTENDED RELEASE ORAL at 07:33

## 2022-09-04 RX ADMIN — ONDANSETRON 4 MILLIGRAM(S): 8 TABLET, FILM COATED ORAL at 19:43

## 2022-09-04 RX ADMIN — ONDANSETRON 4 MILLIGRAM(S): 8 TABLET, FILM COATED ORAL at 09:15

## 2022-09-04 RX ADMIN — ONDANSETRON 4 MILLIGRAM(S): 8 TABLET, FILM COATED ORAL at 07:33

## 2022-09-04 RX ADMIN — HEPARIN SODIUM 5000 UNIT(S): 5000 INJECTION INTRAVENOUS; SUBCUTANEOUS at 15:11

## 2022-09-04 RX ADMIN — SODIUM CHLORIDE 1000 MILLILITER(S): 9 INJECTION INTRAMUSCULAR; INTRAVENOUS; SUBCUTANEOUS at 07:41

## 2022-09-04 RX ADMIN — HEPARIN SODIUM 5000 UNIT(S): 5000 INJECTION INTRAVENOUS; SUBCUTANEOUS at 22:09

## 2022-09-04 RX ADMIN — HYDROMORPHONE HYDROCHLORIDE 1 MILLIGRAM(S): 2 INJECTION INTRAMUSCULAR; INTRAVENOUS; SUBCUTANEOUS at 17:07

## 2022-09-04 RX ADMIN — HYDROMORPHONE HYDROCHLORIDE 1 MILLIGRAM(S): 2 INJECTION INTRAMUSCULAR; INTRAVENOUS; SUBCUTANEOUS at 13:32

## 2022-09-04 RX ADMIN — HYDROMORPHONE HYDROCHLORIDE 1 MILLIGRAM(S): 2 INJECTION INTRAMUSCULAR; INTRAVENOUS; SUBCUTANEOUS at 22:11

## 2022-09-04 RX ADMIN — HYDROMORPHONE HYDROCHLORIDE 1 MILLIGRAM(S): 2 INJECTION INTRAMUSCULAR; INTRAVENOUS; SUBCUTANEOUS at 13:02

## 2022-09-04 RX ADMIN — SODIUM CHLORIDE 100 MILLILITER(S): 9 INJECTION INTRAMUSCULAR; INTRAVENOUS; SUBCUTANEOUS at 13:06

## 2022-09-04 NOTE — H&P ADULT - HISTORY OF PRESENT ILLNESS
Patient is a 66yo female complaining of abdominal pian nausea and vomiting Patient has had a history of SBO and had surgery by Dr Yanez 9 months ago. Patient states that she had Greek food last night at 8pm and hasn't ate since then. Patient denies sick contacts Patient   ate the same food with no affect. Patient states that ht pain is not constant it comes in waves    Patient is 67 year old female w/ PSHx colon resection w/ Dr. Yanez, history of SBO, HLD, hypothyroidism, presenting to [place] ED with abdominal pain.  Patient states pain come on suddenly, and described as similar to the pain when she had an SBO previously.  Patient last ate around 8 pm last night.  States she is passing flatus, no BMs.  Denies fevers, chills, chest pain, SOB, palpitations.   Patient is a 68yo female complaining of abdominal pian nausea and vomiting Patient has had a history of SBO and had surgery by Dr Yanez 9 months ago. Patient states that she had Greek food last night at 8pm and hasn't ate since then. Patient denies sick contacts Patient   ate the same food with no affect. Patient states that ht pain is not constant it comes in waves    Patient is 67 year old female w/ PSHx colon resection w/ Dr. Yanez, history of SBO, HLD, hypothyroidism, presenting to [place] [facility] with abdominal pain.  Patient states pain come on suddenly that woke her from sleeping at 5 am, and described as similar to the pain when she had an SBO previously.  Patient last ate around 8 pm last night.  States she is passing flatus, no BMs.  Denies fevers, chills, chest pain, SOB, palpitations.

## 2022-09-04 NOTE — ED PROVIDER NOTE - PROGRESS NOTE DETAILS
Pt doing well, she is prepping for CT - pt seen by Dr Yanez , will await CT. Pt seen by Dr Yanez, who then placed NGT after CT report. He accepts admission.

## 2022-09-04 NOTE — ED ADULT NURSE NOTE - OBJECTIVE STATEMENT
Patient is a 66yo female complaining of abdominal pian nausea and vomiting Patient has had a history of SBO and had surgery by Dr Yanez 9 months ago. Patient states that she had Greek food last night at 8pm and hasn't ate since then. Patient denies sick contacts Patient   ate the same food with no affect. Patient states that ht pain is not constant it comes in waves

## 2022-09-04 NOTE — PATIENT PROFILE ADULT - FALL HARM RISK - RISK INTERVENTIONS
Assistance OOB with selected safe patient handling equipment/Assistance with ambulation/Communicate Fall Risk and Risk Factors to all staff, patient, and family/Discuss with provider need for PT consult/Monitor gait and stability/Reinforce activity limits and safety measures with patient and family/Visual Cue: Yellow wristband/Bed in lowest position, wheels locked, appropriate side rails in place/Call bell, personal items and telephone in reach/Instruct patient to call for assistance before getting out of bed or chair/Non-slip footwear when patient is out of bed/[place] to call system/Physically safe environment - no spills, clutter or unnecessary equipment/Purposeful Proactive Rounding/Room/bathroom lighting operational, light cord in reach

## 2022-09-04 NOTE — PATIENT PROFILE ADULT - IS THERE A SUSPICION OF ABUSE/NEGLIGENCE?
alfuzosin 10 mg oral tablet, extended release: 1 tab(s) orally once a day  allopurinol 100 mg oral tablet: 1 tab(s) orally once a day at pm  Aspirin Enteric Coated 81 mg oral delayed release tablet: 1 tab(s) orally once a day  atorvastatin 20 mg oral tablet: 1 tab(s) orally once a day (at bedtime)  CellCept 500 mg oral tablet: 2 tab(s) orally 2 times a day  enalapril 10 mg oral tablet: 1 tab(s) orally 2 times a day  finasteride 5 mg oral tablet: 1 tab(s) orally once a day  insulin aspart: Pump settings  Basal - 18 units  12am - 0.7 u/hr  5am - 0.8 u/hr  IC   12am - 1:10  11pm - 1:15  ISF: 1:50  BG Target   Active insulin time: 4 hrs        labetalol 200 mg oral tablet: 1 tab(s) orally 3 times a day  Myrbetriq 50 mg oral tablet, extended release: 1 tab(s) orally once a day  NovoLOG 100 units/mL injectable solution: 10 unit(s) injectable 3 times a day (after meals)  Ozempic (1 mg dose) 4 mg/3 mL subcutaneous solution: 0.5 milligram(s) subcutaneously once a week   polyethylene glycol 3350 oral powder for reconstitution: 17 gram(s) orally once a day  potassium chloride 20 mEq oral tablet, extended release: 1 tab(s) orally once a day  Procardia XL 30 mg oral tablet, extended release: 1 tab(s) orally once a day (at bedtime)  Procardia XL 60 mg oral tablet, extended release: 1 tab(s) orally once a day in am  tacrolimus 1 mg oral tablet, extended release: Take orally twice a day 1 tab(s) (in the morning).     **total day dosage of 2.5 mg   tacrolimus 1 mg oral tablet, extended release: 1.5 tab(s) orally once a day (in the evening)    ***total daily dose of 2.5 mg  torsemide 10 mg oral tablet: 1 tab(s) orally every other day   alfuzosin 10 mg oral tablet, extended release: 1 tab(s) orally once a day  allopurinol 100 mg oral tablet: 1 tab(s) orally once a day at pm  Aspirin Enteric Coated 81 mg oral delayed release tablet: 1 tab(s) orally once a day  atorvastatin 20 mg oral tablet: 1 tab(s) orally once a day (at bedtime)  enalapril 10 mg oral tablet: 1 tab(s) orally once a day  finasteride 5 mg oral tablet: 1 tab(s) orally once a day  insulin aspart: Pump settings  Basal - 18 units  12am - 0.7 u/hr  5am - 0.8 u/hr  IC   12am - 1:10  11pm - 1:15  ISF: 1:50  BG Target   Active insulin time: 4 hrs        labetalol 200 mg oral tablet: 1 tab(s) orally 3 times a day  Myrbetriq 50 mg oral tablet, extended release: 1 tab(s) orally once a day  NIFEdipine 30 mg oral tablet, extended release: 1 tab(s) orally once a day (at bedtime)  NIFEdipine 60 mg oral tablet, extended release: 1 tab(s) orally once a day  NovoLOG 100 units/mL injectable solution: 10 unit(s) injectable 3 times a day (after meals)  Ozempic (1 mg dose) 4 mg/3 mL subcutaneous solution: 0.5 milligram(s) subcutaneously once a week   potassium chloride 20 mEq oral tablet, extended release: 1 tab(s) orally once a day  tacrolimus 1 mg oral tablet, extended release: Take orally twice a day 1 tab(s) (in the morning).     **total day dosage of 2.5 mg   tacrolimus 1 mg oral tablet, extended release: 1.5 tab(s) orally once a day (in the evening)    ***total daily dose of 2.5 mg  torsemide 10 mg oral tablet: 1 tab(s) orally every other day   alfuzosin 10 mg oral tablet, extended release: 1 tab(s) orally once a day  allopurinol 100 mg oral tablet: 1 tab(s) orally once a day at pm  Aspirin Enteric Coated 81 mg oral delayed release tablet: 1 tab(s) orally once a day  atorvastatin 20 mg oral tablet: 1 tab(s) orally once a day (at bedtime)  CBC with differential: CBC with differential weekly     Fax results to Dr. Humphreys (Infectious DIsease) at [phone]  cefTRIAXone 2 g/50 mL-iso-osmotic dextrose intravenous solution: 2 gram(s) intravenously once a day until Sept 20 2022  CMP: CMP weekly    Fax results to Dr. Humphreys (Infectious DIsease) at [phone]  enalapril 10 mg oral tablet: 1 tab(s) orally once a day  finasteride 5 mg oral tablet: 1 tab(s) orally once a day  insulin aspart: Pump settings  Basal - 18 units  12am - 0.7 u/hr  5am - 0.8 u/hr  IC   12am - 1:10  11pm - 1:15  ISF: 1:50  BG Target   Active insulin time: 4 hrs        labetalol 200 mg oral tablet: 1 tab(s) orally 3 times a day  Myrbetriq 50 mg oral tablet, extended release: 1 tab(s) orally once a day  NIFEdipine 30 mg oral tablet, extended release: 1 tab(s) orally once a day (at bedtime)  NIFEdipine 60 mg oral tablet, extended release: 1 tab(s) orally once a day  NovoLOG 100 units/mL injectable solution: 10 unit(s) injectable 3 times a day (after meals)  Ozempic (1 mg dose) 4 mg/3 mL subcutaneous solution: 0.5 milligram(s) subcutaneously once a week   tacrolimus 1 mg oral tablet, extended release: Take orally twice a day 1 tab(s) (in the morning).     **total day dosage of 2.5 mg   tacrolimus 1 mg oral tablet, extended release: 1.5 tab(s) orally once a day (in the evening)    ***total daily dose of 2.5 mg  torsemide 10 mg oral tablet: 1 tab(s) orally every other day   no

## 2022-09-04 NOTE — H&P ADULT - NSHPPHYSICALEXAM_GEN_ALL_CORE
PHYSICAL EXAM:  GENERAL: NAD, lying in bed comfortably  HEAD:  Atraumatic, Normocephalic  EYES: EOMI, PERRLA, conjunctiva and sclera clear  ENT: Moist mucous membranes  NECK: Supple, No JVD  CHEST/LUNG: Clear to auscultation bilaterally; No rales, rhonchi, wheezing, or rubs. Unlabored respirations  HEART: Regular rate and rhythm; No murmurs, rubs, or gallops  ABDOMEN: + abdominal distention, Tender to deep palpation throughout, active bowel sounds in all 4 quadrants   EXTREMITIES:  2+ Peripheral Pulses, brisk capillary refill. No clubbing, cyanosis, or edema  NERVOUS SYSTEM:  Alert & Oriented X3, speech clear. No deficits   MSK: FROM all 4 extremities, full and equal strength  SKIN: No rashes or lesions

## 2022-09-04 NOTE — ED PROVIDER NOTE - OBJECTIVE STATEMENT
67-year-old female with a history of hypothyroidism, history of prolapsed rectum/bowel resection 9 years ago, status post SBO 9 weeks ago with surgical intervention and partial small bowel resection with Dr. Yanez presents with moderate mid abdominal pain, nausea and vomiting for past 2 hours.  Onset at rest.  No acute diarrhea.  No weakness or dizziness.  No chest pains or shortness of breath.  No palpitations/dizziness.  No neck or back pain.  No dysuria/hematuria.  Patient states her current symptoms are consistent with the symptoms she had with her most recent SBO, although states pain is worse in this instance.  Patient is fully vaccinated for COVID.  No recent exposures.  No other acute complaints at this time.

## 2022-09-04 NOTE — ED PROVIDER NOTE - IV ALTEPLASE INCLUSION HIDDEN
show Complex Repair Preamble Text (Leave Blank If You Do Not Want): Extensive wide undermining was performed.

## 2022-09-04 NOTE — H&P ADULT - PROBLEM SELECTOR PLAN 1
- NGT tube placed and set to LCWS  - NPO/IVF  - Analgesia for pain control  - serial abdominal exams  - monitor for BMs  - Admit to Dr. Yanez

## 2022-09-04 NOTE — H&P ADULT - NSHPLABSRESULTS_GEN_ALL_CORE
13.3   6.43  )-----------( 272      ( 04 Sep 2022 07:13 )             41.5     09-04    142  |  106  |  17  ----------------------------<  131<H>  3.8   |  30  |  0.71    Ca    9.0      04 Sep 2022 07:13    TPro  7.9  /  Alb  4.0  /  TBili  0.7  /  DBili  x   /  AST  49<H>  /  ALT  75  /  AlkPhos  104  09-04      ACC: [id number] EXAM:  CT ABDOMEN AND PELVIS OC IC                          PROCEDURE DATE:  09/04/2022    INTERPRETATION:  CLINICAL INFORMATION: Abdominal pain. Evaluate for small   bowel obstruction. Concerning for closed loop small bowel obstruction.    COMPARISON: 7/10/2022    CONTRAST/COMPLICATIONS:  IV Contrast: Omnipaque 350  90 cc administered   10 cc discarded  Oral Contrast: Gastroview  Complications: None reported at time of study completion    PROCEDURE:  CT of the Abdomen and Pelvis was performed.  Sagittal and coronal reformats were performed.    FINDINGS:  LOWER CHEST: Scarring versus atelectasis at the right lung base.    LIVER: Fatty infiltration of the liver.  BILE DUCTS: Normal caliber.  GALLBLADDER: Within normal limits.  SPLEEN: Within normal limits.  PANCREAS: Within normal limits.  ADRENALS: Within normal limits.  KIDNEYS/URETERS: Within normal limits.    BLADDER: Within normal limits.  REPRODUCTIVE ORGANS: Calcified uterine myoma. Bilateral adnexa within   normal limits.    BOWEL: No bowel obstruction. Appendix is normal. Dilatation of small   bowel loops with fecalization of the more distal small bowel contents to   a single transition point at the level of the small bowel anastomosis,   consistent with bowel obstruction. Mild edema of the adjacent mesentery.   No evidence of bowel wall pneumatosis. Enhancement of the bowel is   normal. No evidence of closed loop. Rectal anastomosis.  PERITONEUM: Small amount of pelvic ascites.  VESSELS: Atherosclerotic calcifications.  RETROPERITONEUM/LYMPH NODES: No lymphadenopathy.  ABDOMINAL WALL: Postoperative change  BONES: Degenerative changes.    IMPRESSION:  Small bowel obstruction with transition point at the level of the   patient's small bowel anastomosis.    No evidence of closed loop small bowel obstruction, as clinically   questioned.    Mesenteric edema adjacent to the dilated small bowel loops and small   amount of free fluid in the pelvis.    EMILY NOLASCO MD; Attending Radiologist  This document has been electronically signed. Sep  4 2022 10:34AM

## 2022-09-04 NOTE — H&P ADULT - NSHPREVIEWOFSYSTEMS_GEN_ALL_CORE
REVIEW OF SYSTEMS:    CONSTITUTIONAL: No weakness, fevers or chills  EYES/ENT: No visual changes;  No vertigo or throat pain   NECK: No pain or stiffness  RESPIRATORY: No cough, wheezing, hemoptysis; No shortness of breath  CARDIOVASCULAR: No chest pain or palpitations  GASTROINTESTINAL: + ABDOMINAL PAIN, N/V.  No hematemesis; No diarrhea or constipation. No melena or hematochezia.  GENITOURINARY: No dysuria, frequency or hematuria  NEUROLOGICAL: No numbness or weakness  SKIN: No itching, burning, rashes, or lesions   All other review of systems is negative unless indicated above.

## 2022-09-05 LAB
ANION GAP SERPL CALC-SCNC: 5 MMOL/L — SIGNIFICANT CHANGE UP (ref 5–17)
BASOPHILS # BLD AUTO: 0.02 K/UL — SIGNIFICANT CHANGE UP (ref 0–0.2)
BASOPHILS NFR BLD AUTO: 0.3 % — SIGNIFICANT CHANGE UP (ref 0–2)
BUN SERPL-MCNC: 16 MG/DL — SIGNIFICANT CHANGE UP (ref 7–23)
CALCIUM SERPL-MCNC: 8 MG/DL — LOW (ref 8.5–10.1)
CHLORIDE SERPL-SCNC: 110 MMOL/L — HIGH (ref 96–108)
CO2 SERPL-SCNC: 30 MMOL/L — SIGNIFICANT CHANGE UP (ref 22–31)
CREAT SERPL-MCNC: 0.71 MG/DL — SIGNIFICANT CHANGE UP (ref 0.5–1.3)
EGFR: 93 ML/MIN/1.73M2 — SIGNIFICANT CHANGE UP
EOSINOPHIL # BLD AUTO: 0.05 K/UL — SIGNIFICANT CHANGE UP (ref 0–0.5)
EOSINOPHIL NFR BLD AUTO: 0.8 % — SIGNIFICANT CHANGE UP (ref 0–6)
GLUCOSE SERPL-MCNC: 106 MG/DL — HIGH (ref 70–99)
HCT VFR BLD CALC: 32.9 % — LOW (ref 34.5–45)
HGB BLD-MCNC: 10.6 G/DL — LOW (ref 11.5–15.5)
IMM GRANULOCYTES NFR BLD AUTO: 0.5 % — SIGNIFICANT CHANGE UP (ref 0–1.5)
LYMPHOCYTES # BLD AUTO: 1.96 K/UL — SIGNIFICANT CHANGE UP (ref 1–3.3)
LYMPHOCYTES # BLD AUTO: 30.4 % — SIGNIFICANT CHANGE UP (ref 13–44)
MAGNESIUM SERPL-MCNC: 2.2 MG/DL — SIGNIFICANT CHANGE UP (ref 1.6–2.6)
MCHC RBC-ENTMCNC: 31.3 PG — SIGNIFICANT CHANGE UP (ref 27–34)
MCHC RBC-ENTMCNC: 32.2 GM/DL — SIGNIFICANT CHANGE UP (ref 32–36)
MCV RBC AUTO: 97.1 FL — SIGNIFICANT CHANGE UP (ref 80–100)
MONOCYTES # BLD AUTO: 0.77 K/UL — SIGNIFICANT CHANGE UP (ref 0–0.9)
MONOCYTES NFR BLD AUTO: 12 % — SIGNIFICANT CHANGE UP (ref 2–14)
NEUTROPHILS # BLD AUTO: 3.61 K/UL — SIGNIFICANT CHANGE UP (ref 1.8–7.4)
NEUTROPHILS NFR BLD AUTO: 56 % — SIGNIFICANT CHANGE UP (ref 43–77)
NRBC # BLD: 0 /100 WBCS — SIGNIFICANT CHANGE UP (ref 0–0)
PHOSPHATE SERPL-MCNC: 2.6 MG/DL — SIGNIFICANT CHANGE UP (ref 2.5–4.5)
PLATELET # BLD AUTO: 211 K/UL — SIGNIFICANT CHANGE UP (ref 150–400)
POTASSIUM SERPL-MCNC: 3.3 MMOL/L — LOW (ref 3.5–5.3)
POTASSIUM SERPL-SCNC: 3.3 MMOL/L — LOW (ref 3.5–5.3)
RBC # BLD: 3.39 M/UL — LOW (ref 3.8–5.2)
RBC # FLD: 13.4 % — SIGNIFICANT CHANGE UP (ref 10.3–14.5)
SODIUM SERPL-SCNC: 145 MMOL/L — SIGNIFICANT CHANGE UP (ref 135–145)
WBC # BLD: 6.44 K/UL — SIGNIFICANT CHANGE UP (ref 3.8–10.5)
WBC # FLD AUTO: 6.44 K/UL — SIGNIFICANT CHANGE UP (ref 3.8–10.5)

## 2022-09-05 PROCEDURE — 74018 RADEX ABDOMEN 1 VIEW: CPT | Mod: 26

## 2022-09-05 RX ORDER — KETOROLAC TROMETHAMINE 30 MG/ML
30 SYRINGE (ML) INJECTION ONCE
Refills: 0 | Status: DISCONTINUED | OUTPATIENT
Start: 2022-09-05 | End: 2022-09-05

## 2022-09-05 RX ORDER — ACETAMINOPHEN 500 MG
1000 TABLET ORAL ONCE
Refills: 0 | Status: COMPLETED | OUTPATIENT
Start: 2022-09-05 | End: 2022-09-05

## 2022-09-05 RX ORDER — MORPHINE SULFATE 50 MG/1
2 CAPSULE, EXTENDED RELEASE ORAL EVERY 4 HOURS
Refills: 0 | Status: DISCONTINUED | OUTPATIENT
Start: 2022-09-05 | End: 2022-09-10

## 2022-09-05 RX ORDER — DIPHENHYDRAMINE HCL 50 MG
25 CAPSULE ORAL ONCE
Refills: 0 | Status: COMPLETED | OUTPATIENT
Start: 2022-09-05 | End: 2022-09-05

## 2022-09-05 RX ORDER — MORPHINE SULFATE 50 MG/1
2 CAPSULE, EXTENDED RELEASE ORAL ONCE
Refills: 0 | Status: DISCONTINUED | OUTPATIENT
Start: 2022-09-05 | End: 2022-09-05

## 2022-09-05 RX ORDER — ACETAMINOPHEN 500 MG
1000 TABLET ORAL ONCE
Refills: 0 | Status: COMPLETED | OUTPATIENT
Start: 2022-09-06 | End: 2022-09-06

## 2022-09-05 RX ORDER — MORPHINE SULFATE 50 MG/1
4 CAPSULE, EXTENDED RELEASE ORAL EVERY 6 HOURS
Refills: 0 | Status: DISCONTINUED | OUTPATIENT
Start: 2022-09-05 | End: 2022-09-05

## 2022-09-05 RX ORDER — HYDROMORPHONE HYDROCHLORIDE 2 MG/ML
0.5 INJECTION INTRAMUSCULAR; INTRAVENOUS; SUBCUTANEOUS EVERY 6 HOURS
Refills: 0 | Status: DISCONTINUED | OUTPATIENT
Start: 2022-09-05 | End: 2022-09-05

## 2022-09-05 RX ORDER — HYDROMORPHONE HYDROCHLORIDE 2 MG/ML
1 INJECTION INTRAMUSCULAR; INTRAVENOUS; SUBCUTANEOUS EVERY 6 HOURS
Refills: 0 | Status: DISCONTINUED | OUTPATIENT
Start: 2022-09-05 | End: 2022-09-05

## 2022-09-05 RX ORDER — POTASSIUM CHLORIDE 20 MEQ
10 PACKET (EA) ORAL
Refills: 0 | Status: COMPLETED | OUTPATIENT
Start: 2022-09-05 | End: 2022-09-05

## 2022-09-05 RX ORDER — DIPHENHYDRAMINE HCL 50 MG
25 CAPSULE ORAL AT BEDTIME
Refills: 0 | Status: DISCONTINUED | OUTPATIENT
Start: 2022-09-05 | End: 2022-09-10

## 2022-09-05 RX ORDER — MORPHINE SULFATE 50 MG/1
4 CAPSULE, EXTENDED RELEASE ORAL EVERY 4 HOURS
Refills: 0 | Status: DISCONTINUED | OUTPATIENT
Start: 2022-09-05 | End: 2022-09-10

## 2022-09-05 RX ADMIN — MORPHINE SULFATE 2 MILLIGRAM(S): 50 CAPSULE, EXTENDED RELEASE ORAL at 23:03

## 2022-09-05 RX ADMIN — Medication 400 MILLIGRAM(S): at 12:37

## 2022-09-05 RX ADMIN — MORPHINE SULFATE 2 MILLIGRAM(S): 50 CAPSULE, EXTENDED RELEASE ORAL at 20:09

## 2022-09-05 RX ADMIN — MORPHINE SULFATE 4 MILLIGRAM(S): 50 CAPSULE, EXTENDED RELEASE ORAL at 05:00

## 2022-09-05 RX ADMIN — HEPARIN SODIUM 5000 UNIT(S): 5000 INJECTION INTRAVENOUS; SUBCUTANEOUS at 22:11

## 2022-09-05 RX ADMIN — Medication 25 MILLIGRAM(S): at 01:22

## 2022-09-05 RX ADMIN — MORPHINE SULFATE 2 MILLIGRAM(S): 50 CAPSULE, EXTENDED RELEASE ORAL at 15:45

## 2022-09-05 RX ADMIN — PANTOPRAZOLE SODIUM 40 MILLIGRAM(S): 20 TABLET, DELAYED RELEASE ORAL at 05:07

## 2022-09-05 RX ADMIN — Medication 100 MILLIEQUIVALENT(S): at 11:16

## 2022-09-05 RX ADMIN — HEPARIN SODIUM 5000 UNIT(S): 5000 INJECTION INTRAVENOUS; SUBCUTANEOUS at 05:07

## 2022-09-05 RX ADMIN — Medication 30 MILLIGRAM(S): at 22:48

## 2022-09-05 RX ADMIN — MORPHINE SULFATE 2 MILLIGRAM(S): 50 CAPSULE, EXTENDED RELEASE ORAL at 20:14

## 2022-09-05 RX ADMIN — Medication 30 MILLIGRAM(S): at 23:03

## 2022-09-05 RX ADMIN — Medication 400 MILLIGRAM(S): at 05:00

## 2022-09-05 RX ADMIN — MORPHINE SULFATE 2 MILLIGRAM(S): 50 CAPSULE, EXTENDED RELEASE ORAL at 22:48

## 2022-09-05 RX ADMIN — Medication 25 MILLIGRAM(S): at 22:11

## 2022-09-05 RX ADMIN — Medication 100 MILLIEQUIVALENT(S): at 12:55

## 2022-09-05 RX ADMIN — Medication 88 MICROGRAM(S): at 05:07

## 2022-09-05 RX ADMIN — ATORVASTATIN CALCIUM 20 MILLIGRAM(S): 80 TABLET, FILM COATED ORAL at 22:11

## 2022-09-05 RX ADMIN — PANTOPRAZOLE SODIUM 40 MILLIGRAM(S): 20 TABLET, DELAYED RELEASE ORAL at 17:44

## 2022-09-05 RX ADMIN — MORPHINE SULFATE 2 MILLIGRAM(S): 50 CAPSULE, EXTENDED RELEASE ORAL at 09:36

## 2022-09-05 RX ADMIN — MORPHINE SULFATE 2 MILLIGRAM(S): 50 CAPSULE, EXTENDED RELEASE ORAL at 15:15

## 2022-09-05 RX ADMIN — Medication 100 MILLIEQUIVALENT(S): at 14:29

## 2022-09-05 RX ADMIN — SODIUM CHLORIDE 100 MILLILITER(S): 9 INJECTION INTRAMUSCULAR; INTRAVENOUS; SUBCUTANEOUS at 19:30

## 2022-09-05 RX ADMIN — Medication 400 MILLIGRAM(S): at 17:43

## 2022-09-05 RX ADMIN — MORPHINE SULFATE 2 MILLIGRAM(S): 50 CAPSULE, EXTENDED RELEASE ORAL at 09:09

## 2022-09-05 RX ADMIN — Medication 1000 MILLIGRAM(S): at 13:00

## 2022-09-05 NOTE — PROGRESS NOTE ADULT - SUBJECTIVE AND OBJECTIVE BOX
Pt seen and examined at bedside, denies any overnight events. Vital signs stable.  Admits to intermittent abdominal pain (10/10 at its worse). Reports that she felt better pain control w/ Morphine over Dilaudid.  Reports nausea overnight as well, relieved w/ Zofran. Remains NPO w/ NGT to low wall suction, 600cc yellow-tinged drainage noted in cannister. Passing flatus overnight.  Denies headache, chest pain, palpitations, shortness of breath, weakness or fatigue.    T(C): 36.8 (22 @ 04:47), Max: 36.9 (22 @ 16:20)  HR: 76 (22 @ 04:47) (75 - 99)  BP: 117/63 (22 @ 04:47) (114/80 - 170/117)  RR: 17 (22 @ 04:47) (16 - 18)  SpO2: 92% (22 @ 04:47) (92% - 98%)    PHYSICAL EXAM:  General: no acute distress, appears comfortable, NGT  Pulm: non labored respirations  Abdomen: soft, tender to palpation, worse in epigastric/upper abdomen, distended, hypoactive BS, healed midline incision, two areas of ecchymosis (likely 2/2 to heparin injections)  Extremities: no calf tenderness noted    I&O's Detail    04 Sep 2022 07:01  -  05 Sep 2022 05:32  --------------------------------------------------------  IN:    sodium chloride 0.9%: 300 mL  Total IN: 300 mL    OUT:    Nasogastric/Oral tube (mL): 700 mL    Voided (mL): 500 mL  Total OUT: 1200 mL    Total NET: -900 mL    LABS:                        13.3   6.43  )-----------( 272      ( 04 Sep 2022 07:13 )             41.5         142  |  106  |  17  ----------------------------<  131<H>  3.8   |  30  |  0.71    Ca    9.0      04 Sep 2022 07:13    TPro  7.9  /  Alb  4.0  /  TBili  0.7  /  DBili  x   /  AST  49<H>  /  ALT  75  /  AlkPhos  104  09-04    PT/INR - ( 04 Sep 2022 07:13 )   PT: 10.3 sec;   INR: 0.88 ratio         PTT - ( 04 Sep 2022 07:13 )  PTT:30.4 sec  Urinalysis Basic - ( 04 Sep 2022 07:37 )    Color: Pale Yellow / Appearance: Clear / S.010 / pH: x  Gluc: x / Ketone: Negative  / Bili: Negative / Urobili: Negative   Blood: x / Protein: Negative / Nitrite: Negative   Leuk Esterase: Small / RBC: 0-2 /HPF / WBC 3-5   Sq Epi: x / Non Sq Epi: Few / Bacteria: x    MEDICATIONS:  atorvastatin 20 milliGRAM(s) Oral at bedtime  heparin   Injectable 5000 Unit(s) SubCutaneous every 8 hours  influenza  Vaccine (HIGH DOSE) 0.7 milliLiter(s) IntraMuscular once  levothyroxine 88 MICROGram(s) Oral daily  morphine  - Injectable 2 milliGRAM(s) IV Push every 4 hours PRN  morphine  - Injectable 4 milliGRAM(s) IV Push every 6 hours PRN  ondansetron Injectable 4 milliGRAM(s) IV Push every 6 hours PRN  pantoprazole  Injectable 40 milliGRAM(s) IV Push two times a day  sodium chloride 0.9%. 1000 milliLiter(s) IV Continuous <Continuous>

## 2022-09-05 NOTE — PROGRESS NOTE ADULT - PROBLEM SELECTOR PLAN 1
- NPO, IVF, NG tube to low wall suction, monitor & document output  - Flush NG tube w/ saline daily  - Continue pain control (adjusted pain regimen) & antiemetics as needed  - Monitor for bowel function, serial abdominal exams  - DVT Prophylaxis with subq heparin & SCDs  - F/u AM KUB  - Above to be discussed with Dr. Yanez    Surgical Team  Spectralink: [id number]

## 2022-09-06 LAB
ANION GAP SERPL CALC-SCNC: 7 MMOL/L — SIGNIFICANT CHANGE UP (ref 5–17)
APPEARANCE UR: CLEAR — SIGNIFICANT CHANGE UP
BILIRUB UR-MCNC: NEGATIVE — SIGNIFICANT CHANGE UP
BUN SERPL-MCNC: 8 MG/DL — SIGNIFICANT CHANGE UP (ref 7–23)
CALCIUM SERPL-MCNC: 8.4 MG/DL — LOW (ref 8.5–10.1)
CHLORIDE SERPL-SCNC: 110 MMOL/L — HIGH (ref 96–108)
CO2 SERPL-SCNC: 28 MMOL/L — SIGNIFICANT CHANGE UP (ref 22–31)
COLOR SPEC: SIGNIFICANT CHANGE UP
CREAT SERPL-MCNC: 0.6 MG/DL — SIGNIFICANT CHANGE UP (ref 0.5–1.3)
DIFF PNL FLD: NEGATIVE — SIGNIFICANT CHANGE UP
EGFR: 98 ML/MIN/1.73M2 — SIGNIFICANT CHANGE UP
EPI CELLS # UR: SIGNIFICANT CHANGE UP
GLUCOSE SERPL-MCNC: 98 MG/DL — SIGNIFICANT CHANGE UP (ref 70–99)
GLUCOSE UR QL: NEGATIVE — SIGNIFICANT CHANGE UP
HCT VFR BLD CALC: 32.9 % — LOW (ref 34.5–45)
HGB BLD-MCNC: 10.5 G/DL — LOW (ref 11.5–15.5)
KETONES UR-MCNC: NEGATIVE — SIGNIFICANT CHANGE UP
LEUKOCYTE ESTERASE UR-ACNC: ABNORMAL
MAGNESIUM SERPL-MCNC: 2.2 MG/DL — SIGNIFICANT CHANGE UP (ref 1.6–2.6)
MCHC RBC-ENTMCNC: 30.9 PG — SIGNIFICANT CHANGE UP (ref 27–34)
MCHC RBC-ENTMCNC: 31.9 GM/DL — LOW (ref 32–36)
MCV RBC AUTO: 96.8 FL — SIGNIFICANT CHANGE UP (ref 80–100)
NITRITE UR-MCNC: NEGATIVE — SIGNIFICANT CHANGE UP
NRBC # BLD: 0 /100 WBCS — SIGNIFICANT CHANGE UP (ref 0–0)
PH UR: 7 — SIGNIFICANT CHANGE UP (ref 5–8)
PHOSPHATE SERPL-MCNC: 2.1 MG/DL — LOW (ref 2.5–4.5)
PLATELET # BLD AUTO: 202 K/UL — SIGNIFICANT CHANGE UP (ref 150–400)
POTASSIUM SERPL-MCNC: 3.2 MMOL/L — LOW (ref 3.5–5.3)
POTASSIUM SERPL-SCNC: 3.2 MMOL/L — LOW (ref 3.5–5.3)
PROT UR-MCNC: NEGATIVE — SIGNIFICANT CHANGE UP
RBC # BLD: 3.4 M/UL — LOW (ref 3.8–5.2)
RBC # FLD: 13 % — SIGNIFICANT CHANGE UP (ref 10.3–14.5)
RBC CASTS # UR COMP ASSIST: SIGNIFICANT CHANGE UP /HPF (ref 0–4)
SARS-COV-2 RNA SPEC QL NAA+PROBE: SIGNIFICANT CHANGE UP
SODIUM SERPL-SCNC: 145 MMOL/L — SIGNIFICANT CHANGE UP (ref 135–145)
SP GR SPEC: 1 — LOW (ref 1.01–1.02)
UROBILINOGEN FLD QL: NEGATIVE — SIGNIFICANT CHANGE UP
WBC # BLD: 4.87 K/UL — SIGNIFICANT CHANGE UP (ref 3.8–10.5)
WBC # FLD AUTO: 4.87 K/UL — SIGNIFICANT CHANGE UP (ref 3.8–10.5)
WBC UR QL: SIGNIFICANT CHANGE UP

## 2022-09-06 PROCEDURE — 74018 RADEX ABDOMEN 1 VIEW: CPT | Mod: 26

## 2022-09-06 RX ORDER — SODIUM,POTASSIUM PHOSPHATES 278-250MG
1 POWDER IN PACKET (EA) ORAL ONCE
Refills: 0 | Status: DISCONTINUED | OUTPATIENT
Start: 2022-09-06 | End: 2022-09-06

## 2022-09-06 RX ORDER — BENZOCAINE 10 %
1 GEL (GRAM) MUCOUS MEMBRANE THREE TIMES A DAY
Refills: 0 | Status: DISCONTINUED | OUTPATIENT
Start: 2022-09-06 | End: 2022-09-10

## 2022-09-06 RX ORDER — SODIUM CHLORIDE 9 MG/ML
1000 INJECTION, SOLUTION INTRAVENOUS
Refills: 0 | Status: DISCONTINUED | OUTPATIENT
Start: 2022-09-06 | End: 2022-09-07

## 2022-09-06 RX ORDER — POTASSIUM PHOSPHATE, MONOBASIC POTASSIUM PHOSPHATE, DIBASIC 236; 224 MG/ML; MG/ML
15 INJECTION, SOLUTION INTRAVENOUS ONCE
Refills: 0 | Status: COMPLETED | OUTPATIENT
Start: 2022-09-06 | End: 2022-09-06

## 2022-09-06 RX ORDER — POTASSIUM PHOSPHATE, MONOBASIC POTASSIUM PHOSPHATE, DIBASIC 236; 224 MG/ML; MG/ML
15 INJECTION, SOLUTION INTRAVENOUS ONCE
Refills: 0 | Status: DISCONTINUED | OUTPATIENT
Start: 2022-09-06 | End: 2022-09-06

## 2022-09-06 RX ORDER — POTASSIUM CHLORIDE 20 MEQ
10 PACKET (EA) ORAL
Refills: 0 | Status: COMPLETED | OUTPATIENT
Start: 2022-09-06 | End: 2022-09-06

## 2022-09-06 RX ORDER — DIATRIZOATE MEGLUMINE 180 MG/ML
100 INJECTION, SOLUTION INTRAVESICAL ONCE
Refills: 0 | Status: DISCONTINUED | OUTPATIENT
Start: 2022-09-06 | End: 2022-09-06

## 2022-09-06 RX ADMIN — MORPHINE SULFATE 4 MILLIGRAM(S): 50 CAPSULE, EXTENDED RELEASE ORAL at 15:14

## 2022-09-06 RX ADMIN — Medication 400 MILLIGRAM(S): at 06:12

## 2022-09-06 RX ADMIN — Medication 100 MILLIEQUIVALENT(S): at 11:43

## 2022-09-06 RX ADMIN — Medication 100 MILLIEQUIVALENT(S): at 13:21

## 2022-09-06 RX ADMIN — POTASSIUM PHOSPHATE, MONOBASIC POTASSIUM PHOSPHATE, DIBASIC 62.5 MILLIMOLE(S): 236; 224 INJECTION, SOLUTION INTRAVENOUS at 16:21

## 2022-09-06 RX ADMIN — HEPARIN SODIUM 5000 UNIT(S): 5000 INJECTION INTRAVENOUS; SUBCUTANEOUS at 13:20

## 2022-09-06 RX ADMIN — MORPHINE SULFATE 4 MILLIGRAM(S): 50 CAPSULE, EXTENDED RELEASE ORAL at 14:52

## 2022-09-06 RX ADMIN — HEPARIN SODIUM 5000 UNIT(S): 5000 INJECTION INTRAVENOUS; SUBCUTANEOUS at 05:05

## 2022-09-06 RX ADMIN — Medication 1000 MILLIGRAM(S): at 06:42

## 2022-09-06 RX ADMIN — Medication 88 MICROGRAM(S): at 05:04

## 2022-09-06 RX ADMIN — PANTOPRAZOLE SODIUM 40 MILLIGRAM(S): 20 TABLET, DELAYED RELEASE ORAL at 17:54

## 2022-09-06 RX ADMIN — ATORVASTATIN CALCIUM 20 MILLIGRAM(S): 80 TABLET, FILM COATED ORAL at 21:05

## 2022-09-06 RX ADMIN — SODIUM CHLORIDE 150 MILLILITER(S): 9 INJECTION, SOLUTION INTRAVENOUS at 17:27

## 2022-09-06 RX ADMIN — MORPHINE SULFATE 4 MILLIGRAM(S): 50 CAPSULE, EXTENDED RELEASE ORAL at 21:30

## 2022-09-06 RX ADMIN — Medication 100 MILLIEQUIVALENT(S): at 14:46

## 2022-09-06 RX ADMIN — MORPHINE SULFATE 4 MILLIGRAM(S): 50 CAPSULE, EXTENDED RELEASE ORAL at 10:23

## 2022-09-06 RX ADMIN — MORPHINE SULFATE 4 MILLIGRAM(S): 50 CAPSULE, EXTENDED RELEASE ORAL at 21:03

## 2022-09-06 RX ADMIN — ONDANSETRON 4 MILLIGRAM(S): 8 TABLET, FILM COATED ORAL at 07:41

## 2022-09-06 RX ADMIN — MORPHINE SULFATE 4 MILLIGRAM(S): 50 CAPSULE, EXTENDED RELEASE ORAL at 10:35

## 2022-09-06 RX ADMIN — Medication 25 MILLIGRAM(S): at 21:04

## 2022-09-06 RX ADMIN — HEPARIN SODIUM 5000 UNIT(S): 5000 INJECTION INTRAVENOUS; SUBCUTANEOUS at 21:06

## 2022-09-06 RX ADMIN — PANTOPRAZOLE SODIUM 40 MILLIGRAM(S): 20 TABLET, DELAYED RELEASE ORAL at 05:05

## 2022-09-06 RX ADMIN — SODIUM CHLORIDE 150 MILLILITER(S): 9 INJECTION, SOLUTION INTRAVENOUS at 11:06

## 2022-09-06 NOTE — PROVIDER CONTACT NOTE (OTHER) - REASON
Subjective:       Patient ID: Saida Jones is a 27 y.o.  at 35w4d     Chief Complaint:  Routine Prenatal Visit      History of Present Illness  No complaints. Reports normal sx. Labs and history reviewed with pt.         Review of Systems  Denies n/v, f/c, dysuria, contractions,   VD, VB, round ligament pain, headaches, preE ROS       Objective:     Vitals:    22 0954   BP: 126/78   Pulse: 84     Wt Readings from Last 3 Encounters:   22 64 kg (141 lb)   22 64 kg (141 lb)   22 62.6 kg (138 lb)       nad  NCAT  pupils normal size  Skin nml no rashes or lesions  No resp distress, resp even and unlabored  Gravid nt, no rebound no guarding  No cyanosis or clubbing, edema appropriate for pregn    FHT: 150's      Assessment:        1. Encounter for supervision of normal pregnancy in multigravida in third trimester    2. Monozygotic twins, antepartum    3. History of  delivery                Plan:        Encouraged PNV  Pain, fever, bleeding precautions   RTC 1 weeks      CS friday   Blood in NGT

## 2022-09-06 NOTE — PROGRESS NOTE ADULT - SUBJECTIVE AND OBJECTIVE BOX
HPI:  Patient is a 66yo female complaining of abdominal pian nausea and vomiting Patient has had a history of SBO and had surgery by Dr Yanez 9 months ago. Patient states that she had Greek food last night at 8pm and hasn't ate since then. Patient denies sick contacts Patient   ate the same food with no affect. Patient states that ht pain is not constant it comes in waves    Patient is 67 year old female w/ PSHx colon resection w/ Dr. Yanez, history of SBO, HLD, hypothyroidism, presenting to [facility] with abdominal pain.  Patient states pain come on suddenly that woke her from sleeping at 5 am, and described as similar to the pain when she had an SBO previously.  Patient last ate around 8 pm last night.  States she is passing flatus, no BMs.  Denies fevers, chills, chest pain, SOB, palpitations.   (04 Sep 2022 11:19)    FORMAL PROGRESS NOTED TO FOLLOW// IN PROGRESS    SUBJECTIVE:  Patient seen and examined at bedside. Patient reporting blood in NG tube this AM when going to bathroom. Bedside RN endorses this as well and noted blood-tinged enteric contents in tube canister. Patient reports net improvement in symptoms however still feels "lousy".  Admits to flatus and BM.  Voiding, ambulating and tolerating diet.  Patient denies any fever, chills, chest pain, shortness of breath, nausea, vomiting, or urinary complaints.    VITALS  Vital Signs Last 24 Hrs  T(C): 37 (06 Sep 2022 04:56), Max: 37 (05 Sep 2022 11:46)  T(F): 98.6 (06 Sep 2022 04:56), Max: 98.6 (05 Sep 2022 11:46)  HR: 71 (06 Sep 2022 04:56) (68 - 72)  BP: 124/81 (06 Sep 2022 04:56) (116/86 - 138/78)  BP(mean): --  RR: 18 (06 Sep 2022 04:56) (17 - 18)  SpO2: 91% (06 Sep 2022 04:56) (91% - 93%)    Parameters below as of 06 Sep 2022 04:56  Patient On (Oxygen Delivery Method): room air        PHYSICAL EXAM  GENERAL:  Well-nourished, well-developed Female lying comfortably in bed in NAD.  HEENT:  NC/AT. Sclera white. Mucous membranes moist.  CARDIO:  Regular rate and rhythm.  No murmur, gallop or rub appreciated.  RESPIRATORY:  Nonlabored breathing, no accessory muscle use. Lungs clear to auscultation bilaterally.  No wheezing, rales or rhonchi appreciated.  ABDOMEN:  Soft, nondistended, nontender. BS appreciated on auscultation.  No rebound tenderness or guarding.  EXTREMITIES: No calf tenderness bilaterally.  SKIN:  No jaundice, pallor, or cyanosis  NEURO:  A&O x 3    INTAKE & OUTPUT  I&O's Summary    05 Sep 2022 07:01  -  06 Sep 2022 07:00  --------------------------------------------------------  IN: 1100 mL / OUT: 1350 mL / NET: -250 mL      I&O's Detail    05 Sep 2022 07:01  -  06 Sep 2022 07:00  --------------------------------------------------------  IN:    sodium chloride 0.9%: 1100 mL  Total IN: 1100 mL    OUT:    Nasogastric/Oral tube (mL): 1350 mL  Total OUT: 1350 mL    Total NET: -250 mL          MEDICATIONS  MEDICATIONS  (STANDING):  atorvastatin 20 milliGRAM(s) Oral at bedtime  diatrizoate meglumine/diatrizoate sodium. 100 milliLiter(s) Enteral Tube once  diphenhydrAMINE Injectable 25 milliGRAM(s) IV Push at bedtime  heparin   Injectable 5000 Unit(s) SubCutaneous every 8 hours  influenza  Vaccine (HIGH DOSE) 0.7 milliLiter(s) IntraMuscular once  levothyroxine 88 MICROGram(s) Oral daily  pantoprazole  Injectable 40 milliGRAM(s) IV Push two times a day  sodium chloride 0.9%. 1000 milliLiter(s) (100 mL/Hr) IV Continuous <Continuous>    MEDICATIONS  (PRN):  morphine  - Injectable 4 milliGRAM(s) IV Push every 4 hours PRN Severe Pain (7 - 10)  morphine  - Injectable 2 milliGRAM(s) IV Push every 4 hours PRN Moderate Pain (4 - 6)  ondansetron Injectable 4 milliGRAM(s) IV Push every 6 hours PRN Nausea      LABS:                        10.5   4.87  )-----------( 202      ( 06 Sep 2022 06:54 )             32.9     09-06    145  |  110<H>  |  8   ----------------------------<  98  3.2<L>   |  28  |  0.60    Ca    8.4<L>      06 Sep 2022 06:54  Phos  2.1     09-06  Mg     2.2     09-06            RADIOLOGY & ADDITIONAL STUDIES:    ASSESSMENT & PLAN   67y Female POD# s/p    - Continue diet  - Continue antibiotics  - Serial abdominal exams  - Is & Os  - DVT prophylaxis with  - OOB, pain control  - Incentive spirometry  - Follow up AM labs  - Case to be discussed with   SUBJECTIVE:  Patient seen and examined at bedside. Patient reporting blood in NG tube this AM when going to bathroom. Bedside RN endorses this as well and noted blood-tinged enteric contents in tube canister. Patient reports net improvement in symptoms however still feels "lousy".  Admits to flatus but no BM. Voiding appropriately.  Patient denies any fever, chills, chest pain, shortness of breath, nausea, vomiting, or urinary complaints.    VITALS  Vital Signs Last 24 Hrs  T(C): 37 (06 Sep 2022 04:56), Max: 37 (05 Sep 2022 11:46)  T(F): 98.6 (06 Sep 2022 04:56), Max: 98.6 (05 Sep 2022 11:46)  HR: 71 (06 Sep 2022 04:56) (68 - 72)  BP: 124/81 (06 Sep 2022 04:56) (116/86 - 138/78)  BP(mean): --  RR: 18 (06 Sep 2022 04:56) (17 - 18)  SpO2: 91% (06 Sep 2022 04:56) (91% - 93%)    Parameters below as of 06 Sep 2022 04:56  Patient On (Oxygen Delivery Method): room air    PHYSICAL EXAM  GENERAL:  Well-nourished, well-developed elderly Female sitting comfortably in bed in [facility].  HEENT:  NC/AT. Sclera white. Mucous membranes moist.  CARDIO:  Regular rate and rhythm.  No murmur, gallop or rub appreciated.  RESPIRATORY:  Nonlabored breathing, no accessory muscle use. Lungs clear to auscultation bilaterally.  No wheezing, rales or rhonchi appreciated.    ABDOMEN: NG tube in place, flushed and irrigated with aspiration of mostly clear gastric contents. NG canister with blood-tinged enteric contents, ~100 ccs. Reconnected to LCWS. Soft, minimally distended, ttp bonifacio-umbilically. Hypoactive BS appreciated on auscultation. No rebound tenderness or guarding.    EXTREMITIES: No calf tenderness bilaterally.  SKIN:  No jaundice, pallor, or cyanosis  NEURO:  A&O x 3    INTAKE & OUTPUT  I&O's Summary    05 Sep 2022 07:01  -  06 Sep 2022 07:00  --------------------------------------------------------  IN: 1100 mL / OUT: 1350 mL / NET: -250 mL    I&O's Detail    05 Sep 2022 07:01  -  06 Sep 2022 07:00  --------------------------------------------------------  IN:    sodium chloride 0.9%: 1100 mL  Total IN: 1100 mL    OUT:    Nasogastric/Oral tube (mL): 1350 mL  Total OUT: 1350 mL    Total NET: -250 mL    MEDICATIONS  MEDICATIONS  (STANDING):  atorvastatin 20 milliGRAM(s) Oral at bedtime  diatrizoate meglumine/diatrizoate sodium. 100 milliLiter(s) Enteral Tube once  diphenhydrAMINE Injectable 25 milliGRAM(s) IV Push at bedtime  heparin   Injectable 5000 Unit(s) SubCutaneous every 8 hours  influenza  Vaccine (HIGH DOSE) 0.7 milliLiter(s) IntraMuscular once  levothyroxine 88 MICROGram(s) Oral daily  pantoprazole  Injectable 40 milliGRAM(s) IV Push two times a day  sodium chloride 0.9%. 1000 milliLiter(s) (100 mL/Hr) IV Continuous <Continuous>    MEDICATIONS  (PRN):  morphine  - Injectable 4 milliGRAM(s) IV Push every 4 hours PRN Severe Pain (7 - 10)  morphine  - Injectable 2 milliGRAM(s) IV Push every 4 hours PRN Moderate Pain (4 - 6)  ondansetron Injectable 4 milliGRAM(s) IV Push every 6 hours PRN Nausea    LABS:                      10.5   4.87  )-----------( 202      ( 06 Sep 2022 06:54 )             32.9     09-06    145  |  110<H>  |  8   ----------------------------<  98  3.2<L>   |  28  |  0.60    Ca    8.4<L>      06 Sep 2022 06:54  Phos  2.1     09-06  Mg     2.2     09-06    ASSESSMENT & PLAN  67 year old female w/ PMH/PSH SB resection (Dr. Yanez, 07/2022) history of SBO, HLD, hypothyroidism, a/w  SBO at previous anastomosis site. NGT outpt with 1350 ccs/24 hours. Reassuring VS, WBC WNLs. KUB obtained this AM, pending official read.    - NPO, NGT  - Is & Os  - DVT prophylaxis with SQH  - OOB, pain control, minimize narcotic use.  - Follow up AM labs & KUB read.  - Radiology reviewed personally with Surgery attending and Dr. Staley from radiology.  - Case to be discussed with Dr. Yanez SUBJECTIVE:  Patient seen and examined at bedside. Patient reporting blood in NG tube this AM when going to bathroom. Bedside RN endorses this as well and noted blood-tinged enteric contents in tube canister. Patient reports net improvement in symptoms however still feels "lousy".  Admits to flatus but no BM. Voiding appropriately.  Patient denies any fever, chills, chest pain, shortness of breath, nausea, vomiting, or urinary complaints.    VITALS  Vital Signs Last 24 Hrs  T(C): 37 (06 Sep 2022 04:56), Max: 37 (05 Sep 2022 11:46)  T(F): 98.6 (06 Sep 2022 04:56), Max: 98.6 (05 Sep 2022 11:46)  HR: 71 (06 Sep 2022 04:56) (68 - 72)  BP: 124/81 (06 Sep 2022 04:56) (116/86 - 138/78)  BP(mean): --  RR: 18 (06 Sep 2022 04:56) (17 - 18)  SpO2: 91% (06 Sep 2022 04:56) (91% - 93%)    Parameters below as of 06 Sep 2022 04:56  Patient On (Oxygen Delivery Method): room air    PHYSICAL EXAM  GENERAL:  Well-nourished, well-developed elderly Female sitting comfortably in bed in [facility].  HEENT:  NC/AT. Sclera white. Mucous membranes moist.  CARDIO:  Regular rate and rhythm.  No murmur, gallop or rub appreciated.  RESPIRATORY:  Nonlabored breathing, no accessory muscle use. Lungs clear to auscultation bilaterally.  No wheezing, rales or rhonchi appreciated.    ABDOMEN: NG tube in place, flushed and irrigated with aspiration of mostly clear gastric contents. NG canister with blood-tinged enteric contents, ~100 ccs. Reconnected to LCWS. Soft, minimally distended, ttp bonifacio-umbilically. Hypoactive BS appreciated on auscultation. No rebound tenderness or guarding.    EXTREMITIES: No calf tenderness bilaterally.  SKIN:  No jaundice, pallor, or cyanosis  NEURO:  A&O x 3    INTAKE & OUTPUT  I&O's Summary    05 Sep 2022 07:01  -  06 Sep 2022 07:00  --------------------------------------------------------  IN: 1100 mL / OUT: 1350 mL / NET: -250 mL    I&O's Detail    05 Sep 2022 07:01  -  06 Sep 2022 07:00  --------------------------------------------------------  IN:    sodium chloride 0.9%: 1100 mL  Total IN: 1100 mL    OUT:    Nasogastric/Oral tube (mL): 1350 mL  Total OUT: 1350 mL    Total NET: -250 mL    MEDICATIONS  MEDICATIONS  (STANDING):  atorvastatin 20 milliGRAM(s) Oral at bedtime  diatrizoate meglumine/diatrizoate sodium. 100 milliLiter(s) Enteral Tube once  diphenhydrAMINE Injectable 25 milliGRAM(s) IV Push at bedtime  heparin   Injectable 5000 Unit(s) SubCutaneous every 8 hours  influenza  Vaccine (HIGH DOSE) 0.7 milliLiter(s) IntraMuscular once  levothyroxine 88 MICROGram(s) Oral daily  pantoprazole  Injectable 40 milliGRAM(s) IV Push two times a day  sodium chloride 0.9%. 1000 milliLiter(s) (100 mL/Hr) IV Continuous <Continuous>    MEDICATIONS  (PRN):  morphine  - Injectable 4 milliGRAM(s) IV Push every 4 hours PRN Severe Pain (7 - 10)  morphine  - Injectable 2 milliGRAM(s) IV Push every 4 hours PRN Moderate Pain (4 - 6)  ondansetron Injectable 4 milliGRAM(s) IV Push every 6 hours PRN Nausea    LABS:                      10.5   4.87  )-----------( 202      ( 06 Sep 2022 06:54 )             32.9     09-06    145  |  110<H>  |  8   ----------------------------<  98  3.2<L>   |  28  |  0.60    Ca    8.4<L>      06 Sep 2022 06:54  Phos  2.1     09-06  Mg     2.2     09-06    ASSESSMENT & PLAN  67 year old female w/ PMH/PSH SB resection (Dr. Yanez, 07/2022) history of SBO, HLD, hypothyroidism, a/w  SBO at previous anastomosis site. NGT outpt with 1350 ccs/24 hours. Reassuring VS, WBC WNLs. KUB obtained this AM, pending official read.    - NPO, NGT  - Is & Os  - DVT prophylaxis with SQH  - OOB, pain control, minimize narcotic use. Replete K+ & phosphorus  - Follow up AM labs & KUB read.  - Radiology reviewed personally with Surgery attending and Dr. Staley from radiology.  - Case discussed with Dr. Yanez

## 2022-09-07 LAB
ANION GAP SERPL CALC-SCNC: 8 MMOL/L — SIGNIFICANT CHANGE UP (ref 5–17)
BUN SERPL-MCNC: 6 MG/DL — LOW (ref 7–23)
CALCIUM SERPL-MCNC: 8.9 MG/DL — SIGNIFICANT CHANGE UP (ref 8.5–10.1)
CHLORIDE SERPL-SCNC: 103 MMOL/L — SIGNIFICANT CHANGE UP (ref 96–108)
CO2 SERPL-SCNC: 30 MMOL/L — SIGNIFICANT CHANGE UP (ref 22–31)
CREAT SERPL-MCNC: 0.6 MG/DL — SIGNIFICANT CHANGE UP (ref 0.5–1.3)
EGFR: 98 ML/MIN/1.73M2 — SIGNIFICANT CHANGE UP
GLUCOSE SERPL-MCNC: 91 MG/DL — SIGNIFICANT CHANGE UP (ref 70–99)
HCT VFR BLD CALC: 33.7 % — LOW (ref 34.5–45)
HGB BLD-MCNC: 11 G/DL — LOW (ref 11.5–15.5)
MAGNESIUM SERPL-MCNC: 2.1 MG/DL — SIGNIFICANT CHANGE UP (ref 1.6–2.6)
MCHC RBC-ENTMCNC: 30.7 PG — SIGNIFICANT CHANGE UP (ref 27–34)
MCHC RBC-ENTMCNC: 32.6 GM/DL — SIGNIFICANT CHANGE UP (ref 32–36)
MCV RBC AUTO: 94.1 FL — SIGNIFICANT CHANGE UP (ref 80–100)
NRBC # BLD: 0 /100 WBCS — SIGNIFICANT CHANGE UP (ref 0–0)
PHOSPHATE SERPL-MCNC: 3.2 MG/DL — SIGNIFICANT CHANGE UP (ref 2.5–4.5)
PLATELET # BLD AUTO: 220 K/UL — SIGNIFICANT CHANGE UP (ref 150–400)
POTASSIUM SERPL-MCNC: 3.1 MMOL/L — LOW (ref 3.5–5.3)
POTASSIUM SERPL-SCNC: 3.1 MMOL/L — LOW (ref 3.5–5.3)
RBC # BLD: 3.58 M/UL — LOW (ref 3.8–5.2)
RBC # FLD: 12.5 % — SIGNIFICANT CHANGE UP (ref 10.3–14.5)
SODIUM SERPL-SCNC: 141 MMOL/L — SIGNIFICANT CHANGE UP (ref 135–145)
WBC # BLD: 6.34 K/UL — SIGNIFICANT CHANGE UP (ref 3.8–10.5)
WBC # FLD AUTO: 6.34 K/UL — SIGNIFICANT CHANGE UP (ref 3.8–10.5)

## 2022-09-07 PROCEDURE — 74250 X-RAY XM SM INT 1CNTRST STD: CPT | Mod: 26

## 2022-09-07 PROCEDURE — 74018 RADEX ABDOMEN 1 VIEW: CPT | Mod: 26,59

## 2022-09-07 RX ORDER — POTASSIUM CHLORIDE 20 MEQ
10 PACKET (EA) ORAL
Refills: 0 | Status: COMPLETED | OUTPATIENT
Start: 2022-09-07 | End: 2022-09-07

## 2022-09-07 RX ORDER — SODIUM CHLORIDE 9 MG/ML
1000 INJECTION, SOLUTION INTRAVENOUS
Refills: 0 | Status: DISCONTINUED | OUTPATIENT
Start: 2022-09-07 | End: 2022-09-10

## 2022-09-07 RX ORDER — DIATRIZOATE MEGLUMINE 180 MG/ML
50 INJECTION, SOLUTION INTRAVESICAL ONCE
Refills: 0 | Status: COMPLETED | OUTPATIENT
Start: 2022-09-07 | End: 2022-09-07

## 2022-09-07 RX ORDER — DIPHENHYDRAMINE HCL 50 MG
25 CAPSULE ORAL ONCE
Refills: 0 | Status: COMPLETED | OUTPATIENT
Start: 2022-09-07 | End: 2022-09-07

## 2022-09-07 RX ADMIN — MORPHINE SULFATE 4 MILLIGRAM(S): 50 CAPSULE, EXTENDED RELEASE ORAL at 18:06

## 2022-09-07 RX ADMIN — HEPARIN SODIUM 5000 UNIT(S): 5000 INJECTION INTRAVENOUS; SUBCUTANEOUS at 05:34

## 2022-09-07 RX ADMIN — MORPHINE SULFATE 4 MILLIGRAM(S): 50 CAPSULE, EXTENDED RELEASE ORAL at 05:33

## 2022-09-07 RX ADMIN — MORPHINE SULFATE 4 MILLIGRAM(S): 50 CAPSULE, EXTENDED RELEASE ORAL at 10:30

## 2022-09-07 RX ADMIN — MORPHINE SULFATE 4 MILLIGRAM(S): 50 CAPSULE, EXTENDED RELEASE ORAL at 06:30

## 2022-09-07 RX ADMIN — MORPHINE SULFATE 4 MILLIGRAM(S): 50 CAPSULE, EXTENDED RELEASE ORAL at 10:12

## 2022-09-07 RX ADMIN — ATORVASTATIN CALCIUM 20 MILLIGRAM(S): 80 TABLET, FILM COATED ORAL at 22:20

## 2022-09-07 RX ADMIN — HEPARIN SODIUM 5000 UNIT(S): 5000 INJECTION INTRAVENOUS; SUBCUTANEOUS at 22:20

## 2022-09-07 RX ADMIN — Medication 100 MILLIEQUIVALENT(S): at 10:16

## 2022-09-07 RX ADMIN — PANTOPRAZOLE SODIUM 40 MILLIGRAM(S): 20 TABLET, DELAYED RELEASE ORAL at 05:33

## 2022-09-07 RX ADMIN — PANTOPRAZOLE SODIUM 40 MILLIGRAM(S): 20 TABLET, DELAYED RELEASE ORAL at 17:16

## 2022-09-07 RX ADMIN — Medication 25 MILLIGRAM(S): at 13:16

## 2022-09-07 RX ADMIN — Medication 100 MILLIEQUIVALENT(S): at 13:27

## 2022-09-07 RX ADMIN — MORPHINE SULFATE 4 MILLIGRAM(S): 50 CAPSULE, EXTENDED RELEASE ORAL at 18:36

## 2022-09-07 RX ADMIN — HEPARIN SODIUM 5000 UNIT(S): 5000 INJECTION INTRAVENOUS; SUBCUTANEOUS at 13:29

## 2022-09-07 RX ADMIN — Medication 88 MICROGRAM(S): at 05:34

## 2022-09-07 RX ADMIN — DIATRIZOATE MEGLUMINE 50 MILLILITER(S): 180 INJECTION, SOLUTION INTRAVESICAL at 12:28

## 2022-09-07 RX ADMIN — MORPHINE SULFATE 4 MILLIGRAM(S): 50 CAPSULE, EXTENDED RELEASE ORAL at 22:19

## 2022-09-07 RX ADMIN — Medication 100 MILLIEQUIVALENT(S): at 12:16

## 2022-09-07 RX ADMIN — Medication 25 MILLIGRAM(S): at 23:10

## 2022-09-07 RX ADMIN — MORPHINE SULFATE 4 MILLIGRAM(S): 50 CAPSULE, EXTENDED RELEASE ORAL at 23:00

## 2022-09-07 NOTE — DIETITIAN INITIAL EVALUATION ADULT - PERSON TAUGHT/METHOD
verbal instruction/written material/individual instruction/teach back - (Patient repeats in own words)/patient instructed

## 2022-09-07 NOTE — DIETITIAN INITIAL EVALUATION ADULT - PERTINENT LABORATORY DATA
09-07    141  |  103  |  6<L>  ----------------------------<  91  3.1<L>   |  30  |  0.60    Ca    8.9      07 Sep 2022 07:00  Phos  3.2     09-07  Mg     2.1     09-07

## 2022-09-07 NOTE — DIETITIAN INITIAL EVALUATION ADULT - PERTINENT MEDS FT
MEDICATIONS  (STANDING):  atorvastatin 20 milliGRAM(s) Oral at bedtime  diphenhydrAMINE Injectable 25 milliGRAM(s) IV Push at bedtime  heparin   Injectable 5000 Unit(s) SubCutaneous every 8 hours  influenza  Vaccine (HIGH DOSE) 0.7 milliLiter(s) IntraMuscular once  lactated ringers. 1000 milliLiter(s) (150 mL/Hr) IV Continuous <Continuous>  levothyroxine 88 MICROGram(s) Oral daily  pantoprazole  Injectable 40 milliGRAM(s) IV Push two times a day  potassium chloride  10 mEq/100 mL IVPB 10 milliEquivalent(s) IV Intermittent every 2 hours    MEDICATIONS  (PRN):  benzocaine 20% Spray 1 Spray(s) Topical three times a day PRN Sore throat from NG tube  morphine  - Injectable 4 milliGRAM(s) IV Push every 4 hours PRN Severe Pain (7 - 10)  morphine  - Injectable 2 milliGRAM(s) IV Push every 4 hours PRN Moderate Pain (4 - 6)  ondansetron Injectable 4 milliGRAM(s) IV Push every 6 hours PRN Nausea

## 2022-09-07 NOTE — DIETITIAN INITIAL EVALUATION ADULT - REASON FOR ADMISSION
Intestinal obstruction    Patient is 67 year old female w/ PSHx colon resection w/ Dr. Yanez, history of SBO, HLD, hypothyroidism, presenting to [facility] ED with abdominal pain.

## 2022-09-07 NOTE — DIETITIAN INITIAL EVALUATION ADULT - NSFNSGIIOFT_GEN_A_CORE
09-06-22 @ 07:01  -  09-07-22 @ 07:00  --------------------------------------------------------  OUT:    Nasogastric/Oral tube (mL): 1600 mL  Total OUT: 1600 mL    Total NET: -1600 mL

## 2022-09-07 NOTE — CHART NOTE - NSCHARTNOTEFT_GEN_A_CORE
called by nurse to evaluate patient post gastrografin injection approximately an hour ago with complaints of itchiness.   patient was seen in bed , awake and alert , stated that the itching started 30 minute after administration of the gg via NGT, no other symptoms of sob, palpitation, difficulty breathing had been noted by patient, pruritis is limited to the bilateral arms , chest and abdomin.   patient noted to have patchy erythema/ urticaria around the above sites, no preoral edema , mucosal edema , sob, tachycardia noted   vitals remained unchanged         T(C): 37.1 (07 Sep 2022 13:05), Max: 37.1 (06 Sep 2022 20:02)  T(F): 98.7 (07 Sep 2022 13:05), Max: 98.7 (06 Sep 2022 20:02)  HR: 72 (07 Sep 2022 13:05) (65 - 77)  BP: 144/72 (07 Sep 2022 13:05) (144/72 - 161/77)  RR: 16 (07 Sep 2022 13:05) (16 - 20)  SpO2: 91% (07 Sep 2022 13:05) (91% - 94%)    O2 Parameters below as of 07 Sep 2022 13:05  Patient On (Oxygen Delivery Method): room air        will administer IV benadryl  monitor vitals closely  avoid further administration o GG , radiology was made aware of   will follow called by nurse to evaluate patient post gastrografin injection approximately an hour ago with complaints of itchiness.   patient was seen in bed , awake and alert , stated that the itching started 30 minute after administration of the gg via NGT, no other symptoms of sob, palpitation, difficulty breathing had been noted by patient, pruritis is limited to the bilateral arms , chest and abdomin.   patient noted to have patchy erythema/ urticaria around the above sites, no preoral edema , mucosal edema , sob, tachycardia noted   vitals remained unchanged         T(C): 37.1 (07 Sep 2022 13:05), Max: 37.1 (06 Sep 2022 20:02)  T(F): 98.7 (07 Sep 2022 13:05), Max: 98.7 (06 Sep 2022 20:02)  HR: 72 (07 Sep 2022 13:05) (65 - 77)  BP: 144/72 (07 Sep 2022 13:05) (144/72 - 161/77)  RR: 16 (07 Sep 2022 13:05) (16 - 20)  SpO2: 95%     O2 Parameters below as of 07 Sep 2022 13:05  Patient On (Oxygen Delivery Method): room air        will administer IV benadryl  monitor vitals closely  avoid further administration o GG , radiology was made aware of   will follow

## 2022-09-07 NOTE — PROGRESS NOTE ADULT - SUBJECTIVE AND OBJECTIVE BOX
SURGERY PA NOTE ON BEHALF OF DR. YANEZ:    S: Patient seen and examined at bedside.   No acute events overnight.  Patient reports abdominal pain/distention improved this am.  Passing flatus, no BMs.  Denies fevers, chills, n/v, chest pain, SOB, palpitations, calf pain.      MEDICATIONS:  atorvastatin 20 milliGRAM(s) Oral at bedtime  benzocaine 20% Spray 1 Spray(s) Topical three times a day PRN  diphenhydrAMINE Injectable 25 milliGRAM(s) IV Push at bedtime  heparin   Injectable 5000 Unit(s) SubCutaneous every 8 hours  influenza  Vaccine (HIGH DOSE) 0.7 milliLiter(s) IntraMuscular once  lactated ringers. 1000 milliLiter(s) IV Continuous <Continuous>  levothyroxine 88 MICROGram(s) Oral daily  morphine  - Injectable 4 milliGRAM(s) IV Push every 4 hours PRN  morphine  - Injectable 2 milliGRAM(s) IV Push every 4 hours PRN  ondansetron Injectable 4 milliGRAM(s) IV Push every 6 hours PRN  pantoprazole  Injectable 40 milliGRAM(s) IV Push two times a day      O:  Vital Signs Last 24 Hrs  T(C): 36.9 (07 Sep 2022 04:30), Max: 37.1 (06 Sep 2022 20:02)  T(F): 98.5 (07 Sep 2022 04:30), Max: 98.7 (06 Sep 2022 20:02)  HR: 77 (07 Sep 2022 04:30) (67 - 77)  BP: 155/82 (07 Sep 2022 04:30) (117/73 - 155/82)  BP(mean): --  RR: 19 (07 Sep 2022 04:30) (18 - 20)  SpO2: 94% (07 Sep 2022 04:30) (93% - 94%)    Parameters below as of 07 Sep 2022 04:30  Patient On (Oxygen Delivery Method): room air        I&O SUMMARY:    09-06-22 @ 07:01  -  09-07-22 @ 07:00  --------------------------------------------------------  IN: 2070 mL / OUT: 2850 mL / NET: -780 mL        PHYSICAL EXAM:  Lungs: CTA bilat without W/R/R  Card: S1S2  Abd: Soft.  +BS x 4.  No rebound/guarding.  Mild tenderness to palpation in RUQ & LUQ.  NGT in place to LCWS.    Ext: Calves soft, NT, without edema bilat    LABS:                        11.0   6.34  )-----------( 220      ( 07 Sep 2022 07:00 )             33.7     09-07    141  |  103  |  6<L>  ----------------------------<  91  3.1<L>   |  30  |  0.60    Ca    8.9      07 Sep 2022 07:00  Phos  3.2     09-07  Mg     2.1     09-07                RADIOLOGY:    Assessment:  67 year old female a/w SBO.  NGT in place to LCWS w/ 1600cc output over 24hours.   Patient clinically improving with improved abdominal exam and less abdominal distention.      Plan:  - Continue NGT to LCWS  - Monitor output  - Hypokalemia - replete K, maintain > 4  - Serial abdominal exams  - VTE PPx  - Discussed with Dr. Yanez

## 2022-09-07 NOTE — DIETITIAN INITIAL EVALUATION ADULT - ORAL INTAKE PTA/DIET HISTORY
Pt eats ~3 meals a day, eats relatively healthy, generally higher fiber. Bfst cottage cheese and fruit, lunch 1/2 turkey wrap with lettuce and tomato, has steak ~3 times a week, fish.  Eating a lot of cucumbers, tomato with sesame seeds.  -145#.  Last admission 7/22 144# and 141# upon discharge.

## 2022-09-08 LAB
ANION GAP SERPL CALC-SCNC: 5 MMOL/L — SIGNIFICANT CHANGE UP (ref 5–17)
BUN SERPL-MCNC: 8 MG/DL — SIGNIFICANT CHANGE UP (ref 7–23)
CALCIUM SERPL-MCNC: 8.8 MG/DL — SIGNIFICANT CHANGE UP (ref 8.5–10.1)
CHLORIDE SERPL-SCNC: 106 MMOL/L — SIGNIFICANT CHANGE UP (ref 96–108)
CO2 SERPL-SCNC: 31 MMOL/L — SIGNIFICANT CHANGE UP (ref 22–31)
CREAT SERPL-MCNC: 0.69 MG/DL — SIGNIFICANT CHANGE UP (ref 0.5–1.3)
EGFR: 95 ML/MIN/1.73M2 — SIGNIFICANT CHANGE UP
GLUCOSE SERPL-MCNC: 113 MG/DL — HIGH (ref 70–99)
HCT VFR BLD CALC: 32 % — LOW (ref 34.5–45)
HGB BLD-MCNC: 10.6 G/DL — LOW (ref 11.5–15.5)
MAGNESIUM SERPL-MCNC: 2 MG/DL — SIGNIFICANT CHANGE UP (ref 1.6–2.6)
MCHC RBC-ENTMCNC: 31.3 PG — SIGNIFICANT CHANGE UP (ref 27–34)
MCHC RBC-ENTMCNC: 33.1 GM/DL — SIGNIFICANT CHANGE UP (ref 32–36)
MCV RBC AUTO: 94.4 FL — SIGNIFICANT CHANGE UP (ref 80–100)
NRBC # BLD: 0 /100 WBCS — SIGNIFICANT CHANGE UP (ref 0–0)
PHOSPHATE SERPL-MCNC: 3.9 MG/DL — SIGNIFICANT CHANGE UP (ref 2.5–4.5)
PLATELET # BLD AUTO: 228 K/UL — SIGNIFICANT CHANGE UP (ref 150–400)
POTASSIUM SERPL-MCNC: 3.6 MMOL/L — SIGNIFICANT CHANGE UP (ref 3.5–5.3)
POTASSIUM SERPL-SCNC: 3.6 MMOL/L — SIGNIFICANT CHANGE UP (ref 3.5–5.3)
RBC # BLD: 3.39 M/UL — LOW (ref 3.8–5.2)
RBC # FLD: 12.5 % — SIGNIFICANT CHANGE UP (ref 10.3–14.5)
SODIUM SERPL-SCNC: 142 MMOL/L — SIGNIFICANT CHANGE UP (ref 135–145)
WBC # BLD: 6.01 K/UL — SIGNIFICANT CHANGE UP (ref 3.8–10.5)
WBC # FLD AUTO: 6.01 K/UL — SIGNIFICANT CHANGE UP (ref 3.8–10.5)

## 2022-09-08 PROCEDURE — 74018 RADEX ABDOMEN 1 VIEW: CPT | Mod: 26

## 2022-09-08 RX ADMIN — MORPHINE SULFATE 4 MILLIGRAM(S): 50 CAPSULE, EXTENDED RELEASE ORAL at 17:05

## 2022-09-08 RX ADMIN — HEPARIN SODIUM 5000 UNIT(S): 5000 INJECTION INTRAVENOUS; SUBCUTANEOUS at 05:39

## 2022-09-08 RX ADMIN — ONDANSETRON 4 MILLIGRAM(S): 8 TABLET, FILM COATED ORAL at 09:17

## 2022-09-08 RX ADMIN — MORPHINE SULFATE 4 MILLIGRAM(S): 50 CAPSULE, EXTENDED RELEASE ORAL at 23:00

## 2022-09-08 RX ADMIN — HEPARIN SODIUM 5000 UNIT(S): 5000 INJECTION INTRAVENOUS; SUBCUTANEOUS at 13:05

## 2022-09-08 RX ADMIN — MORPHINE SULFATE 4 MILLIGRAM(S): 50 CAPSULE, EXTENDED RELEASE ORAL at 05:39

## 2022-09-08 RX ADMIN — MORPHINE SULFATE 4 MILLIGRAM(S): 50 CAPSULE, EXTENDED RELEASE ORAL at 10:02

## 2022-09-08 RX ADMIN — ATORVASTATIN CALCIUM 20 MILLIGRAM(S): 80 TABLET, FILM COATED ORAL at 22:27

## 2022-09-08 RX ADMIN — PANTOPRAZOLE SODIUM 40 MILLIGRAM(S): 20 TABLET, DELAYED RELEASE ORAL at 05:40

## 2022-09-08 RX ADMIN — Medication 88 MICROGRAM(S): at 05:39

## 2022-09-08 RX ADMIN — HEPARIN SODIUM 5000 UNIT(S): 5000 INJECTION INTRAVENOUS; SUBCUTANEOUS at 22:27

## 2022-09-08 RX ADMIN — PANTOPRAZOLE SODIUM 40 MILLIGRAM(S): 20 TABLET, DELAYED RELEASE ORAL at 17:32

## 2022-09-08 RX ADMIN — MORPHINE SULFATE 4 MILLIGRAM(S): 50 CAPSULE, EXTENDED RELEASE ORAL at 17:20

## 2022-09-08 RX ADMIN — MORPHINE SULFATE 4 MILLIGRAM(S): 50 CAPSULE, EXTENDED RELEASE ORAL at 22:27

## 2022-09-08 RX ADMIN — MORPHINE SULFATE 4 MILLIGRAM(S): 50 CAPSULE, EXTENDED RELEASE ORAL at 10:15

## 2022-09-08 RX ADMIN — MORPHINE SULFATE 4 MILLIGRAM(S): 50 CAPSULE, EXTENDED RELEASE ORAL at 06:00

## 2022-09-08 RX ADMIN — Medication 25 MILLIGRAM(S): at 22:28

## 2022-09-08 NOTE — PROGRESS NOTE ADULT - SUBJECTIVE AND OBJECTIVE BOX
SURGERY PA NOTE ON BEHALF OF DR. YANEZ / GENERAL SURGERY:    S: Patient seen and examined at bedside earlier at 0820 hours, then again at 0945 hours (due to an episode of vomiting).      0820 - No overnight events noted.  Reports improvement in abdominal pain and spasm.  S/P GG SBFT yesterday - reports frequent diarrhea since early evening yesterday.  Admits to flatus and urination without issue.  Reports some mild, generalized weakness, otherwise denies CP/SOB/PENA/palpitations/dizziness/lightheadedness.       0945 - Upon re-evaluation after vomiting episode: Reports that breakfast today was the first full tray she's taken PO since admission - finished everything on the tray, and felt full and bloated in the supraumbilical region.  Status otherwise unchanged from earlier this morning.      MEDICATIONS:  atorvastatin 20 milliGRAM(s) Oral at bedtime  benzocaine 20% Spray 1 Spray(s) Topical three times a day PRN  diphenhydrAMINE Injectable 25 milliGRAM(s) IV Push at bedtime  heparin   Injectable 5000 Unit(s) SubCutaneous every 8 hours  influenza  Vaccine (HIGH DOSE) 0.7 milliLiter(s) IntraMuscular once  lactated ringers. 1000 milliLiter(s) IV Continuous <Continuous>  levothyroxine 88 MICROGram(s) Oral daily  morphine  - Injectable 2 milliGRAM(s) IV Push every 4 hours PRN  morphine  - Injectable 4 milliGRAM(s) IV Push every 4 hours PRN  ondansetron Injectable 4 milliGRAM(s) IV Push every 6 hours PRN  pantoprazole  Injectable 40 milliGRAM(s) IV Push two times a day    O:  Vital Signs Last 24 Hrs  T(C): 37.2 (08 Sep 2022 04:40), Max: 38.1 (07 Sep 2022 20:31)  T(F): 99 (08 Sep 2022 04:40), Max: 100.5 (07 Sep 2022 20:31)  HR: 65 (08 Sep 2022 04:40) (65 - 80)  BP: 122/74 (08 Sep 2022 04:40) (122/74 - 161/77)  RR: 18 (08 Sep 2022 04:40) (16 - 18)  SpO2: 94% (08 Sep 2022 04:40) (91% - 95%)  Parameters below as of 08 Sep 2022 04:40  Patient On (Oxygen Delivery Method): room air    PHYSICAL EXAM:  at 0820 hours:  Lungs: CTA bilat without W/R/R  Card: S1S2  Abd: Soft, NT, ND.  +BS x 4.  No rebound/guarding.    Ext: Calves soft, NT, without edema bilat    On re-evaluation at 0945, mild upper abdominal tenderness noted, with associated mild distention in the bilat upper quadrants.      LABS:                        10.6   6.01  )-----------( 228      ( 08 Sep 2022 07:00 )             32.0     09-08    142  |  106  |  8   ----------------------------<  113<H>  3.6   |  31  |  0.69    Ca    8.8      08 Sep 2022 07:00  Phos  3.9     09-08  Mg     2.0     09-08    RADIOLOGY:  ACC: [id number] EXAM:  XR SM INTEST SNGL CON STDY                        PROCEDURE DATE:  09/07/2022    INTERPRETATION:  Small bowel series.  Limited film shows NG tube and slight residual contrast in rectosigmoid.   Lower lumbar degeneration is noted.  100 cc of Gastrografin was instilled.  Subsequently there is a mild allergic reaction which was treated by the   housestaff.  At 1 hour most of the small bowel has filled. At 2 hours the small bowel   has filled on the right colon is present film. No bowel distention is evident.  At 4 hours the small bowel is emptied in the colon has filled. NG tube is   been removed.  IMPRESSION: Normal time of transit. No bowel distention. NG tube removed.  FINA SANABRIA MD; Attending Radiologist  This document has been electronically signed. Sep  7 2022  4:44PM    A:  67-yo female with h/o HLD, hypothyroidism; s/p prior SBR for SBO 7/2022  Here with SBO with TP at anastomosis     P:  Patient demonstrating improvement earlier this morning, though did have a vomiting episode later in the morning - still having loose BM's  WC remains normalized, with stable H&H  LG temp elevation noted overnight, tmax 100.5 at 2030 hours - has otherwise been afebrile   Lytes stable   STAT KUB ordered following vomiting episode   Suspect patient took too much breakfast PO too quickly - will regress to CLD for lunch, then advance for dinner if tolerated  Continue current care   Will d/w Dr. Yanez, will follow

## 2022-09-09 ENCOUNTER — TRANSCRIPTION ENCOUNTER (OUTPATIENT)
Age: 67
End: 2022-09-09

## 2022-09-09 LAB — LACTATE SERPL-SCNC: 1 MMOL/L — SIGNIFICANT CHANGE UP (ref 0.7–2)

## 2022-09-09 PROCEDURE — 74177 CT ABD & PELVIS W/CONTRAST: CPT | Mod: 26

## 2022-09-09 RX ORDER — DIATRIZOATE MEGLUMINE 180 MG/ML
30 INJECTION, SOLUTION INTRAVESICAL ONCE
Refills: 0 | Status: DISCONTINUED | OUTPATIENT
Start: 2022-09-09 | End: 2022-09-09

## 2022-09-09 RX ORDER — DIPHENHYDRAMINE HCL 50 MG
25 CAPSULE ORAL ONCE
Refills: 0 | Status: COMPLETED | OUTPATIENT
Start: 2022-09-09 | End: 2022-09-09

## 2022-09-09 RX ORDER — RADIOPAQUE PVC MARKERS/BARIUM 24MARKERS
900 CAPSULE ORAL ONCE
Refills: 0 | Status: COMPLETED | OUTPATIENT
Start: 2022-09-09 | End: 2022-09-09

## 2022-09-09 RX ADMIN — MORPHINE SULFATE 4 MILLIGRAM(S): 50 CAPSULE, EXTENDED RELEASE ORAL at 21:20

## 2022-09-09 RX ADMIN — Medication 25 MILLIGRAM(S): at 22:14

## 2022-09-09 RX ADMIN — Medication 88 MICROGRAM(S): at 05:44

## 2022-09-09 RX ADMIN — HEPARIN SODIUM 5000 UNIT(S): 5000 INJECTION INTRAVENOUS; SUBCUTANEOUS at 05:44

## 2022-09-09 RX ADMIN — HEPARIN SODIUM 5000 UNIT(S): 5000 INJECTION INTRAVENOUS; SUBCUTANEOUS at 21:12

## 2022-09-09 RX ADMIN — PANTOPRAZOLE SODIUM 40 MILLIGRAM(S): 20 TABLET, DELAYED RELEASE ORAL at 05:44

## 2022-09-09 RX ADMIN — MORPHINE SULFATE 4 MILLIGRAM(S): 50 CAPSULE, EXTENDED RELEASE ORAL at 15:08

## 2022-09-09 RX ADMIN — ATORVASTATIN CALCIUM 20 MILLIGRAM(S): 80 TABLET, FILM COATED ORAL at 21:12

## 2022-09-09 RX ADMIN — MORPHINE SULFATE 4 MILLIGRAM(S): 50 CAPSULE, EXTENDED RELEASE ORAL at 05:43

## 2022-09-09 RX ADMIN — MORPHINE SULFATE 4 MILLIGRAM(S): 50 CAPSULE, EXTENDED RELEASE ORAL at 21:02

## 2022-09-09 RX ADMIN — MORPHINE SULFATE 4 MILLIGRAM(S): 50 CAPSULE, EXTENDED RELEASE ORAL at 15:25

## 2022-09-09 RX ADMIN — Medication 900 MILLILITER(S): at 11:15

## 2022-09-09 RX ADMIN — HEPARIN SODIUM 5000 UNIT(S): 5000 INJECTION INTRAVENOUS; SUBCUTANEOUS at 13:08

## 2022-09-09 RX ADMIN — Medication 25 MILLIGRAM(S): at 11:15

## 2022-09-09 RX ADMIN — PANTOPRAZOLE SODIUM 40 MILLIGRAM(S): 20 TABLET, DELAYED RELEASE ORAL at 17:30

## 2022-09-09 NOTE — DISCHARGE NOTE PROVIDER - NSDCMRMEDTOKEN_GEN_ALL_CORE_FT
levothyroxine 88 mcg (0.088 mg) oral tablet: 1 tab(s) orally once a day  oxycodone-acetaminophen 5 mg-325 mg oral tablet: 1-2 tab(s) orally every 4-6 hours, As Needed -for severe pain MDD:6  pantoprazole 40 mg oral delayed release tablet: 1 tab(s) orally once a day  rosuvastatin 5 mg oral tablet: 1 tab(s) orally once a day   acetaminophen 500 mg oral tablet: 2 tab(s) orally every 6 hours, As Needed for pain  ibuprofen 600 mg oral tablet: 1 tab(s) orally every 6 hours, As needed, Mild Pain (1 - 3)  levothyroxine 88 mcg (0.088 mg) oral tablet: 1 tab(s) orally once a day  pantoprazole 40 mg oral delayed release tablet: 1 tab(s) orally once a day  rosuvastatin 5 mg oral tablet: 1 tab(s) orally once a day

## 2022-09-09 NOTE — PROGRESS NOTE ADULT - SUBJECTIVE AND OBJECTIVE BOX
SURGERY PA NOTE ON BEHALF OF DR. YANEZ:    S: Patient seen and examined at bedside.   Patient had 2 episodes of vomiting following liquid diet yesterday.  Patient reports last episode yesterday afternoon after eating lunch.  Reports no nausea or vomiting overnight.  Passing gas and having BMs.  Denies fevers, chills, chest pain, SOB, palpitations, calf pain.      MEDICATIONS:  atorvastatin 20 milliGRAM(s) Oral at bedtime  benzocaine 20% Spray 1 Spray(s) Topical three times a day PRN  diphenhydrAMINE Injectable 25 milliGRAM(s) IV Push at bedtime  heparin   Injectable 5000 Unit(s) SubCutaneous every 8 hours  influenza  Vaccine (HIGH DOSE) 0.7 milliLiter(s) IntraMuscular once  lactated ringers. 1000 milliLiter(s) IV Continuous <Continuous>  levothyroxine 88 MICROGram(s) Oral daily  morphine  - Injectable 2 milliGRAM(s) IV Push every 4 hours PRN  morphine  - Injectable 4 milliGRAM(s) IV Push every 4 hours PRN  ondansetron Injectable 4 milliGRAM(s) IV Push every 6 hours PRN  pantoprazole  Injectable 40 milliGRAM(s) IV Push two times a day      O:  Vital Signs Last 24 Hrs  T(C): 37.2 (09 Sep 2022 04:53), Max: 37.3 (08 Sep 2022 20:32)  T(F): 98.9 (09 Sep 2022 04:53), Max: 99.1 (08 Sep 2022 20:32)  HR: 69 (09 Sep 2022 04:53) (69 - 77)  BP: 146/78 (09 Sep 2022 04:53) (119/63 - 146/78)  BP(mean): --  RR: 19 (09 Sep 2022 04:53) (18 - 20)  SpO2: 94% (09 Sep 2022 04:53) (94% - 98%)    Parameters below as of 09 Sep 2022 04:53  Patient On (Oxygen Delivery Method): room air        I&O SUMMARY:      PHYSICAL EXAM:  Lungs: CTA bilat without W/R/R  Card: S1S2  Abd: Soft, ND.  +BS x 4.  No rebound/guarding.  +tenderness to deep palpation in RUQ and RLQ.    Ext: Calves soft, NT, without edema bilat    LABS:                        10.6   6.01  )-----------( 228      ( 08 Sep 2022 07:00 )             32.0     09-08    142  |  106  |  8   ----------------------------<  113<H>  3.6   |  31  |  0.69    Ca    8.8      08 Sep 2022 07:00  Phos  3.9     09-08  Mg     2.0     09-08    Assessment:  67 year old a/w SBO hospital course day 5.  Patient remains NPO for vomiting.  Vital signs stable and labs unremarkable.      Plan:  - CT abdomen and pelvis today - assess SBO  - f/u am labs  - serial abdomen exams  - vte ppx  - encouraged OOB/ambulation/IS use  - Discussed with Dr. Yanez

## 2022-09-09 NOTE — DISCHARGE NOTE PROVIDER - CARE PROVIDER_API CALL
Dustin Yanez (MD)  Surgery  [address] [place] [address]  Phone: [phone]  Fax: [phone]  Follow Up Time:

## 2022-09-09 NOTE — DISCHARGE NOTE PROVIDER - HOSPITAL COURSE
HPI:  Patient is a 68yo female complaining of abdominal pian nausea and vomiting Patient has had a history of SBO and had surgery by Dr Yanez 9 months ago. Patient states that she had Greek food last night at 8pm and hasn't ate since then. Patient denies sick contacts Patient   ate the same food with no affect. Patient states that ht pain is not constant it comes in waves    Patient is 67 year old female w/ PSHx colon resection w/ Dr. Yanez, history of SBO, HLD, hypothyroidism, presenting to [facility] ED with abdominal pain.  Patient states pain come on suddenly that woke her from sleeping at 5 am, and described as similar to the pain when she had an SBO previously.  Patient last ate around 8 pm last night.  States she is passing flatus, no BMs.  Denies fevers, chills, chest pain, SOB, palpitations.   (04 Sep 2022 11:19)    Hospital Course:  Pt admitted for SBO with a transition at the anastamosis site.     HPI:  Patient is a 68yo female complaining of abdominal pian nausea and vomiting Patient has had a history of SBO and had surgery by Dr Yanez 9 months ago. Patient states that she had Greek food last night at 8pm and hasn't ate since then. Patient denies sick contacts Patient   ate the same food with no affect. Patient states that ht pain is not constant it comes in waves    Patient is 67 year old female w/ PSHx colon resection w/ Dr. Yanez, history of SBO, HLD, hypothyroidism, presenting to [place] ED with abdominal pain.  Patient states pain come on suddenly that woke her from sleeping at 5 am, and described as similar to the pain when she had an SBO previously.  Patient last ate around 8 pm last night.  States she is passing flatus, no BMs.  Denies fevers, chills, chest pain, SOB, palpitations.   (04 Sep 2022 11:19)    Hospital Course:  Pt admitted for SBO with a transition at the anastamosis site.  Pt managed conservatively with NPO, IV fluids, NGT.  Had VTE prophylaxis with SQ heparin.  NGT removed on hospital day 3, diet was advanced. Pt began vomiting, diet was regressed back to NPO.  Again attempts made to advance diet, but patient had episodes of vomiting.  CT scan ordered, and patient maintained at NPO status.  CT showing_________________________________________        Diet was advanced as tolerated. GI function returned to include flatus and BM's.  Voiding spontaneously.  At this time, patient was stable for discharge home    DISPO:  Stable for Discharge with outpatient follow up in 1-2 weeks, with Dr. Yanez. HPI:  Patient is a 66yo female complaining of abdominal pian nausea and vomiting Patient has had a history of SBO and had surgery by Dr Yanez 9 months ago. Patient states that she had Greek food last night at 8pm and hasn't ate since then. Patient denies sick contacts Patient   ate the same food with no affect. Patient states that ht pain is not constant it comes in waves    Patient is 67 year old female w/ PSHx colon resection w/ Dr. Yanez, history of SBO, HLD, hypothyroidism, presenting to [facility] ED with abdominal pain.  Patient states pain come on suddenly that woke her from sleeping at 5 am, and described as similar to the pain when she had an SBO previously.  Patient last ate around 8 pm last night.  States she is passing flatus, no BMs.  Denies fevers, chills, chest pain, SOB, palpitations.   (04 Sep 2022 11:19)    Hospital Course:  Pt admitted for SBO with a transition at the anastamosis site.  Pt managed conservatively with NPO, IV fluids, NGT.  Had VTE prophylaxis with SQ heparin.  NGT removed on hospital day 3, diet was advanced. Pt began vomiting, diet was regressed back to NPO.  Again attempts made to advance diet, but patient had episodes of vomiting.  CT scan ordered, and patient maintained at NPO status.  CT showing cecal pneumatosis.  Patient continued to have GI function, evidenced by passing flatus and having serial bowel movements.  Patient was advanced to low fiber diet.  Patient now stable for discharge.      Diet was advanced as tolerated. GI function returned to include flatus and BM's.  Voiding spontaneously.  At this time, patient was stable for discharge home    DISPO:  Stable for Discharge with outpatient follow up in 1-2 weeks, with Dr. Yanez.

## 2022-09-09 NOTE — DISCHARGE NOTE PROVIDER - NSDCFUSCHEDAPPT_GEN_ALL_CORE_FT
[facility] [address]  Scheduled Appointment: 10/12/2022

## 2022-09-09 NOTE — CHART NOTE - NSCHARTNOTEFT_GEN_A_CORE
Assessment: patient seen for follow up NPO   67y old  Female who presents with a chief complaint of abdominal pain CT pelvis/abd assess SBO with vomiting yesterday  + BM's and gas  LR IV  2 episodes vomiting following liquids (full) 9/8        Factors impacting intake: [ ] none [ ] nausea  [x ] vomiting [ ] diarrhea [ ] constipation  [ ]chewing problems [ ] swallowing issues  [ ] other:     Diet Prescription Diet, NPO:   Except Medications (09-08-22 @ 18:15)    Current Weight: Weight 9/9 wt 150.1# 9/8 153#      Pertinent Medications: MEDICATIONS  (STANDING):  atorvastatin 20 milliGRAM(s) Oral at bedtime  diphenhydrAMINE Injectable 25 milliGRAM(s) IV Push at bedtime  heparin   Injectable 5000 Unit(s) SubCutaneous every 8 hours  influenza  Vaccine (HIGH DOSE) 0.7 milliLiter(s) IntraMuscular once  lactated ringers. 1000 milliLiter(s) (42 mL/Hr) IV Continuous <Continuous>  levothyroxine 88 MICROGram(s) Oral daily  pantoprazole  Injectable 40 milliGRAM(s) IV Push two times a day    MEDICATIONS  (PRN):  benzocaine 20% Spray 1 Spray(s) Topical three times a day PRN Sore throat from NG tube  morphine  - Injectable 4 milliGRAM(s) IV Push every 4 hours PRN Severe Pain (7 - 10)  morphine  - Injectable 2 milliGRAM(s) IV Push every 4 hours PRN Moderate Pain (4 - 6)  ondansetron Injectable 4 milliGRAM(s) IV Push every 6 hours PRN Nausea    Skin no pressure injury no edema    Estimated Needs:   [x ] no change since previous assessment based on 140# 1587-1905kcals 25-30kcals/kg and 63-76gms protein 1-1.2gms/kg  [ ] recalculated:     Previous Nutrition Diagnosis:   [ ] Inadequate Energy Intake [ ]Inadequate Oral Intake [ ] Excessive Energy Intake   [ ] Underweight [ ] Increased Nutrient Needs [ ] Overweight/Obesity   [x ] Altered GI Function [ ] Unintended Weight Loss [ ] Food & Nutrition Related Knowledge Deficit [ ] Malnutrition     Nutrition Diagnosis is [ x] ongoing  [ ] resolved [ ] not applicable     New Nutrition Diagnosis: [x ] not applicable       Interventions:   Recommend  [ ] Change Diet To:  [ ] Nutrition Supplement  [ ] Nutrition Support  [x ] Other:  recommend advance to clear liquids to full fluids to low fiber . patient educated on low fiber diet this admit  Monitoring and Evaluation:   [ ] PO intake [ x ] Tolerance to diet prescription [ x ] weights [ x ] labs[ x ] follow up per protocol  [ ] other:

## 2022-09-09 NOTE — CONSULT NOTE ADULT - ASSESSMENT
SBO  Nausea/vomiting    Repeat CT noted, no obstruction  Will order gastric emptying study for Monday  Cont clears  Anti-emetics prn  Will follow up  D/w patient    I reviewed the overnight course of events on the unit, re-confirming the patient history. I discussed the care with the patient and their family  Differential diagnosis and plan of care discussed with patient after the evaluation  35 minutes spent on total encounter of which more than fifty percent of the encounter was spent counseling and/or coordinating care by the attending physician.  Advanced care planning was discussed with patient and family.  Advanced care planning forms were reviewed and discussed.  Risks, benefits and alternatives of gastroenterologic procedures were discussed in detail and all questions were answered.   SBO  Nausea/vomiting    Repeat CT noted, no obstruction  Will order gastric emptying study for Monday  Please make NPO after midnight on Sunday night  No narcotics or motility medications for 24 hours prior to study  Cont clears  Anti-emetics prn  Will follow up  D/w patient    I reviewed the overnight course of events on the unit, re-confirming the patient history. I discussed the care with the patient and their family  Differential diagnosis and plan of care discussed with patient after the evaluation  35 minutes spent on total encounter of which more than fifty percent of the encounter was spent counseling and/or coordinating care by the attending physician.  Advanced care planning was discussed with patient and family.  Advanced care planning forms were reviewed and discussed.  Risks, benefits and alternatives of gastroenterologic procedures were discussed in detail and all questions were answered.

## 2022-09-09 NOTE — CONSULT NOTE ADULT - SUBJECTIVE AND OBJECTIVE BOX
[facility]  J Luis Almanza PA-C  [address] [place] [id number]  [phone]      Chief Complaint:  Patient is a 67y old  Female who presents with a chief complaint of abdominal pain (09 Sep 2022 10:16)      HPI:  66yo female complaining of abdominal pian nausea and vomiting Patient has had a history of SBO and had surgery by Dr Yanez 9 months ago. Patient states that she had Greek food last night at 8pm and hasn't ate since then. Patient denies sick contacts Patient   ate the same food with no affect. Patient states that ht pain is not constant it comes in waves    Patient is 67 year old female w/ PSHx colon resection w/ Dr. Yanez, history of SBO, HLD, hypothyroidism, presenting to [facility] with abdominal pain.  Patient states pain come on suddenly that woke her from sleeping at 5 am, and described as similar to the pain when she had an SBO previously.  Patient last ate around 8 pm last night.  States she is passing flatus, no BMs.  Denies fevers, chills, chest pain, SOB, palpitations.     INTERVAL HPI:  Pt s/e  Reports history as above  Pt states she has had issues with dysphagia, diarrhea and constipation in the past  She has had endoscopy and colonoscopy before but no significant results  She states she tolerated full liquids at first but then started to vomit when she tried to have anything  Also reports pain in epigastric region  No further GI complaints    Allergies:  tetanus toxoid (Anaphylaxis)      Medications:  atorvastatin 20 milliGRAM(s) Oral at bedtime  benzocaine 20% Spray 1 Spray(s) Topical three times a day PRN  diphenhydrAMINE Injectable 25 milliGRAM(s) IV Push at bedtime  heparin   Injectable 5000 Unit(s) SubCutaneous every 8 hours  influenza  Vaccine (HIGH DOSE) 0.7 milliLiter(s) IntraMuscular once  lactated ringers. 1000 milliLiter(s) IV Continuous <Continuous>  levothyroxine 88 MICROGram(s) Oral daily  morphine  - Injectable 2 milliGRAM(s) IV Push every 4 hours PRN  morphine  - Injectable 4 milliGRAM(s) IV Push every 4 hours PRN  ondansetron Injectable 4 milliGRAM(s) IV Push every 6 hours PRN  pantoprazole  Injectable 40 milliGRAM(s) IV Push two times a day      PMHX/PSHX:  Hypothyroidism    HLD (hyperlipidemia)    History of colon resection      ROS:   General:  No wt loss, fevers, chills, night sweats, fatigue,   Eyes:  Good vision, no reported pain  ENT:  No sore throat, pain, runny nose, dysphagia  CV:  No pain, palpitations, hypo/hypertension  Resp:  No dyspnea, cough, tachypnea, wheezing  GI:  +pain, +nausea, +vomiting, No diarrhea, No constipation, No weight loss, No fever, No pruritis, No rectal bleeding, No tarry stools, No dysphagia,  :  No pain, bleeding, incontinence, nocturia  Muscle:  No pain, weakness  Neuro:  No weakness, tingling, memory problems  Psych:  No fatigue, insomnia, mood problems, depression  Endocrine:  No polyuria, polydipsia, cold/heat intolerance  Heme:  No petechiae, ecchymosis, easy bruisability  Skin:  No rash, tattoos, scars, edema      PHYSICAL EXAM:   Vital Signs:  Vital Signs Last 24 Hrs  T(C): 36.9 (09 Sep 2022 11:40), Max: 37.3 (08 Sep 2022 20:32)  T(F): 98.4 (09 Sep 2022 11:40), Max: 99.1 (08 Sep 2022 20:32)  HR: 70 (09 Sep 2022 11:40) (69 - 70)  BP: 121/64 (09 Sep 2022 11:40) (119/63 - 146/78)  BP(mean): --  RR: 18 (09 Sep 2022 11:40) (18 - 20)  SpO2: 94% (09 Sep 2022 11:40) (94% - 98%)    Parameters below as of 09 Sep 2022 11:40  Patient On (Oxygen Delivery Method): room air      Daily     Daily Weight in k.1 (09 Sep 2022 04:53)    GENERAL:  Appears stated age  ABDOMEN:  Soft, +TTP epigastric region, non-distended  NEURO:  Alert    LABS:                        10.6   6.01  )-----------( 228      ( 08 Sep 2022 07:00 )             32.0     09-08    142  |  106  |  8   ----------------------------<  113<H>  3.6   |  31  |  0.69    Ca    8.8      08 Sep 2022 07:00  Phos  3.9     -  Mg     2.0             Imaging:    < from: CT Abdomen and Pelvis w/ Oral Cont and w/ IV Cont (22 @ 13:38) >    ACC: [id number] EXAM:  CT ABDOMEN AND PELVIS OC IC                          PROCEDURE DATE:  2022          INTERPRETATION:  CLINICAL INFORMATION: Vomiting. Evaluate for small bowel   obstruction.    COMPARISON: CT abdomen pelvis 2022    CONTRAST/COMPLICATIONS:  IV Contrast: Omnipaque 350  90 cc administered   10 cc discarded  Oral Contrast: Gastroview  Complications: None reported at time of study completion    PROCEDURE:  CT of the Abdomen and Pelvis was performed.  Sagittal and coronal reformats were performed.    FINDINGS:    LOWER CHEST: Bibasilar atelectasis/scarring. No visualized pleural   effusion    LIVER: Steatosis.  BILE DUCTS: No distention  GALLBLADDER: Unremarkable  SPLEEN: Normal size  PANCREAS: No acute peripancreatic inflammation  ADRENALS: Unremarkable  KIDNEYS/URETERS: No hydronephrosis    BLADDER: Minimally distended.  REPRODUCTIVE ORGANS: Fibroid of the uterus. Uterus and adnexa otherwise   suboptimally characterized on CT.    BOWEL: Stomach is underdistended. No small bowel distention. Oral   contrast passes to the cecum. There is cecal pneumatosis, which is new,   of unclear significance. Mild stool burden of the colon limits of   elevation of the colonic mucosa. Nondistended noninflamed appendix. Small   bowel and rectosigmoid anastomoses noted.  PERITONEUM: No ascites  VESSELS: No abdominal aortic aneurysm. Mild atheromatous changes.   Circumaortic left renal vein.  RETROPERITONEUM/LYMPH NODES: Small volume nodes.  ABDOMINAL WALL: Postsurgical changes.  BONES: Osseous demineralization and degenerative changes.    IMPRESSION:    New cecal pneumatosis, of unclear clinical significance. Correlate with   clinical exam and lactate value, especially if there is concern for   ischemia.    Oral contrast passes to the cecum. No small bowel obstruction.    Dr. Dennis discussed these findings with SINDY Lang from surgery service   on 2022 at 1:59 PM, with read back.    --- End of Report ---      DENNIS DENNIS M.D., ATTENDING RADIOLOGIST  This document has been electronically signed. Sep  9 2022  2:01PM    < end of copied text >

## 2022-09-10 ENCOUNTER — TRANSCRIPTION ENCOUNTER (OUTPATIENT)
Age: 67
End: 2022-09-10

## 2022-09-10 VITALS
DIASTOLIC BLOOD PRESSURE: 59 MMHG | TEMPERATURE: 99 F | HEART RATE: 78 BPM | RESPIRATION RATE: 18 BRPM | SYSTOLIC BLOOD PRESSURE: 107 MMHG | OXYGEN SATURATION: 95 %

## 2022-09-10 LAB
ALBUMIN SERPL ELPH-MCNC: 3.2 G/DL — LOW (ref 3.3–5)
ALP SERPL-CCNC: 160 U/L — HIGH (ref 40–120)
ALT FLD-CCNC: 65 U/L — SIGNIFICANT CHANGE UP (ref 12–78)
ANION GAP SERPL CALC-SCNC: 6 MMOL/L — SIGNIFICANT CHANGE UP (ref 5–17)
AST SERPL-CCNC: 62 U/L — HIGH (ref 15–37)
BILIRUB SERPL-MCNC: 1.1 MG/DL — SIGNIFICANT CHANGE UP (ref 0.2–1.2)
BUN SERPL-MCNC: 4 MG/DL — LOW (ref 7–23)
CALCIUM SERPL-MCNC: 9.2 MG/DL — SIGNIFICANT CHANGE UP (ref 8.5–10.1)
CHLORIDE SERPL-SCNC: 105 MMOL/L — SIGNIFICANT CHANGE UP (ref 96–108)
CO2 SERPL-SCNC: 30 MMOL/L — SIGNIFICANT CHANGE UP (ref 22–31)
CREAT SERPL-MCNC: 0.66 MG/DL — SIGNIFICANT CHANGE UP (ref 0.5–1.3)
EGFR: 96 ML/MIN/1.73M2 — SIGNIFICANT CHANGE UP
GLUCOSE SERPL-MCNC: 108 MG/DL — HIGH (ref 70–99)
HCT VFR BLD CALC: 33.2 % — LOW (ref 34.5–45)
HGB BLD-MCNC: 11 G/DL — LOW (ref 11.5–15.5)
MAGNESIUM SERPL-MCNC: 1.9 MG/DL — SIGNIFICANT CHANGE UP (ref 1.6–2.6)
MCHC RBC-ENTMCNC: 31.1 PG — SIGNIFICANT CHANGE UP (ref 27–34)
MCHC RBC-ENTMCNC: 33.1 GM/DL — SIGNIFICANT CHANGE UP (ref 32–36)
MCV RBC AUTO: 93.8 FL — SIGNIFICANT CHANGE UP (ref 80–100)
NRBC # BLD: 0 /100 WBCS — SIGNIFICANT CHANGE UP (ref 0–0)
PHOSPHATE SERPL-MCNC: 3.7 MG/DL — SIGNIFICANT CHANGE UP (ref 2.5–4.5)
PLATELET # BLD AUTO: 242 K/UL — SIGNIFICANT CHANGE UP (ref 150–400)
POTASSIUM SERPL-MCNC: 3.5 MMOL/L — SIGNIFICANT CHANGE UP (ref 3.5–5.3)
POTASSIUM SERPL-SCNC: 3.5 MMOL/L — SIGNIFICANT CHANGE UP (ref 3.5–5.3)
PROT SERPL-MCNC: 6.6 G/DL — SIGNIFICANT CHANGE UP (ref 6–8.3)
RBC # BLD: 3.54 M/UL — LOW (ref 3.8–5.2)
RBC # FLD: 12.4 % — SIGNIFICANT CHANGE UP (ref 10.3–14.5)
SODIUM SERPL-SCNC: 141 MMOL/L — SIGNIFICANT CHANGE UP (ref 135–145)
WBC # BLD: 6.15 K/UL — SIGNIFICANT CHANGE UP (ref 3.8–10.5)
WBC # FLD AUTO: 6.15 K/UL — SIGNIFICANT CHANGE UP (ref 3.8–10.5)

## 2022-09-10 PROCEDURE — 85730 THROMBOPLASTIN TIME PARTIAL: CPT

## 2022-09-10 PROCEDURE — 85027 COMPLETE CBC AUTOMATED: CPT

## 2022-09-10 PROCEDURE — 96375 TX/PRO/DX INJ NEW DRUG ADDON: CPT

## 2022-09-10 PROCEDURE — 99285 EMERGENCY DEPT VISIT HI MDM: CPT

## 2022-09-10 PROCEDURE — 81001 URINALYSIS AUTO W/SCOPE: CPT

## 2022-09-10 PROCEDURE — 74250 X-RAY XM SM INT 1CNTRST STD: CPT

## 2022-09-10 PROCEDURE — 96374 THER/PROPH/DIAG INJ IV PUSH: CPT

## 2022-09-10 PROCEDURE — 83690 ASSAY OF LIPASE: CPT

## 2022-09-10 PROCEDURE — 86850 RBC ANTIBODY SCREEN: CPT

## 2022-09-10 PROCEDURE — 80048 BASIC METABOLIC PNL TOTAL CA: CPT

## 2022-09-10 PROCEDURE — 74018 RADEX ABDOMEN 1 VIEW: CPT | Mod: 26

## 2022-09-10 PROCEDURE — 74177 CT ABD & PELVIS W/CONTRAST: CPT

## 2022-09-10 PROCEDURE — 83735 ASSAY OF MAGNESIUM: CPT

## 2022-09-10 PROCEDURE — 74018 RADEX ABDOMEN 1 VIEW: CPT

## 2022-09-10 PROCEDURE — 87635 SARS-COV-2 COVID-19 AMP PRB: CPT

## 2022-09-10 PROCEDURE — 83605 ASSAY OF LACTIC ACID: CPT

## 2022-09-10 PROCEDURE — 85610 PROTHROMBIN TIME: CPT

## 2022-09-10 PROCEDURE — 36415 COLL VENOUS BLD VENIPUNCTURE: CPT

## 2022-09-10 PROCEDURE — 74019 RADEX ABDOMEN 2 VIEWS: CPT

## 2022-09-10 PROCEDURE — 80053 COMPREHEN METABOLIC PANEL: CPT

## 2022-09-10 PROCEDURE — 86900 BLOOD TYPING SEROLOGIC ABO: CPT

## 2022-09-10 PROCEDURE — 85025 COMPLETE CBC W/AUTO DIFF WBC: CPT

## 2022-09-10 PROCEDURE — 86901 BLOOD TYPING SEROLOGIC RH(D): CPT

## 2022-09-10 PROCEDURE — 84100 ASSAY OF PHOSPHORUS: CPT

## 2022-09-10 PROCEDURE — 96376 TX/PRO/DX INJ SAME DRUG ADON: CPT

## 2022-09-10 PROCEDURE — 71046 X-RAY EXAM CHEST 2 VIEWS: CPT

## 2022-09-10 RX ORDER — ACETAMINOPHEN 500 MG
1000 TABLET ORAL EVERY 6 HOURS
Refills: 0 | Status: DISCONTINUED | OUTPATIENT
Start: 2022-09-10 | End: 2022-09-10

## 2022-09-10 RX ORDER — IBUPROFEN 200 MG
1 TABLET ORAL
Qty: 0 | Refills: 0 | DISCHARGE
Start: 2022-09-10

## 2022-09-10 RX ORDER — ACETAMINOPHEN 500 MG
2 TABLET ORAL
Qty: 0 | Refills: 0 | DISCHARGE
Start: 2022-09-10

## 2022-09-10 RX ORDER — IBUPROFEN 200 MG
600 TABLET ORAL EVERY 6 HOURS
Refills: 0 | Status: DISCONTINUED | OUTPATIENT
Start: 2022-09-10 | End: 2022-09-10

## 2022-09-10 RX ORDER — OXYCODONE HYDROCHLORIDE 5 MG/1
5 TABLET ORAL EVERY 4 HOURS
Refills: 0 | Status: DISCONTINUED | OUTPATIENT
Start: 2022-09-10 | End: 2022-09-10

## 2022-09-10 RX ADMIN — OXYCODONE HYDROCHLORIDE 5 MILLIGRAM(S): 5 TABLET ORAL at 06:25

## 2022-09-10 RX ADMIN — HEPARIN SODIUM 5000 UNIT(S): 5000 INJECTION INTRAVENOUS; SUBCUTANEOUS at 06:01

## 2022-09-10 RX ADMIN — Medication 1000 MILLIGRAM(S): at 13:45

## 2022-09-10 RX ADMIN — Medication 1000 MILLIGRAM(S): at 17:34

## 2022-09-10 RX ADMIN — HEPARIN SODIUM 5000 UNIT(S): 5000 INJECTION INTRAVENOUS; SUBCUTANEOUS at 13:50

## 2022-09-10 RX ADMIN — OXYCODONE HYDROCHLORIDE 5 MILLIGRAM(S): 5 TABLET ORAL at 10:33

## 2022-09-10 RX ADMIN — PANTOPRAZOLE SODIUM 40 MILLIGRAM(S): 20 TABLET, DELAYED RELEASE ORAL at 17:34

## 2022-09-10 RX ADMIN — Medication 1000 MILLIGRAM(S): at 06:25

## 2022-09-10 RX ADMIN — OXYCODONE HYDROCHLORIDE 5 MILLIGRAM(S): 5 TABLET ORAL at 18:00

## 2022-09-10 RX ADMIN — OXYCODONE HYDROCHLORIDE 5 MILLIGRAM(S): 5 TABLET ORAL at 17:33

## 2022-09-10 RX ADMIN — Medication 88 MICROGRAM(S): at 06:01

## 2022-09-10 RX ADMIN — SODIUM CHLORIDE 42 MILLILITER(S): 9 INJECTION, SOLUTION INTRAVENOUS at 10:33

## 2022-09-10 RX ADMIN — PANTOPRAZOLE SODIUM 40 MILLIGRAM(S): 20 TABLET, DELAYED RELEASE ORAL at 06:01

## 2022-09-10 RX ADMIN — Medication 1000 MILLIGRAM(S): at 11:43

## 2022-09-10 RX ADMIN — OXYCODONE HYDROCHLORIDE 5 MILLIGRAM(S): 5 TABLET ORAL at 11:00

## 2022-09-10 RX ADMIN — Medication 1000 MILLIGRAM(S): at 18:00

## 2022-09-10 NOTE — PROGRESS NOTE ADULT - SUBJECTIVE AND OBJECTIVE BOX
SUBJECTIVE:  Patient seen and examined at bedside.  No overnight events.  Patient with no new complaints at this time, tolerating clear diet, admits to flatus and loose bowel movement.  Patient denies any fevers, chills, chest pain, shortness of breath, nausea, vomiting or diarrhea.    Vital Signs Last 24 Hrs  T(C): 37.2 (10 Sep 2022 04:32), Max: 37.2 (09 Sep 2022 20:29)  T(F): 99 (10 Sep 2022 04:32), Max: 99 (10 Sep 2022 04:32)  HR: 79 (10 Sep 2022 04:32) (70 - 85)  BP: 120/67 (10 Sep 2022 04:32) (104/66 - 121/64)  BP(mean): --  RR: 18 (10 Sep 2022 04:32) (18 - 18)  SpO2: 94% (10 Sep 2022 04:32) (94% - 98%)    Parameters below as of 10 Sep 2022 04:32  Patient On (Oxygen Delivery Method): room air        PHYSICAL EXAM:  GENERAL: No acute distress, well-developed  HEAD:  Atraumatic, Normocephalic  ABDOMEN: Soft, minimally-tender, minimally-distended; bowel sounds+  NEUROLOGY: A&O x 3, no focal deficits    I&O's Summary    I&O's Detail    MEDICATIONS  (STANDING):  atorvastatin 20 milliGRAM(s) Oral at bedtime  diphenhydrAMINE Injectable 25 milliGRAM(s) IV Push at bedtime  heparin   Injectable 5000 Unit(s) SubCutaneous every 8 hours  influenza  Vaccine (HIGH DOSE) 0.7 milliLiter(s) IntraMuscular once  lactated ringers. 1000 milliLiter(s) (42 mL/Hr) IV Continuous <Continuous>  levothyroxine 88 MICROGram(s) Oral daily  pantoprazole  Injectable 40 milliGRAM(s) IV Push two times a day    MEDICATIONS  (PRN):  benzocaine 20% Spray 1 Spray(s) Topical three times a day PRN Sore throat from NG tube  morphine  - Injectable 4 milliGRAM(s) IV Push every 4 hours PRN Severe Pain (7 - 10)  morphine  - Injectable 2 milliGRAM(s) IV Push every 4 hours PRN Moderate Pain (4 - 6)  ondansetron Injectable 4 milliGRAM(s) IV Push every 6 hours PRN Nausea    LABS:                        10.6   6.01  )-----------( 228      ( 08 Sep 2022 07:00 )             32.0     09-08    142  |  106  |  8   ----------------------------<  113<H>  3.6   |  31  |  0.69    Ca    8.8      08 Sep 2022 07:00  Phos  3.9     09-08  Mg     2.0     09-08          RADIOLOGY & ADDITIONAL STUDIES:  < from: CT Abdomen and Pelvis w/ Oral Cont and w/ IV Cont (09.09.22 @ 13:38) >  FINDINGS:    LOWER CHEST: Bibasilar atelectasis/scarring. No visualized pleural   effusion    LIVER: Steatosis.  BILE DUCTS: No distention  GALLBLADDER: Unremarkable  SPLEEN: Normal size  PANCREAS: No acute peripancreatic inflammation  ADRENALS: Unremarkable  KIDNEYS/URETERS: No hydronephrosis    BLADDER: Minimally distended.  REPRODUCTIVE ORGANS: Fibroid of the uterus. Uterus and adnexa otherwise   suboptimally characterized on CT.    BOWEL: Stomach is underdistended. No small bowel distention. Oral   contrast passes to the cecum. There is cecal pneumatosis, which is new,   of unclear significance. Mild stool burden of the colon limits of   elevation of the colonic mucosa. Nondistended noninflamed appendix. Small   bowel and rectosigmoid anastomoses noted.  PERITONEUM: No ascites  VESSELS: No abdominal aortic aneurysm. Mild atheromatous changes.   Circumaortic left renal vein.  RETROPERITONEUM/LYMPH NODES: Small volume nodes.  ABDOMINAL WALL: Postsurgical changes.  BONES: Osseous demineralization and degenerative changes.    IMPRESSION:    New cecal pneumatosis, of unclear clinical significance. Correlate with   clinical exam and lactate value, especially if there is concern for   ischemia.    Oral contrast passes to the cecum. No small bowel obstruction.    Dr. Dennis discussed these findings with SINDY Lang from surgery service   on 9/9/2022 at 1:59 PM, with read back.    --- End of Report ---      DENNIS DENNIS M.D., ATTENDING RADIOLOGIST  This document has been electronically signed. Sep  9 2022  2:01PM    < end of copied text >

## 2022-09-10 NOTE — DISCHARGE NOTE NURSING/CASE MANAGEMENT/SOCIAL WORK - MODE OF TRANSPORTATION
Patient called to remind to get prescription refill order to pharmacy.  As of this morning pharmacy hasn't received order.   Wheelchair/Stroller

## 2022-09-10 NOTE — DISCHARGE NOTE NURSING/CASE MANAGEMENT/SOCIAL WORK - NSDCPEFALRISK_GEN_ALL_CORE
For information on Fall & Injury Prevention, visit: [email or web address] OR  [email or web address] OR  https://www.cdc.gov/steadi/patient.html

## 2022-09-10 NOTE — PROGRESS NOTE ADULT - SUBJECTIVE AND OBJECTIVE BOX
INTERVAL HPI/OVERNIGHT EVENTS:  No new overnight event.  No N/V/D.  Tolerating diet clears had a loose bm  c/o gas on narcotics and ges cant be ordered and done was canceleed  gas on physical exam    Allergies    tetanus toxoid (Anaphylaxis)    Intolerances          General:  No wt loss, fevers, chills, night sweats, fatigue,   Eyes:  Good vision, no reported pain  ENT:  No sore throat, pain, runny nose, dysphagia  CV:  No pain, palpitations, hypo/hypertension  Resp:  No dyspnea, cough, tachypnea, wheezing  GI:  No pain, No nausea, No vomiting, No diarrhea, No constipation, No weight loss, No fever, No pruritis, No rectal bleeding, No tarry stools, No dysphagia,  :  No pain, bleeding, incontinence, nocturia  Muscle:  No pain, weakness  Neuro:  No weakness, tingling, memory problems  Psych:  No fatigue, insomnia, mood problems, depression  Endocrine:  No polyuria, polydipsia, cold/heat intolerance  Heme:  No petechiae, ecchymosis, easy bruisability  Skin:  No rash, tattoos, scars, edema      PHYSICAL EXAM:   Vital Signs:  Vital Signs Last 24 Hrs  T(C): 37.1 (10 Sep 2022 11:39), Max: 37.2 (09 Sep 2022 20:29)  T(F): 98.8 (10 Sep 2022 11:39), Max: 99 (10 Sep 2022 04:32)  HR: 78 (10 Sep 2022 11:39) (78 - 85)  BP: 107/59 (10 Sep 2022 11:39) (104/66 - 120/67)  BP(mean): --  RR: 18 (10 Sep 2022 11:39) (18 - 18)  SpO2: 95% (10 Sep 2022 11:39) (94% - 98%)    Parameters below as of 10 Sep 2022 11:39  Patient On (Oxygen Delivery Method): room air      Daily     Daily Weight in k.1 (10 Sep 2022 04:32)I&O's Summary    09 Sep 2022 07:01  -  10 Sep 2022 07:00  --------------------------------------------------------  IN: 504 mL / OUT: 0 mL / NET: 504 mL        GENERAL:  Appears stated age, well-groomed, well-nourished, no distress  HEENT:  NC/AT,  conjunctivae clear and pink, no thyromegaly, nodules, adenopathy, no JVD, sclera -anicteric  CHEST:  Full & symmetric excursion, no increased effort, breath sounds clear  HEART:  Regular rhythm, S1, S2, no murmur/rub/S3/S4, no abdominal bruit, no edema  ABDOMEN:  Soft, non-tender, non-distended, normoactive bowel sounds,  no masses ,no hepato-splenomegaly, no signs of chronic liver disease  EXTEREMITIES:  no cyanosis,clubbing or edema  SKIN:  No rash/erythema/ecchymoses/petechiae/wounds/abscess/warm/dry  NEURO:  Alert, oriented, no asterixis, no tremor, no encephalopathy      LABS:                        11.0   6.15  )-----------( 242      ( 10 Sep 2022 05:59 )             33.2     -10    141  |  105  |  4<L>  ----------------------------<  108<H>  3.5   |  30  |  0.66    Ca    9.2      10 Sep 2022 05:59  Phos  3.7     09-10  Mg     1.9     09-10    TPro  6.6  /  Alb  3.2<L>  /  TBili  1.1  /  DBili  x   /  AST  62<H>  /  ALT  65  /  AlkPhos  160<H>  10        amylase   lipase  RADIOLOGY & ADDITIONAL TESTS:

## 2022-09-10 NOTE — PROGRESS NOTE ADULT - PROBLEM SELECTOR PLAN 1
- CT abdomen and pelvis today reassuring  - adv to FLD  - f/u am labs  - serial abdomen exams  - vte ppx  - encouraged OOB/ambulation/IS use

## 2022-09-10 NOTE — DISCHARGE NOTE NURSING/CASE MANAGEMENT/SOCIAL WORK - PATIENT PORTAL LINK FT
You can access the FollowMyHealth Patient Portal offered by [facility] by registering at the following website: [email or web address]. By joining [facility] FollowMyHealth portal, you will also be able to view your health information using other applications (apps) compatible with our system.

## 2022-09-10 NOTE — PROGRESS NOTE ADULT - ASSESSMENT
67 year old a/w SBO hospital course day 6.  Patient bobbi CLD.  Vital signs stable and labs unremarkable.  
66 y/o F w/ hx of colon resection presenting w/ SBO.
sbo  pneumaotosis  chr constipation    plan  axr if red would recmonned bowel regimen  full liquids  in an dout  to follow up clinical status  ppi once a day  may adv diet of axr is better    Advanced care planning was discussed with patient and family.  Advanced care planning forms were reviewed and discussed.  Risks, benefits and alternatives of gastroenterologic procedures were discussed in detail and all questions were answered.    30 minutes spent.

## 2022-09-25 ENCOUNTER — INPATIENT (INPATIENT)
Facility: HOSPITAL | Age: 67
LOS: 7 days | Discharge: HOME | DRG: 331 | End: 2022-10-03
Attending: SURGERY | Admitting: SURGERY
Payer: MEDICARE

## 2022-09-25 VITALS
DIASTOLIC BLOOD PRESSURE: 91 MMHG | OXYGEN SATURATION: 96 % | SYSTOLIC BLOOD PRESSURE: 143 MMHG | RESPIRATION RATE: 18 BRPM | TEMPERATURE: 98 F | HEART RATE: 96 BPM | HEIGHT: 62 IN

## 2022-09-25 DIAGNOSIS — K56.600 PARTIAL INTESTINAL OBSTRUCTION, UNSPECIFIED AS TO CAUSE: ICD-10-CM

## 2022-09-25 DIAGNOSIS — K56.609 UNSPECIFIED INTESTINAL OBSTRUCTION, UNSPECIFIED AS TO PARTIAL VERSUS COMPLETE OBSTRUCTION: ICD-10-CM

## 2022-09-25 DIAGNOSIS — Z90.49 ACQUIRED ABSENCE OF OTHER SPECIFIED PARTS OF DIGESTIVE TRACT: Chronic | ICD-10-CM

## 2022-09-25 DIAGNOSIS — Z98.89 OTHER SPECIFIED POSTPROCEDURAL STATES: Chronic | ICD-10-CM

## 2022-09-25 LAB
ALBUMIN SERPL ELPH-MCNC: 3.9 G/DL — SIGNIFICANT CHANGE UP (ref 3.3–5)
ALP SERPL-CCNC: 120 U/L — SIGNIFICANT CHANGE UP (ref 40–120)
ALT FLD-CCNC: 50 U/L — SIGNIFICANT CHANGE UP (ref 12–78)
ANION GAP SERPL CALC-SCNC: 5 MMOL/L — SIGNIFICANT CHANGE UP (ref 5–17)
APPEARANCE UR: ABNORMAL
APTT BLD: 31.6 SEC — SIGNIFICANT CHANGE UP (ref 27.5–35.5)
AST SERPL-CCNC: 34 U/L — SIGNIFICANT CHANGE UP (ref 15–37)
BACTERIA # UR AUTO: ABNORMAL
BASOPHILS # BLD AUTO: 0.04 K/UL — SIGNIFICANT CHANGE UP (ref 0–0.2)
BASOPHILS NFR BLD AUTO: 0.7 % — SIGNIFICANT CHANGE UP (ref 0–2)
BILIRUB SERPL-MCNC: 0.4 MG/DL — SIGNIFICANT CHANGE UP (ref 0.2–1.2)
BILIRUB UR-MCNC: NEGATIVE — SIGNIFICANT CHANGE UP
BLD GP AB SCN SERPL QL: SIGNIFICANT CHANGE UP
BUN SERPL-MCNC: 18 MG/DL — SIGNIFICANT CHANGE UP (ref 7–23)
CALCIUM SERPL-MCNC: 9.7 MG/DL — SIGNIFICANT CHANGE UP (ref 8.5–10.1)
CHLORIDE SERPL-SCNC: 108 MMOL/L — SIGNIFICANT CHANGE UP (ref 96–108)
CO2 SERPL-SCNC: 32 MMOL/L — HIGH (ref 22–31)
COLOR SPEC: YELLOW — SIGNIFICANT CHANGE UP
CREAT SERPL-MCNC: 0.68 MG/DL — SIGNIFICANT CHANGE UP (ref 0.5–1.3)
DIFF PNL FLD: ABNORMAL
EGFR: 95 ML/MIN/1.73M2 — SIGNIFICANT CHANGE UP
EOSINOPHIL # BLD AUTO: 0.19 K/UL — SIGNIFICANT CHANGE UP (ref 0–0.5)
EOSINOPHIL NFR BLD AUTO: 3.5 % — SIGNIFICANT CHANGE UP (ref 0–6)
EPI CELLS # UR: ABNORMAL
GLUCOSE SERPL-MCNC: 109 MG/DL — HIGH (ref 70–99)
GLUCOSE UR QL: NEGATIVE — SIGNIFICANT CHANGE UP
HCT VFR BLD CALC: 38.3 % — SIGNIFICANT CHANGE UP (ref 34.5–45)
HGB BLD-MCNC: 12.6 G/DL — SIGNIFICANT CHANGE UP (ref 11.5–15.5)
IMM GRANULOCYTES NFR BLD AUTO: 1.5 % — HIGH (ref 0–0.9)
INR BLD: 0.9 RATIO — SIGNIFICANT CHANGE UP (ref 0.88–1.16)
KETONES UR-MCNC: NEGATIVE — SIGNIFICANT CHANGE UP
LACTATE SERPL-SCNC: 0.7 MMOL/L — SIGNIFICANT CHANGE UP (ref 0.7–2)
LEUKOCYTE ESTERASE UR-ACNC: ABNORMAL
LIDOCAIN IGE QN: 375 U/L — SIGNIFICANT CHANGE UP (ref 73–393)
LYMPHOCYTES # BLD AUTO: 1.97 K/UL — SIGNIFICANT CHANGE UP (ref 1–3.3)
LYMPHOCYTES # BLD AUTO: 36.4 % — SIGNIFICANT CHANGE UP (ref 13–44)
MCHC RBC-ENTMCNC: 30.7 PG — SIGNIFICANT CHANGE UP (ref 27–34)
MCHC RBC-ENTMCNC: 32.9 GM/DL — SIGNIFICANT CHANGE UP (ref 32–36)
MCV RBC AUTO: 93.4 FL — SIGNIFICANT CHANGE UP (ref 80–100)
MONOCYTES # BLD AUTO: 0.51 K/UL — SIGNIFICANT CHANGE UP (ref 0–0.9)
MONOCYTES NFR BLD AUTO: 9.4 % — SIGNIFICANT CHANGE UP (ref 2–14)
NEUTROPHILS # BLD AUTO: 2.62 K/UL — SIGNIFICANT CHANGE UP (ref 1.8–7.4)
NEUTROPHILS NFR BLD AUTO: 48.5 % — SIGNIFICANT CHANGE UP (ref 43–77)
NITRITE UR-MCNC: NEGATIVE — SIGNIFICANT CHANGE UP
NRBC # BLD: 0 /100 WBCS — SIGNIFICANT CHANGE UP (ref 0–0)
PH UR: 6 — SIGNIFICANT CHANGE UP (ref 5–8)
PLATELET # BLD AUTO: 344 K/UL — SIGNIFICANT CHANGE UP (ref 150–400)
POTASSIUM SERPL-MCNC: 3.5 MMOL/L — SIGNIFICANT CHANGE UP (ref 3.5–5.3)
POTASSIUM SERPL-SCNC: 3.5 MMOL/L — SIGNIFICANT CHANGE UP (ref 3.5–5.3)
PROT SERPL-MCNC: 7.4 G/DL — SIGNIFICANT CHANGE UP (ref 6–8.3)
PROT UR-MCNC: NEGATIVE — SIGNIFICANT CHANGE UP
PROTHROM AB SERPL-ACNC: 10.5 SEC — SIGNIFICANT CHANGE UP (ref 10.5–13.4)
RBC # BLD: 4.1 M/UL — SIGNIFICANT CHANGE UP (ref 3.8–5.2)
RBC # FLD: 12.6 % — SIGNIFICANT CHANGE UP (ref 10.3–14.5)
RBC CASTS # UR COMP ASSIST: ABNORMAL /HPF (ref 0–4)
SARS-COV-2 RNA SPEC QL NAA+PROBE: SIGNIFICANT CHANGE UP
SODIUM SERPL-SCNC: 145 MMOL/L — SIGNIFICANT CHANGE UP (ref 135–145)
SP GR SPEC: 1.01 — SIGNIFICANT CHANGE UP (ref 1.01–1.02)
UROBILINOGEN FLD QL: NEGATIVE — SIGNIFICANT CHANGE UP
WBC # BLD: 5.41 K/UL — SIGNIFICANT CHANGE UP (ref 3.8–10.5)
WBC # FLD AUTO: 5.41 K/UL — SIGNIFICANT CHANGE UP (ref 3.8–10.5)
WBC UR QL: ABNORMAL

## 2022-09-25 PROCEDURE — 71045 X-RAY EXAM CHEST 1 VIEW: CPT | Mod: 26

## 2022-09-25 PROCEDURE — 99285 EMERGENCY DEPT VISIT HI MDM: CPT

## 2022-09-25 PROCEDURE — 93010 ELECTROCARDIOGRAM REPORT: CPT

## 2022-09-25 PROCEDURE — 74177 CT ABD & PELVIS W/CONTRAST: CPT | Mod: 26,MA

## 2022-09-25 RX ORDER — DIPHENHYDRAMINE HCL 50 MG
12.5 CAPSULE ORAL AT BEDTIME
Refills: 0 | Status: DISCONTINUED | OUTPATIENT
Start: 2022-09-25 | End: 2022-09-27

## 2022-09-25 RX ORDER — ATORVASTATIN CALCIUM 80 MG/1
20 TABLET, FILM COATED ORAL AT BEDTIME
Refills: 0 | Status: DISCONTINUED | OUTPATIENT
Start: 2022-09-25 | End: 2022-09-27

## 2022-09-25 RX ORDER — CEFTRIAXONE 500 MG/1
1000 INJECTION, POWDER, FOR SOLUTION INTRAMUSCULAR; INTRAVENOUS ONCE
Refills: 0 | Status: COMPLETED | OUTPATIENT
Start: 2022-09-25 | End: 2022-09-25

## 2022-09-25 RX ORDER — MORPHINE SULFATE 50 MG/1
4 CAPSULE, EXTENDED RELEASE ORAL ONCE
Refills: 0 | Status: DISCONTINUED | OUTPATIENT
Start: 2022-09-25 | End: 2022-09-25

## 2022-09-25 RX ORDER — SODIUM CHLORIDE 9 MG/ML
1000 INJECTION INTRAMUSCULAR; INTRAVENOUS; SUBCUTANEOUS
Refills: 0 | Status: COMPLETED | OUTPATIENT
Start: 2022-09-25 | End: 2022-09-25

## 2022-09-25 RX ORDER — SODIUM CHLORIDE 9 MG/ML
1000 INJECTION, SOLUTION INTRAVENOUS
Refills: 0 | Status: DISCONTINUED | OUTPATIENT
Start: 2022-09-25 | End: 2022-09-27

## 2022-09-25 RX ORDER — ONDANSETRON 8 MG/1
4 TABLET, FILM COATED ORAL EVERY 6 HOURS
Refills: 0 | Status: DISCONTINUED | OUTPATIENT
Start: 2022-09-25 | End: 2022-09-27

## 2022-09-25 RX ORDER — KETOROLAC TROMETHAMINE 30 MG/ML
15 SYRINGE (ML) INJECTION ONCE
Refills: 0 | Status: DISCONTINUED | OUTPATIENT
Start: 2022-09-25 | End: 2022-09-27

## 2022-09-25 RX ORDER — LEVOTHYROXINE SODIUM 125 MCG
88 TABLET ORAL DAILY
Refills: 0 | Status: DISCONTINUED | OUTPATIENT
Start: 2022-09-25 | End: 2022-09-27

## 2022-09-25 RX ORDER — INFLUENZA VIRUS VACCINE 15; 15; 15; 15 UG/.5ML; UG/.5ML; UG/.5ML; UG/.5ML
0.7 SUSPENSION INTRAMUSCULAR ONCE
Refills: 0 | Status: DISCONTINUED | OUTPATIENT
Start: 2022-09-25 | End: 2022-10-03

## 2022-09-25 RX ORDER — METOCLOPRAMIDE HCL 10 MG
10 TABLET ORAL EVERY 6 HOURS
Refills: 0 | Status: DISCONTINUED | OUTPATIENT
Start: 2022-09-25 | End: 2022-09-27

## 2022-09-25 RX ORDER — ACETAMINOPHEN 500 MG
1000 TABLET ORAL ONCE
Refills: 0 | Status: COMPLETED | OUTPATIENT
Start: 2022-09-25 | End: 2022-09-25

## 2022-09-25 RX ORDER — ONDANSETRON 8 MG/1
4 TABLET, FILM COATED ORAL ONCE
Refills: 0 | Status: COMPLETED | OUTPATIENT
Start: 2022-09-25 | End: 2022-09-25

## 2022-09-25 RX ORDER — HYDROMORPHONE HYDROCHLORIDE 2 MG/ML
1 INJECTION INTRAMUSCULAR; INTRAVENOUS; SUBCUTANEOUS EVERY 4 HOURS
Refills: 0 | Status: DISCONTINUED | OUTPATIENT
Start: 2022-09-25 | End: 2022-09-27

## 2022-09-25 RX ORDER — METOCLOPRAMIDE HCL 10 MG
10 TABLET ORAL ONCE
Refills: 0 | Status: COMPLETED | OUTPATIENT
Start: 2022-09-25 | End: 2022-09-25

## 2022-09-25 RX ADMIN — CEFTRIAXONE 100 MILLIGRAM(S): 500 INJECTION, POWDER, FOR SOLUTION INTRAMUSCULAR; INTRAVENOUS at 08:12

## 2022-09-25 RX ADMIN — MORPHINE SULFATE 4 MILLIGRAM(S): 50 CAPSULE, EXTENDED RELEASE ORAL at 06:51

## 2022-09-25 RX ADMIN — ONDANSETRON 4 MILLIGRAM(S): 8 TABLET, FILM COATED ORAL at 06:51

## 2022-09-25 RX ADMIN — SODIUM CHLORIDE 1000 MILLILITER(S): 9 INJECTION INTRAMUSCULAR; INTRAVENOUS; SUBCUTANEOUS at 06:45

## 2022-09-25 RX ADMIN — Medication 400 MILLIGRAM(S): at 09:08

## 2022-09-25 RX ADMIN — Medication 10 MILLIGRAM(S): at 11:26

## 2022-09-25 RX ADMIN — MORPHINE SULFATE 4 MILLIGRAM(S): 50 CAPSULE, EXTENDED RELEASE ORAL at 07:10

## 2022-09-25 RX ADMIN — HYDROMORPHONE HYDROCHLORIDE 1 MILLIGRAM(S): 2 INJECTION INTRAMUSCULAR; INTRAVENOUS; SUBCUTANEOUS at 19:32

## 2022-09-25 RX ADMIN — SODIUM CHLORIDE 75 MILLILITER(S): 9 INJECTION, SOLUTION INTRAVENOUS at 10:32

## 2022-09-25 RX ADMIN — HYDROMORPHONE HYDROCHLORIDE 1 MILLIGRAM(S): 2 INJECTION INTRAMUSCULAR; INTRAVENOUS; SUBCUTANEOUS at 10:31

## 2022-09-25 RX ADMIN — HYDROMORPHONE HYDROCHLORIDE 1 MILLIGRAM(S): 2 INJECTION INTRAMUSCULAR; INTRAVENOUS; SUBCUTANEOUS at 10:55

## 2022-09-25 RX ADMIN — ONDANSETRON 4 MILLIGRAM(S): 8 TABLET, FILM COATED ORAL at 05:26

## 2022-09-25 RX ADMIN — Medication 1000 MILLIGRAM(S): at 09:30

## 2022-09-25 RX ADMIN — MORPHINE SULFATE 4 MILLIGRAM(S): 50 CAPSULE, EXTENDED RELEASE ORAL at 05:25

## 2022-09-25 RX ADMIN — Medication 12.5 MILLIGRAM(S): at 21:25

## 2022-09-25 RX ADMIN — HYDROMORPHONE HYDROCHLORIDE 1 MILLIGRAM(S): 2 INJECTION INTRAMUSCULAR; INTRAVENOUS; SUBCUTANEOUS at 14:57

## 2022-09-25 RX ADMIN — ONDANSETRON 4 MILLIGRAM(S): 8 TABLET, FILM COATED ORAL at 18:06

## 2022-09-25 RX ADMIN — MORPHINE SULFATE 4 MILLIGRAM(S): 50 CAPSULE, EXTENDED RELEASE ORAL at 05:40

## 2022-09-25 RX ADMIN — SODIUM CHLORIDE 1000 MILLILITER(S): 9 INJECTION INTRAMUSCULAR; INTRAVENOUS; SUBCUTANEOUS at 05:25

## 2022-09-25 RX ADMIN — HYDROMORPHONE HYDROCHLORIDE 1 MILLIGRAM(S): 2 INJECTION INTRAMUSCULAR; INTRAVENOUS; SUBCUTANEOUS at 19:47

## 2022-09-25 RX ADMIN — HYDROMORPHONE HYDROCHLORIDE 1 MILLIGRAM(S): 2 INJECTION INTRAMUSCULAR; INTRAVENOUS; SUBCUTANEOUS at 23:53

## 2022-09-25 RX ADMIN — SODIUM CHLORIDE 1000 MILLILITER(S): 9 INJECTION INTRAMUSCULAR; INTRAVENOUS; SUBCUTANEOUS at 06:40

## 2022-09-25 NOTE — H&P ADULT - NSHPLABSRESULTS_GEN_ALL_CORE
12.6   5.41  )-----------( 344      ( 25 Sep 2022 05:30 )             38.3     09-25    145  |  108  |  18  ----------------------------<  109<H>  3.5   |  32<H>  |  0.68    Ca    9.7      25 Sep 2022 05:30    TPro  7.4  /  Alb  3.9  /  TBili  0.4  /  DBili  x   /  AST  34  /  ALT  50  /  AlkPhos  120  09-25      ACC: [id number] EXAM:  CT ABDOMEN AND PELVIS IC                          PROCEDURE DATE:  09/25/2022          INTERPRETATION:  CLINICAL INFORMATION: Abdominal pain with nausea and   vomiting and history of small bowel obstruction    COMPARISON: 9/9/2022.    CONTRAST/COMPLICATIONS:  IV Contrast: Omnipaque 350  90 cc administered   10 cc discarded  Oral Contrast: NONE  Complications: None reported at time of study completion    PROCEDURE:  CT of the Abdomen and Pelvis was performed.  Sagittal and coronal reformats were performed.    FINDINGS:  LOWER CHEST: There is right posterior medial osteophyte lung and mild   posterior dependent changes..    LIVER: There is fatty replacement of liver..  BILE DUCTS: Normal caliber.  GALLBLADDER: Within normal limits.  SPLEEN: Within normal limits.  PANCREAS: Within normal limits.  ADRENALS: Within normal limits.  KIDNEYS/URETERS: Within normal limits.    BLADDER: Within normal limits.  REPRODUCTIVE ORGANS: Calcified fibroid of the uterus is noted. Thereis   no abnormal adnexal enlargement.    BOWEL: No abnormally dilated loops of bowel appreciated. However,   proximal and mid small bowel loops appear fluid-filled and distended well   distal small bowel loops appear relatively under distended and   decompressed. There appears to be a transition in the region of the small   bowel anastomosis. There is associated mesenteric edema in this region.   Possibility of early or partial bowel obstruction cannot be excluded.   Appendix is normal. Previously noted cecal pneumatosis is no longer   visualized. Partial colectomy changes are appreciated, with a   rectosigmoid anastomosis noted. A large amount of stool seen throughout   the majority the colon. This may indicate constipation.  PERITONEUM: Small free fluid seen within the cul-de-sac.  VESSELS: Atherosclerotic changes.  RETROPERITONEUM/LYMPH NODES: No lymphadenopathy.  ABDOMINAL WALL: Postsurgical changes. There is a small fat-containing   ventral hernia the epigastric region.  BONES: Degenerative changes. Diffusely mottled appearance of the osseous   structures are appreciated likely related to osteopenia.    IMPRESSION:  No bowel dilatation, however there is a question of a transition of   caliber at the level of a small bowel anastomosis within the mid abdomen.   There is associated mesenteric edema. Possibility of early or partial   small bowel obstruction not excluded. Other differential considerations   include sequelae of enteritis.    Probable constipation.    Fatty liver.     HANG AYERS M.D., Attending Radiologist  This document has been electronically signed. Sep 25 2022  6:50AM

## 2022-09-25 NOTE — ED ADULT NURSE REASSESSMENT NOTE - NS ED NURSE REASSESS COMMENT FT1
Received pt from outgoing RN resting in stretcher.   Pt states abd discomfort is currently at an acceptable level, denies need for pain medication at present.   abd soft tender to palpation.  +flatus.   Pt ambulatory to BR voiding freely.  Denies n/v.   Denies cp/sob/palpitations.    Pt medicated with abx per stat order.  Safety maintained.  Will continue frequent monitoring. BN Received pt from outgoing RN resting in stretcher.   Pt states abd discomfort is currently at an acceptable level, denies need for pain medication at present.   abd soft tender to palpation.  +flatus.   Pt ambulatory to BR voiding freely.  Denies n/v.   Denies cp/sob/palpitations.    Pt medicated with abx per stat order.  Safety maintained.  Will continue frequent monitoring. GAMALIEL  1914:  Pt medicated for increased pain at this time.   Surgical PA at bedside to reassess pt and discuss plan of care at this time.   Pt calm, verbalizes understnading. Will continue to monitor. GAMALIEL

## 2022-09-25 NOTE — H&P ADULT - PROBLEM SELECTOR PLAN 1
- No indication for acute surgical intervention at this time  - Continue NPO and serial abdominal exams  - Will continue monitoring w/out NGT placement for now  - Admit to Dr. Yanez  - Discussed w/ Dr. Yanez

## 2022-09-25 NOTE — ED PROVIDER NOTE - OBJECTIVE STATEMENT
67-year-old female with history of Hashimoto's, SBO s/p operative repair July 10, status post recent admission for SBO 2 weeks ago presents with abdominal pain and nausea vomiting over past few hours.  Patient was in normal state of health last night.  No fevers or chills.  No cough/URI.  No recent COVID exposure.  Patient fully vaccinated for COVID.  No acute diarrhea.  No blood in the stool.  No dysuria/hematuria.  No aggravating or alleviating factors.  No other acute complaints at this time.

## 2022-09-25 NOTE — H&P ADULT - NSHPPHYSICALEXAM_GEN_ALL_CORE
PHYSICAL EXAM:  General: No acute distress, appears comfortable, well nourished, well-groomed, appears stated age  Head, Eyes, Ears, Nose, Throat: Normal cephalic/atraumatic, anicteric, conjunctiva-non injected and moist, vision grossly intact, hearing grossly intact, no nasal discharge, ears and nose symmetrical and atraumatic.  Nasal, oral, and oropharyngeal mucosa pink moist with no evidence of ulceration  Neck: Supple, carotids have good upstroke, trachea in the midline, without JVD or thyromegaly  Lymphatic: No evidence of masses or lymphadenopathy in the head, neck, trunk, axillary, inguinal, or supraclavicular regions  Chest: Lungs are clear to P&A, no wheezing, no rales, no ronchi, with good inspiratory effort  Heart: Heart rhythm regular, no murmurs  Abdomen: Tender to deep palpation in RLQ.  Soft good bowel sounds present in all four quadrants.  No guarding, rebound, and no peritoneal signs.  No evidence of hepatosplenomegaly.  No evidence of abdominal wall hernias.  Inguinal regions are unremarkable with no evidence of hernias. + healed midline abdominal incision.    Extremity: No swelling, or open sores, no gross deformities,  good range of motion, 2+ peripheral pulses bilat UE/LE, no edema,  negative Werner's sign, no lymphadenopathy  Neuro: Alert and oriented x3, motor and sensory intact  Psychiatric: Awake , alert, oriented x3 with an appropriate affect.   Skin: Good color, turgor, texture with no gross lesions, no eruptions, no rashes, no subcutaneous nodules and normal temperature.

## 2022-09-25 NOTE — ED ADULT NURSE NOTE - CHPI ED NUR TIMING2
[FreeTextEntry1] : Patient is a 63 year old man who presents with continued severe left-sided neck pain. We will resubmit for diagnostic medial branch injections at the levels of C3-4 and C4-5 on the left. Two levels are being requested because each facet receives innervation at the same level and from the level above. Patient is presenting with mainly axial pain with impairment in ADLs and functionality pointing to facet joints.  The pain has not responded to conservative care, including medications, stretching, as well as active modalities, such as physical therapy.  Imaging studies as well as physical exam findings corroborate the symptomatology and point to the facet joints.  We would like to proceed with diagnostic LEFT C3-4, C4-5 medial branch blocks with MAC. \par \par \par All of this patient's questions were answered and the conversation was understood well.\par \par No medications were given at today's visit.\par \par Patient had paravertebral tenderness and pain on spinal movement with facet loading.  Patient has trialed rehab (home exercise, physical therapy or chiropractic care) and medications.  I will schedule a left diagnostic cervical medial branch injection C3-4, C4-5 , if 70% immediate relief for greater than 30 minutes or 50% greater than 24 hours, would schedule RFA at CERVICAL medial branches.  If greater than 50% intermediate relief for 30 minutes, would schedule a second diagnostic CERVICAL medial branch block, and if greater than 50% intermediate relief for 30 minutes, would schedule a RFA at CERVICAL medial branches.\par \par The patient has severe anxiety of procedures that necessitates monitored anesthesia care (MAC). The procedure performed will be close to major nerves, arteries, and spinal cord and/or joint structures. Due to the proximity of these structures, we need the patient to be still during the procedure. With the help of MAC, this will be safely achieved and decrease the risk of any complications.\par \par Risk, benefits, pros and cons of procedure were explained to the patient using models and diagrams and their questions were answered.\par \par \par \par No imaging was ordered at this visit.\par \par We encourage a home exercise program, stressing walking and strengthening of the core. We have recommended exercises such as the modified plank, Jose Manuel exercise, elliptical , recumbent bike, as well as shoulder griddle strengthening. We discussed recreational activities such as swimming, Ubaldo Chi and yoga. Use things that heat like hot shower or icy heat before rehab and exercising and at the beginning of the day, and ice (ice in a bag never directly on the skin) after activity and at the end of the day.\par \par \par Topher Henderson Scribe: Dr. Huston *\par \par Entered by Topher Henderson, acting as scribe for Dr. Huston.\par  \par The documentation recorded by the scribe, in my presence, accurately reflects the\par service I personally performed, and the decisions made by me with my edits as appropriate.\par  \par Thank you for allowing me to assist in the management of this patient.\par  \par Best Regards,\par  \par Lissett Huston M.D., [facility]\par  \par Diplomate, American Board of Physical Medicine and Rehabilitation\par Diplomate, American Board of Pain Medicine Diplomate, American Board of Pain Management\par \par  sudden onset

## 2022-09-25 NOTE — ED ADULT NURSE NOTE - ALCOHOL PRE SCREEN (AUDIT - C)
[facility] [address], [place] [address]

Test Date:    2022               Test Time:    04:34:36

Pat Name:     PARISH RICO      Department:   

Patient ID:   [id number]           Room:          

Gender:       F                        Technician:   MEGAN

:          1952               Requested By: CHOLO CABELLO

Order Number: [id number]            Reading MD:     

                                 Measurements

Intervals                              Axis          

Rate:         59                       P:            64

AK:           176                      QRS:          -19

QRSD:         130                      T:            74

QT:           448                                    

QTc:          448                                    

                           Interpretive Statements

SINUS RHYTHM

LEFTWARD AXIS

NON SPECIFIC INTRAVENTRICULAR BLOCK

ABNORMAL ECG

RI6.02

No previous ECG available for comparison
[facility] [address], [place] [address]

Test Date:    2022               Test Time:    04:40:48

Pat Name:     PARISH RICO      Department:   

Patient ID:   [id number]           Room:          

Gender:       F                        Technician:   MEGAN

:          1952               Requested By: CHOLO CABELLO

Order Number: [id number]            Reading MD:     

                                 Measurements

Intervals                              Axis          

Rate:         56                       P:            55

IL:           170                      QRS:          -18

QRSD:         130                      T:            79

QT:           458                                    

QTc:          445                                    

                           Interpretive Statements

SINUS RHYTHM

LEFTWARD AXIS

NON SPECIFIC INTRAVENTRICULAR BLOCK

ABNORMAL ECG

RI6.02

Compared to ECG 2022 04:34:36

No significant changes
Statement Selected

## 2022-09-25 NOTE — H&P ADULT - ASSESSMENT
r old female p/w abdominal pain, found to have early to partial SBO on CT imaging.  Vital signs stable with reassuring labs.

## 2022-09-25 NOTE — PATIENT PROFILE ADULT - FALL HARM RISK - RISK INTERVENTIONS
Assistance OOB with selected safe patient handling equipment/Assistance with ambulation/Communicate Fall Risk and Risk Factors to all staff, patient, and family/Discuss with provider need for PT consult/Monitor gait and stability/Reinforce activity limits and safety measures with patient and family/Visual Cue: Yellow wristband/Bed in lowest position, wheels locked, appropriate side rails in place/Call bell, personal items and telephone in reach/Instruct patient to call for assistance before getting out of bed or chair/Non-slip footwear when patient is out of bed/[place] to call system/Physically safe environment - no spills, clutter or unnecessary equipment/Purposeful Proactive Rounding/Room/bathroom lighting operational, light cord in reach

## 2022-09-25 NOTE — ED ADULT TRIAGE NOTE - CHIEF COMPLAINT QUOTE
pt reports she feels like she has a bowel obstruction. had a partial obstruction and was admitted around labor day of this year. reports similar pain and vomiting, last BM this morning

## 2022-09-25 NOTE — ED PROVIDER NOTE - GASTROINTESTINAL, MLM
Abdomen soft, pos diffuse mid / lower abd tenderness, no guarding. No rebound. no hsm.no cvat. Lower midline scar intact.

## 2022-09-25 NOTE — CONSULT NOTE ADULT - SUBJECTIVE AND OBJECTIVE BOX
SURGERY PA CONSULT NOTE    CHIEF COMPLAINT:  Patient is a 67y old  Female who presents with a chief complaint of abdominal pain    HPI:  67 year old female w/ history of colon resection w/ Dr. Yanez, recurrent SBO (most recent admission 2 weeks ago, managed conservatively), HLD, hypothyroidism, presenting to [place]  ED with abdominal pain.  Patient states pain started around 3 am, described as sharp in her RLQ that radiated to her upper abdomen.  Endorses this is how she felt prior to her most recent hospital admission for a SBO.  Patient had nausea with multiple episodes of vomiting overnight and while in the ED.  Patient last ate Greek food for dinner at 7:30 pm, last BM yesterday morning.  Patient endorses since her last hospital admission she was in a good state of health and able to tolerate a regular diet.  Denies fevers, chills, chest pain, SOB, palpitations, calf pain.        PAST MEDICAL HISTORY:  PAST MEDICAL & SURGICAL HISTORY:  Hypothyroidism      HLD (hyperlipidemia)      History of colon resection          PAST SURGICAL HISTORY:    REVIEW OF SYSTEMS:  General/Constitutional: No acute distress, no headache, weakness, fevers, or chills   HEENT: Denies auditory or visual changes/disturbances, no vertigo, no throat pain, no dysphagia    Neck: Denies neck pain/stiffness, denies swelling/lumps/hoarseness   Lymphatic: Denies lumps or swelling in the axillae, groin, or neck bilaterally   Respiratory: Denies cough/hemoptysis, denies wheezing/SOB/dyspnea  Cardiac: Denies chest pain, palpitations  Abdomen: + ABDOMINAL PAIN/NAUSEA/VOMITING.    Extremities: Denies sores, swelling, discoloration bilat UE/LE  Genitourinary: Denies urinary issues or complaints, denies dysuria/hematuria  Neuro: Denies weakness, paraesthesias, paralysis, syncope, loss of vision  Skin: Denies pruritus, pain, rashes  Psych: Denies hallucinations, visual disturbances, or depression    MEDICATIONS:  Home Medications:  acetaminophen 500 mg oral tablet: 2 tab(s) orally every 6 hours, As Needed for pain (10 Sep 2022 19:04)  ibuprofen 600 mg oral tablet: 1 tab(s) orally every 6 hours, As needed, Mild Pain (1 - 3) (10 Sep 2022 19:04)  levothyroxine 88 mcg (0.088 mg) oral tablet: 1 tab(s) orally once a day (10 Jul 2022 05:04)  pantoprazole 40 mg oral delayed release tablet: 1 tab(s) orally once a day (10 Jul 2022 05:34)  rosuvastatin 5 mg oral tablet: 1 tab(s) orally once a day (10 Jul 2022 05:34)    MEDICATIONS  (STANDING):  cefTRIAXone   IVPB 1000 milliGRAM(s) IV Intermittent once    MEDICATIONS  (PRN):      ALLERGIES:  Allergies    tetanus toxoid (Anaphylaxis)    Intolerances        SOCIAL HISTORY:  Social History:    Smoking: Yes [ ]  No [x]   ETOH  Yes [ ]  No [x]  Social [ ]  DRUGS:  Yes [ ]  No [x]  if so what______________    FAMILY HISTORY:  FAMILY HISTORY:      VITAL SIGNS:  Vital Signs Last 24 Hrs  T(C): 36.4 (25 Sep 2022 04:49), Max: 36.4 (25 Sep 2022 04:49)  T(F): 97.5 (25 Sep 2022 04:49), Max: 97.5 (25 Sep 2022 04:49)  HR: 92 (25 Sep 2022 06:44) (92 - 96)  BP: 120/69 (25 Sep 2022 06:44) (120/69 - 143/91)  BP(mean): --  RR: 15 (25 Sep 2022 06:44) (15 - 18)  SpO2: 98% (25 Sep 2022 06:44) (96% - 98%)    Parameters below as of 25 Sep 2022 04:49  Patient On (Oxygen Delivery Method): room air        PHYSICAL EXAM:  General: No acute distress, appears comfortable, well nourished, well-groomed, appears stated age  Head, Eyes, Ears, Nose, Throat: Normal cephalic/atraumatic, anicteric, conjunctiva-non injected and moist, vision grossly intact, hearing grossly intact, no nasal discharge, ears and nose symmetrical and atraumatic.  Nasal, oral, and oropharyngeal mucosa pink moist with no evidence of ulceration  Neck: Supple, carotids have good upstroke, trachea in the midline, without JVD or thyromegaly  Lymphatic: No evidence of masses or lymphadenopathy in the head, neck, trunk, axillary, inguinal, or supraclavicular regions  Chest: Lungs are clear to P&A, no wheezing, no rales, no ronchi, with good inspiratory effort  Heart: Heart rhythm regular, no murmurs  Abdomen: Tender to deep palpation in RLQ.  Soft good bowel sounds present in all four quadrants.  No guarding, rebound, and no peritoneal signs.  No evidence of hepatosplenomegaly.  No evidence of abdominal wall hernias.  Inguinal regions are unremarkable with no evidence of hernias. + healed midline abdominal incision.    Extremity: No swelling, or open sores, no gross deformities,  good range of motion, 2+ peripheral pulses bilat UE/LE, no edema,  negative Werner's sign, no lymphadenopathy  Neuro: Alert and oriented x3, motor and sensory intact  Psychiatric: Awake , alert, oriented x3 with an appropriate affect.   Skin: Good color, turgor, texture with no gross lesions, no eruptions, no rashes, no subcutaneous nodules and normal temperature.     LABS:                        12.6   5.41  )-----------( 344      ( 25 Sep 2022 05:30 )             38.3         145  |  108  |  18  ----------------------------<  109<H>  3.5   |  32<H>  |  0.68    Ca    9.7      25 Sep 2022 05:30    TPro  7.4  /  Alb  3.9  /  TBili  0.4  /  DBili  x   /  AST  34  /  ALT  50  /  AlkPhos  120  -    PT/INR - ( 25 Sep 2022 05:30 )   PT: 10.5 sec;   INR: 0.90 ratio         PTT - ( 25 Sep 2022 05:30 )  PTT:31.6 sec  Urinalysis Basic - ( 25 Sep 2022 05:30 )    Color: Yellow / Appearance: Slightly Turbid / S.015 / pH: x  Gluc: x / Ketone: Negative  / Bili: Negative / Urobili: Negative   Blood: x / Protein: Negative / Nitrite: Negative   Leuk Esterase: Moderate / RBC: 3-5 /HPF / WBC 11-25   Sq Epi: x / Non Sq Epi: Moderate / Bacteria: Moderate      LIVER FUNCTIONS - ( 25 Sep 2022 05:30 )  Alb: 3.9 g/dL / Pro: 7.4 g/dL / ALK PHOS: 120 U/L / ALT: 50 U/L / AST: 34 U/L / GGT: x               RADIOLOGY & ADDITIONAL STUDIES:                          12.6   5.41  )-----------( 344      ( 25 Sep 2022 05:30 )             38.3         145  |  108  |  18  ----------------------------<  109<H>  3.5   |  32<H>  |  0.68    Ca    9.7      25 Sep 2022 05:30    TPro  7.4  /  Alb  3.9  /  TBili  0.4  /  DBili  x   /  AST  34  /  ALT  50  /  AlkPhos  120        ACC: [id number] EXAM:  CT ABDOMEN AND PELVIS IC                          PROCEDURE DATE:  2022          INTERPRETATION:  CLINICAL INFORMATION: Abdominal pain with nausea and   vomiting and history of small bowel obstruction    COMPARISON: 2022.    CONTRAST/COMPLICATIONS:  IV Contrast: Omnipaque 350  90 cc administered   10 cc discarded  Oral Contrast: NONE  Complications: None reported at time of study completion    PROCEDURE:  CT of the Abdomen and Pelvis was performed.  Sagittal and coronal reformats were performed.    FINDINGS:  LOWER CHEST: There is right posterior medial osteophyte lung and mild   posterior dependent changes..    LIVER: There is fatty replacement of liver..  BILE DUCTS: Normal caliber.  GALLBLADDER: Within normal limits.  SPLEEN: Within normal limits.  PANCREAS: Within normal limits.  ADRENALS: Within normal limits.  KIDNEYS/URETERS: Within normal limits.    BLADDER: Within normal limits.  REPRODUCTIVE ORGANS: Calcified fibroid of the uterus is noted. Thereis   no abnormal adnexal enlargement.    BOWEL: No abnormally dilated loops of bowel appreciated. However,   proximal and mid small bowel loops appear fluid-filled and distended well   distal small bowel loops appear relatively under distended and   decompressed. There appears to be a transition in the region of the small   bowel anastomosis. There is associated mesenteric edema in this region.   Possibility of early or partial bowel obstruction cannot be excluded.   Appendix is normal. Previously noted cecal pneumatosis is no longer   visualized. Partial colectomy changes are appreciated, with a   rectosigmoid anastomosis noted. A large amount of stool seen throughout   the majority the colon. This may indicate constipation.  PERITONEUM: Small free fluid seen within the cul-de-sac.  VESSELS: Atherosclerotic changes.  RETROPERITONEUM/LYMPH NODES: No lymphadenopathy.  ABDOMINAL WALL: Postsurgical changes. There is a small fat-containing   ventral hernia the epigastric region.  BONES: Degenerative changes. Diffusely mottled appearance of the osseous   structures are appreciated likely related to osteopenia.    IMPRESSION:  No bowel dilatation, however there is a question of a transition of   caliber at the level of a small bowel anastomosis within the mid abdomen.   There is associated mesenteric edema. Possibility of early or partial   small bowel obstruction not excluded. Other differential considerations   include sequelae of enteritis.    Probable constipation.    Fatty liver.    HANG AYERS M.D., Attending Radiologist  This document has been electronically signed. Sep 25 2022  6:50AM

## 2022-09-25 NOTE — ED PROVIDER NOTE - CLINICAL SUMMARY MEDICAL DECISION MAKING FREE TEXT BOX
Abdominal pain this morning with history of SBO status post recent admission.  Check labs, CT, IV fluids, pain control, surgical consultation.

## 2022-09-25 NOTE — H&P ADULT - HISTORY OF PRESENT ILLNESS
67 year old female w/ history of colon resection w/ Dr. Yanez, recurrent SBO (most recent admission 2 weeks ago, managed conservatively), HLD, hypothyroidism, presenting to [place]  ED with abdominal pain.  Patient states pain started around 3 am, described as sharp in her RLQ that radiated to her upper abdomen.  Endorses this is how she felt prior to her most recent hospital admission for a SBO.  Patient had nausea with multiple episodes of vomiting overnight and while in the ED.  Patient last ate Greek food for dinner at 7:30 pm, last BM yesterday morning.  Patient endorses since her last hospital admission she was in a good state of health and able to tolerate a regular diet.  Denies fevers, chills, chest pain, SOB, palpitations, calf pain.

## 2022-09-25 NOTE — H&P ADULT - NSHPREVIEWOFSYSTEMS_GEN_ALL_CORE
General/Constitutional: No acute distress, no headache, weakness, fevers, or chills   HEENT: Denies auditory or visual changes/disturbances, no vertigo, no throat pain, no dysphagia    Neck: Denies neck pain/stiffness, denies swelling/lumps/hoarseness   Lymphatic: Denies lumps or swelling in the axillae, groin, or neck bilaterally   Respiratory: Denies cough/hemoptysis, denies wheezing/SOB/dyspnea  Cardiac: Denies chest pain, palpitations  Abdomen: + ABDOMINAL PAIN/NAUSEA/VOMITING.    Extremities: Denies sores, swelling, discoloration bilat UE/LE  Genitourinary: Denies urinary issues or complaints, denies dysuria/hematuria  Neuro: Denies weakness, paraesthesias, paralysis, syncope, loss of vision  Skin: Denies pruritus, pain, rashes  Psych: Denies hallucinations, visual disturbances, or depression

## 2022-09-25 NOTE — ED ADULT NURSE NOTE - RECENT EXPOSURE TO
Pt arrived to ED via EMS. CC of chest pain, pt took 2 nitro from home, states pain went away completely. Pt feeling nauseous on ride to hospital, but is not nauseous on arrival. Pt has hx of MI about 2 years ago   none known

## 2022-09-26 ENCOUNTER — TRANSCRIPTION ENCOUNTER (OUTPATIENT)
Age: 67
End: 2022-09-26

## 2022-09-26 DIAGNOSIS — E78.5 HYPERLIPIDEMIA, UNSPECIFIED: ICD-10-CM

## 2022-09-26 DIAGNOSIS — N89.8 OTHER SPECIFIED NONINFLAMMATORY DISORDERS OF VAGINA: ICD-10-CM

## 2022-09-26 DIAGNOSIS — R03.0 ELEVATED BLOOD-PRESSURE READING, WITHOUT DIAGNOSIS OF HYPERTENSION: ICD-10-CM

## 2022-09-26 DIAGNOSIS — R39.89 OTHER SYMPTOMS AND SIGNS INVOLVING THE GENITOURINARY SYSTEM: ICD-10-CM

## 2022-09-26 DIAGNOSIS — E06.3 AUTOIMMUNE THYROIDITIS: ICD-10-CM

## 2022-09-26 DIAGNOSIS — Z29.9 ENCOUNTER FOR PROPHYLACTIC MEASURES, UNSPECIFIED: ICD-10-CM

## 2022-09-26 LAB
ANION GAP SERPL CALC-SCNC: 6 MMOL/L — SIGNIFICANT CHANGE UP (ref 5–17)
BASOPHILS # BLD AUTO: 0.04 K/UL — SIGNIFICANT CHANGE UP (ref 0–0.2)
BASOPHILS NFR BLD AUTO: 0.7 % — SIGNIFICANT CHANGE UP (ref 0–2)
BUN SERPL-MCNC: 10 MG/DL — SIGNIFICANT CHANGE UP (ref 7–23)
CALCIUM SERPL-MCNC: 9.1 MG/DL — SIGNIFICANT CHANGE UP (ref 8.5–10.1)
CHLORIDE SERPL-SCNC: 106 MMOL/L — SIGNIFICANT CHANGE UP (ref 96–108)
CO2 SERPL-SCNC: 32 MMOL/L — HIGH (ref 22–31)
CREAT SERPL-MCNC: 0.61 MG/DL — SIGNIFICANT CHANGE UP (ref 0.5–1.3)
EGFR: 98 ML/MIN/1.73M2 — SIGNIFICANT CHANGE UP
EOSINOPHIL # BLD AUTO: 0.1 K/UL — SIGNIFICANT CHANGE UP (ref 0–0.5)
EOSINOPHIL NFR BLD AUTO: 1.8 % — SIGNIFICANT CHANGE UP (ref 0–6)
GLUCOSE SERPL-MCNC: 96 MG/DL — SIGNIFICANT CHANGE UP (ref 70–99)
HCT VFR BLD CALC: 35.3 % — SIGNIFICANT CHANGE UP (ref 34.5–45)
HGB BLD-MCNC: 11.2 G/DL — LOW (ref 11.5–15.5)
IMM GRANULOCYTES NFR BLD AUTO: 0.4 % — SIGNIFICANT CHANGE UP (ref 0–0.9)
LYMPHOCYTES # BLD AUTO: 1.8 K/UL — SIGNIFICANT CHANGE UP (ref 1–3.3)
LYMPHOCYTES # BLD AUTO: 33.3 % — SIGNIFICANT CHANGE UP (ref 13–44)
MCHC RBC-ENTMCNC: 30.2 PG — SIGNIFICANT CHANGE UP (ref 27–34)
MCHC RBC-ENTMCNC: 31.7 GM/DL — LOW (ref 32–36)
MCV RBC AUTO: 95.1 FL — SIGNIFICANT CHANGE UP (ref 80–100)
MONOCYTES # BLD AUTO: 0.54 K/UL — SIGNIFICANT CHANGE UP (ref 0–0.9)
MONOCYTES NFR BLD AUTO: 10 % — SIGNIFICANT CHANGE UP (ref 2–14)
NEUTROPHILS # BLD AUTO: 2.91 K/UL — SIGNIFICANT CHANGE UP (ref 1.8–7.4)
NEUTROPHILS NFR BLD AUTO: 53.8 % — SIGNIFICANT CHANGE UP (ref 43–77)
NRBC # BLD: 0 /100 WBCS — SIGNIFICANT CHANGE UP (ref 0–0)
PLATELET # BLD AUTO: 262 K/UL — SIGNIFICANT CHANGE UP (ref 150–400)
POTASSIUM SERPL-MCNC: 3.8 MMOL/L — SIGNIFICANT CHANGE UP (ref 3.5–5.3)
POTASSIUM SERPL-SCNC: 3.8 MMOL/L — SIGNIFICANT CHANGE UP (ref 3.5–5.3)
RBC # BLD: 3.71 M/UL — LOW (ref 3.8–5.2)
RBC # FLD: 12.5 % — SIGNIFICANT CHANGE UP (ref 10.3–14.5)
SODIUM SERPL-SCNC: 144 MMOL/L — SIGNIFICANT CHANGE UP (ref 135–145)
WBC # BLD: 5.41 K/UL — SIGNIFICANT CHANGE UP (ref 3.8–10.5)
WBC # FLD AUTO: 5.41 K/UL — SIGNIFICANT CHANGE UP (ref 3.8–10.5)

## 2022-09-26 PROCEDURE — 74018 RADEX ABDOMEN 1 VIEW: CPT | Mod: 26

## 2022-09-26 PROCEDURE — 99222 1ST HOSP IP/OBS MODERATE 55: CPT

## 2022-09-26 RX ORDER — ACETAMINOPHEN 500 MG
1000 TABLET ORAL ONCE
Refills: 0 | Status: COMPLETED | OUTPATIENT
Start: 2022-09-26 | End: 2022-09-26

## 2022-09-26 RX ORDER — CEFOTETAN DISODIUM 1 G
2 VIAL (EA) INJECTION ONCE
Refills: 0 | Status: COMPLETED | OUTPATIENT
Start: 2022-09-27 | End: 2022-09-27

## 2022-09-26 RX ORDER — PANTOPRAZOLE SODIUM 20 MG/1
40 TABLET, DELAYED RELEASE ORAL DAILY
Refills: 0 | Status: DISCONTINUED | OUTPATIENT
Start: 2022-09-26 | End: 2022-09-27

## 2022-09-26 RX ORDER — LANOLIN ALCOHOL/MO/W.PET/CERES
5 CREAM (GRAM) TOPICAL AT BEDTIME
Refills: 0 | Status: DISCONTINUED | OUTPATIENT
Start: 2022-09-26 | End: 2022-09-27

## 2022-09-26 RX ORDER — ACETAMINOPHEN 500 MG
1000 TABLET ORAL ONCE
Refills: 0 | Status: COMPLETED | OUTPATIENT
Start: 2022-09-27 | End: 2022-09-27

## 2022-09-26 RX ORDER — CLOTRIMAZOLE AND BETAMETHASONE DIPROPIONATE 10; .5 MG/G; MG/G
1 CREAM TOPICAL
Refills: 0 | Status: DISCONTINUED | OUTPATIENT
Start: 2022-09-26 | End: 2022-09-27

## 2022-09-26 RX ORDER — LACTOBACILLUS ACIDOPHILUS 100MM CELL
1 CAPSULE ORAL
Refills: 0 | Status: DISCONTINUED | OUTPATIENT
Start: 2022-09-26 | End: 2022-09-27

## 2022-09-26 RX ADMIN — Medication 5 MILLIGRAM(S): at 21:24

## 2022-09-26 RX ADMIN — HYDROMORPHONE HYDROCHLORIDE 1 MILLIGRAM(S): 2 INJECTION INTRAMUSCULAR; INTRAVENOUS; SUBCUTANEOUS at 10:45

## 2022-09-26 RX ADMIN — PANTOPRAZOLE SODIUM 40 MILLIGRAM(S): 20 TABLET, DELAYED RELEASE ORAL at 11:38

## 2022-09-26 RX ADMIN — Medication 400 MILLIGRAM(S): at 07:05

## 2022-09-26 RX ADMIN — HYDROMORPHONE HYDROCHLORIDE 1 MILLIGRAM(S): 2 INJECTION INTRAMUSCULAR; INTRAVENOUS; SUBCUTANEOUS at 20:14

## 2022-09-26 RX ADMIN — Medication 1000 MILLIGRAM(S): at 18:25

## 2022-09-26 RX ADMIN — Medication 400 MILLIGRAM(S): at 21:23

## 2022-09-26 RX ADMIN — Medication 12.5 MILLIGRAM(S): at 22:38

## 2022-09-26 RX ADMIN — HYDROMORPHONE HYDROCHLORIDE 1 MILLIGRAM(S): 2 INJECTION INTRAMUSCULAR; INTRAVENOUS; SUBCUTANEOUS at 10:30

## 2022-09-26 RX ADMIN — ATORVASTATIN CALCIUM 20 MILLIGRAM(S): 80 TABLET, FILM COATED ORAL at 21:23

## 2022-09-26 RX ADMIN — Medication 1 TABLET(S): at 18:21

## 2022-09-26 RX ADMIN — HYDROMORPHONE HYDROCHLORIDE 1 MILLIGRAM(S): 2 INJECTION INTRAMUSCULAR; INTRAVENOUS; SUBCUTANEOUS at 05:08

## 2022-09-26 RX ADMIN — Medication 400 MILLIGRAM(S): at 14:00

## 2022-09-26 RX ADMIN — CLOTRIMAZOLE AND BETAMETHASONE DIPROPIONATE 1 APPLICATION(S): 10; .5 CREAM TOPICAL at 15:33

## 2022-09-26 RX ADMIN — HYDROMORPHONE HYDROCHLORIDE 1 MILLIGRAM(S): 2 INJECTION INTRAMUSCULAR; INTRAVENOUS; SUBCUTANEOUS at 00:08

## 2022-09-26 RX ADMIN — Medication 1000 MILLIGRAM(S): at 22:00

## 2022-09-26 RX ADMIN — HYDROMORPHONE HYDROCHLORIDE 1 MILLIGRAM(S): 2 INJECTION INTRAMUSCULAR; INTRAVENOUS; SUBCUTANEOUS at 21:00

## 2022-09-26 RX ADMIN — Medication 88 MICROGRAM(S): at 05:05

## 2022-09-26 NOTE — PROGRESS NOTE ADULT - ASSESSMENT
68 yo with abdominal pain following ex lap with SBR(7/10).  PT did well postop then developed abdominal pain 2 weeks ago. Found to have sbo with edema and transition at anastomosis. PT eventually opened up and d/c . Pt returns 2 weeks later with similar symptoms.  Mesenteric edema at anastomosis evident again.  PT nervous about happening again.  Since 2nd episode with edema seen on CT will proceed with diagnostic laparoscopy and redo of anastomosis.  PRocedure d/w pt including r/b/a.

## 2022-09-26 NOTE — CHART NOTE - NSCHARTNOTEFT_GEN_A_CORE
Request for secondary opinion received from Dr. ANNA Yanez.  Surgical report from initial operation reviewed as well as imaging from recent hospitalization and then this admission.  In summary, 67 year old female now s/p small bowel obstruction with resultant small bowel resection in July for adhesive disease +/- food impaction by history/ clinical concern though pathology significant for adhesive disease only (no bezoar.)  Already admitted once and successfully managed conservatively for partial SBO centered around anastomosis some two weeks ago.  Now, with readmission again with similar clinical complaints and radiographic findings.  She reports still feeling "boated" and admits to a fear of eating secondary to the history as above and her current complaints.  Options of further watchful waiting with ongoing observation discussed with Ms. Toby Ferraro and  in detail versus diagnostic laparoscopy and anastomotic revision.  She is strongly in favor of the latter and wishes to proceed expeditiously and her  is supportive of this.  I have counseled them that all surgery bears the risk of further adhesions and recurrent obstruction.  However, given the relatively short period of time since her initial surgery and the necessity of two subsequent admissions for (at least partial) SBO, laparoscopy/ laparotomy PRN/ anastomotic revision is reasonable.  They are satisfied with this, comfortable with Dr. Yanez's care and remain available to proceed.  Will discuss further with Dr. Yanez personally/ directly, though proceeding to the OR in a non-urgent fashion in ideal circumstances seems most appropriate to me.

## 2022-09-26 NOTE — CONSULT NOTE ADULT - ASSESSMENT
67 year old female p/w abdominal pain, found to have early to partial SBO on CT imaging.  
67-year-old female with history of Hashimoto's, SBO s/p operative repair July 10, status post recent admission for SBO 2 weeks ago presents with abdominal pain and nausea vomiting over past few hours.  Patient was in normal state of health last night.  No fevers or chills.  No cough/URI.  No recent COVID exposure.  Patient fully vaccinated for COVID.  No acute diarrhea.  No blood in the stool.  No dysuria/hematuria.  No aggravating or alleviating factors.  No other acute complaints at this time Admitted with Small bowel obstruction, partial. Found to have positive ua and developed dysuria & vaginal pruruits  ,s/p ceftriaxone in ER ,ID cons requested   ·  Surgery is following ,KUB ordered  - NPO w/ IVF ,MAY REQUIRE SX intervention   - Continue pain control as needed  -  IV tylenol Q 6 hours & PPI  - Encourage OOB/ambulation  - Serial abdominal exams & monitor for bowel function  - Daily labs  - DVT Prophylaxis with SCDs

## 2022-09-26 NOTE — CONSULT NOTE ADULT - SUBJECTIVE AND OBJECTIVE BOX
[facility]  INFECTIOUS DISEASES   [address] [place] [address]  Tel: [phone]     Fax: [phone]  ======================================================  MD Kirsten Borden Kaushal, MD Cho, Michelle, MD   ======================================================    [facility]-[id number]  JONNA KANG     CC: Abdominal pain/SBO     HPI:  "67 year old female w/ history of colon resection w/ Dr. Yanez, recurrent SBO (most recent admission 2 weeks ago, managed conservatively), HLD, hypothyroidism, presenting to [place]  ED with abdominal pain.  Patient states pain started around 3 am, described as sharp in her RLQ that radiated to her upper abdomen.  Endorses this is how she felt prior to her most recent hospital admission for a SBO.  Patient had nausea with multiple episodes of vomiting overnight and while in the ED.  Patient last ate Greek food for dinner at 7:30 pm, last BM yesterday morning.  Patient endorses since her last hospital admission she was in a good state of health and able to tolerate a regular diet.  Denies fevers, chills, chest pain, SOB, palpitations, calf pain."      PAST MEDICAL & SURGICAL HISTORY:  Hypothyroidism  HLD (hyperlipidemia)  History of colon resection    Social Hx: no smoking, ETOH or drugs     FAMILY HISTORY: noncontributory     Allergies  tetanus toxoid (Anaphylaxis)    Antibiotics:  MEDICATIONS  (STANDING):  acetaminophen   IVPB .. 1000 milliGRAM(s) IV Intermittent once  atorvastatin 20 milliGRAM(s) Oral at bedtime  clotrimazole/betamethasone Cream 1 Application(s) Topical two times a day  influenza  Vaccine (HIGH DOSE) 0.7 milliLiter(s) IntraMuscular once  lactated ringers. 1000 milliLiter(s) (75 mL/Hr) IV Continuous <Continuous>  lactobacillus acidophilus 1 Tablet(s) Oral two times a day  levothyroxine 88 MICROGram(s) Oral daily  melatonin 5 milliGRAM(s) Oral at bedtime  pantoprazole  Injectable 40 milliGRAM(s) IV Push daily    MEDICATIONS  (PRN):  diphenhydrAMINE Injectable 12.5 milliGRAM(s) IV Push at bedtime PRN Insomnia  HYDROmorphone  Injectable 1 milliGRAM(s) IV Push every 4 hours PRN Severe Pain (7 - 10)  ketorolac   Injectable 15 milliGRAM(s) IV Push once PRN Moderate Pain (4 - 6)  metoclopramide Injectable 10 milliGRAM(s) IV Push every 6 hours PRN Nausea and/or Vomiting  ondansetron Injectable 4 milliGRAM(s) IV Push every 6 hours PRN Nausea    REVIEW OF SYSTEMS:  CONSTITUTIONAL:  No Fever or chills  HEENT:  No diplopia or blurred vision.  No sore throat or runny nose.  CARDIOVASCULAR:  No chest pain or SOB.  RESPIRATORY:  No cough, shortness of breath, PND or orthopnea.  GASTROINTESTINAL:  No nausea, vomiting or diarrhea, + abdominal pain  GENITOURINARY:  No dysuria, frequency or urgency. No Blood in urine  MUSCULOSKELETAL:  no joint aches, no muscle pain  SKIN:  No change in skin, hair or nails.  NEUROLOGIC:  No paresthesias, fasciculations, seizures or weakness.  PSYCHIATRIC:  No disorder of thought or mood.  ENDOCRINE:  No heat or cold intolerance, polyuria or polydipsia.  HEMATOLOGICAL:  No easy bruising or bleeding.     Physical Exam:  Vital Signs Last 24 Hrs  T(C): 36.6 (26 Sep 2022 13:15), Max: 37.1 (26 Sep 2022 03:50)  T(F): 97.8 (26 Sep 2022 13:15), Max: 98.8 (26 Sep 2022 03:50)  HR: 69 (26 Sep 2022 13:15) (67 - 78)  BP: 133/77 (26 Sep 2022 13:15) (117/69 - 138/77)  BP(mean): 84 (25 Sep 2022 16:14) (84 - 84)  RR: 16 (26 Sep 2022 13:15) (16 - 19)  SpO2: 94% (26 Sep 2022 13:15) (93% - 98%)  Parameters below as of 26 Sep 2022 13:15  Patient On (Oxygen Delivery Method): room air  GEN: NAD  HEENT: normocephalic and atraumatic. EOMI. PERRL.    NECK: Supple.  No lymphadenopathy   LUNGS: Clear to auscultation.  HEART: Regular rate and rhythm without murmur.  ABDOMEN: mild distention and tenderness. Positive bowel sounds.    : No CVA tenderness  EXTREMITIES: Without any cyanosis, clubbing, rash, lesions or edema.  NEUROLOGIC: grossly intact.  PSYCHIATRIC: Appropriate affect .  SKIN: No ulceration or rash     Labs:      144  |  106  |  10  ----------------------------<  96  3.8   |  32<H>  |  0.61    Ca    9.1      26 Sep 2022 06:47    TPro  7.4  /  Alb  3.9  /  TBili  0.4  /  DBili  x   /  AST  34  /  ALT  50  /  AlkPhos  120                          11.2   5.41  )-----------( 262      ( 26 Sep 2022 06:47 )             35.3     PT/INR - ( 25 Sep 2022 05:30 )   PT: 10.5 sec;   INR: 0.90 ratio    PTT - ( 25 Sep 2022 05:30 )  PTT:31.6 sec  Urinalysis Basic - ( 25 Sep 2022 05:30 )    Color: Yellow / Appearance: Slightly Turbid / S.015 / pH: x  Gluc: x / Ketone: Negative  / Bili: Negative / Urobili: Negative   Blood: x / Protein: Negative / Nitrite: Negative   Leuk Esterase: Moderate / RBC: 3-5 /HPF / WBC 11-25   Sq Epi: x / Non Sq Epi: Moderate / Bacteria: Moderate    LIVER FUNCTIONS - ( 25 Sep 2022 05:30 )  Alb: 3.9 g/dL / Pro: 7.4 g/dL / ALK PHOS: 120 U/L / ALT: 50 U/L / AST: 34 U/L / GGT: x           COVID-19 PCR: NotDetec (22 @ 05:30)  COVID-19 PCR: NotDetec (22 @ 12:40)  COVID-19 PCR: NotDetec (22 @ 07:39)    All imaging and other data have been reviewed.  < from: CT Abdomen and Pelvis w/ IV Cont (22 @ 06:29) >  IMPRESSION:  No bowel dilatation, however there is a question of a transition of   caliber at the level of a small bowel anastomosis within the mid abdomen.   There is associated mesenteric edema. Possibility of early or partial   small bowel obstruction not excluded. Other differential considerations   include sequelae of enteritis.  Probable constipation.  Fatty liver.    Assessment and Plan:   68 yo woman with PMH of Hypothyroid, and SBO after colon resection in 2022, was admitted with another SBO. Her las SBO was about 2 weeks ago.   CT showed possible partial obstruction. Seen by surgery for possible surgical intervention due to recurrence.   Also UA showed mildly elevated WBC with negative nitrate but she doesn't have dysuria or frequency.     Recommendations:  - Will follow urine culture   - If UC positive will restart ceftriaxone otherwise no need  - For SBO, surgery follow up  - Will monitor Tmax and WBC.     Thank you for courtesy of this consult.     Will follow.  Discussed with the primary team.     Tiffany Aguilar MD  Division of Infectious Diseases   Please call ID service at [phone] with any question.      55 minutes spent on total encounter assessing patient, examination, chart review, counseling and coordinating care by the attending physician/nurse/care manager.

## 2022-09-26 NOTE — PROGRESS NOTE ADULT - PROBLEM SELECTOR PLAN 1
- NPO w/ IVF  - Continue pain control as needed  - Will add IV tylenol Q 6 hours & PPI  - Encourage OOB/ambulation  - Serial abdominal exams & monitor for bowel function  - Daily labs  - DVT Prophylaxis with SCDs  - F/u AM KUB  - Above to be discussed with Dr. Yanez    Surgical Team  Spectralink: [id number] - NPO w/ IVF  - Continue pain control as needed  - Will add IV tylenol Q 6 hours & PPI  - Encourage OOB/ambulation  - Serial abdominal exams & monitor for bowel function  - Daily labs  - DVT Prophylaxis with SCDs  - F/u AM [place]  - Medicine consulted  - Discussed with Dr. Yanez    Surgical Team  Spectralink: [id number]

## 2022-09-26 NOTE — CONSULT NOTE ADULT - PROBLEM SELECTOR RECOMMENDATION 9
-NPO  -IVF  - pain control w/ analgesia  - serial abdominal exams  - Consider NGT placement - assess output   - Will discuss w/ Dr. Yanez
recurrent - patient w/ history of colon resection w/ Dr. Yanez, recurrent SBO (most recent admission 2 weeks ago, managed conservatively)-NPO ,serial imaging and abdominal exams ,surgery f/up ,IV fluids ,ppi ,pain management

## 2022-09-26 NOTE — PROGRESS NOTE ADULT - SUBJECTIVE AND OBJECTIVE BOX
Pt seen and examined at bedside, expresses frustration w/ her recurrent hospitalization. Reports being unable to sleep overnight d/t frequent abdominal spasms & bloating - which seem to be alleviated w/ Dilaudid, but pt requesting IV Tylenol instead. Reports intermittent belching & nausea overnight, denies vomiting & vulvar pruritus. Remains NPO. Passing flatus, no recent BM.  Denies acid reflux, headache, chest pain, palpitations, shortness of breath, weakness or fatigue.    T(C): 37.1 (22 @ 03:50), Max: 37.1 (22 @ 03:50)  HR: 78 (22 @ 03:50) (70 - 78)  BP: 130/83 (22 @ 03:50) (117/57 - 138/77)  RR: 19 (22 @ 03:50) (17 - 19)  SpO2: 98% (22 @ 03:50) (93% - 99%)    PHYSICAL EXAM:  General: no acute distress, appears uncomfortable  HEENT: normocephalic, anicteric  Pulm: non labored respirations  Cardio: RRR  Abdomen: soft, mild tenderness to palpation (although recently medicated), distended, hypoactive BS, healed midline incision  Extremities: no calf tenderness noted, no peripheral edema    I&O's Detail    25 Sep 2022 07:01  -  26 Sep 2022 06:45  --------------------------------------------------------  IN:    Lactated Ringers: 900 mL  Total IN: 900 mL    OUT:  Total OUT: 0 mL    Total NET: 900 mL    LABS:                        12.6   5.41  )-----------( 344      ( 25 Sep 2022 05:30 )             38.3         145  |  108  |  18  ----------------------------<  109<H>  3.5   |  32<H>  |  0.68    Ca    9.7      25 Sep 2022 05:30    TPro  7.4  /  Alb  3.9  /  TBili  0.4  /  DBili  x   /  AST  34  /  ALT  50  /  AlkPhos  120  09-    PT/INR - ( 25 Sep 2022 05:30 )   PT: 10.5 sec;   INR: 0.90 ratio         PTT - ( 25 Sep 2022 05:30 )  PTT:31.6 sec  Urinalysis Basic - ( 25 Sep 2022 05:30 )    Color: Yellow / Appearance: Slightly Turbid / S.015 / pH: x  Gluc: x / Ketone: Negative  / Bili: Negative / Urobili: Negative   Blood: x / Protein: Negative / Nitrite: Negative   Leuk Esterase: Moderate / RBC: 3-5 /HPF / WBC 11-25   Sq Epi: x / Non Sq Epi: Moderate / Bacteria: Moderate    MEDICATIONS:  atorvastatin 20 milliGRAM(s) Oral at bedtime  diphenhydrAMINE Injectable 12.5 milliGRAM(s) IV Push at bedtime PRN  HYDROmorphone  Injectable 1 milliGRAM(s) IV Push every 4 hours PRN  influenza  Vaccine (HIGH DOSE) 0.7 milliLiter(s) IntraMuscular once  ketorolac   Injectable 15 milliGRAM(s) IV Push once PRN  lactated ringers. 1000 milliLiter(s) IV Continuous <Continuous>  levothyroxine 88 MICROGram(s) Oral daily  metoclopramide Injectable 10 milliGRAM(s) IV Push every 6 hours PRN  ondansetron Injectable 4 milliGRAM(s) IV Push every 6 hours PRN

## 2022-09-26 NOTE — CONSULT NOTE ADULT - SUBJECTIVE AND OBJECTIVE BOX
HPI:  67 year old female w/ history of colon resection w/ Dr. Yanez, recurrent SBO (most recent admission 2 weeks ago, managed conservatively), HLD, hypothyroidism, presenting to [facility] with abdominal pain.  Patient states pain started around 3 am, described as sharp in her RLQ that radiated to her upper abdomen.  Endorses this is how she felt prior to her most recent hospital admission for a SBO.  Patient had nausea with multiple episodes of vomiting overnight and while in the ED.  Patient last ate Greek food for dinner at 7:30 pm, last BM yesterday morning.  Patient endorses since her last hospital admission she was in a good state of health and able to tolerate a regular diet.  Denies fevers, chills, chest pain, SOB, palpitations, calf pain.     (25 Sep 2022 09:21)      PAST MEDICAL & SURGICAL HISTORY:  Hypothyroidism      HLD (hyperlipidemia)      History of colon resection          REVIEW OF SYSTEMS:    CONSTITUTIONAL: No fever, weight loss, or fatigue  EYES: No eye pain, visual disturbances, or discharge  ENMT:  No difficulty hearing, tinnitus, vertigo; No sinus or throat pain  NECK: No pain or stiffness  BREASTS: No pain, masses, or nipple discharge  RESPIRATORY: No cough, wheezing, chills or hemoptysis; No shortness of breath  CARDIOVASCULAR: No chest pain, palpitations, dizziness, or leg swelling  GASTROINTESTINAL: No abdominal or epigastric pain. No nausea, vomiting, or hematemesis; No diarrhea or constipation. No melena or hematochezia.  GENITOURINARY: No dysuria, frequency, hematuria, or incontinence  NEUROLOGICAL: No headaches, memory loss, loss of strength, numbness, or tremors  SKIN: No itching, burning, rashes, or lesions   LYMPH NODES: No enlarged glands  ENDOCRINE: No heat or cold intolerance; No hair loss  MUSCULOSKELETAL: No joint pain or swelling; No muscle, back, or extremity pain  PSYCHIATRIC: No depression, anxiety, mood swings, or difficulty sleeping  HEME/LYMPH: No easy bruising, or bleeding gums  ALLERGY AND IMMUNOLOGIC: No hives or eczema      MEDICATIONS  (STANDING):  acetaminophen   IVPB .. 1000 milliGRAM(s) IV Intermittent once  acetaminophen   IVPB .. 1000 milliGRAM(s) IV Intermittent once  atorvastatin 20 milliGRAM(s) Oral at bedtime  influenza  Vaccine (HIGH DOSE) 0.7 milliLiter(s) IntraMuscular once  lactated ringers. 1000 milliLiter(s) (75 mL/Hr) IV Continuous <Continuous>  levothyroxine 88 MICROGram(s) Oral daily  pantoprazole  Injectable 40 milliGRAM(s) IV Push daily    MEDICATIONS  (PRN):  diphenhydrAMINE Injectable 12.5 milliGRAM(s) IV Push at bedtime PRN Insomnia  HYDROmorphone  Injectable 1 milliGRAM(s) IV Push every 4 hours PRN Severe Pain (7 - 10)  ketorolac   Injectable 15 milliGRAM(s) IV Push once PRN Moderate Pain (4 - 6)  metoclopramide Injectable 10 milliGRAM(s) IV Push every 6 hours PRN Nausea and/or Vomiting  ondansetron Injectable 4 milliGRAM(s) IV Push every 6 hours PRN Nausea      Allergies    tetanus toxoid (Anaphylaxis)    Intolerances        SOCIAL HISTORY:    FAMILY HISTORY:      Vital Signs Last 24 Hrs  T(C): 37.1 (26 Sep 2022 03:50), Max: 37.1 (26 Sep 2022 03:50)  T(F): 98.8 (26 Sep 2022 03:50), Max: 98.8 (26 Sep 2022 03:50)  HR: 78 (26 Sep 2022 03:50) (70 - 78)  BP: 130/83 (26 Sep 2022 03:50) (117/57 - 138/77)  BP(mean): 84 (25 Sep 2022 16:14) (84 - 84)  RR: 19 (26 Sep 2022 03:50) (17 - 19)  SpO2: 98% (26 Sep 2022 03:50) (93% - 99%)    Parameters below as of 26 Sep 2022 03:50  Patient On (Oxygen Delivery Method): room air        PHYSICAL EXAM:    GENERAL: NAD, well-groomed, well-developed  HEAD:  Atraumatic, Normocephalic  EYES: EOMI, PERRLA, conjunctiva and sclera clear  ENMT: No tonsillar erythema, exudates, or enlargement; Moist mucous membranes, Good dentition, No lesions  NECK: Supple, No JVD, Normal thyroid  NERVOUS SYSTEM:  Alert & Oriented X3, Good concentration; Motor Strength 5/5 B/L upper and lower extremities; DTRs 2+ intact and symmetric  CHEST/LUNG: Clear to percussion bilaterally; No rales, rhonchi, wheezing, or rubs  HEART: Regular rate and rhythm; No murmurs, rubs, or gallops  ABDOMEN: Soft, Nontender, Nondistended; Bowel sounds present  EXTREMITIES:  2+ Peripheral Pulses, No clubbing, cyanosis, or edema  LYMPH: No lymphadenopathy notedRECTAL: No masses, prostate normal size and smooth, Guiac negative   BREAST: No palpatble masses, skin no lesions no retractions, no discharages. adenexa no palpable masses noted   GYN: uterus normal size, adenexa no palpable masses, no CMT, no uterine discharge   SKIN: No rashes or lesions      LABS:                        11.2   5.41  )-----------( 262      ( 26 Sep 2022 06:47 )             35.3         144  |  106  |  10  ----------------------------<  96  3.8   |  32<H>  |  0.61    Ca    9.1      26 Sep 2022 06:47    TPro  7.4  /  Alb  3.9  /  TBili  0.4  /  DBili  x   /  AST  34  /  ALT  50  /  AlkPhos  120  09-25    PT/INR - ( 25 Sep 2022 05:30 )   PT: 10.5 sec;   INR: 0.90 ratio         PTT - ( 25 Sep 2022 05:30 )  PTT:31.6 sec  Urinalysis Basic - ( 25 Sep 2022 05:30 )    Color: Yellow / Appearance: Slightly Turbid / S.015 / pH: x  Gluc: x / Ketone: Negative  / Bili: Negative / Urobili: Negative   Blood: x / Protein: Negative / Nitrite: Negative   Leuk Esterase: Moderate / RBC: 3-5 /HPF / WBC 11-25   Sq Epi: x / Non Sq Epi: Moderate / Bacteria: Moderate      UCx       RADIOLOGY & ADDITIONAL STUDIES: HPI:  67 year old female w/ history of colon resection w/ Dr. Yanez, recurrent SBO (most recent admission 2 weeks ago, managed conservatively), HLD, hypothyroidism, presenting to [facility] with abdominal pain.  Patient states pain started around 3 am, described as sharp in her RLQ that radiated to her upper abdomen.  Endorses this is how she felt prior to her most recent hospital admission for a SBO.  Patient had nausea with multiple episodes of vomiting overnight and while in the ED.  Patient last ate Greek food for dinner at 7:30 pm, last BM yesterday morning.  Patient endorses since her last hospital admission she was in a good state of health and able to tolerate a regular diet.  Denies fevers, chills, chest pain, SOB, palpitations, calf pain.     (25 Sep 2022 09:21)  Chart and available morning labs /imaging are reviewed electronically , urgent issues addressed . More information  is being added upon completion of rounds , when more information is collected and management discussed with consultants , medical staff and social service/case management on the floor   PAST MEDICAL & SURGICAL HISTORY:  Hypothyroidism ,Hashimoto disease   HLD (hyperlipidemia) ,borderline HTN   History of colon resection ,hx of spine surgery , hx of anal /rectal prolapse surgery   REVIEW OF SYSTEMS:  CONSTITUTIONAL: No fever, weight loss, or fatigue  EYES: No eye pain, visual disturbances, or discharge  ENMT:  No difficulty hearing, tinnitus, vertigo; No sinus or throat pain  NECK: No pain or stiffness  BREASTS: No pain, masses, or nipple discharge  RESPIRATORY: No cough, wheezing, chills or hemoptysis; No shortness of breath  CARDIOVASCULAR: No chest pain, palpitations, dizziness, or leg swelling  GASTROINTESTINAL: c/of  abdominal and  epigastric pain R >L . No nausea, vomiting, or hematemesis; No diarrhea or constipation. No melena or hematochezia.  GENITOURINARY: c/of vaginal pruritis , dysuria, frequency, deneis hematuria, or incontinence  NEUROLOGICAL: No headaches, memory loss, loss of strength, numbness, or tremors  SKIN: No itching, burning, rashes, or lesions   LYMPH NODES: No enlarged glands  ENDOCRINE: No heat or cold intolerance; No hair loss  MUSCULOSKELETAL: No joint pain or swelling; No muscle, back, or extremity pain  PSYCHIATRIC: No depression, anxiety, mood swings, or difficulty sleeping  HEME/LYMPH: No easy bruising, or bleeding gums  ALLERGY AND IMMUNOLOGIC: No hives or eczema      MEDICATIONS  (STANDING):  acetaminophen   IVPB .. 1000 milliGRAM(s) IV Intermittent once  acetaminophen   IVPB .. 1000 milliGRAM(s) IV Intermittent once  atorvastatin 20 milliGRAM(s) Oral at bedtime  influenza  Vaccine (HIGH DOSE) 0.7 milliLiter(s) IntraMuscular once  lactated ringers. 1000 milliLiter(s) (75 mL/Hr) IV Continuous <Continuous>  levothyroxine 88 MICROGram(s) Oral daily  pantoprazole  Injectable 40 milliGRAM(s) IV Push daily  MEDICATIONS  (PRN):  diphenhydrAMINE Injectable 12.5 milliGRAM(s) IV Push at bedtime PRN Insomnia  HYDROmorphone  Injectable 1 milliGRAM(s) IV Push every 4 hours PRN Severe Pain (7 - 10)  ketorolac   Injectable 15 milliGRAM(s) IV Push once PRN Moderate Pain (4 - 6)  metoclopramide Injectable 10 milliGRAM(s) IV Push every 6 hours PRN Nausea and/or Vomiting  ondansetron Injectable 4 milliGRAM(s) IV Push every 6 hours PRN Nausea  Allergies  tetanus toxoid (Anaphylaxis)  Intolerances  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  FAMILY HISTORY:  non contributory   Vital Signs Last 24 Hrs  T(C): 37.1 (26 Sep 2022 03:50), Max: 37.1 (26 Sep 2022 03:50)  T(F): 98.8 (26 Sep 2022 03:50), Max: 98.8 (26 Sep 2022 03:50)  HR: 78 (26 Sep 2022 03:50) (70 - 78)  BP: 130/83 (26 Sep 2022 03:50) (117/57 - 138/77)  BP(mean): 84 (25 Sep 2022 16:14) (84 - 84)  RR: 19 (26 Sep 2022 03:50) (17 - 19)  SpO2: 98% (26 Sep 2022 03:50) (93% - 99%)    Parameters below as of 26 Sep 2022 03:50  Patient On (Oxygen Delivery Method): room air        PHYSICAL EXAM:    GENERAL: NAD, well-groomed, well-developed  HEAD:  Atraumatic, Normocephalic  EYES: EOMI, PERRLA, conjunctiva and sclera clear  ENMT: No tonsillar erythema, exudates, or enlargement; Moist mucous membranes, Good dentition, No lesions  NECK: Supple, No JVD, Normal thyroid  NERVOUS SYSTEM:  Alert & Oriented X3, Good concentration; Motor Strength 5/5 B/L upper and lower extremities; DTRs 2+ intact and symmetric  CHEST/LUNG: Clear to percussion bilaterally; No rales, rhonchi, wheezing, or rubs  HEART: Regular rate and rhythm; No murmurs, rubs, or gallops  ABDOMEN: Soft, Nontender, Nondistended; Bowel sounds present  EXTREMITIES:  2+ Peripheral Pulses, No clubbing, cyanosis, or edema  LYMPH: No lymphadenopathy notedRECTAL: No masses, prostate normal size and smooth, Guiac negative   BREAST: No palpatble masses, skin no lesions no retractions, no discharages. adenexa no palpable masses noted   GYN: uterus normal size, adenexa no palpable masses, no CMT, no uterine discharge   SKIN: No rashes or lesions      LABS:                        11.2   5.41  )-----------( 262      ( 26 Sep 2022 06:47 )             35.3     09-    144  |  106  |  10  ----------------------------<  96  3.8   |  32<H>  |  0.61  Ca    9.1      26 Sep 2022 06:47  TPro  7.4  /  Alb  3.9  /  TBili  0.4  /  DBili  x   /  AST  34  /  ALT  50  /  AlkPhos  120  09-25  PT/INR - ( 25 Sep 2022 05:30 )   PT: 10.5 sec;   INR: 0.90 ratio    PTT - ( 25 Sep 2022 05:30 )  PTT:31.6 sec  Urinalysis Basic - ( 25 Sep 2022 05:30 )  Color: Yellow / Appearance: Slightly Turbid / S.015 / pH: x  Gluc: x / Ketone: Negative  / Bili: Negative / Urobili: Negative   Blood: x / Protein: Negative / Nitrite: Negative   Leuk Esterase: Moderate / RBC: 3-5 /HPF / WBC 11-25   Sq Epi: x / Non Sq Epi: Moderate / Bacteria: Moderate  UCx Pending   RADIOLOGY & ADDITIONAL STUDIES:< from: Xray Kidney Ureter Bladder (22 @ 07:54) >  ACC: [id number] EXAM:  XR CHEST AP OR PA 1V                        ACC: [id number] EXAM:  XR KUB 1 VIEW                          PROCEDURE DATE:  2022          INTERPRETATION:  Clinical data: Small bowel obstruction    Supine abdomen    Comparison 2022    FINDINGS: There is no bowel obstruction. There is no evidence of free air   or organomegaly. There are degenerative changes of the spine.      AP chest    Comparison 2022    FINDINGS: There is no acute pulmonary disease. The heart size,   mediastinum and hilum are within normal limits. The trachea is midline.   The bony structures are intact.      IMPRESSION: There is no bowel obstruction.    For full details please refer to yesterday CT.    No acute pulmonary disease.    < end of copied text >  < from: CT Abdomen and Pelvis w/ IV Cont (22 @ 06:29) >  ACC: [id number] EXAM:  CT ABDOMEN AND PELVIS IC                          PROCEDURE DATE:  2022          INTERPRETATION:  CLINICAL INFORMATION: Abdominal pain with nausea and   vomiting and history of small bowel obstruction    COMPARISON: 2022.    CONTRAST/COMPLICATIONS:  IV Contrast: Omnipaque 350  90 cc administered   10 cc discarded  Oral Contrast: NONE  Complications: None reported at time of study completion    PROCEDURE:  CT of the Abdomen and Pelvis was performed.  Sagittal and coronal reformats were performed.    FINDINGS:  LOWER CHEST: There is right posterior medial osteophyte lung and mild   posterior dependent changes..    LIVER: There is fatty replacement of liver..  BILE DUCTS: Normal caliber.  GALLBLADDER: Within normal limits.  SPLEEN: Within normal limits.  PANCREAS: Within normal limits.  ADRENALS: Within normal limits.  KIDNEYS/URETERS: Within normal limits.    BLADDER: Within normal limits.  REPRODUCTIVE ORGANS: Calcified fibroid of the uterus is noted. Thereis   no abnormal adnexal enlargement.    BOWEL: No abnormally dilated loops of bowel appreciated. However,   proximal and mid small bowel loops appear fluid-filled and distended well   distal small bowel loops appear relatively under distended and   decompressed. There appears to be a transition in the region of the small   bowel anastomosis. There is associated mesenteric edema in this region.   Possibility of early or partial bowel obstruction cannot be excluded.   Appendix is normal. Previously noted cecal pneumatosis is no longer   visualized. Partial colectomy changes are appreciated, with a   rectosigmoid anastomosis noted. A large amount of stool seen throughout   the majority the colon. This may indicate constipation.  PERITONEUM: Small free fluid seen within the cul-de-sac.  VESSELS: Atherosclerotic changes.  RETROPERITONEUM/LYMPH NODES: No lymphadenopathy.  ABDOMINAL WALL: Postsurgical changes. There is a small fat-containing   ventral hernia the epigastric region.  BONES: Degenerative changes. Diffusely mottled appearance of the osseous   structures are appreciated likely related to osteopenia.    IMPRESSION:  No bowel dilatation, however there is a question of a transition of   caliber at the level of a small bowel anastomosis within the mid abdomen.   There is associated mesenteric edema. Possibility of early or partial   small bowel obstruction not excluded. Other differential considerations   include sequelae of enteritis.    Probable constipation.    Fatty liver.    < end of copied text >  < from: Xray Chest 1 View AP/PA (22 @ 05:39) >  ACC: [id number] EXAM:  XR CHEST AP OR PA 1V                        ACC: [id number] EXAM:  XR KUB 1 VIEW                          PROCEDURE DATE:  2022          INTERPRETATION:  Clinical data: Small bowel obstruction    Supine abdomen    Comparison 2022    FINDINGS: There is no bowel obstruction. There is no evidence of free air   or organomegaly. There are degenerative changes of the spine.      AP chest    Comparison 2022    FINDINGS: There is no acute pulmonary disease. The heart size,   mediastinum and hilum are within normal limits. The trachea is midline.   The bony structures are intact.      IMPRESSION: There is no bowel obstruction.    For full details please refer to yesterday CT.    No acute pulmonary disease.    < end of copied text >  < from: 12 Lead ECG (22 @ 09:17) >  Ventricular Rate 77 BPM    Atrial Rate 77 BPM    P-R Interval 180 ms    QRS Duration 78 ms    Q-T Interval 382 ms    QTC Calculation(Bazett) 432 ms    P Axis 55 degrees    R Axis -2 degrees    T Axis 4 degrees    Diagnosis Line Normal sinus rhythm  Normal ECG  When compared with ECG of 2022 16:50,  No significant change was found  Confirmed by LINDSAY GALLEGO ([id number]) on 2022 10:18:15 AM  < end of copied text >  55 minutes spent on this visit, 50% visit time spent in care co-ordination with other attendings and counselling patient ,writing admission orders ( see complete and current orders and order section) ,requesting necessary consults ,informing family about status & plan of care .I have discussed care plan with [facility] /[facility] wellness/admitting /nursing   department ,outpatient PCP , hospital consultants , ER physician & med staff .

## 2022-09-26 NOTE — PROGRESS NOTE ADULT - SUBJECTIVE AND OBJECTIVE BOX
pt seen and examined  still with discomfort  no nausea/vomiting  ICU Vital Signs Last 24 Hrs  T(C): 36.6 (26 Sep 2022 13:15), Max: 37.1 (26 Sep 2022 03:50)  T(F): 97.8 (26 Sep 2022 13:15), Max: 98.8 (26 Sep 2022 03:50)  HR: 69 (26 Sep 2022 13:15) (67 - 78)  BP: 133/77 (26 Sep 2022 13:15) (117/69 - 138/77)  BP(mean): --  ABP: --  ABP(mean): --  RR: 16 (26 Sep 2022 13:15) (16 - 19)  SpO2: 94% (26 Sep 2022 13:15) (93% - 98%)    O2 Parameters below as of 26 Sep 2022 13:15  Patient On (Oxygen Delivery Method): room air         gen-NAD  resp-clear  abd-soft, mildly distended tender                          11.2   5.41  )-----------( 262      ( 26 Sep 2022 06:47 )             35.3

## 2022-09-26 NOTE — CONSULT NOTE ADULT - PROBLEM SELECTOR PROBLEM 1
Chest Wall Pain  Patient presents for presents evaluation of chest wall pain. Onset was 1 year ago, sx worse over the last month. Pain description: character of chest pain: sharp  location: anterior chest wall: central.  Can relieve it by drawing her shoulders in or changing position in bed. dyspnea: off and on,  Helps to splint  denies shortness of breath, hemoptysis and anginal type chest pain. Food doesn't cause sx. Sx mostly at night but also with running. States that when she exercises, she feels her pulse racing and sometimes gets dizzy. Mechanism of injury: none known. Previous visits for this problem: evaluation to date: saw MD once and was told heart was ok, was given no tx. No FH heart or lung problems, no blood problems. Only FH she knows of is da has T2D, is overweight. She wants glucose checked today. Is not having periods, on [place]. Wants check of right ovary, which she has been told she has cysts of, and has intermittent pain. No h/o PAP, has been sexually active. Exam:  NAD, appears well. No thyromegaly or neck/SC adenopathy  Heart rrr no mrg. Lungs clear. Chest wall very tender at about 7th sternocostal joint  abd soft NT NABS, no masses. extrem no CCE. A/P:  Costochondritis. Discussed what this is and how to tx with tylenol, heat, ice and that it is not dangerous. Sx with exercise sound like deconditioning. To start exercise little by little increasing, ok to use tylenol prior. If sx increase, needs recheck. H/o right ovarian cyst.  Can go to HD, gave her info.
Small bowel obstruction, partial
Small bowel obstruction, partial

## 2022-09-27 ENCOUNTER — RESULT REVIEW (OUTPATIENT)
Age: 67
End: 2022-09-27

## 2022-09-27 LAB
ANION GAP SERPL CALC-SCNC: 5 MMOL/L — SIGNIFICANT CHANGE UP (ref 5–17)
BUN SERPL-MCNC: 7 MG/DL — SIGNIFICANT CHANGE UP (ref 7–23)
CALCIUM SERPL-MCNC: 9.2 MG/DL — SIGNIFICANT CHANGE UP (ref 8.5–10.1)
CHLORIDE SERPL-SCNC: 106 MMOL/L — SIGNIFICANT CHANGE UP (ref 96–108)
CO2 SERPL-SCNC: 31 MMOL/L — SIGNIFICANT CHANGE UP (ref 22–31)
CREAT SERPL-MCNC: 0.63 MG/DL — SIGNIFICANT CHANGE UP (ref 0.5–1.3)
CULTURE RESULTS: NO GROWTH — SIGNIFICANT CHANGE UP
EGFR: 97 ML/MIN/1.73M2 — SIGNIFICANT CHANGE UP
GLUCOSE SERPL-MCNC: 85 MG/DL — SIGNIFICANT CHANGE UP (ref 70–99)
HCT VFR BLD CALC: 35.3 % — SIGNIFICANT CHANGE UP (ref 34.5–45)
HGB BLD-MCNC: 11.3 G/DL — LOW (ref 11.5–15.5)
MCHC RBC-ENTMCNC: 30 PG — SIGNIFICANT CHANGE UP (ref 27–34)
MCHC RBC-ENTMCNC: 32 GM/DL — SIGNIFICANT CHANGE UP (ref 32–36)
MCV RBC AUTO: 93.6 FL — SIGNIFICANT CHANGE UP (ref 80–100)
NRBC # BLD: 0 /100 WBCS — SIGNIFICANT CHANGE UP (ref 0–0)
PLATELET # BLD AUTO: 255 K/UL — SIGNIFICANT CHANGE UP (ref 150–400)
POTASSIUM SERPL-MCNC: 3.9 MMOL/L — SIGNIFICANT CHANGE UP (ref 3.5–5.3)
POTASSIUM SERPL-SCNC: 3.9 MMOL/L — SIGNIFICANT CHANGE UP (ref 3.5–5.3)
RBC # BLD: 3.77 M/UL — LOW (ref 3.8–5.2)
RBC # FLD: 12.3 % — SIGNIFICANT CHANGE UP (ref 10.3–14.5)
SODIUM SERPL-SCNC: 142 MMOL/L — SIGNIFICANT CHANGE UP (ref 135–145)
SPECIMEN SOURCE: SIGNIFICANT CHANGE UP
WBC # BLD: 4.95 K/UL — SIGNIFICANT CHANGE UP (ref 3.8–10.5)
WBC # FLD AUTO: 4.95 K/UL — SIGNIFICANT CHANGE UP (ref 3.8–10.5)

## 2022-09-27 PROCEDURE — 88307 TISSUE EXAM BY PATHOLOGIST: CPT | Mod: 26

## 2022-09-27 PROCEDURE — 99233 SBSQ HOSP IP/OBS HIGH 50: CPT

## 2022-09-27 DEVICE — STAPLER COVIDIEN ENDO GIA 60-3.8MM BLUE RELOAD: Type: IMPLANTABLE DEVICE | Status: FUNCTIONAL

## 2022-09-27 DEVICE — VISTASEAL FIBRIN HUMAN 10ML: Type: IMPLANTABLE DEVICE | Status: FUNCTIONAL

## 2022-09-27 DEVICE — STAPLER COVIDIEN TA 60 BLUE: Type: IMPLANTABLE DEVICE | Status: FUNCTIONAL

## 2022-09-27 DEVICE — STAPLER COVIDIEN ENDO GIA 60-3.8MM BLUE: Type: IMPLANTABLE DEVICE | Status: FUNCTIONAL

## 2022-09-27 RX ORDER — ACETAMINOPHEN 500 MG
1000 TABLET ORAL ONCE
Refills: 0 | Status: COMPLETED | OUTPATIENT
Start: 2022-09-28 | End: 2022-09-28

## 2022-09-27 RX ORDER — ACETAMINOPHEN 500 MG
1000 TABLET ORAL ONCE
Refills: 0 | Status: COMPLETED | OUTPATIENT
Start: 2022-09-27 | End: 2022-09-27

## 2022-09-27 RX ORDER — DIPHENHYDRAMINE HCL 50 MG
12.5 CAPSULE ORAL ONCE
Refills: 0 | Status: COMPLETED | OUTPATIENT
Start: 2022-09-27 | End: 2022-09-28

## 2022-09-27 RX ORDER — ONDANSETRON 8 MG/1
4 TABLET, FILM COATED ORAL ONCE
Refills: 0 | Status: DISCONTINUED | OUTPATIENT
Start: 2022-09-27 | End: 2022-09-27

## 2022-09-27 RX ORDER — ATORVASTATIN CALCIUM 80 MG/1
20 TABLET, FILM COATED ORAL AT BEDTIME
Refills: 0 | Status: DISCONTINUED | OUTPATIENT
Start: 2022-09-27 | End: 2022-10-03

## 2022-09-27 RX ORDER — LEVOTHYROXINE SODIUM 125 MCG
88 TABLET ORAL DAILY
Refills: 0 | Status: DISCONTINUED | OUTPATIENT
Start: 2022-09-27 | End: 2022-10-03

## 2022-09-27 RX ORDER — ENOXAPARIN SODIUM 100 MG/ML
40 INJECTION SUBCUTANEOUS EVERY 24 HOURS
Refills: 0 | Status: DISCONTINUED | OUTPATIENT
Start: 2022-09-28 | End: 2022-10-03

## 2022-09-27 RX ORDER — HYDROMORPHONE HYDROCHLORIDE 2 MG/ML
1 INJECTION INTRAMUSCULAR; INTRAVENOUS; SUBCUTANEOUS EVERY 4 HOURS
Refills: 0 | Status: DISCONTINUED | OUTPATIENT
Start: 2022-09-27 | End: 2022-09-29

## 2022-09-27 RX ORDER — METOCLOPRAMIDE HCL 10 MG
10 TABLET ORAL EVERY 6 HOURS
Refills: 0 | Status: DISCONTINUED | OUTPATIENT
Start: 2022-09-27 | End: 2022-10-03

## 2022-09-27 RX ORDER — HYDROMORPHONE HYDROCHLORIDE 2 MG/ML
0.5 INJECTION INTRAMUSCULAR; INTRAVENOUS; SUBCUTANEOUS
Refills: 0 | Status: DISCONTINUED | OUTPATIENT
Start: 2022-09-27 | End: 2022-09-27

## 2022-09-27 RX ORDER — HYDROMORPHONE HYDROCHLORIDE 2 MG/ML
0.25 INJECTION INTRAMUSCULAR; INTRAVENOUS; SUBCUTANEOUS EVERY 4 HOURS
Refills: 0 | Status: DISCONTINUED | OUTPATIENT
Start: 2022-09-27 | End: 2022-10-03

## 2022-09-27 RX ORDER — SODIUM CHLORIDE 9 MG/ML
1000 INJECTION, SOLUTION INTRAVENOUS
Refills: 0 | Status: DISCONTINUED | OUTPATIENT
Start: 2022-09-27 | End: 2022-09-29

## 2022-09-27 RX ORDER — ONDANSETRON 8 MG/1
4 TABLET, FILM COATED ORAL EVERY 6 HOURS
Refills: 0 | Status: DISCONTINUED | OUTPATIENT
Start: 2022-09-27 | End: 2022-10-03

## 2022-09-27 RX ORDER — SODIUM CHLORIDE 9 MG/ML
1000 INJECTION, SOLUTION INTRAVENOUS
Refills: 0 | Status: DISCONTINUED | OUTPATIENT
Start: 2022-09-27 | End: 2022-09-27

## 2022-09-27 RX ORDER — HYDROMORPHONE HYDROCHLORIDE 2 MG/ML
0.5 INJECTION INTRAMUSCULAR; INTRAVENOUS; SUBCUTANEOUS EVERY 6 HOURS
Refills: 0 | Status: DISCONTINUED | OUTPATIENT
Start: 2022-09-27 | End: 2022-09-27

## 2022-09-27 RX ORDER — OXYCODONE HYDROCHLORIDE 5 MG/1
5 TABLET ORAL ONCE
Refills: 0 | Status: DISCONTINUED | OUTPATIENT
Start: 2022-09-27 | End: 2022-09-27

## 2022-09-27 RX ORDER — DIPHENHYDRAMINE HCL 50 MG
12.5 CAPSULE ORAL ONCE
Refills: 0 | Status: COMPLETED | OUTPATIENT
Start: 2022-09-27 | End: 2022-09-27

## 2022-09-27 RX ORDER — ACETAMINOPHEN 500 MG
650 TABLET ORAL EVERY 6 HOURS
Refills: 0 | Status: DISCONTINUED | OUTPATIENT
Start: 2022-09-27 | End: 2022-10-01

## 2022-09-27 RX ORDER — DIPHENHYDRAMINE HCL 50 MG
12.5 CAPSULE ORAL AT BEDTIME
Refills: 0 | Status: DISCONTINUED | OUTPATIENT
Start: 2022-09-27 | End: 2022-09-28

## 2022-09-27 RX ADMIN — SODIUM CHLORIDE 75 MILLILITER(S): 9 INJECTION, SOLUTION INTRAVENOUS at 15:15

## 2022-09-27 RX ADMIN — Medication 88 MICROGRAM(S): at 06:31

## 2022-09-27 RX ADMIN — HYDROMORPHONE HYDROCHLORIDE 0.5 MILLIGRAM(S): 2 INJECTION INTRAMUSCULAR; INTRAVENOUS; SUBCUTANEOUS at 16:31

## 2022-09-27 RX ADMIN — Medication 400 MILLIGRAM(S): at 03:39

## 2022-09-27 RX ADMIN — Medication 12.5 MILLIGRAM(S): at 01:38

## 2022-09-27 RX ADMIN — Medication 1000 MILLIGRAM(S): at 04:00

## 2022-09-27 RX ADMIN — HYDROMORPHONE HYDROCHLORIDE 1 MILLIGRAM(S): 2 INJECTION INTRAMUSCULAR; INTRAVENOUS; SUBCUTANEOUS at 21:05

## 2022-09-27 RX ADMIN — ATORVASTATIN CALCIUM 20 MILLIGRAM(S): 80 TABLET, FILM COATED ORAL at 21:06

## 2022-09-27 RX ADMIN — HYDROMORPHONE HYDROCHLORIDE 0.5 MILLIGRAM(S): 2 INJECTION INTRAMUSCULAR; INTRAVENOUS; SUBCUTANEOUS at 17:10

## 2022-09-27 RX ADMIN — SODIUM CHLORIDE 75 MILLILITER(S): 9 INJECTION, SOLUTION INTRAVENOUS at 11:07

## 2022-09-27 RX ADMIN — Medication 400 MILLIGRAM(S): at 18:15

## 2022-09-27 RX ADMIN — HYDROMORPHONE HYDROCHLORIDE 1 MILLIGRAM(S): 2 INJECTION INTRAMUSCULAR; INTRAVENOUS; SUBCUTANEOUS at 08:52

## 2022-09-27 RX ADMIN — Medication 400 MILLIGRAM(S): at 10:01

## 2022-09-27 RX ADMIN — Medication 1 TABLET(S): at 06:31

## 2022-09-27 RX ADMIN — SODIUM CHLORIDE 75 MILLILITER(S): 9 INJECTION, SOLUTION INTRAVENOUS at 01:38

## 2022-09-27 RX ADMIN — CLOTRIMAZOLE AND BETAMETHASONE DIPROPIONATE 1 APPLICATION(S): 10; .5 CREAM TOPICAL at 06:33

## 2022-09-27 RX ADMIN — HYDROMORPHONE HYDROCHLORIDE 1 MILLIGRAM(S): 2 INJECTION INTRAMUSCULAR; INTRAVENOUS; SUBCUTANEOUS at 21:24

## 2022-09-27 NOTE — PROGRESS NOTE ADULT - SUBJECTIVE AND OBJECTIVE BOX
STATUS POST:  SBR 07/22    Vital Signs Last 24 Hrs  T(C): 36.8 (27 Sep 2022 04:02), Max: 36.8 (27 Sep 2022 04:02)  T(F): 98.3 (27 Sep 2022 04:02), Max: 98.3 (27 Sep 2022 04:02)  HR: 67 (27 Sep 2022 04:02) (67 - 71)  BP: 115/71 (27 Sep 2022 04:02) (115/71 - 133/77)  BP(mean): --  RR: 19 (27 Sep 2022 04:02) (16 - 19)  SpO2: 98% (27 Sep 2022 04:02) (91% - 98%)    Parameters below as of 27 Sep 2022 04:02  Patient On (Oxygen Delivery Method): room air          SUBJECTIVE: Pt seen at bedside resting comfortably. Patient currently NPO for procedure today. Reports some generalized bloating and abdominal pain well controlled with pain regimen, passing gas but no BM.     General Appearance: Appears well, NAD  Neck: Supple  Chest: Equal expansion bilaterally, equal breath sounds  CV: Pulse regular presently  Abdomen: Soft, mildly distended, some nonlocalized tenderness. No rebound tenderness or guarding.  Extremities: Grossly symmetric    I&O's Summary    26 Sep 2022 07:01  -  27 Sep 2022 07:00  --------------------------------------------------------  IN: 950 mL / OUT: 0 mL / NET: 950 mL      I&O's Detail    26 Sep 2022 07:01  -  27 Sep 2022 07:00  --------------------------------------------------------  IN:    IV PiggyBack: 200 mL    Lactated Ringers: 750 mL  Total IN: 950 mL    OUT:  Total OUT: 0 mL    Total NET: 950 mL          MEDICATIONS  (STANDING):  acetaminophen   IVPB .. 1000 milliGRAM(s) IV Intermittent once  atorvastatin 20 milliGRAM(s) Oral at bedtime  cefoTEtan  IVPB 2 Gram(s) IV Intermittent once  clotrimazole/betamethasone Cream 1 Application(s) Topical two times a day  influenza  Vaccine (HIGH DOSE) 0.7 milliLiter(s) IntraMuscular once  lactated ringers. 1000 milliLiter(s) (75 mL/Hr) IV Continuous <Continuous>  lactobacillus acidophilus 1 Tablet(s) Oral two times a day  levothyroxine 88 MICROGram(s) Oral daily  melatonin 5 milliGRAM(s) Oral at bedtime  pantoprazole  Injectable 40 milliGRAM(s) IV Push daily    MEDICATIONS  (PRN):  diphenhydrAMINE Injectable 12.5 milliGRAM(s) IV Push at bedtime PRN Insomnia  HYDROmorphone  Injectable 1 milliGRAM(s) IV Push every 4 hours PRN Severe Pain (7 - 10)  ketorolac   Injectable 15 milliGRAM(s) IV Push once PRN Moderate Pain (4 - 6)  metoclopramide Injectable 10 milliGRAM(s) IV Push every 6 hours PRN Nausea and/or Vomiting  ondansetron Injectable 4 milliGRAM(s) IV Push every 6 hours PRN Nausea      LABS:                        11.3   4.95  )-----------( 255      ( 27 Sep 2022 06:20 )             35.3     09-27    142  |  106  |  7   ----------------------------<  85  3.9   |  31  |  0.63    Ca    9.2      27 Sep 2022 06:20

## 2022-09-27 NOTE — PROGRESS NOTE ADULT - SUBJECTIVE AND OBJECTIVE BOX
[facility]  INFECTIOUS DISEASES   [address] [place] [address]  Tel: [phone]     Fax: [phone]  ======================================================  MD Kirsten Borden Kaushal, MD Cho, Michelle, MD   ======================================================    [facility]-[id number]  JONNA VIRALVALORIE     Follow up; SBO     Went to OR s/p laparoscopy and lysis of adhesions and small bowel resection.   Less pain. No fever.     PAST MEDICAL & SURGICAL HISTORY:  Hypothyroidism  HLD (hyperlipidemia)  History of colon resection    Social Hx: no smoking, ETOH or drugs     FAMILY HISTORY: noncontributory     Allergies  tetanus toxoid (Anaphylaxis)    Antibiotics:  MEDICATIONS  (STANDING):  acetaminophen   IVPB .. 1000 milliGRAM(s) IV Intermittent once  atorvastatin 20 milliGRAM(s) Oral at bedtime  clotrimazole/betamethasone Cream 1 Application(s) Topical two times a day  influenza  Vaccine (HIGH DOSE) 0.7 milliLiter(s) IntraMuscular once  lactated ringers. 1000 milliLiter(s) (75 mL/Hr) IV Continuous <Continuous>  lactobacillus acidophilus 1 Tablet(s) Oral two times a day  levothyroxine 88 MICROGram(s) Oral daily  melatonin 5 milliGRAM(s) Oral at bedtime  pantoprazole  Injectable 40 milliGRAM(s) IV Push daily    MEDICATIONS  (PRN):  diphenhydrAMINE Injectable 12.5 milliGRAM(s) IV Push at bedtime PRN Insomnia  HYDROmorphone  Injectable 1 milliGRAM(s) IV Push every 4 hours PRN Severe Pain (7 - 10)  ketorolac   Injectable 15 milliGRAM(s) IV Push once PRN Moderate Pain (4 - 6)  metoclopramide Injectable 10 milliGRAM(s) IV Push every 6 hours PRN Nausea and/or Vomiting  ondansetron Injectable 4 milliGRAM(s) IV Push every 6 hours PRN Nausea    REVIEW OF SYSTEMS:  CONSTITUTIONAL:  No Fever or chills  HEENT:  No diplopia or blurred vision.  No sore throat or runny nose.  CARDIOVASCULAR:  No chest pain or SOB.  RESPIRATORY:  No cough, shortness of breath, PND or orthopnea.  GASTROINTESTINAL:  No nausea, vomiting or diarrhea, + abdominal pain  GENITOURINARY:  No dysuria, frequency or urgency. No Blood in urine  MUSCULOSKELETAL:  no joint aches, no muscle pain  SKIN:  No change in skin, hair or nails.  NEUROLOGIC:  No paresthesias, fasciculations, seizures or weakness.  PSYCHIATRIC:  No disorder of thought or mood.  ENDOCRINE:  No heat or cold intolerance, polyuria or polydipsia.  HEMATOLOGICAL:  No easy bruising or bleeding.     Physical Exam:  Vital Signs Last 24 Hrs  T(C): 37.1 (27 Sep 2022 16:22), Max: 37.5 (27 Sep 2022 14:22)  T(F): 98.8 (27 Sep 2022 16:22), Max: 99.5 (27 Sep 2022 14:22)  HR: 73 (27 Sep 2022 16:22) (67 - 90)  BP: 130/76 (27 Sep 2022 16:22) (115/71 - 142/68)  BP(mean): --  RR: 18 (27 Sep 2022 16:22) (12 - 19)  SpO2: 96% (27 Sep 2022 16:22) (91% - 98%)  Parameters below as of 27 Sep 2022 16:22  Patient On (Oxygen Delivery Method): room air  GEN: NAD  HEENT: normocephalic and atraumatic. EOMI. PERRL.    NECK: Supple.  No lymphadenopathy   LUNGS: Clear to auscultation.  HEART: Regular rate and rhythm without murmur.  ABDOMEN: mild distention and tenderness. Positive bowel sounds.    Now post surgery,   : No CVA tenderness  EXTREMITIES: Without any cyanosis, clubbing, rash, lesions or edema.  NEUROLOGIC: grossly intact.  PSYCHIATRIC: Appropriate affect .  SKIN: No ulceration or rash     Labs:                        11.3   4.95  )-----------( 255      ( 27 Sep 2022 06:20 )             35.3     09-27    142  |  106  |  7   ----------------------------<  85  3.9   |  31  |  0.63    Ca    9.2      27 Sep 2022 06:20    Culture - Urine (collected 09-26-22 @ 11:00)  Source: Clean Catch Clean Catch (Midstream)  Final Report (09-27-22 @ 12:07):    No growth    WBC Count: 4.95 K/uL (09-27-22 @ 06:20)  WBC Count: 5.41 K/uL (09-26-22 @ 06:47)  WBC Count: 5.41 K/uL (09-25-22 @ 05:30)    Creatinine, Serum: 0.63 mg/dL (09-27-22 @ 06:20)  Creatinine, Serum: 0.61 mg/dL (09-26-22 @ 06:47)  Creatinine, Serum: 0.68 mg/dL (09-25-22 @ 05:30)     COVID-19 PCR: NotDetec (09-25-22 @ 05:30)  COVID-19 PCR: NotDetec (09-06-22 @ 12:40)  COVID-19 PCR: NotDetec (09-04-22 @ 07:39)    All imaging and other data have been reviewed.  < from: CT Abdomen and Pelvis w/ IV Cont (09.25.22 @ 06:29) >  IMPRESSION:  No bowel dilatation, however there is a question of a transition of   caliber at the level of a small bowel anastomosis within the mid abdomen.   There is associated mesenteric edema. Possibility of early or partial   small bowel obstruction not excluded. Other differential considerations   include sequelae of enteritis.  Probable constipation.  Fatty liver.    Assessment and Plan:   66 yo woman with PMH of Hypothyroid, and SBO after colon resection in july 2022, was admitted with another SBO. Her las SBO was about 2 weeks ago.   CT showed possible partial obstruction. Seen by surgery for possible surgical intervention due to recurrence.   Also UA showed mildly elevated WBC with negative nitrate but she doesn't have dysuria or frequency.   9/27 s/p surgery lysis of adhesions and small bowel resection     Recommendations:  - Urine culture negative   - For SBO, had surgery today   - Will monitor Tmax and WBC.   - Periop antibiotics as per surgery     Will follow PRN.    Tiffany Aguilar MD  Division of Infectious Diseases   Please call ID service at [phone] with any question.      35 minutes spent on total encounter assessing patient, examination, chart review, counseling and coordinating care by the attending physician/nurse/care manager.

## 2022-09-27 NOTE — PROGRESS NOTE ADULT - SUBJECTIVE AND OBJECTIVE BOX
POST OPERATIVE NOTE  Patient: JONNA KANG 67y (1955) Female   MRN: [id number]  Visit: 09-25-22 Inpatient  Date: 09-27-22 @ 18:04    Procedure: S/P diagnostic lap w/ lysis of adhesions, small bowel resection w/ anastomosis.    Subjective: Patient seen and examined at bedside. Reports feeling better when she woke up after surgery. Reports mild abdominal pain.  Denies dizziness, chest pain, palpitations, SOB, nausea, vomiting, or urinary complaints.    Objective:  Vitals: T(F): 98.8 (09-27-22 @ 16:22), Max: 99.5 (09-27-22 @ 14:22)  HR: 73 (09-27-22 @ 16:22)  BP: 130/76 (09-27-22 @ 16:22) (115/71 - 142/68)  RR: 18 (09-27-22 @ 16:22)  SpO2: 96% (09-27-22 @ 16:22)    In:   09-26-22 @ 07:01  -  09-27-22 @ 07:00  --------------------------------------------------------  IN: 950 mL    09-27-22 @ 07:01  -  09-27-22 @ 18:04  --------------------------------------------------------  IN: 77 mL    IV Fluids: lactated ringers. 1000 milliLiter(s) (100 mL/Hr) IV Continuous <Continuous>    Out:   09-26-22 @ 07:01  -  09-27-22 @ 07:00  --------------------------------------------------------  OUT: 0 mL    09-27-22 @ 07:01  -  09-27-22 @ 18:04  --------------------------------------------------------  OUT: 0 mL    Physical Exam:  GENERAL: NAD, resting comfortably in bed  HEENT:  Atraumatic, normocephalic; conjunctiva and sclera clear; neck supple  CHEST/LUNG: CTA B/L, good inspiratory effort  HEART: Rate & rhythm regular; +S1/S2  ABDOMEN: Soft, nondistended, mild tenderness to palpation, incisions intact w/ dermabond, midline incision w/ dressing over top  EXTREMITIES:  2+ peripheral pulses, no clubbing, cyanosis, or edema  PSYCH: A&O x3  NEUROLOGY: Motor & sensory grossly intact  SKIN: Warm & dry    Medications: [Standing]  acetaminophen     Tablet .. 650 milliGRAM(s) Oral every 6 hours PRN  acetaminophen   IVPB .. 1000 milliGRAM(s) IV Intermittent once  atorvastatin 20 milliGRAM(s) Oral at bedtime  diphenhydrAMINE Injectable 12.5 milliGRAM(s) IV Push once PRN  diphenhydrAMINE Injectable 12.5 milliGRAM(s) IV Push at bedtime PRN  HYDROmorphone  Injectable 0.25 milliGRAM(s) IV Push every 4 hours PRN  HYDROmorphone  Injectable 1 milliGRAM(s) IV Push every 4 hours PRN  influenza  Vaccine (HIGH DOSE) 0.7 milliLiter(s) IntraMuscular once  lactated ringers. 1000 milliLiter(s) IV Continuous <Continuous>  levothyroxine 88 MICROGram(s) Oral daily  metoclopramide Injectable 10 milliGRAM(s) IV Push every 6 hours PRN  ondansetron Injectable 4 milliGRAM(s) IV Push every 6 hours PRN    Medications: [PRN]  acetaminophen     Tablet .. 650 milliGRAM(s) Oral every 6 hours PRN  acetaminophen   IVPB .. 1000 milliGRAM(s) IV Intermittent once  atorvastatin 20 milliGRAM(s) Oral at bedtime  diphenhydrAMINE Injectable 12.5 milliGRAM(s) IV Push once PRN  diphenhydrAMINE Injectable 12.5 milliGRAM(s) IV Push at bedtime PRN  HYDROmorphone  Injectable 0.25 milliGRAM(s) IV Push every 4 hours PRN  HYDROmorphone  Injectable 1 milliGRAM(s) IV Push every 4 hours PRN  influenza  Vaccine (HIGH DOSE) 0.7 milliLiter(s) IntraMuscular once  lactated ringers. 1000 milliLiter(s) IV Continuous <Continuous>  levothyroxine 88 MICROGram(s) Oral daily  metoclopramide Injectable 10 milliGRAM(s) IV Push every 6 hours PRN  ondansetron Injectable 4 milliGRAM(s) IV Push every 6 hours PRN    Labs:                        11.3   4.95  )-----------( 255      ( 27 Sep 2022 06:20 )             35.3     09-27    142  |  106  |  7   ----------------------------<  85  3.9   |  31  |  0.63    Ca    9.2      27 Sep 2022 06:20      Imaging:  No post-op imaging studies

## 2022-09-27 NOTE — PROGRESS NOTE ADULT - ASSESSMENT
67-year-old female with history of Hashimoto's, SBO s/p operative repair July 10, status post recent admission for SBO 2 weeks ago presents with abdominal pain and nausea vomiting over past few hours.  Patient was in normal state of health last night.  No fevers or chills.  No cough/URI.  No recent COVID exposure.  Patient fully vaccinated for COVID.  No acute diarrhea.  No blood in the stool.  No dysuria/hematuria.  No aggravating or alleviating factors.  No other acute complaints at this time Admitted with Small bowel obstruction, partial. Found to have positive ua and developed dysuria & vaginal pruruits  ,s/p ceftriaxone in ER ,ID cons requested   ·  Surgery is following ,KUB ordered  - NPO w/ IVF ,MAY REQUIRE SX intervention   - Continue pain control as needed  -  IV tylenol Q 6 hours & PPI  - Encourage OOB/ambulation  - Serial abdominal exams & monitor for bowel function  - Daily labs  - DVT Prophylaxis with SCDs

## 2022-09-27 NOTE — PROGRESS NOTE ADULT - ASSESSMENT
66 y/o F with PMHx of SBO s/p SBR in 07/22. Patient was found to have transition point at site of anastomosis on CT scan. VS stable, labs are unremarkable.

## 2022-09-27 NOTE — PROGRESS NOTE ADULT - ASSESSMENT
66 y/o Female S/P diagnostic lap w/ lysis of adhesions, small bowel resection w/ anastomosis.    Plan:  - Continue current care  - Start on clear liquids  - Pain control PRN, Zofran PRN, supportive care  - Incentive spirometry, encourage ambulation  - SCDs, DVT prophylaxis with lovenox in AM  - Follow up AM labs  - Will continue to monitor    Surgical Team: [id number]

## 2022-09-27 NOTE — BRIEF OPERATIVE NOTE - NSICDXBRIEFPROCEDURE_GEN_ALL_CORE_FT
PROCEDURES:  Diagnostic laparoscopy 27-Sep-2022 14:14:08  Dustin Yanez  Laparoscopic lysis of abdominal adhesions 27-Sep-2022 14:16:20  Dustin Yanez  Small bowel resection with anastomosis 27-Sep-2022 14:19:12  Dustin Yanez

## 2022-09-28 ENCOUNTER — TRANSCRIPTION ENCOUNTER (OUTPATIENT)
Age: 67
End: 2022-09-28

## 2022-09-28 LAB
ANION GAP SERPL CALC-SCNC: 6 MMOL/L — SIGNIFICANT CHANGE UP (ref 5–17)
BASOPHILS # BLD AUTO: 0.02 K/UL — SIGNIFICANT CHANGE UP (ref 0–0.2)
BASOPHILS NFR BLD AUTO: 0.2 % — SIGNIFICANT CHANGE UP (ref 0–2)
BUN SERPL-MCNC: 8 MG/DL — SIGNIFICANT CHANGE UP (ref 7–23)
CALCIUM SERPL-MCNC: 9.1 MG/DL — SIGNIFICANT CHANGE UP (ref 8.5–10.1)
CHLORIDE SERPL-SCNC: 107 MMOL/L — SIGNIFICANT CHANGE UP (ref 96–108)
CO2 SERPL-SCNC: 30 MMOL/L — SIGNIFICANT CHANGE UP (ref 22–31)
CREAT SERPL-MCNC: 0.79 MG/DL — SIGNIFICANT CHANGE UP (ref 0.5–1.3)
EGFR: 82 ML/MIN/1.73M2 — SIGNIFICANT CHANGE UP
EOSINOPHIL # BLD AUTO: 0 K/UL — SIGNIFICANT CHANGE UP (ref 0–0.5)
EOSINOPHIL NFR BLD AUTO: 0 % — SIGNIFICANT CHANGE UP (ref 0–6)
GLUCOSE SERPL-MCNC: 125 MG/DL — HIGH (ref 70–99)
HCT VFR BLD CALC: 33 % — LOW (ref 34.5–45)
HGB BLD-MCNC: 10.7 G/DL — LOW (ref 11.5–15.5)
IMM GRANULOCYTES NFR BLD AUTO: 0.5 % — SIGNIFICANT CHANGE UP (ref 0–0.9)
LYMPHOCYTES # BLD AUTO: 1.61 K/UL — SIGNIFICANT CHANGE UP (ref 1–3.3)
LYMPHOCYTES # BLD AUTO: 17 % — SIGNIFICANT CHANGE UP (ref 13–44)
MAGNESIUM SERPL-MCNC: 2.2 MG/DL — SIGNIFICANT CHANGE UP (ref 1.6–2.6)
MCHC RBC-ENTMCNC: 30.1 PG — SIGNIFICANT CHANGE UP (ref 27–34)
MCHC RBC-ENTMCNC: 32.4 GM/DL — SIGNIFICANT CHANGE UP (ref 32–36)
MCV RBC AUTO: 92.7 FL — SIGNIFICANT CHANGE UP (ref 80–100)
MONOCYTES # BLD AUTO: 0.87 K/UL — SIGNIFICANT CHANGE UP (ref 0–0.9)
MONOCYTES NFR BLD AUTO: 9.2 % — SIGNIFICANT CHANGE UP (ref 2–14)
NEUTROPHILS # BLD AUTO: 6.91 K/UL — SIGNIFICANT CHANGE UP (ref 1.8–7.4)
NEUTROPHILS NFR BLD AUTO: 73.1 % — SIGNIFICANT CHANGE UP (ref 43–77)
NRBC # BLD: 0 /100 WBCS — SIGNIFICANT CHANGE UP (ref 0–0)
PHOSPHATE SERPL-MCNC: 3.2 MG/DL — SIGNIFICANT CHANGE UP (ref 2.5–4.5)
PLATELET # BLD AUTO: 238 K/UL — SIGNIFICANT CHANGE UP (ref 150–400)
POTASSIUM SERPL-MCNC: 3.7 MMOL/L — SIGNIFICANT CHANGE UP (ref 3.5–5.3)
POTASSIUM SERPL-SCNC: 3.7 MMOL/L — SIGNIFICANT CHANGE UP (ref 3.5–5.3)
RBC # BLD: 3.56 M/UL — LOW (ref 3.8–5.2)
RBC # FLD: 12.2 % — SIGNIFICANT CHANGE UP (ref 10.3–14.5)
SODIUM SERPL-SCNC: 143 MMOL/L — SIGNIFICANT CHANGE UP (ref 135–145)
WBC # BLD: 9.46 K/UL — SIGNIFICANT CHANGE UP (ref 3.8–10.5)
WBC # FLD AUTO: 9.46 K/UL — SIGNIFICANT CHANGE UP (ref 3.8–10.5)

## 2022-09-28 PROCEDURE — 99232 SBSQ HOSP IP/OBS MODERATE 35: CPT

## 2022-09-28 RX ORDER — DIPHENHYDRAMINE HCL 50 MG
25 CAPSULE ORAL AT BEDTIME
Refills: 0 | Status: DISCONTINUED | OUTPATIENT
Start: 2022-09-28 | End: 2022-10-03

## 2022-09-28 RX ORDER — ACETAMINOPHEN 500 MG
1000 TABLET ORAL ONCE
Refills: 0 | Status: COMPLETED | OUTPATIENT
Start: 2022-09-28 | End: 2022-09-28

## 2022-09-28 RX ADMIN — Medication 1000 MILLIGRAM(S): at 06:39

## 2022-09-28 RX ADMIN — HYDROMORPHONE HYDROCHLORIDE 1 MILLIGRAM(S): 2 INJECTION INTRAMUSCULAR; INTRAVENOUS; SUBCUTANEOUS at 04:07

## 2022-09-28 RX ADMIN — Medication 25 MILLIGRAM(S): at 22:26

## 2022-09-28 RX ADMIN — Medication 400 MILLIGRAM(S): at 00:32

## 2022-09-28 RX ADMIN — HYDROMORPHONE HYDROCHLORIDE 1 MILLIGRAM(S): 2 INJECTION INTRAMUSCULAR; INTRAVENOUS; SUBCUTANEOUS at 03:52

## 2022-09-28 RX ADMIN — ENOXAPARIN SODIUM 40 MILLIGRAM(S): 100 INJECTION SUBCUTANEOUS at 11:30

## 2022-09-28 RX ADMIN — HYDROMORPHONE HYDROCHLORIDE 1 MILLIGRAM(S): 2 INJECTION INTRAMUSCULAR; INTRAVENOUS; SUBCUTANEOUS at 14:00

## 2022-09-28 RX ADMIN — Medication 88 MICROGRAM(S): at 05:22

## 2022-09-28 RX ADMIN — Medication 1000 MILLIGRAM(S): at 18:30

## 2022-09-28 RX ADMIN — HYDROMORPHONE HYDROCHLORIDE 1 MILLIGRAM(S): 2 INJECTION INTRAMUSCULAR; INTRAVENOUS; SUBCUTANEOUS at 20:45

## 2022-09-28 RX ADMIN — SODIUM CHLORIDE 100 MILLILITER(S): 9 INJECTION, SOLUTION INTRAVENOUS at 01:25

## 2022-09-28 RX ADMIN — Medication 12.5 MILLIGRAM(S): at 00:08

## 2022-09-28 RX ADMIN — HYDROMORPHONE HYDROCHLORIDE 1 MILLIGRAM(S): 2 INJECTION INTRAMUSCULAR; INTRAVENOUS; SUBCUTANEOUS at 20:13

## 2022-09-28 RX ADMIN — ATORVASTATIN CALCIUM 20 MILLIGRAM(S): 80 TABLET, FILM COATED ORAL at 22:25

## 2022-09-28 RX ADMIN — Medication 400 MILLIGRAM(S): at 06:22

## 2022-09-28 RX ADMIN — HYDROMORPHONE HYDROCHLORIDE 1 MILLIGRAM(S): 2 INJECTION INTRAMUSCULAR; INTRAVENOUS; SUBCUTANEOUS at 13:45

## 2022-09-28 RX ADMIN — Medication 12.5 MILLIGRAM(S): at 01:25

## 2022-09-28 RX ADMIN — Medication 400 MILLIGRAM(S): at 18:10

## 2022-09-28 RX ADMIN — Medication 1000 MILLIGRAM(S): at 00:50

## 2022-09-28 RX ADMIN — HYDROMORPHONE HYDROCHLORIDE 1 MILLIGRAM(S): 2 INJECTION INTRAMUSCULAR; INTRAVENOUS; SUBCUTANEOUS at 09:03

## 2022-09-28 RX ADMIN — HYDROMORPHONE HYDROCHLORIDE 1 MILLIGRAM(S): 2 INJECTION INTRAMUSCULAR; INTRAVENOUS; SUBCUTANEOUS at 09:20

## 2022-09-28 NOTE — PROGRESS NOTE ADULT - SUBJECTIVE AND OBJECTIVE BOX
[facility]  INFECTIOUS DISEASES   [address] [place] [address]  Tel: [phone]     Fax: [phone]  ======================================================  MD Kirsten Borden Kaushal, MD Cho, Michelle, MD   ======================================================    [id number]  JONNA KANG     Follow up; SBO     Went to OR s/p laparoscopy and lysis of adhesions and small bowel resection on 9/27.  Doing well, walking around, had some diarrhea, no more abdominal pain. No vomiting. No fever.      PAST MEDICAL & SURGICAL HISTORY:  Hypothyroidism  HLD (hyperlipidemia)  History of colon resection    Social Hx: no smoking, ETOH or drugs     FAMILY HISTORY: noncontributory     Allergies  tetanus toxoid (Anaphylaxis)    Antibiotics:  MEDICATIONS  (STANDING):  acetaminophen   IVPB .. 1000 milliGRAM(s) IV Intermittent once  atorvastatin 20 milliGRAM(s) Oral at bedtime  clotrimazole/betamethasone Cream 1 Application(s) Topical two times a day  influenza  Vaccine (HIGH DOSE) 0.7 milliLiter(s) IntraMuscular once  lactated ringers. 1000 milliLiter(s) (75 mL/Hr) IV Continuous <Continuous>  lactobacillus acidophilus 1 Tablet(s) Oral two times a day  levothyroxine 88 MICROGram(s) Oral daily  melatonin 5 milliGRAM(s) Oral at bedtime  pantoprazole  Injectable 40 milliGRAM(s) IV Push daily    MEDICATIONS  (PRN):  diphenhydrAMINE Injectable 12.5 milliGRAM(s) IV Push at bedtime PRN Insomnia  HYDROmorphone  Injectable 1 milliGRAM(s) IV Push every 4 hours PRN Severe Pain (7 - 10)  ketorolac   Injectable 15 milliGRAM(s) IV Push once PRN Moderate Pain (4 - 6)  metoclopramide Injectable 10 milliGRAM(s) IV Push every 6 hours PRN Nausea and/or Vomiting  ondansetron Injectable 4 milliGRAM(s) IV Push every 6 hours PRN Nausea    REVIEW OF SYSTEMS:  CONSTITUTIONAL:  No Fever or chills  HEENT:  No diplopia or blurred vision.  No sore throat or runny nose.  CARDIOVASCULAR:  No chest pain or SOB.  RESPIRATORY:  No cough, shortness of breath, PND or orthopnea.  GASTROINTESTINAL:  No nausea, vomiting or diarrhea, + abdominal pain  GENITOURINARY:  No dysuria, frequency or urgency. No Blood in urine  MUSCULOSKELETAL:  no joint aches, no muscle pain  SKIN:  No change in skin, hair or nails.  NEUROLOGIC:  No paresthesias, fasciculations, seizures or weakness.  PSYCHIATRIC:  No disorder of thought or mood.  ENDOCRINE:  No heat or cold intolerance, polyuria or polydipsia.  HEMATOLOGICAL:  No easy bruising or bleeding.     Physical Exam:  Vital Signs Last 24 Hrs  T(C): 36.7 (28 Sep 2022 12:13), Max: 37.5 (27 Sep 2022 14:22)  T(F): 98 (28 Sep 2022 12:13), Max: 99.5 (27 Sep 2022 14:22)  HR: 63 (28 Sep 2022 12:13) (63 - 90)  BP: 111/64 (28 Sep 2022 12:13) (111/64 - 158/78)  BP(mean): --  RR: 20 (28 Sep 2022 12:13) (12 - 20)  SpO2: 96% (28 Sep 2022 12:13) (94% - 98%)  Parameters below as of 28 Sep 2022 12:13  Patient On (Oxygen Delivery Method): room air  GEN: NAD  HEENT: normocephalic and atraumatic. EOMI. PERRL.    NECK: Supple.  No lymphadenopathy   LUNGS: Clear to auscultation.  HEART: Regular rate and rhythm without murmur.  ABDOMEN: mild distention and tenderness. Positive bowel sounds.    Now post surgery,   : No CVA tenderness  EXTREMITIES: Without any cyanosis, clubbing, rash, lesions or edema.  NEUROLOGIC: grossly intact.  PSYCHIATRIC: Appropriate affect .  SKIN: No ulceration or rash     Labs:                        10.7   9.46  )-----------( 238      ( 28 Sep 2022 06:45 )             33.0     09-28    143  |  107  |  8   ----------------------------<  125<H>  3.7   |  30  |  0.79    Ca    9.1      28 Sep 2022 06:45  Phos  3.2     09-28  Mg     2.2     09-28    Culture - Urine (collected 09-26-22 @ 11:00)  Source: Clean Catch Clean Catch (Midstream)  Final Report (09-27-22 @ 12:07):    No growth    WBC Count: 9.46 K/uL (09-28-22 @ 06:45)  WBC Count: 4.95 K/uL (09-27-22 @ 06:20)  WBC Count: 5.41 K/uL (09-26-22 @ 06:47)  WBC Count: 5.41 K/uL (09-25-22 @ 05:30)    Creatinine, Serum: 0.79 mg/dL (09-28-22 @ 06:45)  Creatinine, Serum: 0.63 mg/dL (09-27-22 @ 06:20)  Creatinine, Serum: 0.61 mg/dL (09-26-22 @ 06:47)  Creatinine, Serum: 0.68 mg/dL (09-25-22 @ 05:30)     COVID-19 PCR: NotDetec (09-25-22 @ 05:30)  COVID-19 PCR: NotDetec (09-06-22 @ 12:40)  COVID-19 PCR: NotDetec (09-04-22 @ 07:39)    All imaging and other data have been reviewed.  < from: CT Abdomen and Pelvis w/ IV Cont (09.25.22 @ 06:29) >  IMPRESSION:  No bowel dilatation, however there is a question of a transition of   caliber at the level of a small bowel anastomosis within the mid abdomen.   There is associated mesenteric edema. Possibility of early or partial   small bowel obstruction not excluded. Other differential considerations   include sequelae of enteritis.  Probable constipation.  Fatty liver.    Assessment and Plan:   68 yo woman with PMH of Hypothyroid, and SBO after colon resection in july 2022, was admitted with another SBO. Her las SBO was about 2 weeks ago.   CT showed possible partial obstruction. Seen by surgery for possible surgical intervention due to recurrence.   Also UA showed mildly elevated WBC with negative nitrate but she doesn't have dysuria or frequency.   9/27 s/p surgery lysis of adhesions and small bowel resection     Recommendations:  - Urine culture negative   - For SBO, had surgery yesterday   - Will monitor Tmax and WBC.   - Periop antibiotics as per surgery     Will sign off please call with any question.     Tiffany Aguilar MD  Division of Infectious Diseases   Please call ID service at [phone] with any question.      35 minutes spent on total encounter assessing patient, examination, chart review, counseling and coordinating care by the attending physician/nurse/care manager.

## 2022-09-28 NOTE — DIETITIAN INITIAL EVALUATION ADULT - ORAL INTAKE PTA/DIET HISTORY
patient seen tolerating clear liquids does not like ensure clear. other preferences noted on clear liquids.   patient here in early September SBO educated on low fiber diet at that time. has paper work at home.  states ate greek food prior to both visits for SBO.   no food allergies  was following regular diet PTA as per GI at last visit

## 2022-09-28 NOTE — DISCHARGE NOTE PROVIDER - NPI NUMBER (FOR SYSADMIN USE ONLY) :
comfortable appearance/resting/sleeping resting/sleeping comfortable appearance/cooperative comfortable appearance/resting/sleeping comfortable appearance/cooperative [[id number]]

## 2022-09-28 NOTE — DISCHARGE NOTE PROVIDER - CARE PROVIDER_API CALL
Dustin Yanez (MD)  Surgery  [address], [address]  [place] [address]  Phone: [phone]  Fax: [phone]  Follow Up Time:

## 2022-09-28 NOTE — DIETITIAN INITIAL EVALUATION ADULT - OTHER INFO
Reason for Admission: Abdominal pain/SBO  67 year old female w/ history of colon resection  recurrent SBO (most recent admission 2 weeks ago, managed conservatively), HLD, hypothyroidism, presenting to [place]  ED with abdominal pain.  Patient states pain started around 3 am, described as sharp in her RLQ that radiated to her upper abdomen.  Endorses this is how she felt prior to her most recent hospital admission for a SBO.  Patient had nausea with multiple episodes of vomiting overnight and while in the ED.  Patient last ate Greek food for dinner at 7:30 pm, last BM yesterday morning.  Patient endorses since her last hospital admission she was in a good state of health and able to tolerate a regular diet.  patient reports weight ~ 147# day of admit . usual weight 140-145# Sept 7 admit weight 140#  no food allergies

## 2022-09-28 NOTE — DIETITIAN INITIAL EVALUATION ADULT - PERTINENT MEDS FT
MEDICATIONS  (STANDING):  atorvastatin 20 milliGRAM(s) Oral at bedtime  enoxaparin Injectable 40 milliGRAM(s) SubCutaneous every 24 hours  influenza  Vaccine (HIGH DOSE) 0.7 milliLiter(s) IntraMuscular once  lactated ringers. 1000 milliLiter(s) (100 mL/Hr) IV Continuous <Continuous>  levothyroxine 88 MICROGram(s) Oral daily    MEDICATIONS  (PRN):  acetaminophen     Tablet .. 650 milliGRAM(s) Oral every 6 hours PRN Temp greater or equal to 38C (100.4F), Mild Pain (1 - 3)  diphenhydrAMINE Injectable 12.5 milliGRAM(s) IV Push at bedtime PRN Insomnia  HYDROmorphone  Injectable 0.25 milliGRAM(s) IV Push every 4 hours PRN Moderate Pain (4 - 6)  HYDROmorphone  Injectable 1 milliGRAM(s) IV Push every 4 hours PRN Severe Pain (7 - 10)  metoclopramide Injectable 10 milliGRAM(s) IV Push every 6 hours PRN Nausea and/or Vomiting  ondansetron Injectable 4 milliGRAM(s) IV Push every 6 hours PRN Nausea

## 2022-09-28 NOTE — DISCHARGE NOTE PROVIDER - NSDCCPTREATMENT_GEN_ALL_CORE_FT
PRINCIPAL PROCEDURE  Procedure: Diagnostic laparoscopy  Findings and Treatment:       SECONDARY PROCEDURE  Procedure: Small bowel resection with anastomosis  Findings and Treatment:

## 2022-09-28 NOTE — DIETITIAN INITIAL EVALUATION ADULT - PERTINENT LABORATORY DATA
09-28    143  |  107  |  8   ----------------------------<  125<H>  3.7   |  30  |  0.79    Ca    9.1      28 Sep 2022 06:45  Phos  3.2     09-28  Mg     2.2     09-28

## 2022-09-28 NOTE — DISCHARGE NOTE PROVIDER - NSDCACTIVITY_GEN_ALL_CORE
Addended by: Magali Conde on: 12/8/2017 09:17 AM     Modules accepted: Orders Do not drive or operate machinery/Do not make important decisions/Stairs allowed/Walking - Indoors allowed/No heavy lifting/straining/Walking - Outdoors allowed/Follow Instructions Provided by your Surgical Team

## 2022-09-28 NOTE — PROGRESS NOTE ADULT - SUBJECTIVE AND OBJECTIVE BOX
PROGRESS NOTE  Patient is a 67y old  Female who presents with a chief complaint of Abdominal pain/SBO (28 Sep 2022 13:34)  Chart and available morning labs /imaging are reviewed electronically , urgent issues addressed . More information  is being added upon completion of rounds , when more information is collected and management discussed with consultants , medical staff and social service/case management on the floor   OVERNIGHT s/p sx -s/p dx lap with sbr  No new issues reported by medical staff . All above noted Patient is resting in a bed comfortably . .No distress noted   HPI:  67 year old female w/ history of colon resection w/ Dr. Yanez, recurrent SBO (most recent admission 2 weeks ago, managed conservatively), HLD, hypothyroidism, presenting to [place]  ED with abdominal pain.  Patient states pain started around 3 am, described as sharp in her RLQ that radiated to her upper abdomen.  Endorses this is how she felt prior to her most recent hospital admission for a SBO.  Patient had nausea with multiple episodes of vomiting overnight and while in the ED.  Patient last ate Greek food for dinner at 7:30 pm, last BM yesterday morning.  Patient endorses since her last hospital admission she was in a good state of health and able to tolerate a regular diet.  Denies fevers, chills, chest pain, SOB, palpitations, calf pain.     (25 Sep 2022 09:21)  PAST MEDICAL & SURGICAL HISTORY:  Hypothyroidism  HLD (hyperlipidemia)  History of colon resection  MEDICATIONS  (STANDING):  atorvastatin 20 milliGRAM(s) Oral at bedtime  diphenhydrAMINE Injectable 25 milliGRAM(s) IV Push at bedtime  enoxaparin Injectable 40 milliGRAM(s) SubCutaneous every 24 hours  influenza  Vaccine (HIGH DOSE) 0.7 milliLiter(s) IntraMuscular once  lactated ringers. 1000 milliLiter(s) (100 mL/Hr) IV Continuous <Continuous>  levothyroxine 88 MICROGram(s) Oral daily  MEDICATIONS  (PRN):  acetaminophen     Tablet .. 650 milliGRAM(s) Oral every 6 hours PRN Temp greater or equal to 38C (100.4F), Mild Pain (1 - 3)  HYDROmorphone  Injectable 0.25 milliGRAM(s) IV Push every 4 hours PRN Moderate Pain (4 - 6)  HYDROmorphone  Injectable 1 milliGRAM(s) IV Push every 4 hours PRN Severe Pain (7 - 10)  metoclopramide Injectable 10 milliGRAM(s) IV Push every 6 hours PRN Nausea and/or Vomiting  ondansetron Injectable 4 milliGRAM(s) IV Push every 6 hours PRN Nausea      OBJECTIVE    T(C): 36.7 (09-28-22 @ 12:13), Max: 37.1 (09-27-22 @ 16:22)  HR: 63 (09-28-22 @ 12:13) (63 - 88)  BP: 111/64 (09-28-22 @ 12:13) (111/64 - 158/78)  RR: 20 (09-28-22 @ 12:13) (17 - 20)  SpO2: 96% (09-28-22 @ 12:13) (94% - 96%)  Wt(kg): --  I&O's Summary    27 Sep 2022 07:01  -  28 Sep 2022 07:00  --------------------------------------------------------  IN: 1377 mL / OUT: 1200 mL / NET: 177 mL          REVIEW OF SYSTEMS:  A 10-point review of systems was obtained.   Review of systems otherwise unchanged compared to prior visit except as previously noted   PHYSICAL EXAM:  Appearance: NAD. VS past 24 hrs -as above   HEENT:   Moist oral mucosa. Conjunctiva clear b/l.   Neck : supple  Respiratory: Lungs CTAB.  Gastrointestinal:  Soft, nontender. No rebound. No rigidity. BS present	  Cardiovascular: RRR ,S1S2 present  Neurologic: Non-focal. Moving all extremities.  Extremities: No edema. No erythema. No calf tenderness.  Skin: No rashes, No ecchymoses, No cyanosis.	  wounds ,skin lesions-See skin assesment flow sheet   LABS:                        10.7   9.46  )-----------( 238      ( 28 Sep 2022 06:45 )             33.0     09-28    143  |  107  |  8   ----------------------------<  125<H>  3.7   |  30  |  0.79    Ca    9.1      28 Sep 2022 06:45  Phos  3.2     09-28  Mg     2.2     09-28      CAPILLARY BLOOD GLUCOSE              Culture - Urine (collected 26 Sep 2022 11:00)  Source: Clean Catch Clean Catch (Midstream)  Final Report (27 Sep 2022 12:07):    No growth      RADIOLOGY & ADDITIONAL TESTS:   reviewed elctronically  ASSESSMENT/PLAN: 	  25 minutes aggregate time was spent on this visit, 50% visit time spent in care co-ordination with other attendings and counselling patient .I have discussed care plan with patient / HCP/family member ,who expressed understanding of problems treatment and their effect and side effects, alternatives in details. I have asked if they have any questions and concerns and appropriately addressed them to best of my ability.

## 2022-09-28 NOTE — DISCHARGE NOTE PROVIDER - NSDCMRMEDTOKEN_GEN_ALL_CORE_FT
levothyroxine 88 mcg (0.088 mg) oral tablet: 1 tab(s) orally once a day  pantoprazole 40 mg oral delayed release tablet: 1 tab(s) orally once a day  rosuvastatin 5 mg oral tablet: 1 tab(s) orally once a day   levothyroxine 88 mcg (0.088 mg) oral tablet: 1 tab(s) orally once a day  pantoprazole 40 mg oral delayed release tablet: 1 tab(s) orally once a day  Percocet 5 mg-325 mg oral tablet: 1 tab(s) orally every 6 hours, As Needed MDD:4   rosuvastatin 5 mg oral tablet: 1 tab(s) orally once a day

## 2022-09-28 NOTE — DISCHARGE NOTE PROVIDER - NSDCFUSCHEDAPPT_GEN_ALL_CORE_FT
[facility]  [facility] [address]  Scheduled Appointment: 10/12/2022

## 2022-09-28 NOTE — DISCHARGE NOTE PROVIDER - HOSPITAL COURSE
HPI:  67 year old female w/ history of colon resection w/ Dr. Yanez, recurrent SBO (most recent admission 2 weeks ago, managed conservatively), HLD, hypothyroidism, presenting to [place]  ED with abdominal pain.  Patient states pain started around 3 am, described as sharp in her RLQ that radiated to her upper abdomen.  Endorses this is how she felt prior to her most recent hospital admission for a SBO.  Patient had nausea with multiple episodes of vomiting overnight and while in the ED.  Patient last ate Greek food for dinner at 7:30 pm, last BM yesterday morning.  Patient endorses since her last hospital admission she was in a good state of health and able to tolerate a regular diet.  Denies fevers, chills, chest pain, SOB, palpitations, calf pain.     (25 Sep 2022 09:21)    In ER, WBC elevated, CT scan showed partial SBO at site of previous anastomosis. Patient was admitted to surgical service for observation with medical team as consultants. ID also consulted for recommendations on antibiotics for UTI. On hospital day 1, KUB showed no bowel obstruction. Decision was made for OR for revision of anastomosis as a prophylactic measure to prevent future episodes. Second opinion obtained from Dr. Collins, who agreed with OR. On hospital day 2, patient underwent laparoscopic LINDSAY with SBR of anastomosis without complication. Patient was transferred to the PACU and then back to the floor for further observation. Preop abx prophylaxis given. Postop, pain well controlled. Diet advanced slowly as GI function returned. Lovenox given daily for DVT ppx. Ambulation and incentive spirometry encouraged. Labs trended daily.    DISPO: Follow up with Dr. Yanez as outpt         HPI:  67 year old female w/ history of colon resection w/ Dr. Yanez, recurrent SBO (most recent admission 2 weeks ago, managed conservatively), HLD, hypothyroidism, presenting to [place]  ED with abdominal pain.  Patient states pain started around 3 am, described as sharp in her RLQ that radiated to her upper abdomen.  Endorses this is how she felt prior to her most recent hospital admission for a SBO.  Patient had nausea with multiple episodes of vomiting overnight and while in the ED.  Patient last ate Greek food for dinner at 7:30 pm, last BM yesterday morning.  Patient endorses since her last hospital admission she was in a good state of health and able to tolerate a regular diet.  Denies fevers, chills, chest pain, SOB, palpitations, calf pain.     (25 Sep 2022 09:21)    In ER, WBC elevated, CT scan showed partial SBO at site of previous anastomosis. Patient was admitted to surgical service for observation with medical team as consultants. ID also consulted for recommendations on antibiotics for UTI. On hospital day 1, KUB showed no bowel obstruction. Decision was made for OR for revision of anastomosis as a prophylactic measure to prevent future episodes. Second opinion obtained from Dr. Collins, who agreed with OR. On hospital day 2, patient underwent laparoscopic LINDSAY with SBR of anastomosis without complication. Patient was transferred to the PACU and then back to the floor for further observation. Preop abx prophylaxis given. Postop, pain well controlled. Diet advanced slowly as GI function returned. Lovenox given daily for DVT ppx. Ambulation and incentive spirometry encouraged. Labs trended daily. patient tolerating diet , for discharge and follow up with MD in one WK     DISPO: Follow up with Dr. Yanez as outpt         HPI:  67 year old female w/ history of colon resection w/ Dr. Yanez, recurrent SBO (most recent admission 2 weeks ago, managed conservatively), HLD, hypothyroidism, presenting to [place]  ED with abdominal pain.  Patient states pain started around 3 am, described as sharp in her RLQ that radiated to her upper abdomen.  Endorses this is how she felt prior to her most recent hospital admission for a SBO.  Patient had nausea with multiple episodes of vomiting overnight and while in the ED.  Patient last ate Greek food for dinner at 7:30 pm, last BM yesterday morning.  Patient endorses since her last hospital admission she was in a good state of health and able to tolerate a regular diet.  Denies fevers, chills, chest pain, SOB, palpitations, calf pain.     (25 Sep 2022 09:21)    In ER, WBC elevated, CT scan showed partial SBO at site of previous anastomosis. Patient was admitted to surgical service for observation with medical team as consultants. ID also consulted for recommendations on antibiotics for UTI. On hospital day 1, KUB showed no bowel obstruction. Decision was made for OR for revision of anastomosis as a prophylactic measure to prevent future episodes. Second opinion obtained from Dr. Collins, who agreed with OR. On hospital day 2, patient underwent laparoscopic LINDSAY with SBR of anastomosis without complication. Patient was transferred to the PACU and then back to the floor for further observation. Preop abx prophylaxis given. Postop, pain well controlled. Diet advanced slowly as GI function returned. Lovenox given daily for DVT ppx. Ambulation and incentive spirometry encouraged. Labs trended daily. patient tolerating diet , for discharge and follow up with MD in one WK     DISPO: Follow up with Dr. Yanez as outpt        Postop ileus was not a postop complication    Pt had negative urine cultue, UTI ruled out!!!!

## 2022-09-28 NOTE — PROGRESS NOTE ADULT - SUBJECTIVE AND OBJECTIVE BOX
PT seen  feeling good  slight soreness  +BM x2  ICU Vital Signs Last 24 Hrs  T(C): 36.8 (28 Sep 2022 04:45), Max: 37.5 (27 Sep 2022 14:22)  T(F): 98.2 (28 Sep 2022 04:45), Max: 99.5 (27 Sep 2022 14:22)  HR: 68 (28 Sep 2022 04:45) (68 - 90)  BP: 123/72 (28 Sep 2022 04:45) (121/72 - 158/78)  BP(mean): --  ABP: --  ABP(mean): --  RR: 18 (28 Sep 2022 04:45) (12 - 18)  SpO2: 95% (28 Sep 2022 04:45) (94% - 98%)    O2 Parameters below as of 28 Sep 2022 04:45  Patient On (Oxygen Delivery Method): room air        gen-NAD  resp-clear  abd-soft, mildly distended, appropriate tenderness  incision c/d/i                          10.7   9.46  )-----------( 238      ( 28 Sep 2022 06:45 )             33.0

## 2022-09-29 LAB
ALBUMIN SERPL ELPH-MCNC: 2.9 G/DL — LOW (ref 3.3–5)
ALP SERPL-CCNC: 88 U/L — SIGNIFICANT CHANGE UP (ref 40–120)
ALT FLD-CCNC: 32 U/L — SIGNIFICANT CHANGE UP (ref 12–78)
ANION GAP SERPL CALC-SCNC: 5 MMOL/L — SIGNIFICANT CHANGE UP (ref 5–17)
AST SERPL-CCNC: 23 U/L — SIGNIFICANT CHANGE UP (ref 15–37)
BILIRUB SERPL-MCNC: 0.7 MG/DL — SIGNIFICANT CHANGE UP (ref 0.2–1.2)
BUN SERPL-MCNC: 5 MG/DL — LOW (ref 7–23)
CALCIUM SERPL-MCNC: 8.5 MG/DL — SIGNIFICANT CHANGE UP (ref 8.5–10.1)
CHLORIDE SERPL-SCNC: 107 MMOL/L — SIGNIFICANT CHANGE UP (ref 96–108)
CO2 SERPL-SCNC: 32 MMOL/L — HIGH (ref 22–31)
CREAT SERPL-MCNC: 0.63 MG/DL — SIGNIFICANT CHANGE UP (ref 0.5–1.3)
EGFR: 97 ML/MIN/1.73M2 — SIGNIFICANT CHANGE UP
GLUCOSE SERPL-MCNC: 87 MG/DL — SIGNIFICANT CHANGE UP (ref 70–99)
HCT VFR BLD CALC: 30 % — LOW (ref 34.5–45)
HGB BLD-MCNC: 9.6 G/DL — LOW (ref 11.5–15.5)
MAGNESIUM SERPL-MCNC: 2 MG/DL — SIGNIFICANT CHANGE UP (ref 1.6–2.6)
MCHC RBC-ENTMCNC: 30.4 PG — SIGNIFICANT CHANGE UP (ref 27–34)
MCHC RBC-ENTMCNC: 32 GM/DL — SIGNIFICANT CHANGE UP (ref 32–36)
MCV RBC AUTO: 94.9 FL — SIGNIFICANT CHANGE UP (ref 80–100)
NRBC # BLD: 0 /100 WBCS — SIGNIFICANT CHANGE UP (ref 0–0)
PHOSPHATE SERPL-MCNC: 2.6 MG/DL — SIGNIFICANT CHANGE UP (ref 2.5–4.5)
PLATELET # BLD AUTO: 185 K/UL — SIGNIFICANT CHANGE UP (ref 150–400)
POTASSIUM SERPL-MCNC: 3.2 MMOL/L — LOW (ref 3.5–5.3)
POTASSIUM SERPL-SCNC: 3.2 MMOL/L — LOW (ref 3.5–5.3)
PROT SERPL-MCNC: 5.8 G/DL — LOW (ref 6–8.3)
RBC # BLD: 3.16 M/UL — LOW (ref 3.8–5.2)
RBC # FLD: 12.6 % — SIGNIFICANT CHANGE UP (ref 10.3–14.5)
SODIUM SERPL-SCNC: 144 MMOL/L — SIGNIFICANT CHANGE UP (ref 135–145)
WBC # BLD: 6.9 K/UL — SIGNIFICANT CHANGE UP (ref 3.8–10.5)
WBC # FLD AUTO: 6.9 K/UL — SIGNIFICANT CHANGE UP (ref 3.8–10.5)

## 2022-09-29 PROCEDURE — 74019 RADEX ABDOMEN 2 VIEWS: CPT | Mod: 26

## 2022-09-29 RX ORDER — SIMETHICONE 80 MG/1
80 TABLET, CHEWABLE ORAL THREE TIMES A DAY
Refills: 0 | Status: DISCONTINUED | OUTPATIENT
Start: 2022-09-29 | End: 2022-10-03

## 2022-09-29 RX ORDER — IBUPROFEN 200 MG
600 TABLET ORAL EVERY 6 HOURS
Refills: 0 | Status: COMPLETED | OUTPATIENT
Start: 2022-09-29 | End: 2022-09-30

## 2022-09-29 RX ORDER — PANTOPRAZOLE SODIUM 20 MG/1
40 TABLET, DELAYED RELEASE ORAL
Refills: 0 | Status: DISCONTINUED | OUTPATIENT
Start: 2022-09-29 | End: 2022-10-03

## 2022-09-29 RX ORDER — SODIUM CHLORIDE 9 MG/ML
1000 INJECTION INTRAMUSCULAR; INTRAVENOUS; SUBCUTANEOUS
Refills: 0 | Status: DISCONTINUED | OUTPATIENT
Start: 2022-09-29 | End: 2022-09-30

## 2022-09-29 RX ORDER — HYDROMORPHONE HYDROCHLORIDE 2 MG/ML
0.5 INJECTION INTRAMUSCULAR; INTRAVENOUS; SUBCUTANEOUS EVERY 4 HOURS
Refills: 0 | Status: DISCONTINUED | OUTPATIENT
Start: 2022-09-29 | End: 2022-10-03

## 2022-09-29 RX ORDER — CALCIUM CARBONATE 500(1250)
2 TABLET ORAL THREE TIMES A DAY
Refills: 0 | Status: DISCONTINUED | OUTPATIENT
Start: 2022-09-29 | End: 2022-10-03

## 2022-09-29 RX ORDER — POTASSIUM CHLORIDE 20 MEQ
10 PACKET (EA) ORAL
Refills: 0 | Status: COMPLETED | OUTPATIENT
Start: 2022-09-29 | End: 2022-09-29

## 2022-09-29 RX ADMIN — HYDROMORPHONE HYDROCHLORIDE 1 MILLIGRAM(S): 2 INJECTION INTRAMUSCULAR; INTRAVENOUS; SUBCUTANEOUS at 02:35

## 2022-09-29 RX ADMIN — Medication 2 TABLET(S): at 17:17

## 2022-09-29 RX ADMIN — Medication 88 MICROGRAM(S): at 05:21

## 2022-09-29 RX ADMIN — Medication 100 MILLIEQUIVALENT(S): at 18:17

## 2022-09-29 RX ADMIN — PANTOPRAZOLE SODIUM 40 MILLIGRAM(S): 20 TABLET, DELAYED RELEASE ORAL at 08:25

## 2022-09-29 RX ADMIN — Medication 600 MILLIGRAM(S): at 20:15

## 2022-09-29 RX ADMIN — HYDROMORPHONE HYDROCHLORIDE 0.5 MILLIGRAM(S): 2 INJECTION INTRAMUSCULAR; INTRAVENOUS; SUBCUTANEOUS at 14:51

## 2022-09-29 RX ADMIN — Medication 100 MILLIEQUIVALENT(S): at 21:32

## 2022-09-29 RX ADMIN — HYDROMORPHONE HYDROCHLORIDE 1 MILLIGRAM(S): 2 INJECTION INTRAMUSCULAR; INTRAVENOUS; SUBCUTANEOUS at 09:15

## 2022-09-29 RX ADMIN — Medication 100 MILLIEQUIVALENT(S): at 17:12

## 2022-09-29 RX ADMIN — Medication 212 MILLIGRAM(S): at 19:46

## 2022-09-29 RX ADMIN — Medication 25 MILLIGRAM(S): at 22:30

## 2022-09-29 RX ADMIN — HYDROMORPHONE HYDROCHLORIDE 1 MILLIGRAM(S): 2 INJECTION INTRAMUSCULAR; INTRAVENOUS; SUBCUTANEOUS at 02:05

## 2022-09-29 RX ADMIN — ATORVASTATIN CALCIUM 20 MILLIGRAM(S): 80 TABLET, FILM COATED ORAL at 21:32

## 2022-09-29 RX ADMIN — HYDROMORPHONE HYDROCHLORIDE 1 MILLIGRAM(S): 2 INJECTION INTRAMUSCULAR; INTRAVENOUS; SUBCUTANEOUS at 08:50

## 2022-09-29 RX ADMIN — ENOXAPARIN SODIUM 40 MILLIGRAM(S): 100 INJECTION SUBCUTANEOUS at 11:10

## 2022-09-29 NOTE — PROGRESS NOTE ADULT - ASSESSMENT
68 yo female here with pmhx of HLD, Hypothyroidism, previous SBR in 7/2022. S/P Diagnostic lap with SBR.

## 2022-09-29 NOTE — PROGRESS NOTE ADULT - SUBJECTIVE AND OBJECTIVE BOX
PROGRESS NOTE  Patient is a 67y old  Female who presents with a chief complaint of Abdominal pain/SBO (29 Sep 2022 06:46)  Chart and available morning labs /imaging are reviewed electronically , urgent issues addressed . More information  is being added upon completion of rounds , when more information is collected and management discussed with consultants , medical staff and social service/case management on the floor     OVERNIGHT  No new issues reported by medical staff . All above noted Patient is resting in a bed comfortably .On full liquids  .No distress noted     HPI:  67 year old female w/ history of colon resection w/ Dr. Yanez, recurrent SBO (most recent admission 2 weeks ago, managed conservatively), HLD, hypothyroidism, presenting to [place]  ED with abdominal pain.  Patient states pain started around 3 am, described as sharp in her RLQ that radiated to her upper abdomen.  Endorses this is how she felt prior to her most recent hospital admission for a SBO.  Patient had nausea with multiple episodes of vomiting overnight and while in the ED.  Patient last ate Greek food for dinner at 7:30 pm, last BM yesterday morning.  Patient endorses since her last hospital admission she was in a good state of health and able to tolerate a regular diet.  Denies fevers, chills, chest pain, SOB, palpitations, calf pain.     (25 Sep 2022 09:21)    PAST MEDICAL & SURGICAL HISTORY:  Hypothyroidism      HLD (hyperlipidemia)      History of colon resection          MEDICATIONS  (STANDING):  atorvastatin 20 milliGRAM(s) Oral at bedtime  diphenhydrAMINE Injectable 25 milliGRAM(s) IV Push at bedtime  enoxaparin Injectable 40 milliGRAM(s) SubCutaneous every 24 hours  ibuprofen IVPB .. 600 milliGRAM(s) IV Intermittent every 6 hours  influenza  Vaccine (HIGH DOSE) 0.7 milliLiter(s) IntraMuscular once  levothyroxine 88 MICROGram(s) Oral daily  pantoprazole    Tablet 40 milliGRAM(s) Oral before breakfast  potassium chloride  10 mEq/100 mL IVPB 10 milliEquivalent(s) IV Intermittent every 1 hour  sodium chloride 0.9%. 1000 milliLiter(s) (100 mL/Hr) IV Continuous <Continuous>    MEDICATIONS  (PRN):  acetaminophen     Tablet .. 650 milliGRAM(s) Oral every 6 hours PRN Temp greater or equal to 38C (100.4F), Mild Pain (1 - 3)  calcium carbonate    500 mG (Tums) Chewable 2 Tablet(s) Chew three times a day PRN Heartburn  HYDROmorphone  Injectable 0.25 milliGRAM(s) IV Push every 4 hours PRN Moderate Pain (4 - 6)  HYDROmorphone  Injectable 0.5 milliGRAM(s) IV Push every 4 hours PRN Severe Pain (7 - 10)  metoclopramide Injectable 10 milliGRAM(s) IV Push every 6 hours PRN Nausea and/or Vomiting  ondansetron Injectable 4 milliGRAM(s) IV Push every 6 hours PRN Nausea  simethicone 80 milliGRAM(s) Chew three times a day PRN Gas      OBJECTIVE    T(C): 36.7 (09-29-22 @ 12:37), Max: 36.9 (09-29-22 @ 03:26)  HR: 80 (09-29-22 @ 12:37) (66 - 80)  BP: 124/79 (09-29-22 @ 12:37) (124/79 - 138/87)  RR: 16 (09-29-22 @ 12:37) (16 - 18)  SpO2: 94% (09-29-22 @ 12:37) (94% - 97%)  Wt(kg): --  I&O's Summary    28 Sep 2022 07:01  -  29 Sep 2022 07:00  --------------------------------------------------------  IN: 1100 mL / OUT: 0 mL / NET: 1100 mL          REVIEW OF SYSTEMS:  CONSTITUTIONAL: No fever, weight loss, or fatigue  EYES: No eye pain, visual disturbances, or discharge  ENMT:   No sinus or throat pain  NECK: No pain or stiffness  RESPIRATORY: No cough, wheezing, chills or hemoptysis; No shortness of breath  CARDIOVASCULAR: No chest pain, palpitations, dizziness, or leg swelling  GASTROINTESTINAL: No abdominal pain. No nausea, vomiting; No diarrhea or constipation. No melena or hematochezia.  GENITOURINARY: No dysuria, frequency, hematuria, or incontinence  NEUROLOGICAL: No headaches, memory loss, loss of strength, numbness, or tremors  SKIN: No itching, burning, rashes, or lesions   MUSCULOSKELETAL: No joint pain or swelling; No muscle, back, or extremity pain    PHYSICAL EXAM:  Appearance: NAD. VS past 24 hrs -as above   HEENT:   Moist oral mucosa. Conjunctiva clear b/l.   Neck : supple  Respiratory: Lungs CTAB.  Gastrointestinal:  Soft, nontender. No rebound. No rigidity. BS present	  Cardiovascular: RRR ,S1S2 present  Neurologic: Non-focal. Moving all extremities.  Extremities: No edema. No erythema. No calf tenderness.  Skin: No rashes, No ecchymoses, No cyanosis.	  wounds ,skin lesions-See skin assesment flow sheet   LABS:                        9.6    6.90  )-----------( 185      ( 29 Sep 2022 06:31 )             30.0     09-29    144  |  107  |  5<L>  ----------------------------<  87  3.2<L>   |  32<H>  |  0.63    Ca    8.5      29 Sep 2022 06:31  Phos  2.6     09-29  Mg     2.0     09-29    TPro  5.8<L>  /  Alb  2.9<L>  /  TBili  0.7  /  DBili  x   /  AST  23  /  ALT  32  /  AlkPhos  88  09-29    CAPILLARY BLOOD GLUCOSE              Culture - Urine (collected 26 Sep 2022 11:00)  Source: Clean Catch Clean Catch (Midstream)  Final Report (27 Sep 2022 12:07):    No growth      RADIOLOGY & ADDITIONAL TESTS:   reviewed elctronically  ASSESSMENT/PLAN: 	    25 minutes aggregate time was spent on this visit, 50% visit time spent in care co-ordination with other attendings and counselling patient .I have discussed care plan with patient / HCP/family member ,who expressed understanding of problems treatment and their effect and side effects, alternatives in details. I have asked if they have any questions and concerns and appropriately addressed them to best of my ability.

## 2022-09-29 NOTE — PROGRESS NOTE ADULT - PROBLEM SELECTOR PLAN 1
Continue VTe with lovenox  Continue CLD for now, possible advancement of diet.   Out of bed and ambulating.   Will start protonix for reflux (Takes at home)  Dressing change per Dr. Yanez for midline incision  Awaiting Am labs.   Will discuss with Dr. Yanez.

## 2022-09-29 NOTE — PROGRESS NOTE ADULT - SUBJECTIVE AND OBJECTIVE BOX
Postoperative Day #: *2    67y Female admitted with Intestinal obstruction  S/P Diagnostic lap with SBR      Patient seen and examined bedside resting comfortably.  No overnight events.  Currently with complaints of overnight diarrhea.  States it is watery and foul smelling.  Passing flatus with bowel movements.  Increased cramping with bowel movements. Also notes reflux regularly.  Is ambulating, voiding.  No other overnight events. no other complaints.     T(F): 98.2 (09-29-22 @ 04:56), Max: 98.4 (09-29-22 @ 03:26)  HR: 68 (09-29-22 @ 04:56) (63 - 81)  BP: 130/79 (09-29-22 @ 04:56) (111/64 - 148/62)  RR: 18 (09-29-22 @ 04:56) (18 - 20)  SpO2: 96% (09-29-22 @ 04:56) (96% - 98%)  Wt(kg): --  CAPILLARY BLOOD GLUCOSE          PHYSICAL EXAM:  General: NA  Neuro:  Alert & oriented   CV: +S1+S2 regular rate and rhythm  Lung: clear to ausculation bilaterally  Abdomen: BS+ Softly distended.  Mild tenderness to palpation. Incision covered with a dressing- dried blood noted.  Mild erythema/ecchymosis to the inferior aspect of incision just under 4x4 under the tegaderm.   Extremities: no pedal edema or calf tenderness noted       LABS:                        10.7   9.46  )-----------( 238      ( 28 Sep 2022 06:45 )             33.0     09-28    143  |  107  |  8   ----------------------------<  125<H>  3.7   |  30  |  0.79    Ca    9.1      28 Sep 2022 06:45  Phos  3.2     09-28  Mg     2.2     09-28        I&O's Detail    27 Sep 2022 07:01  -  28 Sep 2022 07:00  --------------------------------------------------------  IN:    IV PiggyBack: 200 mL    Lactated Ringers: 1100 mL    Lactated Ringers: 77 mL  Total IN: 1377 mL    OUT:    Oral Fluid: 0 mL    Voided (mL): 1200 mL  Total OUT: 1200 mL    Total NET: 177 mL      28 Sep 2022 07:01  -  29 Sep 2022 06:46  --------------------------------------------------------  IN:    Lactated Ringers: 1100 mL  Total IN: 1100 mL    OUT:  Total OUT: 0 mL    Total NET: 1100 mL            RADIOLOGY:

## 2022-09-29 NOTE — CHART NOTE - NSCHARTNOTEFT_GEN_A_CORE
Received call from RN patient complaining of abdominal pain and bloating after eating full liquid diet for lunch. Patient seen and examined bedside immediately, she reports eating Italian ices and tomato soup for lunch followed by worsening abdominal distention, 1 episode of diarrhea, and heartburn-type pain. She denies passing flatus. PPI started this AM.    Vital Signs Last 24 Hrs  T(C): 36.7 (29 Sep 2022 12:37), Max: 36.9 (29 Sep 2022 03:26)  T(F): 98 (29 Sep 2022 12:37), Max: 98.4 (29 Sep 2022 03:26)  HR: 80 (29 Sep 2022 12:37) (66 - 81)  BP: 124/79 (29 Sep 2022 12:37) (124/79 - 148/62)  RR: 16 (29 Sep 2022 12:37) (16 - 19)  SpO2: 94% (29 Sep 2022 12:37) (94% - 98%)    Parameters below as of 29 Sep 2022 12:37  Patient On (Oxygen Delivery Method): room air    GENERAL:  Well-nourished, well-developed female lying comfortably in bed in [facility].  HEENT:  NC/AT. Sclera white. Mucous membranes moist.  ABDOMEN:  Softly distended, +moderate incisional tenderness. Dressing over midline incision clean/dry. No rebound tenderness or guarding.  SKIN:  No jaundice, pallor, or cyanosis  NEURO:  A&O x 3    ASSESSMENT & PLAN  67 year old female POD#2 s/p laparoscopic lysis of adhesions and small bowel resection for SBO, now with GERD and abdominal distention after being advanced to full liquid diet today. R/o ileus.    - NPO for now, IV fluids  - AXR ordered  - Tums, simethicone PRN. Continue daily PPI.  - Limit narcotics  - Serial abdominal exams  - Encourage ambulation as tolerated  - Discussed with Dr. Yanez. Will continue to monitor. Received call from RN patient complaining of abdominal pain and bloating after eating full liquid diet for lunch. Patient seen and examined bedside immediately, she reports eating Italian ices and tomato soup for lunch followed by worsening abdominal distention, 1 episode of diarrhea, and heartburn-type pain. She denies passing flatus, nausea/vomiting. PPI started this AM.    Vital Signs Last 24 Hrs  T(C): 36.7 (29 Sep 2022 12:37), Max: 36.9 (29 Sep 2022 03:26)  T(F): 98 (29 Sep 2022 12:37), Max: 98.4 (29 Sep 2022 03:26)  HR: 80 (29 Sep 2022 12:37) (66 - 81)  BP: 124/79 (29 Sep 2022 12:37) (124/79 - 148/62)  RR: 16 (29 Sep 2022 12:37) (16 - 19)  SpO2: 94% (29 Sep 2022 12:37) (94% - 98%)    Parameters below as of 29 Sep 2022 12:37  Patient On (Oxygen Delivery Method): room air    GENERAL:  Well-nourished, well-developed female lying comfortably in bed in [facility].  HEENT:  NC/AT. Sclera white. Mucous membranes moist.  ABDOMEN:  Softly distended, +moderate incisional tenderness. Dressing over midline incision clean/dry. No rebound tenderness or guarding.  SKIN:  No jaundice, pallor, or cyanosis  NEURO:  A&O x 3    ASSESSMENT & PLAN  67 year old female POD#2 s/p laparoscopic lysis of adhesions and small bowel resection for SBO, now with GERD and abdominal distention after being advanced to full liquid diet today. R/o ileus.    - NPO for now, IV fluids  - AXR ordered  - Tums, simethicone PRN. Continue daily PPI.  - Limit narcotics  - Serial abdominal exams  - Encourage ambulation as tolerated  - Discussed with Dr. Yanez. Will continue to monitor.

## 2022-09-30 DIAGNOSIS — K21.9 GASTRO-ESOPHAGEAL REFLUX DISEASE WITHOUT ESOPHAGITIS: ICD-10-CM

## 2022-09-30 DIAGNOSIS — E87.6 HYPOKALEMIA: ICD-10-CM

## 2022-09-30 LAB
ANION GAP SERPL CALC-SCNC: 7 MMOL/L — SIGNIFICANT CHANGE UP (ref 5–17)
BUN SERPL-MCNC: 5 MG/DL — LOW (ref 7–23)
CALCIUM SERPL-MCNC: 8.6 MG/DL — SIGNIFICANT CHANGE UP (ref 8.5–10.1)
CHLORIDE SERPL-SCNC: 109 MMOL/L — HIGH (ref 96–108)
CO2 SERPL-SCNC: 29 MMOL/L — SIGNIFICANT CHANGE UP (ref 22–31)
CREAT SERPL-MCNC: 0.61 MG/DL — SIGNIFICANT CHANGE UP (ref 0.5–1.3)
EGFR: 98 ML/MIN/1.73M2 — SIGNIFICANT CHANGE UP
GLUCOSE SERPL-MCNC: 103 MG/DL — HIGH (ref 70–99)
HCT VFR BLD CALC: 32 % — LOW (ref 34.5–45)
HGB BLD-MCNC: 10.5 G/DL — LOW (ref 11.5–15.5)
MAGNESIUM SERPL-MCNC: 2.1 MG/DL — SIGNIFICANT CHANGE UP (ref 1.6–2.6)
MCHC RBC-ENTMCNC: 30.9 PG — SIGNIFICANT CHANGE UP (ref 27–34)
MCHC RBC-ENTMCNC: 32.8 GM/DL — SIGNIFICANT CHANGE UP (ref 32–36)
MCV RBC AUTO: 94.1 FL — SIGNIFICANT CHANGE UP (ref 80–100)
NRBC # BLD: 0 /100 WBCS — SIGNIFICANT CHANGE UP (ref 0–0)
PHOSPHATE SERPL-MCNC: 3.1 MG/DL — SIGNIFICANT CHANGE UP (ref 2.5–4.5)
PLATELET # BLD AUTO: 200 K/UL — SIGNIFICANT CHANGE UP (ref 150–400)
POTASSIUM SERPL-MCNC: 3.3 MMOL/L — LOW (ref 3.5–5.3)
POTASSIUM SERPL-SCNC: 3.3 MMOL/L — LOW (ref 3.5–5.3)
RBC # BLD: 3.4 M/UL — LOW (ref 3.8–5.2)
RBC # FLD: 12.3 % — SIGNIFICANT CHANGE UP (ref 10.3–14.5)
SODIUM SERPL-SCNC: 145 MMOL/L — SIGNIFICANT CHANGE UP (ref 135–145)
WBC # BLD: 5.68 K/UL — SIGNIFICANT CHANGE UP (ref 3.8–10.5)
WBC # FLD AUTO: 5.68 K/UL — SIGNIFICANT CHANGE UP (ref 3.8–10.5)

## 2022-09-30 PROCEDURE — 74018 RADEX ABDOMEN 1 VIEW: CPT | Mod: 26

## 2022-09-30 RX ORDER — POTASSIUM CHLORIDE 20 MEQ
20 PACKET (EA) ORAL
Refills: 0 | Status: COMPLETED | OUTPATIENT
Start: 2022-09-30 | End: 2022-09-30

## 2022-09-30 RX ORDER — ACETAMINOPHEN 500 MG
1000 TABLET ORAL EVERY 6 HOURS
Refills: 0 | Status: COMPLETED | OUTPATIENT
Start: 2022-09-30 | End: 2022-10-01

## 2022-09-30 RX ORDER — SODIUM CHLORIDE 9 MG/ML
1000 INJECTION INTRAMUSCULAR; INTRAVENOUS; SUBCUTANEOUS
Refills: 0 | Status: DISCONTINUED | OUTPATIENT
Start: 2022-09-30 | End: 2022-10-02

## 2022-09-30 RX ORDER — IBUPROFEN 200 MG
600 TABLET ORAL EVERY 6 HOURS
Refills: 0 | Status: COMPLETED | OUTPATIENT
Start: 2022-09-30 | End: 2022-10-01

## 2022-09-30 RX ORDER — ALPRAZOLAM 0.25 MG
0.25 TABLET ORAL DAILY
Refills: 0 | Status: DISCONTINUED | OUTPATIENT
Start: 2022-09-30 | End: 2022-10-03

## 2022-09-30 RX ADMIN — Medication 25 MILLIGRAM(S): at 22:04

## 2022-09-30 RX ADMIN — Medication 400 MILLIGRAM(S): at 10:19

## 2022-09-30 RX ADMIN — ENOXAPARIN SODIUM 40 MILLIGRAM(S): 100 INJECTION SUBCUTANEOUS at 11:19

## 2022-09-30 RX ADMIN — Medication 0.25 MILLIGRAM(S): at 10:19

## 2022-09-30 RX ADMIN — Medication 20 MILLIEQUIVALENT(S): at 13:13

## 2022-09-30 RX ADMIN — Medication 400 MILLIGRAM(S): at 21:11

## 2022-09-30 RX ADMIN — Medication 212 MILLIGRAM(S): at 11:18

## 2022-09-30 RX ADMIN — Medication 1000 MILLIGRAM(S): at 10:35

## 2022-09-30 RX ADMIN — Medication 600 MILLIGRAM(S): at 07:45

## 2022-09-30 RX ADMIN — PANTOPRAZOLE SODIUM 40 MILLIGRAM(S): 20 TABLET, DELAYED RELEASE ORAL at 05:30

## 2022-09-30 RX ADMIN — HYDROMORPHONE HYDROCHLORIDE 0.5 MILLIGRAM(S): 2 INJECTION INTRAMUSCULAR; INTRAVENOUS; SUBCUTANEOUS at 02:50

## 2022-09-30 RX ADMIN — HYDROMORPHONE HYDROCHLORIDE 0.5 MILLIGRAM(S): 2 INJECTION INTRAMUSCULAR; INTRAVENOUS; SUBCUTANEOUS at 03:20

## 2022-09-30 RX ADMIN — Medication 212 MILLIGRAM(S): at 01:28

## 2022-09-30 RX ADMIN — Medication 400 MILLIGRAM(S): at 15:14

## 2022-09-30 RX ADMIN — Medication 212 MILLIGRAM(S): at 07:26

## 2022-09-30 RX ADMIN — Medication 88 MICROGRAM(S): at 05:30

## 2022-09-30 RX ADMIN — Medication 20 MILLIEQUIVALENT(S): at 17:07

## 2022-09-30 RX ADMIN — Medication 212 MILLIGRAM(S): at 17:07

## 2022-09-30 RX ADMIN — Medication 1000 MILLIGRAM(S): at 22:11

## 2022-09-30 RX ADMIN — Medication 600 MILLIGRAM(S): at 02:00

## 2022-09-30 RX ADMIN — ATORVASTATIN CALCIUM 20 MILLIGRAM(S): 80 TABLET, FILM COATED ORAL at 21:11

## 2022-09-30 NOTE — PROGRESS NOTE ADULT - PROBLEM SELECTOR PLAN 1
s/p r diagnostic laparoscopy and redo of anastomosis 09/26/22 Continue CLD for now, possible advancement of diet. Out of bed and ambulating.

## 2022-09-30 NOTE — PROGRESS NOTE ADULT - PROBLEM SELECTOR PLAN 1
Awaiting Am labs  AM KUB  Will start on Xanax as patient would like something for her anxiety  Round the clock [facility] to avoid dilaudid usage., discussed with pharmacy  Once further GI function, will start sips and chips.   Strongly encourage ambulation and out of bed.   Daily midline incision dressing changes.   Will discuss with Dr. Yanez.

## 2022-09-30 NOTE — PROGRESS NOTE ADULT - ASSESSMENT
66 yo female here with pmhx of HTN, HLD, hashimotis, history of SBO< now s./p diagnostic lap with SBR.  Currently with periincisional pain.  Minimal flatus with sitting, and no BM as of yet. Spontaneously voiding.

## 2022-09-30 NOTE — PROGRESS NOTE ADULT - SUBJECTIVE AND OBJECTIVE BOX
PROGRESS NOTE  Patient is a 67y old  Female who presents with a chief complaint of Abdominal pain/SBO (30 Sep 2022 08:53)  Chart and available morning labs /imaging are reviewed electronically , urgent issues addressed . More information  is being added upon completion of rounds , when more information is collected and management discussed with consultants , medical staff and social service/case management on the floor     OVERNIGHT  No new issues reported by medical staff . All above noted Patient is resting in a bed comfortably .NPO as per sx due to abd disomfort ,will advance slowly  .No distress noted   c/of gas ,simeticone prn ordered   HPI:  67 year old female w/ history of colon resection w/ Dr. Yanez, recurrent SBO (most recent admission 2 weeks ago, managed conservatively), HLD, hypothyroidism, presenting to [place]  ED with abdominal pain.  Patient states pain started around 3 am, described as sharp in her RLQ that radiated to her upper abdomen.  Endorses this is how she felt prior to her most recent hospital admission for a SBO.  Patient had nausea with multiple episodes of vomiting overnight and while in the ED.  Patient last ate Greek food for dinner at 7:30 pm, last BM yesterday morning.  Patient endorses since her last hospital admission she was in a good state of health and able to tolerate a regular diet.  Denies fevers, chills, chest pain, SOB, palpitations, calf pain.     (25 Sep 2022 09:21)    PAST MEDICAL & SURGICAL HISTORY:  Hypothyroidism      HLD (hyperlipidemia)      History of colon resection          MEDICATIONS  (STANDING):  acetaminophen   IVPB .. 1000 milliGRAM(s) IV Intermittent every 6 hours  atorvastatin 20 milliGRAM(s) Oral at bedtime  diphenhydrAMINE Injectable 25 milliGRAM(s) IV Push at bedtime  enoxaparin Injectable 40 milliGRAM(s) SubCutaneous every 24 hours  ibuprofen IVPB .. 600 milliGRAM(s) IV Intermittent every 6 hours  influenza  Vaccine (HIGH DOSE) 0.7 milliLiter(s) IntraMuscular once  levothyroxine 88 MICROGram(s) Oral daily  pantoprazole    Tablet 40 milliGRAM(s) Oral before breakfast  potassium chloride    Tablet ER 20 milliEquivalent(s) Oral two times a day  sodium chloride 0.9%. 1000 milliLiter(s) (42 mL/Hr) IV Continuous <Continuous>    MEDICATIONS  (PRN):  acetaminophen     Tablet .. 650 milliGRAM(s) Oral every 6 hours PRN Temp greater or equal to 38C (100.4F), Mild Pain (1 - 3)  ALPRAZolam 0.25 milliGRAM(s) Oral daily PRN anxiety  calcium carbonate    500 mG (Tums) Chewable 2 Tablet(s) Chew three times a day PRN Heartburn  HYDROmorphone  Injectable 0.25 milliGRAM(s) IV Push every 4 hours PRN Moderate Pain (4 - 6)  HYDROmorphone  Injectable 0.5 milliGRAM(s) IV Push every 4 hours PRN Severe Pain (7 - 10)  metoclopramide Injectable 10 milliGRAM(s) IV Push every 6 hours PRN Nausea and/or Vomiting  ondansetron Injectable 4 milliGRAM(s) IV Push every 6 hours PRN Nausea  simethicone 80 milliGRAM(s) Chew three times a day PRN Gas      OBJECTIVE    T(C): 37.1 (09-30-22 @ 12:48), Max: 37.1 (09-30-22 @ 12:48)  HR: 67 (09-30-22 @ 12:48) (59 - 72)  BP: 128/78 (09-30-22 @ 12:48) (128/78 - 146/86)  RR: 16 (09-30-22 @ 12:48) (16 - 20)  SpO2: 95% (09-30-22 @ 12:48) (95% - 97%)  Wt(kg): --  I&O's Summary        REVIEW OF SYSTEMS:  CONSTITUTIONAL: No fever, weight loss, or fatigue  EYES: No eye pain, visual disturbances, or discharge  ENMT:   No sinus or throat pain  NECK: No pain or stiffness  RESPIRATORY: No cough, wheezing, chills or hemoptysis; No shortness of breath  CARDIOVASCULAR: No chest pain, palpitations, dizziness, or leg swelling  GASTROINTESTINAL: No abdominal pain. No nausea, vomiting; No diarrhea or constipation. No melena or hematochezia.  GENITOURINARY: No dysuria, frequency, hematuria, or incontinence  NEUROLOGICAL: No headaches, memory loss, loss of strength, numbness, or tremors  SKIN: No itching, burning, rashes, or lesions   MUSCULOSKELETAL: No joint pain or swelling; No muscle, back, or extremity pain    PHYSICAL EXAM:  Appearance: NAD. VS past 24 hrs -as above   HEENT:   Moist oral mucosa. Conjunctiva clear b/l.   Neck : supple  Respiratory: Lungs CTAB.  Gastrointestinal:  Soft, nontender. No rebound. No rigidity. BS present	  Cardiovascular: RRR ,S1S2 present  Neurologic: Non-focal. Moving all extremities.  Extremities: No edema. No erythema. No calf tenderness.  Skin: No rashes, No ecchymoses, No cyanosis.	  wounds ,skin lesions-See skin assesment flow sheet   LABS:                        10.5   5.68  )-----------( 200      ( 30 Sep 2022 07:25 )             32.0     09-30    145  |  109<H>  |  5<L>  ----------------------------<  103<H>  3.3<L>   |  29  |  0.61    Ca    8.6      30 Sep 2022 07:25  Phos  3.1     09-30  Mg     2.1     09-30    TPro  5.8<L>  /  Alb  2.9<L>  /  TBili  0.7  /  DBili  x   /  AST  23  /  ALT  32  /  AlkPhos  88  09-29    CAPILLARY BLOOD GLUCOSE              Culture - Urine (collected 26 Sep 2022 11:00)  Source: Clean Catch Clean Catch (Midstream)  Final Report (27 Sep 2022 12:07):    No growth      RADIOLOGY & ADDITIONAL TESTS:   reviewed elctronically  ASSESSMENT/PLAN: 	  25 minutes aggregate time was spent on this visit, 50% visit time spent in care co-ordination with other attendings and counselling patient .I have discussed care plan with patient / HCP/family member ,who expressed understanding of problems treatment and their effect and side effects, alternatives in details. I have asked if they have any questions and concerns and appropriately addressed them to best of my ability.

## 2022-09-30 NOTE — PROGRESS NOTE ADULT - SUBJECTIVE AND OBJECTIVE BOX
Postoperative Day #:3    67y Female admitted with Intestinal obstruction  S/P Diagnostic lap with SBR      Patient seen and examined bedside resting.  Complaining of incisional pain, spasms. States she feels abdominal movement. Passed flatus while sitting.  No bowel movements.  Voiding without issue.  Currently NPO as she had an issue with distention, reflux.  Pt notes pain around the periincisionally.  Is ambulating.  No other overnight events.       T(F): 98 (09-30-22 @ 04:20), Max: 98.6 (09-29-22 @ 20:23)  HR: 59 (09-30-22 @ 04:20) (59 - 80)  BP: 134/73 (09-30-22 @ 04:20) (124/79 - 146/86)  RR: 17 (09-30-22 @ 04:20) (16 - 20)  SpO2: 95% (09-30-22 @ 04:20) (94% - 97%)  Wt(kg): --  CAPILLARY BLOOD GLUCOSE          PHYSICAL EXAM:  General: NAD, although does appear uncomfortable, holding periincisional area.   Neuro:  Alert & oriented  Abdomen: BS+ Soft, Non distended.  Incision healing well.  + Ecchymosis noted at the distal aspect of midline incision.  Extremities: no pedal edema or calf tenderness noted       LABS:                        9.6    6.90  )-----------( 185      ( 29 Sep 2022 06:31 )             30.0     09-29    144  |  107  |  5<L>  ----------------------------<  87  3.2<L>   |  32<H>  |  0.63    Ca    8.5      29 Sep 2022 06:31  Phos  2.6     09-29  Mg     2.0     09-29    TPro  5.8<L>  /  Alb  2.9<L>  /  TBili  0.7  /  DBili  x   /  AST  23  /  ALT  32  /  AlkPhos  88  09-29      I&O's Detail        RADIOLOGY:  < from: Xray Abdomen 2 Views (09.29.22 @ 15:57) >    ACC: [id number] EXAM:  XR ABDOMEN 2V                          PROCEDURE DATE:  09/29/2022          INTERPRETATION:  Supine and erect abdominal films. 3 images. Patient had   small bowel resection.    Dilated air-fluid levels are seen in small bowel and transverse colon.   Very little content is seen distal to the splenic fracture.    IMPRESSION: As above.    --- End of Report ---            FINA SANABRIA MD; Attending Radiologist  This document has been electronically signed. Sep 29 2022  4:03PM    < end of copied text >

## 2022-10-01 DIAGNOSIS — K56.7 ILEUS, UNSPECIFIED: ICD-10-CM

## 2022-10-01 LAB
ALBUMIN SERPL ELPH-MCNC: 2.9 G/DL — LOW (ref 3.3–5)
ALP SERPL-CCNC: 102 U/L — SIGNIFICANT CHANGE UP (ref 40–120)
ALT FLD-CCNC: 41 U/L — SIGNIFICANT CHANGE UP (ref 12–78)
ANION GAP SERPL CALC-SCNC: 7 MMOL/L — SIGNIFICANT CHANGE UP (ref 5–17)
AST SERPL-CCNC: 20 U/L — SIGNIFICANT CHANGE UP (ref 15–37)
BILIRUB SERPL-MCNC: 0.8 MG/DL — SIGNIFICANT CHANGE UP (ref 0.2–1.2)
BUN SERPL-MCNC: 4 MG/DL — LOW (ref 7–23)
CALCIUM SERPL-MCNC: 8.4 MG/DL — LOW (ref 8.5–10.1)
CHLORIDE SERPL-SCNC: 112 MMOL/L — HIGH (ref 96–108)
CO2 SERPL-SCNC: 27 MMOL/L — SIGNIFICANT CHANGE UP (ref 22–31)
CREAT SERPL-MCNC: 0.61 MG/DL — SIGNIFICANT CHANGE UP (ref 0.5–1.3)
EGFR: 98 ML/MIN/1.73M2 — SIGNIFICANT CHANGE UP
GLUCOSE SERPL-MCNC: 103 MG/DL — HIGH (ref 70–99)
HCT VFR BLD CALC: 32.7 % — LOW (ref 34.5–45)
HGB BLD-MCNC: 10.5 G/DL — LOW (ref 11.5–15.5)
MAGNESIUM SERPL-MCNC: 2 MG/DL — SIGNIFICANT CHANGE UP (ref 1.6–2.6)
MCHC RBC-ENTMCNC: 30 PG — SIGNIFICANT CHANGE UP (ref 27–34)
MCHC RBC-ENTMCNC: 32.1 GM/DL — SIGNIFICANT CHANGE UP (ref 32–36)
MCV RBC AUTO: 93.4 FL — SIGNIFICANT CHANGE UP (ref 80–100)
NRBC # BLD: 0 /100 WBCS — SIGNIFICANT CHANGE UP (ref 0–0)
PHOSPHATE SERPL-MCNC: 3.1 MG/DL — SIGNIFICANT CHANGE UP (ref 2.5–4.5)
PLATELET # BLD AUTO: 198 K/UL — SIGNIFICANT CHANGE UP (ref 150–400)
POTASSIUM SERPL-MCNC: 3.6 MMOL/L — SIGNIFICANT CHANGE UP (ref 3.5–5.3)
POTASSIUM SERPL-SCNC: 3.6 MMOL/L — SIGNIFICANT CHANGE UP (ref 3.5–5.3)
PROT SERPL-MCNC: 6.2 G/DL — SIGNIFICANT CHANGE UP (ref 6–8.3)
RBC # BLD: 3.5 M/UL — LOW (ref 3.8–5.2)
RBC # FLD: 12.5 % — SIGNIFICANT CHANGE UP (ref 10.3–14.5)
SODIUM SERPL-SCNC: 146 MMOL/L — HIGH (ref 135–145)
WBC # BLD: 5.63 K/UL — SIGNIFICANT CHANGE UP (ref 3.8–10.5)
WBC # FLD AUTO: 5.63 K/UL — SIGNIFICANT CHANGE UP (ref 3.8–10.5)

## 2022-10-01 RX ORDER — ACETAMINOPHEN 500 MG
650 TABLET ORAL EVERY 6 HOURS
Refills: 0 | Status: DISCONTINUED | OUTPATIENT
Start: 2022-10-01 | End: 2022-10-03

## 2022-10-01 RX ORDER — IBUPROFEN 200 MG
600 TABLET ORAL EVERY 6 HOURS
Refills: 0 | Status: DISCONTINUED | OUTPATIENT
Start: 2022-10-01 | End: 2022-10-03

## 2022-10-01 RX ORDER — POLYETHYLENE GLYCOL 3350 17 G/17G
17 POWDER, FOR SOLUTION ORAL DAILY
Refills: 0 | Status: DISCONTINUED | OUTPATIENT
Start: 2022-10-01 | End: 2022-10-03

## 2022-10-01 RX ADMIN — SIMETHICONE 80 MILLIGRAM(S): 80 TABLET, CHEWABLE ORAL at 22:22

## 2022-10-01 RX ADMIN — Medication 25 MILLIGRAM(S): at 22:22

## 2022-10-01 RX ADMIN — Medication 1000 MILLIGRAM(S): at 03:15

## 2022-10-01 RX ADMIN — PANTOPRAZOLE SODIUM 40 MILLIGRAM(S): 20 TABLET, DELAYED RELEASE ORAL at 06:22

## 2022-10-01 RX ADMIN — Medication 650 MILLIGRAM(S): at 17:20

## 2022-10-01 RX ADMIN — Medication 212 MILLIGRAM(S): at 00:13

## 2022-10-01 RX ADMIN — Medication 600 MILLIGRAM(S): at 06:10

## 2022-10-01 RX ADMIN — Medication 600 MILLIGRAM(S): at 17:20

## 2022-10-01 RX ADMIN — Medication 400 MILLIGRAM(S): at 02:59

## 2022-10-01 RX ADMIN — Medication 88 MICROGRAM(S): at 05:58

## 2022-10-01 RX ADMIN — Medication 650 MILLIGRAM(S): at 18:20

## 2022-10-01 RX ADMIN — ENOXAPARIN SODIUM 40 MILLIGRAM(S): 100 INJECTION SUBCUTANEOUS at 11:30

## 2022-10-01 RX ADMIN — Medication 212 MILLIGRAM(S): at 05:58

## 2022-10-01 RX ADMIN — HYDROMORPHONE HYDROCHLORIDE 0.5 MILLIGRAM(S): 2 INJECTION INTRAMUSCULAR; INTRAVENOUS; SUBCUTANEOUS at 04:44

## 2022-10-01 RX ADMIN — Medication 600 MILLIGRAM(S): at 12:45

## 2022-10-01 RX ADMIN — Medication 212 MILLIGRAM(S): at 11:30

## 2022-10-01 RX ADMIN — HYDROMORPHONE HYDROCHLORIDE 0.5 MILLIGRAM(S): 2 INJECTION INTRAMUSCULAR; INTRAVENOUS; SUBCUTANEOUS at 04:29

## 2022-10-01 RX ADMIN — Medication 600 MILLIGRAM(S): at 18:20

## 2022-10-01 RX ADMIN — Medication 600 MILLIGRAM(S): at 00:30

## 2022-10-01 RX ADMIN — POLYETHYLENE GLYCOL 3350 17 GRAM(S): 17 POWDER, FOR SOLUTION ORAL at 17:50

## 2022-10-01 RX ADMIN — Medication 0.25 MILLIGRAM(S): at 11:30

## 2022-10-01 RX ADMIN — ATORVASTATIN CALCIUM 20 MILLIGRAM(S): 80 TABLET, FILM COATED ORAL at 22:22

## 2022-10-01 NOTE — PROGRESS NOTE ADULT - PROBLEM SELECTOR PROBLEM 6
Prophylactic measure
HLD (hyperlipidemia)
Prophylactic measure

## 2022-10-01 NOTE — PROGRESS NOTE ADULT - ASSESSMENT
66 yo female here with pmhx of HTN, HLD, hashimotis, POD#4 s/p diagnostic lap with SBR for SBO, with postop ileus, resolving. 67 year old female with pmhx of HTN, HLD, hashimotos, POD#4 s/p diagnostic lap with SBR for SBO, with postop ileus, resolving.

## 2022-10-01 NOTE — PROGRESS NOTE ADULT - SUBJECTIVE AND OBJECTIVE BOX
PROGRESS NOTE  Patient is a 67y old  Female who presents with a chief complaint of Abdominal pain/SBO (01 Oct 2022 07:02)    Chart and available morning labs /imaging are reviewed electronically , urgent issues addressed . More information  is being added upon completion of rounds , when more information is collected and management discussed with consultants , medical staff and social service/case management on the floor   OVERNIGHT      HPI:  67 year old female w/ history of colon resection w/ Dr. Yanez, recurrent SBO (most recent admission 2 weeks ago, managed conservatively), HLD, hypothyroidism, presenting to [place]  ED with abdominal pain.  Patient states pain started around 3 am, described as sharp in her RLQ that radiated to her upper abdomen.  Endorses this is how she felt prior to her most recent hospital admission for a SBO.  Patient had nausea with multiple episodes of vomiting overnight and while in the ED.  Patient last ate Greek food for dinner at 7:30 pm, last BM yesterday morning.  Patient endorses since her last hospital admission she was in a good state of health and able to tolerate a regular diet.  Denies fevers, chills, chest pain, SOB, palpitations, calf pain.     (25 Sep 2022 09:21)    PAST MEDICAL & SURGICAL HISTORY:  Hypothyroidism      HLD (hyperlipidemia)      History of colon resection          MEDICATIONS  (STANDING):  atorvastatin 20 milliGRAM(s) Oral at bedtime  diphenhydrAMINE Injectable 25 milliGRAM(s) IV Push at bedtime  enoxaparin Injectable 40 milliGRAM(s) SubCutaneous every 24 hours  ibuprofen IVPB .. 600 milliGRAM(s) IV Intermittent every 6 hours  influenza  Vaccine (HIGH DOSE) 0.7 milliLiter(s) IntraMuscular once  levothyroxine 88 MICROGram(s) Oral daily  pantoprazole    Tablet 40 milliGRAM(s) Oral before breakfast  sodium chloride 0.9%. 1000 milliLiter(s) (42 mL/Hr) IV Continuous <Continuous>    MEDICATIONS  (PRN):  acetaminophen     Tablet .. 650 milliGRAM(s) Oral every 6 hours PRN Temp greater or equal to 38C (100.4F), Mild Pain (1 - 3)  ALPRAZolam 0.25 milliGRAM(s) Oral daily PRN anxiety  calcium carbonate    500 mG (Tums) Chewable 2 Tablet(s) Chew three times a day PRN Heartburn  HYDROmorphone  Injectable 0.25 milliGRAM(s) IV Push every 4 hours PRN Moderate Pain (4 - 6)  HYDROmorphone  Injectable 0.5 milliGRAM(s) IV Push every 4 hours PRN Severe Pain (7 - 10)  metoclopramide Injectable 10 milliGRAM(s) IV Push every 6 hours PRN Nausea and/or Vomiting  ondansetron Injectable 4 milliGRAM(s) IV Push every 6 hours PRN Nausea  simethicone 80 milliGRAM(s) Chew three times a day PRN Gas      OBJECTIVE    T(C): 36.6 (10-01-22 @ 04:58), Max: 37.1 (09-30-22 @ 12:48)  HR: 67 (10-01-22 @ 04:58) (67 - 67)  BP: 156/85 (10-01-22 @ 04:58) (128/78 - 156/85)  RR: 18 (10-01-22 @ 04:58) (16 - 18)  SpO2: 96% (10-01-22 @ 04:58) (95% - 96%)  Wt(kg): --  I&O's Summary    30 Sep 2022 07:01  -  01 Oct 2022 07:00  --------------------------------------------------------  IN: 504 mL / OUT: 0 mL / NET: 504 mL          REVIEW OF SYSTEMS:  CONSTITUTIONAL: No fever, weight loss, or fatigue  EYES: No eye pain, visual disturbances, or discharge  ENMT:   No sinus or throat pain  NECK: No pain or stiffness  RESPIRATORY: No cough, wheezing, chills or hemoptysis; No shortness of breath  CARDIOVASCULAR: No chest pain, palpitations, dizziness, or leg swelling  GASTROINTESTINAL: No abdominal pain. No nausea, vomiting; No diarrhea or constipation. No melena or hematochezia.  GENITOURINARY: No dysuria, frequency, hematuria, or incontinence  NEUROLOGICAL: No headaches, memory loss, loss of strength, numbness, or tremors  SKIN: No itching, burning, rashes, or lesions   MUSCULOSKELETAL: No joint pain or swelling; No muscle, back, or extremity pain    PHYSICAL EXAM:  Appearance: NAD. VS past 24 hrs -as above   HEENT:   Moist oral mucosa. Conjunctiva clear b/l.   Neck : supple  Respiratory: Lungs CTAB.  Gastrointestinal:  Soft, nontender. No rebound. No rigidity. BS present	  Cardiovascular: RRR ,S1S2 present  Neurologic: Non-focal. Moving all extremities.  Extremities: No edema. No erythema. No calf tenderness.  Skin: No rashes, No ecchymoses, No cyanosis.	  wounds ,skin lesions-See skin assesment flow sheet   LABS:                        10.5   5.63  )-----------( 198      ( 01 Oct 2022 06:10 )             32.7     10-01    146<H>  |  112<H>  |  4<L>  ----------------------------<  103<H>  3.6   |  27  |  0.61    Ca    8.4<L>      01 Oct 2022 06:10  Phos  3.1     10-01  Mg     2.0     10-01    TPro  6.2  /  Alb  2.9<L>  /  TBili  0.8  /  DBili  x   /  AST  20  /  ALT  41  /  AlkPhos  102  10-01    CAPILLARY BLOOD GLUCOSE              Culture - Urine (collected 26 Sep 2022 11:00)  Source: Clean Catch Clean Catch (Midstream)  Final Report (27 Sep 2022 12:07):    No growth      RADIOLOGY & ADDITIONAL TESTS:   reviewed elctronically  ASSESSMENT/PLAN: 	     PROGRESS NOTE  Patient is a 67y old  Female who presents with a chief complaint of Abdominal pain/SBO (01 Oct 2022 07:02)    Chart and available morning labs /imaging are reviewed electronically , urgent issues addressed . More information  is being added upon completion of rounds , when more information is collected and management discussed with consultants , medical staff and social service/case management on the floor   OVERNIGHT  Feels much better ,denies abd pain ,no n/v ,tolerates liquid diet well Slept well    HPI:  67 year old female w/ history of colon resection w/ Dr. Yanez, recurrent SBO (most recent admission 2 weeks ago, managed conservatively), HLD, hypothyroidism, presenting to [place]  ED with abdominal pain.  Patient states pain started around 3 am, described as sharp in her RLQ that radiated to her upper abdomen.  Endorses this is how she felt prior to her most recent hospital admission for a SBO.  Patient had nausea with multiple episodes of vomiting overnight and while in the ED.  Patient last ate Greek food for dinner at 7:30 pm, last BM yesterday morning.  Patient endorses since her last hospital admission she was in a good state of health and able to tolerate a regular diet.  Denies fevers, chills, chest pain, SOB, palpitations, calf pain.     (25 Sep 2022 09:21)    PAST MEDICAL & SURGICAL HISTORY:  Hypothyroidism      HLD (hyperlipidemia)      History of colon resection          MEDICATIONS  (STANDING):  atorvastatin 20 milliGRAM(s) Oral at bedtime  diphenhydrAMINE Injectable 25 milliGRAM(s) IV Push at bedtime  enoxaparin Injectable 40 milliGRAM(s) SubCutaneous every 24 hours  ibuprofen IVPB .. 600 milliGRAM(s) IV Intermittent every 6 hours  influenza  Vaccine (HIGH DOSE) 0.7 milliLiter(s) IntraMuscular once  levothyroxine 88 MICROGram(s) Oral daily  pantoprazole    Tablet 40 milliGRAM(s) Oral before breakfast  sodium chloride 0.9%. 1000 milliLiter(s) (42 mL/Hr) IV Continuous <Continuous>    MEDICATIONS  (PRN):  acetaminophen     Tablet .. 650 milliGRAM(s) Oral every 6 hours PRN Temp greater or equal to 38C (100.4F), Mild Pain (1 - 3)  ALPRAZolam 0.25 milliGRAM(s) Oral daily PRN anxiety  calcium carbonate    500 mG (Tums) Chewable 2 Tablet(s) Chew three times a day PRN Heartburn  HYDROmorphone  Injectable 0.25 milliGRAM(s) IV Push every 4 hours PRN Moderate Pain (4 - 6)  HYDROmorphone  Injectable 0.5 milliGRAM(s) IV Push every 4 hours PRN Severe Pain (7 - 10)  metoclopramide Injectable 10 milliGRAM(s) IV Push every 6 hours PRN Nausea and/or Vomiting  ondansetron Injectable 4 milliGRAM(s) IV Push every 6 hours PRN Nausea  simethicone 80 milliGRAM(s) Chew three times a day PRN Gas      OBJECTIVE    T(C): 36.6 (10-01-22 @ 04:58), Max: 37.1 (09-30-22 @ 12:48)  HR: 67 (10-01-22 @ 04:58) (67 - 67)  BP: 156/85 (10-01-22 @ 04:58) (128/78 - 156/85)  RR: 18 (10-01-22 @ 04:58) (16 - 18)  SpO2: 96% (10-01-22 @ 04:58) (95% - 96%)  Wt(kg): --  I&O's Summary    30 Sep 2022 07:01  -  01 Oct 2022 07:00  --------------------------------------------------------  IN: 504 mL / OUT: 0 mL / NET: 504 mL          REVIEW OF SYSTEMS:  CONSTITUTIONAL: No fever, weight loss, or fatigue  EYES: No eye pain, visual disturbances, or discharge  ENMT:   No sinus or throat pain  NECK: No pain or stiffness  RESPIRATORY: No cough, wheezing, chills or hemoptysis; No shortness of breath  CARDIOVASCULAR: No chest pain, palpitations, dizziness, or leg swelling  GASTROINTESTINAL: No abdominal pain. No nausea, vomiting; No diarrhea or constipation. No melena or hematochezia.  GENITOURINARY: No dysuria, frequency, hematuria, or incontinence  NEUROLOGICAL: No headaches, memory loss, loss of strength, numbness, or tremors  SKIN: No itching, burning, rashes, or lesions   MUSCULOSKELETAL: No joint pain or swelling; No muscle, back, or extremity pain    PHYSICAL EXAM:  Appearance: NAD. VS past 24 hrs -as above   HEENT:   Moist oral mucosa. Conjunctiva clear b/l.   Neck : supple  Respiratory: Lungs CTAB.  Gastrointestinal:  Soft, nontender. No rebound. No rigidity. BS present	  Cardiovascular: RRR ,S1S2 present  Neurologic: Non-focal. Moving all extremities.  Extremities: No edema. No erythema. No calf tenderness.  Skin: No rashes, No ecchymoses, No cyanosis.	  wounds ,skin lesions-See skin assesment flow sheet   LABS:                        10.5   5.63  )-----------( 198      ( 01 Oct 2022 06:10 )             32.7     10-01    146<H>  |  112<H>  |  4<L>  ----------------------------<  103<H>  3.6   |  27  |  0.61    Ca    8.4<L>      01 Oct 2022 06:10  Phos  3.1     10-01  Mg     2.0     10-01    TPro  6.2  /  Alb  2.9<L>  /  TBili  0.8  /  DBili  x   /  AST  20  /  ALT  41  /  AlkPhos  102  10-01    CAPILLARY BLOOD GLUCOSE              Culture - Urine (collected 26 Sep 2022 11:00)  Source: Clean Catch Clean Catch (Midstream)  Final Report (27 Sep 2022 12:07):    No growth      RADIOLOGY & ADDITIONAL TESTS:   reviewed elctronically  ASSESSMENT/PLAN: 	  25 minutes aggregate time was spent on this visit, 50% visit time spent in care co-ordination with other attendings and counselling patient .I have discussed care plan with patient / HCP/family member ,who expressed understanding of problems treatment and their effect and side effects, alternatives in details. I have asked if they have any questions and concerns and appropriately addressed them to best of my ability.

## 2022-10-01 NOTE — CHART NOTE - NSCHARTNOTEFT_GEN_A_CORE
Assessment:   Pt seen for nutrition follow up.  Chart reviewed, hospital course noted.     Brief History: 66 yo female here with pmhx of HTN, HLD, Hashimoto's, POD#4 s/p diagnostic lap with SBR for SBO, with postop ileus, resolving.  Pt denies BM since 9/25, +flatus.  Reports -149#.    Factors impacting intake: [ ] none [ ] nausea  [ ] vomiting [ ] diarrhea [ ] constipation  [ ]chewing problems [ ] swallowing issues  [x] other: PSBO    Diet Prescription: Diet, Clear Liquid (09-30-22 @ 14:39)    Intake: tolerating clears, does not like ensure clear supplement    Current Weight: 9/26 154.3#, 10/1 152.3#      Pertinent Medications: MEDICATIONS  (STANDING):  atorvastatin 20 milliGRAM(s) Oral at bedtime  diphenhydrAMINE Injectable 25 milliGRAM(s) IV Push at bedtime  enoxaparin Injectable 40 milliGRAM(s) SubCutaneous every 24 hours  ibuprofen IVPB .. 600 milliGRAM(s) IV Intermittent every 6 hours  influenza  Vaccine (HIGH DOSE) 0.7 milliLiter(s) IntraMuscular once  levothyroxine 88 MICROGram(s) Oral daily  pantoprazole    Tablet 40 milliGRAM(s) Oral before breakfast  sodium chloride 0.9%. 1000 milliLiter(s) (42 mL/Hr) IV Continuous <Continuous>    MEDICATIONS  (PRN):  acetaminophen     Tablet .. 650 milliGRAM(s) Oral every 6 hours PRN Temp greater or equal to 38C (100.4F), Mild Pain (1 - 3)  ALPRAZolam 0.25 milliGRAM(s) Oral daily PRN anxiety  calcium carbonate    500 mG (Tums) Chewable 2 Tablet(s) Chew three times a day PRN Heartburn  HYDROmorphone  Injectable 0.25 milliGRAM(s) IV Push every 4 hours PRN Moderate Pain (4 - 6)  HYDROmorphone  Injectable 0.5 milliGRAM(s) IV Push every 4 hours PRN Severe Pain (7 - 10)  metoclopramide Injectable 10 milliGRAM(s) IV Push every 6 hours PRN Nausea and/or Vomiting  ondansetron Injectable 4 milliGRAM(s) IV Push every 6 hours PRN Nausea  simethicone 80 milliGRAM(s) Chew three times a day PRN Gas    Pertinent Labs: 10-01 Na146 mmol/L<H> Glu 103 mg/dL<H> K+ 3.6 mmol/L Cr  0.61 mg/dL BUN 4 mg/dL<L> 10-01 Phos 3.1 mg/dL 10-01 Alb 2.9 g/dL<L>    Skin: abd surgical wound  Edema: none noted    Estimated Needs:   [x] no change since previous assessment on: 9/28 based on upper range #  kcal/day: 7024-8289 (25-30 travis/kg)  gms protein/day: 54-65gm (1-1.2gm/kg)    Previous Nutrition Diagnosis:   [x] Altered GI Function      Nutrition Diagnosis is [x] ongoing  [ ] resolved [ ] not applicable     New Nutrition Diagnosis: [x] not applicable       Interventions:   Recommend  [ ] Continue current diet  [x] Change Diet To: advance diet per GI, recommend full liquid to low fiber  [ ] Nutrition Supplement  [ ] Nutrition Support  [ ] Other:     Monitoring and Evaluation:   [x] PO intake [ x ] Tolerance to diet prescription [ x ] weights [ x ] labs [ x ] follow up per protocol  [x] other: s/s GI distress, bowel function, skin integrity/ edema

## 2022-10-01 NOTE — PROGRESS NOTE ADULT - PROBLEM SELECTOR PLAN 1
- Monitor for continued GI function   - Continue clear liquid diet for now; possibly advance to fulls today  - Transition to PO pain medication as tolerated  - Encourage ambulation  - Incentive spirometry  - Follow up AM labs  - DVT prophylaxis with lovenox  - To be discussed with Dr. Yanez

## 2022-10-01 NOTE — PROGRESS NOTE ADULT - PROBLEM SELECTOR PLAN 6
.ON STATIN
Gastrointestinal stress ulcer prophylaxis and DVT prophylaxis administered

## 2022-10-01 NOTE — PROGRESS NOTE ADULT - SUBJECTIVE AND OBJECTIVE BOX
SUBJECTIVE:  Patient seen and examined at bedside.  No overnight events.  Patient reports decreased abdominal distention and passing a lot of flatus. No BM yet. Tolerating clear liquid diet. Also reports improved abdominal pain, decreased dilaudid use with ATC caldolor and offirmev.  Patient denies any fever, chills, chest pain, shortness of breath, nausea or vomiting.    VITALS  Vital Signs Last 24 Hrs  T(C): 36.6 (01 Oct 2022 04:58), Max: 37.1 (30 Sep 2022 12:48)  T(F): 97.9 (01 Oct 2022 04:58), Max: 98.7 (30 Sep 2022 12:48)  HR: 67 (01 Oct 2022 04:58) (67 - 67)  BP: 156/85 (01 Oct 2022 04:58) (128/78 - 156/85)  RR: 18 (01 Oct 2022 04:58) (16 - 18)  SpO2: 96% (01 Oct 2022 04:58) (95% - 96%)    Parameters below as of 01 Oct 2022 04:58  Patient On (Oxygen Delivery Method): room air    PHYSICAL EXAM  GENERAL:  Well-nourished, well-developed female lying comfortably in bed in [facility].  HEENT:  NC/AT. Sclera white. Mucous membranes moist.  CARDIO:  Regular rate and rhythm.  RESPIRATORY:  Nonlabored breathing, no accessory muscle use.  ABDOMEN:  Soft, nondistended, nontender. Dressings over surgical incisions clean/dry.  No rebound tenderness or guarding.  SKIN:  No jaundice, pallor, or cyanosis  NEURO:  A&O x 3    INTAKE & OUTPUT  I&O's Summary    30 Sep 2022 07:01  -  01 Oct 2022 07:00  --------------------------------------------------------  IN: 504 mL / OUT: 0 mL / NET: 504 mL    I&O's Detail    30 Sep 2022 07:01  -  01 Oct 2022 07:00  --------------------------------------------------------  IN:    sodium chloride 0.9%: 504 mL  Total IN: 504 mL    OUT:  Total OUT: 0 mL    Total NET: 504 mL    MEDICATIONS  MEDICATIONS  (STANDING):  atorvastatin 20 milliGRAM(s) Oral at bedtime  diphenhydrAMINE Injectable 25 milliGRAM(s) IV Push at bedtime  enoxaparin Injectable 40 milliGRAM(s) SubCutaneous every 24 hours  ibuprofen IVPB .. 600 milliGRAM(s) IV Intermittent every 6 hours  influenza  Vaccine (HIGH DOSE) 0.7 milliLiter(s) IntraMuscular once  levothyroxine 88 MICROGram(s) Oral daily  pantoprazole    Tablet 40 milliGRAM(s) Oral before breakfast  sodium chloride 0.9%. 1000 milliLiter(s) (42 mL/Hr) IV Continuous <Continuous>    MEDICATIONS  (PRN):  acetaminophen     Tablet .. 650 milliGRAM(s) Oral every 6 hours PRN Temp greater or equal to 38C (100.4F), Mild Pain (1 - 3)  ALPRAZolam 0.25 milliGRAM(s) Oral daily PRN anxiety  calcium carbonate    500 mG (Tums) Chewable 2 Tablet(s) Chew three times a day PRN Heartburn  HYDROmorphone  Injectable 0.25 milliGRAM(s) IV Push every 4 hours PRN Moderate Pain (4 - 6)  HYDROmorphone  Injectable 0.5 milliGRAM(s) IV Push every 4 hours PRN Severe Pain (7 - 10)  metoclopramide Injectable 10 milliGRAM(s) IV Push every 6 hours PRN Nausea and/or Vomiting  ondansetron Injectable 4 milliGRAM(s) IV Push every 6 hours PRN Nausea  simethicone 80 milliGRAM(s) Chew three times a day PRN Gas    LABS:                        10.5   5.68  )-----------( 200      ( 30 Sep 2022 07:25 )             32.0     09-30    145  |  109<H>  |  5<L>  ----------------------------<  103<H>  3.3<L>   |  29  |  0.61    Ca    8.6      30 Sep 2022 07:25  Phos  3.1     09-30  Mg     2.1     09-30

## 2022-10-02 LAB
ANION GAP SERPL CALC-SCNC: 7 MMOL/L — SIGNIFICANT CHANGE UP (ref 5–17)
BUN SERPL-MCNC: 6 MG/DL — LOW (ref 7–23)
CALCIUM SERPL-MCNC: 8.6 MG/DL — SIGNIFICANT CHANGE UP (ref 8.5–10.1)
CHLORIDE SERPL-SCNC: 111 MMOL/L — HIGH (ref 96–108)
CO2 SERPL-SCNC: 28 MMOL/L — SIGNIFICANT CHANGE UP (ref 22–31)
CREAT SERPL-MCNC: 0.64 MG/DL — SIGNIFICANT CHANGE UP (ref 0.5–1.3)
EGFR: 97 ML/MIN/1.73M2 — SIGNIFICANT CHANGE UP
GLUCOSE SERPL-MCNC: 109 MG/DL — HIGH (ref 70–99)
HCT VFR BLD CALC: 31.3 % — LOW (ref 34.5–45)
HGB BLD-MCNC: 10.3 G/DL — LOW (ref 11.5–15.5)
MCHC RBC-ENTMCNC: 31 PG — SIGNIFICANT CHANGE UP (ref 27–34)
MCHC RBC-ENTMCNC: 32.9 GM/DL — SIGNIFICANT CHANGE UP (ref 32–36)
MCV RBC AUTO: 94.3 FL — SIGNIFICANT CHANGE UP (ref 80–100)
NRBC # BLD: 0 /100 WBCS — SIGNIFICANT CHANGE UP (ref 0–0)
PLATELET # BLD AUTO: 215 K/UL — SIGNIFICANT CHANGE UP (ref 150–400)
POTASSIUM SERPL-MCNC: 3.1 MMOL/L — LOW (ref 3.5–5.3)
POTASSIUM SERPL-SCNC: 3.1 MMOL/L — LOW (ref 3.5–5.3)
RBC # BLD: 3.32 M/UL — LOW (ref 3.8–5.2)
RBC # FLD: 12.3 % — SIGNIFICANT CHANGE UP (ref 10.3–14.5)
SARS-COV-2 RNA SPEC QL NAA+PROBE: SIGNIFICANT CHANGE UP
SODIUM SERPL-SCNC: 146 MMOL/L — HIGH (ref 135–145)
WBC # BLD: 5.61 K/UL — SIGNIFICANT CHANGE UP (ref 3.8–10.5)
WBC # FLD AUTO: 5.61 K/UL — SIGNIFICANT CHANGE UP (ref 3.8–10.5)

## 2022-10-02 RX ORDER — POTASSIUM CHLORIDE 20 MEQ
40 PACKET (EA) ORAL EVERY 4 HOURS
Refills: 0 | Status: COMPLETED | OUTPATIENT
Start: 2022-10-02 | End: 2022-10-02

## 2022-10-02 RX ADMIN — HYDROMORPHONE HYDROCHLORIDE 0.5 MILLIGRAM(S): 2 INJECTION INTRAMUSCULAR; INTRAVENOUS; SUBCUTANEOUS at 14:03

## 2022-10-02 RX ADMIN — Medication 650 MILLIGRAM(S): at 11:30

## 2022-10-02 RX ADMIN — ENOXAPARIN SODIUM 40 MILLIGRAM(S): 100 INJECTION SUBCUTANEOUS at 11:29

## 2022-10-02 RX ADMIN — Medication 650 MILLIGRAM(S): at 01:00

## 2022-10-02 RX ADMIN — SIMETHICONE 80 MILLIGRAM(S): 80 TABLET, CHEWABLE ORAL at 12:48

## 2022-10-02 RX ADMIN — PANTOPRAZOLE SODIUM 40 MILLIGRAM(S): 20 TABLET, DELAYED RELEASE ORAL at 05:34

## 2022-10-02 RX ADMIN — Medication 25 MILLIGRAM(S): at 22:01

## 2022-10-02 RX ADMIN — Medication 40 MILLIEQUIVALENT(S): at 14:00

## 2022-10-02 RX ADMIN — Medication 600 MILLIGRAM(S): at 06:34

## 2022-10-02 RX ADMIN — Medication 600 MILLIGRAM(S): at 11:30

## 2022-10-02 RX ADMIN — HYDROMORPHONE HYDROCHLORIDE 0.5 MILLIGRAM(S): 2 INJECTION INTRAMUSCULAR; INTRAVENOUS; SUBCUTANEOUS at 20:29

## 2022-10-02 RX ADMIN — Medication 600 MILLIGRAM(S): at 12:30

## 2022-10-02 RX ADMIN — HYDROMORPHONE HYDROCHLORIDE 0.5 MILLIGRAM(S): 2 INJECTION INTRAMUSCULAR; INTRAVENOUS; SUBCUTANEOUS at 14:18

## 2022-10-02 RX ADMIN — HYDROMORPHONE HYDROCHLORIDE 0.5 MILLIGRAM(S): 2 INJECTION INTRAMUSCULAR; INTRAVENOUS; SUBCUTANEOUS at 20:44

## 2022-10-02 RX ADMIN — Medication 600 MILLIGRAM(S): at 01:00

## 2022-10-02 RX ADMIN — HYDROMORPHONE HYDROCHLORIDE 0.5 MILLIGRAM(S): 2 INJECTION INTRAMUSCULAR; INTRAVENOUS; SUBCUTANEOUS at 03:56

## 2022-10-02 RX ADMIN — Medication 88 MICROGRAM(S): at 05:34

## 2022-10-02 RX ADMIN — Medication 600 MILLIGRAM(S): at 17:36

## 2022-10-02 RX ADMIN — HYDROMORPHONE HYDROCHLORIDE 0.5 MILLIGRAM(S): 2 INJECTION INTRAMUSCULAR; INTRAVENOUS; SUBCUTANEOUS at 03:41

## 2022-10-02 RX ADMIN — Medication 650 MILLIGRAM(S): at 12:30

## 2022-10-02 RX ADMIN — Medication 650 MILLIGRAM(S): at 00:00

## 2022-10-02 RX ADMIN — Medication 650 MILLIGRAM(S): at 05:34

## 2022-10-02 RX ADMIN — Medication 0.25 MILLIGRAM(S): at 11:29

## 2022-10-02 RX ADMIN — Medication 650 MILLIGRAM(S): at 06:33

## 2022-10-02 RX ADMIN — Medication 650 MILLIGRAM(S): at 17:36

## 2022-10-02 RX ADMIN — ATORVASTATIN CALCIUM 20 MILLIGRAM(S): 80 TABLET, FILM COATED ORAL at 22:01

## 2022-10-02 RX ADMIN — Medication 40 MILLIEQUIVALENT(S): at 10:11

## 2022-10-02 RX ADMIN — Medication 600 MILLIGRAM(S): at 00:00

## 2022-10-02 RX ADMIN — Medication 600 MILLIGRAM(S): at 05:33

## 2022-10-02 NOTE — PROGRESS NOTE ADULT - PROBLEM SELECTOR PROBLEM 4
Borderline systolic HTN
HLD (hyperlipidemia)
Hashimoto's disease
Borderline systolic HTN

## 2022-10-02 NOTE — PROGRESS NOTE ADULT - SUBJECTIVE AND OBJECTIVE BOX
PROGRESS NOTE  Patient is a 67y old  Female who presents with a chief complaint of Abdominal pain/SBO (02 Oct 2022 08:55)  Chart and available morning labs /imaging are reviewed electronically , urgent issues addressed . More information  is being added upon completion of rounds , when more information is collected and management discussed with consultants , medical staff and social service/case management on the floor   OVERNIGHT  No new issues reported by medical staff . All above noted Patient is resting in a bed comfortably .TOLERATES UPGRADED DIET WELL ,NO COMPLAINS ,K is being replaced  .No distress noted   HPI:  67 year old female w/ history of colon resection w/ Dr. Yanez, recurrent SBO (most recent admission 2 weeks ago, managed conservatively), HLD, hypothyroidism, presenting to [place]  ED with abdominal pain.  Patient states pain started around 3 am, described as sharp in her RLQ that radiated to her upper abdomen.  Endorses this is how she felt prior to her most recent hospital admission for a SBO.  Patient had nausea with multiple episodes of vomiting overnight and while in the ED.  Patient last ate Greek food for dinner at 7:30 pm, last BM yesterday morning.  Patient endorses since her last hospital admission she was in a good state of health and able to tolerate a regular diet.  Denies fevers, chills, chest pain, SOB, palpitations, calf pain.     (25 Sep 2022 09:21)  PAST MEDICAL & SURGICAL HISTORY:  Hypothyroidism  HLD (hyperlipidemia)  History of colon resectio  MEDICATIONS  (STANDING):  acetaminophen     Tablet .. 650 milliGRAM(s) Oral every 6 hours  atorvastatin 20 milliGRAM(s) Oral at bedtime  diphenhydrAMINE Injectable 25 milliGRAM(s) IV Push at bedtime  enoxaparin Injectable 40 milliGRAM(s) SubCutaneous every 24 hours  ibuprofen  Tablet. 600 milliGRAM(s) Oral every 6 hours  influenza  Vaccine (HIGH DOSE) 0.7 milliLiter(s) IntraMuscular once  levothyroxine 88 MICROGram(s) Oral daily  pantoprazole    Tablet 40 milliGRAM(s) Oral before breakfast  polyethylene glycol 3350 17 Gram(s) Oral daily  potassium chloride    Tablet ER 40 milliEquivalent(s) Oral every 4 hours  MEDICATIONS  (PRN):  ALPRAZolam 0.25 milliGRAM(s) Oral daily PRN anxiety  calcium carbonate    500 mG (Tums) Chewable 2 Tablet(s) Chew three times a day PRN Heartburn  HYDROmorphone  Injectable 0.25 milliGRAM(s) IV Push every 4 hours PRN Moderate Pain (4 - 6)  HYDROmorphone  Injectable 0.5 milliGRAM(s) IV Push every 4 hours PRN Severe Pain (7 - 10)  metoclopramide Injectable 10 milliGRAM(s) IV Push every 6 hours PRN Nausea and/or Vomiting  ondansetron Injectable 4 milliGRAM(s) IV Push every 6 hours PRN Nausea  simethicone 80 milliGRAM(s) Chew three times a day PRN Gas      OBJECTIVE    T(C): 37.1 (10-02-22 @ 12:35), Max: 37.1 (10-02-22 @ 12:35)  HR: 73 (10-02-22 @ 12:35) (63 - 76)  BP: 120/78 (10-02-22 @ 12:35) (120/78 - 147/77)  RR: 20 (10-02-22 @ 12:35) (18 - 20)  SpO2: 96% (10-02-22 @ 12:35) (96% - 98%)  Wt(kg): --  I&O's Summary    01 Oct 2022 07:01  -  02 Oct 2022 07:00  --------------------------------------------------------  IN: 504 mL / OUT: 0 mL / NET: 504 mL          REVIEW OF SYSTEMS:  CONSTITUTIONAL: No fever, weight loss, or fatigue  EYES: No eye pain, visual disturbances, or discharge  ENMT:   No sinus or throat pain  NECK: No pain or stiffness  RESPIRATORY: No cough, wheezing, chills or hemoptysis; No shortness of breath  CARDIOVASCULAR: No chest pain, palpitations, dizziness, or leg swelling  GASTROINTESTINAL: No abdominal pain. No nausea, vomiting; No diarrhea or constipation. No melena or hematochezia.  GENITOURINARY: No dysuria, frequency, hematuria, or incontinence  NEUROLOGICAL: No headaches, memory loss, loss of strength, numbness, or tremors  SKIN: No itching, burning, rashes, or lesions   MUSCULOSKELETAL: No joint pain or swelling; No muscle, back, or extremity pain    PHYSICAL EXAM:  Appearance: NAD. VS past 24 hrs -as above   HEENT:   Moist oral mucosa. Conjunctiva clear b/l.   Neck : supple  Respiratory: Lungs CTAB.  Gastrointestinal:  Soft, nontender. No rebound. No rigidity. BS present	  Cardiovascular: RRR ,S1S2 present  Neurologic: Non-focal. Moving all extremities.  Extremities: No edema. No erythema. No calf tenderness.  Skin: No rashes, No ecchymoses, No cyanosis.	  wounds ,skin lesions-See skin assesment flow sheet   LABS:                        10.3   5.61  )-----------( 215      ( 02 Oct 2022 06:10 )             31.3     10-02    146<H>  |  111<H>  |  6<L>  ----------------------------<  109<H>  3.1<L>   |  28  |  0.64    Ca    8.6      02 Oct 2022 06:10  Phos  3.1     10-01  Mg     2.0     10-01    TPro  6.2  /  Alb  2.9<L>  /  TBili  0.8  /  DBili  x   /  AST  20  /  ALT  41  /  AlkPhos  102  10-01    CAPILLARY BLOOD GLUCOSE              Culture - Urine (collected 26 Sep 2022 11:00)  Source: Clean Catch Clean Catch (Midstream)  Final Report (27 Sep 2022 12:07):    No growth      RADIOLOGY & ADDITIONAL TESTS:   reviewed elctronically  ASSESSMENT/PLAN: 	    25 minutes aggregate time was spent on this visit, 50% visit time spent in care co-ordination with other attendings and counselling patient .I have discussed care plan with patient / HCP/family member ,who expressed understanding of problems treatment and their effect and side effects, alternatives in details. I have asked if they have any questions and concerns and appropriately addressed them to best of my ability.

## 2022-10-02 NOTE — PROGRESS NOTE ADULT - SUBJECTIVE AND OBJECTIVE BOX
Postoperative Day #: 5    67y Female admitted with Intestinal obstruction  S/P Diagnostic lap with SBR of anastamosis      Patient seen and examined bedside resting comfortably.  Currently states she is feeling better. States passing "lots of" flatus.  Had a small bowel movement overnight.  Tolerating diet.  Ambulating.  Feels less distended, less pain.  No overnight events.      T(F): 97.6 (10-02-22 @ 04:43), Max: 98.3 (10-01-22 @ 19:59)  HR: 65 (10-02-22 @ 04:43) (63 - 79)  BP: 128/83 (10-02-22 @ 04:43) (128/83 - 147/77)  RR: 18 (10-02-22 @ 04:43) (17 - 18)  SpO2: 96% (10-02-22 @ 04:43) (95% - 98%)  Wt(kg): --  CAPILLARY BLOOD GLUCOSE          PHYSICAL EXAM:  General: NAD  Abdomen: BS+ Soft, Mild perincisional discomfort.  Incisions healing well.    Extremities: no pedal edema or calf tenderness noted       LABS:                        10.3   5.61  )-----------( 215      ( 02 Oct 2022 06:10 )             31.3     10-02    146<H>  |  111<H>  |  6<L>  ----------------------------<  109<H>  3.1<L>   |  28  |  0.64    Ca    8.6      02 Oct 2022 06:10  Phos  3.1     10-01  Mg     2.0     10-01    TPro  6.2  /  Alb  2.9<L>  /  TBili  0.8  /  DBili  x   /  AST  20  /  ALT  41  /  AlkPhos  102  10-01      I&O's Detail    01 Oct 2022 07:01  -  02 Oct 2022 07:00  --------------------------------------------------------  IN:    sodium chloride 0.9%: 504 mL  Total IN: 504 mL    OUT:  Total OUT: 0 mL    Total NET: 504 mL            RADIOLOGY:

## 2022-10-02 NOTE — PROGRESS NOTE ADULT - PROBLEM SELECTOR PROBLEM 3
Hashimoto's disease
Borderline systolic HTN
Hashimoto's disease
GERD (gastroesophageal reflux disease)

## 2022-10-02 NOTE — PROGRESS NOTE ADULT - NSPROGADDITIONALINFOA_GEN_ALL_CORE
no objection for discharge when cleared by surgical team .F/up with PCP in 2 -3 weeks
Medically optimized for diagnostic laparoscopy and redo of anastomosis.
agree with above unless contradictsbelow  doing well  +F-bm  tolerating clears  NAD  soft, mildly distended    s/p exlap  full liquid diet  miralax

## 2022-10-02 NOTE — PROGRESS NOTE ADULT - PROBLEM SELECTOR PLAN 2
continue home medications -on synthroid
urine cx is pending ,no abx at this time ,sen by ID .S/p one dose of ceftriaxone in ER
replacement ordered ,serial bmp
urine cx is pending ,no abx at this time ,sen by ID .S/p one dose of ceftriaxone in ER

## 2022-10-02 NOTE — PROGRESS NOTE ADULT - ASSESSMENT
66 yo female with pmhx of HLD, HYpothyroidism, s/.p Diagnostic lap with SBR, now with bowel function, tolerating diet, ambulating.

## 2022-10-02 NOTE — PROGRESS NOTE ADULT - PROBLEM SELECTOR PLAN 1
s/p exlap redo sbr  d/c home if tolerstes regular diet well  Will advance to a regular diet today.   Encourage OOB, and ambulation  Continue VTE with Lovenox.   Continue lipitor  Continue IV caldalor, IV ofirmev for pain, possible attempt to switch to PO pain medications- No narcotics.   Hypokalemia - asymptomatic will replete with PO potassium 40 meq x2. Will advance to a regular diet today.   Encourage OOB, and ambulation  Continue VTE with Lovenox.   Continue lipitor  Continue IV caldalor, IV ofirmev for pain, possible attempt to switch to PO pain medications- No narcotics.   Hypokalemia today- asymptomatic will replete with PO potassium 40 meq x2.

## 2022-10-02 NOTE — PROGRESS NOTE ADULT - PROBLEM SELECTOR PROBLEM 1
Small bowel obstruction, partial
Ileus
Small bowel obstruction, partial

## 2022-10-02 NOTE — PROGRESS NOTE ADULT - PROBLEM SELECTOR PROBLEM 5
HLD (hyperlipidemia)
Borderline systolic HTN
Prophylactic measure
HLD (hyperlipidemia)

## 2022-10-02 NOTE — PROGRESS NOTE ADULT - PROBLEM SELECTOR PLAN 1
Will advance to a regular diet today.   Encourage OOB, and ambulation  Continue VTE with Lovenox.   Continue lipitor  Continue IV caldalor, IV ofirmev for pain, possible attempt to switch to PO pain medications- No narcotics.   Hypokalemia today- asymptomatic will replete with PO potassium 40 meq x2.   Discussed with Dr. aguirre.

## 2022-10-02 NOTE — PROGRESS NOTE ADULT - PROBLEM SELECTOR PLAN 3
monitor BP
continue home medications -on synthroid
continue home medications -on synthroid
ppi
continue home medications -on synthroid
continue home medications -on synthroid

## 2022-10-02 NOTE — PROGRESS NOTE ADULT - PROBLEM SELECTOR PLAN 5
Gastrointestinal stress ulcer prophylaxis and DVT prophylaxis administered
monitor BP
.ON STATIN

## 2022-10-03 ENCOUNTER — TRANSCRIPTION ENCOUNTER (OUTPATIENT)
Age: 67
End: 2022-10-03

## 2022-10-03 VITALS
RESPIRATION RATE: 20 BRPM | TEMPERATURE: 98 F | SYSTOLIC BLOOD PRESSURE: 124 MMHG | DIASTOLIC BLOOD PRESSURE: 79 MMHG | OXYGEN SATURATION: 96 % | HEART RATE: 87 BPM

## 2022-10-03 LAB
ANION GAP SERPL CALC-SCNC: 5 MMOL/L — SIGNIFICANT CHANGE UP (ref 5–17)
BUN SERPL-MCNC: 12 MG/DL — SIGNIFICANT CHANGE UP (ref 7–23)
CALCIUM SERPL-MCNC: 8.8 MG/DL — SIGNIFICANT CHANGE UP (ref 8.5–10.1)
CHLORIDE SERPL-SCNC: 110 MMOL/L — HIGH (ref 96–108)
CO2 SERPL-SCNC: 30 MMOL/L — SIGNIFICANT CHANGE UP (ref 22–31)
CREAT SERPL-MCNC: 0.65 MG/DL — SIGNIFICANT CHANGE UP (ref 0.5–1.3)
EGFR: 96 ML/MIN/1.73M2 — SIGNIFICANT CHANGE UP
GLUCOSE SERPL-MCNC: 84 MG/DL — SIGNIFICANT CHANGE UP (ref 70–99)
HCT VFR BLD CALC: 33.3 % — LOW (ref 34.5–45)
HGB BLD-MCNC: 10.8 G/DL — LOW (ref 11.5–15.5)
MCHC RBC-ENTMCNC: 30.9 PG — SIGNIFICANT CHANGE UP (ref 27–34)
MCHC RBC-ENTMCNC: 32.4 GM/DL — SIGNIFICANT CHANGE UP (ref 32–36)
MCV RBC AUTO: 95.4 FL — SIGNIFICANT CHANGE UP (ref 80–100)
NRBC # BLD: 0 /100 WBCS — SIGNIFICANT CHANGE UP (ref 0–0)
PLATELET # BLD AUTO: 211 K/UL — SIGNIFICANT CHANGE UP (ref 150–400)
POTASSIUM SERPL-MCNC: 3.6 MMOL/L — SIGNIFICANT CHANGE UP (ref 3.5–5.3)
POTASSIUM SERPL-SCNC: 3.6 MMOL/L — SIGNIFICANT CHANGE UP (ref 3.5–5.3)
RBC # BLD: 3.49 M/UL — LOW (ref 3.8–5.2)
RBC # FLD: 12.5 % — SIGNIFICANT CHANGE UP (ref 10.3–14.5)
SODIUM SERPL-SCNC: 145 MMOL/L — SIGNIFICANT CHANGE UP (ref 135–145)
WBC # BLD: 7.52 K/UL — SIGNIFICANT CHANGE UP (ref 3.8–10.5)
WBC # FLD AUTO: 7.52 K/UL — SIGNIFICANT CHANGE UP (ref 3.8–10.5)

## 2022-10-03 PROCEDURE — 99231 SBSQ HOSP IP/OBS SF/LOW 25: CPT

## 2022-10-03 RX ORDER — OXYCODONE AND ACETAMINOPHEN 5; 325 MG/1; MG/1
1 TABLET ORAL
Qty: 10 | Refills: 0
Start: 2022-10-03

## 2022-10-03 RX ADMIN — Medication 650 MILLIGRAM(S): at 00:37

## 2022-10-03 RX ADMIN — Medication 600 MILLIGRAM(S): at 06:23

## 2022-10-03 RX ADMIN — Medication 650 MILLIGRAM(S): at 06:24

## 2022-10-03 RX ADMIN — Medication 0.25 MILLIGRAM(S): at 11:10

## 2022-10-03 RX ADMIN — Medication 600 MILLIGRAM(S): at 00:38

## 2022-10-03 RX ADMIN — Medication 650 MILLIGRAM(S): at 01:37

## 2022-10-03 RX ADMIN — PANTOPRAZOLE SODIUM 40 MILLIGRAM(S): 20 TABLET, DELAYED RELEASE ORAL at 06:24

## 2022-10-03 RX ADMIN — HYDROMORPHONE HYDROCHLORIDE 0.5 MILLIGRAM(S): 2 INJECTION INTRAMUSCULAR; INTRAVENOUS; SUBCUTANEOUS at 04:04

## 2022-10-03 RX ADMIN — Medication 88 MICROGRAM(S): at 06:24

## 2022-10-03 RX ADMIN — Medication 600 MILLIGRAM(S): at 11:10

## 2022-10-03 RX ADMIN — Medication 650 MILLIGRAM(S): at 11:10

## 2022-10-03 RX ADMIN — ENOXAPARIN SODIUM 40 MILLIGRAM(S): 100 INJECTION SUBCUTANEOUS at 11:10

## 2022-10-03 RX ADMIN — HYDROMORPHONE HYDROCHLORIDE 0.5 MILLIGRAM(S): 2 INJECTION INTRAMUSCULAR; INTRAVENOUS; SUBCUTANEOUS at 04:19

## 2022-10-03 RX ADMIN — Medication 600 MILLIGRAM(S): at 12:00

## 2022-10-03 RX ADMIN — Medication 650 MILLIGRAM(S): at 12:00

## 2022-10-03 RX ADMIN — Medication 600 MILLIGRAM(S): at 01:37

## 2022-10-03 NOTE — PROGRESS NOTE ADULT - REASON FOR ADMISSION
Abdominal pain/SBO

## 2022-10-03 NOTE — PROGRESS NOTE ADULT - PROVIDER SPECIALTY LIST ADULT
Infectious Disease
Infectious Disease
Surgery
Hospitalist
Surgery
Hospitalist

## 2022-10-03 NOTE — DISCHARGE NOTE NURSING/CASE MANAGEMENT/SOCIAL WORK - NSDCPNINST_GEN_ALL_CORE
Worsening abdominal pain, nausea , vomiting  call your doctor. Chest pain or shortness of breath to call 911 and go to the nearest emergency room. No heavy lifting.

## 2022-10-03 NOTE — PROGRESS NOTE ADULT - SUBJECTIVE AND OBJECTIVE BOX
patient seen this AM, in good spirit, states that she is going home today, reports frequent BM and flatulence yesterday which has subsided with one soft BM overnight and none this AM, no other symptoms noted at this time, tolerating diet      T(F): 97.8 (10-03-22 @ 04:21), Max: 98.7 (10-02-22 @ 12:35)  HR: 64 (10-03-22 @ 04:21) (64 - 80)  BP: 134/87 (10-03-22 @ 04:21) (120/78 - 149/81)  RR: 18 (10-03-22 @ 04:21) (18 - 20)  SpO2: 96% (10-03-22 @ 04:21) (96% - 96%)  Wt(kg): --  CAPILLARY BLOOD GLUCOSE          PHYSICAL EXAM:  General: NAD, WDWN.   Neuro:  Alert & oriented x 3  CV: +S1+S2 regular rate and rhythm  Lung: clear to ausculation bilaterally, respirations nonlabored, good inspiratory effort  Abdomen: soft, NT ND. Normative BS  Extremities: no pedal edema or calf tenderness noted       LABS:                        10.8   7.52  )-----------( 211      ( 03 Oct 2022 05:30 )             33.3     10-03    145  |  110<H>  |  12  ----------------------------<  84  3.6   |  30  |  0.65    Ca    8.8      03 Oct 2022 05:30        I&O's Detail      Impression: 67y Female admitted with Intestinal obstruction and SBR      PMH Hypothyroidism    HLD (hyperlipidemia)        Plan:  will monitor BM this morning, if no further episode of loose BM patient may be discharged with follow up in one wk at the office

## 2022-10-03 NOTE — DISCHARGE NOTE NURSING/CASE MANAGEMENT/SOCIAL WORK - PATIENT PORTAL LINK FT
You can access the FollowMyHealth Patient Portal offered by [facility] by registering at the following website: [email or web address]. By joining [facility]’s FollowMyHealth portal, you will also be able to view your health information using other applications (apps) compatible with our system.

## 2022-10-12 ENCOUNTER — APPOINTMENT (OUTPATIENT)
Dept: OBGYN | Facility: CLINIC | Age: 67
End: 2022-10-12

## 2022-10-12 PROCEDURE — 76830 TRANSVAGINAL US NON-OB: CPT

## 2022-10-12 PROCEDURE — 81002 URINALYSIS NONAUTO W/O SCOPE: CPT

## 2022-10-12 PROCEDURE — G0101: CPT

## 2022-10-20 PROCEDURE — 96376 TX/PRO/DX INJ SAME DRUG ADON: CPT

## 2022-10-20 PROCEDURE — 88307 TISSUE EXAM BY PATHOLOGIST: CPT

## 2022-10-20 PROCEDURE — 85730 THROMBOPLASTIN TIME PARTIAL: CPT

## 2022-10-20 PROCEDURE — 85025 COMPLETE CBC W/AUTO DIFF WBC: CPT

## 2022-10-20 PROCEDURE — 85610 PROTHROMBIN TIME: CPT

## 2022-10-20 PROCEDURE — C1889: CPT

## 2022-10-20 PROCEDURE — 87086 URINE CULTURE/COLONY COUNT: CPT

## 2022-10-20 PROCEDURE — 96374 THER/PROPH/DIAG INJ IV PUSH: CPT

## 2022-10-20 PROCEDURE — 74019 RADEX ABDOMEN 2 VIEWS: CPT

## 2022-10-20 PROCEDURE — 86901 BLOOD TYPING SEROLOGIC RH(D): CPT

## 2022-10-20 PROCEDURE — 96375 TX/PRO/DX INJ NEW DRUG ADDON: CPT

## 2022-10-20 PROCEDURE — 93005 ELECTROCARDIOGRAM TRACING: CPT

## 2022-10-20 PROCEDURE — U0005: CPT

## 2022-10-20 PROCEDURE — 87635 SARS-COV-2 COVID-19 AMP PRB: CPT

## 2022-10-20 PROCEDURE — U0003: CPT

## 2022-10-20 PROCEDURE — 86900 BLOOD TYPING SEROLOGIC ABO: CPT

## 2022-10-20 PROCEDURE — 85027 COMPLETE CBC AUTOMATED: CPT

## 2022-10-20 PROCEDURE — 74018 RADEX ABDOMEN 1 VIEW: CPT

## 2022-10-20 PROCEDURE — 81001 URINALYSIS AUTO W/SCOPE: CPT

## 2022-10-20 PROCEDURE — 36415 COLL VENOUS BLD VENIPUNCTURE: CPT

## 2022-10-20 PROCEDURE — 71045 X-RAY EXAM CHEST 1 VIEW: CPT

## 2022-10-20 PROCEDURE — 86850 RBC ANTIBODY SCREEN: CPT

## 2022-10-20 PROCEDURE — 83690 ASSAY OF LIPASE: CPT

## 2022-10-20 PROCEDURE — 80048 BASIC METABOLIC PNL TOTAL CA: CPT

## 2022-10-20 PROCEDURE — 74177 CT ABD & PELVIS W/CONTRAST: CPT | Mod: MA

## 2022-10-20 PROCEDURE — 84100 ASSAY OF PHOSPHORUS: CPT

## 2022-10-20 PROCEDURE — 99285 EMERGENCY DEPT VISIT HI MDM: CPT

## 2022-10-20 PROCEDURE — 80053 COMPREHEN METABOLIC PANEL: CPT

## 2022-10-20 PROCEDURE — 83605 ASSAY OF LACTIC ACID: CPT

## 2022-10-20 PROCEDURE — 83735 ASSAY OF MAGNESIUM: CPT

## 2022-10-27 ENCOUNTER — NON-APPOINTMENT (OUTPATIENT)
Age: 67
End: 2022-10-27

## 2022-10-27 DIAGNOSIS — F41.9 ANXIETY DISORDER, UNSPECIFIED: ICD-10-CM

## 2022-10-27 DIAGNOSIS — Z87.891 PERSONAL HISTORY OF NICOTINE DEPENDENCE: ICD-10-CM

## 2022-10-27 DIAGNOSIS — N39.0 URINARY TRACT INFECTION, SITE NOT SPECIFIED: ICD-10-CM

## 2022-10-27 DIAGNOSIS — Z87.898 PERSONAL HISTORY OF OTHER SPECIFIED CONDITIONS: ICD-10-CM

## 2022-10-27 DIAGNOSIS — S76.319A STRAIN OF MUSCLE, FASCIA AND TENDON OF THE POSTERIOR MUSCLE GROUP AT THIGH LEVEL, UNSPECIFIED THIGH, INITIAL ENCOUNTER: ICD-10-CM

## 2022-10-27 DIAGNOSIS — Z90.49 ACQUIRED ABSENCE OF OTHER SPECIFIED PARTS OF DIGESTIVE TRACT: ICD-10-CM

## 2022-10-27 DIAGNOSIS — Z80.1 FAMILY HISTORY OF MALIGNANT NEOPLASM OF TRACHEA, BRONCHUS AND LUNG: ICD-10-CM

## 2022-10-27 DIAGNOSIS — Z92.89 PERSONAL HISTORY OF OTHER MEDICAL TREATMENT: ICD-10-CM

## 2022-10-27 DIAGNOSIS — Z82.49 FAMILY HISTORY OF ISCHEMIC HEART DISEASE AND OTHER DISEASES OF THE CIRCULATORY SYSTEM: ICD-10-CM

## 2022-10-27 RX ORDER — ECHINACEA ANGUSTIFOLIA ROOT 125 MG
120-180-1000 TABLET ORAL
Refills: 0 | Status: ACTIVE | COMMUNITY

## 2022-10-27 RX ORDER — CALCIUM ACETATE 667 MG/1
667 CAPSULE ORAL 3 TIMES DAILY
Refills: 0 | Status: ACTIVE | COMMUNITY

## 2022-10-27 RX ORDER — FOLIC ACID 1 MG/1
1 TABLET ORAL DAILY
Refills: 0 | Status: ACTIVE | COMMUNITY

## 2022-10-27 RX ORDER — LEVOTHYROXINE SODIUM 88 UG/1
88 TABLET ORAL
Refills: 0 | Status: ACTIVE | COMMUNITY

## 2022-10-27 RX ORDER — CETIRIZINE HYDROCHLORIDE 10 MG/1
10 TABLET, COATED ORAL
Refills: 0 | Status: ACTIVE | COMMUNITY

## 2022-11-21 ENCOUNTER — APPOINTMENT (OUTPATIENT)
Dept: COLORECTAL SURGERY | Facility: CLINIC | Age: 67
End: 2022-11-21

## 2022-11-21 VITALS
HEART RATE: 80 BPM | RESPIRATION RATE: 12 BRPM | DIASTOLIC BLOOD PRESSURE: 80 MMHG | TEMPERATURE: 98.6 F | BODY MASS INDEX: 26.13 KG/M2 | HEIGHT: 62 IN | WEIGHT: 142 LBS | SYSTOLIC BLOOD PRESSURE: 130 MMHG

## 2022-11-21 DIAGNOSIS — K62.3 RECTAL PROLAPSE: ICD-10-CM

## 2022-11-21 PROCEDURE — 99204 OFFICE O/P NEW MOD 45 MIN: CPT

## 2022-11-21 RX ORDER — SIMVASTATIN 10 MG/1
10 TABLET, FILM COATED ORAL DAILY
Refills: 0 | Status: DISCONTINUED | COMMUNITY
End: 2022-11-21

## 2022-11-21 RX ORDER — DIAZEPAM 5 MG/1
5 TABLET ORAL
Refills: 0 | Status: DISCONTINUED | COMMUNITY
End: 2022-11-21

## 2022-11-21 RX ORDER — MELOXICAM 15 MG/1
15 TABLET ORAL DAILY
Refills: 0 | Status: DISCONTINUED | COMMUNITY
End: 2022-11-21

## 2022-11-23 NOTE — ASSESSMENT
[FreeTextEntry1] : Pleasant 67-year-old female who presents for consultation regarding recurrent bouts of small bowel obstruction.\par \par In  the patient underwent a  section.  In  she underwent a bowel resection and rectopexy for rectal prolapse.  She did well until 2022 when after ingesting an artichoke leaf she developed abdominal pain, distention, nausea and vomiting.  A CAT scan was performed and she was told she had a complete bowel obstruction and was in the operating room within 8 hours of coming to the emergency room.  She underwent a lysis of adhesions and a small bowel resection.  In 2022 she developed recurrent obstructive symptomatology and was once again admitted to the hospital and had a nasogastric tube inserted.  She was in the hospital a few days and the situation did not resolve and she was taken back to the operating room.  She had a laparoscopy followed by a laparotomy and then a re-resection of her prior small bowel resection.  Her problem eventually resolved and then she went to visit her son in [place] last week.  While visiting there she developed recurrent partial small bowel obstruction.  Transiently a small nasogastric tube was placed but this fell out quickly.  Her symptoms improved and she was discharged from the hospital approximately 3 days ago.  Currently she is still feeling somewhat bloated but not terribly so.  She is ingesting some food and continues to pass flatus.  Her last colonoscopy was in 2018.\par \par Situation is certainly a difficult one.  I believe reoperating at this point is incorrect.  I have advised her to increase her fluid intake.  I stressed the importance of minimizing a high-fiber diet as she was told that she could eat anything she wanted after her recent hospital discharge.  I reviewed high-fiber products including raw fruits, raw vegetables, nuts, seeds, corn and shellfish.\par \par I had a long discussion with the patient and her son regarding this scenario.  Should the symptoms persist she can call me and we will obtain a CAT scan.  If she develops severe recurrent pain I advised her to go to the emergency room at [facility].

## 2022-11-23 NOTE — PHYSICAL EXAM
[Normal Breath Sounds] : Normal breath sounds [Normal Heart Sounds] : normal heart sounds [Normal Rate and Rhythm] : normal rate and rhythm [No Edema] : No edema [Alert] : alert [Oriented to Person] : oriented to person [Oriented to Place] : oriented to place [Oriented to Time] : oriented to time [Anxious] : anxious [de-identified] : round soft +BS midline scar/lap scars/Pfann scar [de-identified] : NC/AT [de-identified] : +ROM [de-identified] : intact

## 2022-11-23 NOTE — REVIEW OF SYSTEMS
[Feeling Tired] : feeling tired [As Noted in HPI] : as noted in HPI [Negative] : Endocrine [Chest Pain] : no chest pain [Shortness Of Breath] : no shortness of breath [Easy Bleeding] : no tendency for easy bleeding [Easy Bruising] : no tendency for easy bruising

## 2022-12-07 ENCOUNTER — INPATIENT (INPATIENT)
Facility: HOSPITAL | Age: 67
LOS: 5 days | Discharge: HOME | DRG: 390 | End: 2022-12-13
Attending: SURGERY | Admitting: SURGERY
Payer: MEDICARE

## 2022-12-07 VITALS
HEART RATE: 88 BPM | WEIGHT: 139.99 LBS | DIASTOLIC BLOOD PRESSURE: 92 MMHG | SYSTOLIC BLOOD PRESSURE: 158 MMHG | OXYGEN SATURATION: 98 % | TEMPERATURE: 98 F | RESPIRATION RATE: 18 BRPM

## 2022-12-07 DIAGNOSIS — Z98.89 OTHER SPECIFIED POSTPROCEDURAL STATES: Chronic | ICD-10-CM

## 2022-12-07 PROCEDURE — 99285 EMERGENCY DEPT VISIT HI MDM: CPT

## 2022-12-07 RX ORDER — ONDANSETRON 8 MG/1
4 TABLET, FILM COATED ORAL ONCE
Refills: 0 | Status: COMPLETED | OUTPATIENT
Start: 2022-12-07 | End: 2022-12-07

## 2022-12-07 RX ORDER — SODIUM CHLORIDE 9 MG/ML
1000 INJECTION INTRAMUSCULAR; INTRAVENOUS; SUBCUTANEOUS ONCE
Refills: 0 | Status: COMPLETED | OUTPATIENT
Start: 2022-12-07 | End: 2022-12-07

## 2022-12-07 RX ORDER — HYDROMORPHONE HYDROCHLORIDE 2 MG/ML
1 INJECTION INTRAMUSCULAR; INTRAVENOUS; SUBCUTANEOUS ONCE
Refills: 0 | Status: DISCONTINUED | OUTPATIENT
Start: 2022-12-07 | End: 2022-12-07

## 2022-12-07 RX ORDER — DIATRIZOATE MEGLUMINE 180 MG/ML
30 INJECTION, SOLUTION INTRAVESICAL ONCE
Refills: 0 | Status: COMPLETED | OUTPATIENT
Start: 2022-12-07 | End: 2022-12-08

## 2022-12-07 RX ADMIN — HYDROMORPHONE HYDROCHLORIDE 1 MILLIGRAM(S): 2 INJECTION INTRAMUSCULAR; INTRAVENOUS; SUBCUTANEOUS at 23:58

## 2022-12-07 RX ADMIN — SODIUM CHLORIDE 1000 MILLILITER(S): 9 INJECTION INTRAMUSCULAR; INTRAVENOUS; SUBCUTANEOUS at 23:59

## 2022-12-07 RX ADMIN — ONDANSETRON 4 MILLIGRAM(S): 8 TABLET, FILM COATED ORAL at 23:58

## 2022-12-07 NOTE — ED ADULT TRIAGE NOTE - CHIEF COMPLAINT QUOTE
Bladder scan reveals 601 mL of urine. Pt currently attempting to void at this time.      Bisi Shannon RN  08/16/21 [id number] I know exactly what is wrong with me.  I have a sbo.  I had surgery July and September. (Dr. Fang)  I need an iv and Dilaudid.  I was in [place] in november and was in [facility] for 4 days.  I don't think things have fully resolved since then.  (I had a complete obstruction in July, but they have all been partial since then)  and I really can't wait long because I need something for this pain.

## 2022-12-07 NOTE — ED PROVIDER NOTE - CLINICAL SUMMARY MEDICAL DECISION MAKING FREE TEXT BOX
Patient is a 67-year-old female who presents to the emergency room with concern for recurrent SBO.  Past medical history of a colon resection with recurrent SBO's, hyperlipidemia, hypothyroidism.  Patient was last admitted to the hospital from September 25 through October 3, 2022 with abdominal pain.  She also reported multiple episodes of nausea vomiting.  CT scan revealed a partial SBO at the site of the previous anastomosis.  The decision was made for OR revision of the anastomosis to hopefully prevent future recurrent episodes.  Patient underwent a laparoscopic LINDSAY with SBR of anastomosis without complication.  Diet was slowly advanced patient improved and was discharged home.  Reports that November 15 while visiting her son in [place] she again developed similar symptoms.  She was admitted to [facility] for 4 days with a partial SBO and was treated conservatively with NG placement.  Symptoms improved and she was discharged home.  She reports that she never "quite felt right" but she followed up with Dr. Yanez at the end of November and everything seemed fine.  Today she developed abdominal bloating diffuse pain nausea vomiting.  She reports that symptoms seem similar to what she experienced with her prior SBO.  She did have a small bowel movement and has been passing gas.  Denies fevers chills chest pain shortness of breath extremity numbness or weakness.

## 2022-12-07 NOTE — ED ADULT NURSE NOTE - CHIEF COMPLAINT QUOTE
I know exactly what is wrong with me.  I have a sbo.  I had surgery July and September. (Dr. Fang)  I need an iv and Dilaudid.  I was in [place] in november and was in [facility] for 4 days.  I don't think things have fully resolved since then.  (I had a complete obstruction in July, but they have all been partial since then)  and I really can't wait long because I need something for this pain.

## 2022-12-07 NOTE — ED ADULT NURSE NOTE - OBJECTIVE STATEMENT
patient aox4. patient with hx of SBO x 2. patient reports patient aox4. patient with hx of SBO x 2. patient reports abdominal pain since 1900 tonight feels like prior SBO. patient also with nausea and vomiting. patient with distended abdomen. patient last BM tonight but small as per patient. patient able to pass gas.

## 2022-12-07 NOTE — ED PROVIDER NOTE - OBJECTIVE STATEMENT
Patient is a 67-year-old female who presents to the emergency room with concern for recurrent SBO.  Past medical history of a colon resection with recurrent SBO's, hyperlipidemia, hypothyroidism.  Patient was last admitted to the hospital from September 25 through October 3, 2022 with abdominal pain.  She also reported multiple episodes of nausea vomiting.  CT scan revealed a partial SBO at the site of the previous anastomosis.  The decision was made for OR revision of the anastomosis to hopefully prevent future recurrent episodes.  Patient underwent a laparoscopic LINDSAY with SBR of anastomosis without complication.  Diet was slowly advanced patient improved and was discharged home.  Reports that November 15 while visiting her son in [place] she again developed similar symptoms.  She was admitted to [facility] for 4 days with a partial SBO and was treated conservatively with NG placement.  Symptoms improved and she was discharged home.  She reports that she never "quite felt right" but she followed up with Dr. Yanez at the end of November and everything seemed fine.  Today she developed abdominal bloating diffuse pain nausea vomiting.  She reports that symptoms seem similar to what she experienced with her prior SBO.  She did have a small bowel movement and has been passing gas.  Denies fevers chills chest pain shortness of breath extremity numbness or weakness.

## 2022-12-08 DIAGNOSIS — E03.9 HYPOTHYROIDISM, UNSPECIFIED: ICD-10-CM

## 2022-12-08 DIAGNOSIS — Z90.49 ACQUIRED ABSENCE OF OTHER SPECIFIED PARTS OF DIGESTIVE TRACT: Chronic | ICD-10-CM

## 2022-12-08 DIAGNOSIS — K56.609 UNSPECIFIED INTESTINAL OBSTRUCTION, UNSPECIFIED AS TO PARTIAL VERSUS COMPLETE OBSTRUCTION: ICD-10-CM

## 2022-12-08 DIAGNOSIS — E78.5 HYPERLIPIDEMIA, UNSPECIFIED: ICD-10-CM

## 2022-12-08 LAB
ALBUMIN SERPL ELPH-MCNC: 3.9 G/DL — SIGNIFICANT CHANGE UP (ref 3.3–5)
ALP SERPL-CCNC: 111 U/L — SIGNIFICANT CHANGE UP (ref 40–120)
ALT FLD-CCNC: 62 U/L — SIGNIFICANT CHANGE UP (ref 12–78)
ANION GAP SERPL CALC-SCNC: 6 MMOL/L — SIGNIFICANT CHANGE UP (ref 5–17)
APTT BLD: 32.5 SEC — SIGNIFICANT CHANGE UP (ref 27.5–35.5)
AST SERPL-CCNC: 39 U/L — HIGH (ref 15–37)
BASOPHILS # BLD AUTO: 0.04 K/UL — SIGNIFICANT CHANGE UP (ref 0–0.2)
BASOPHILS NFR BLD AUTO: 0.4 % — SIGNIFICANT CHANGE UP (ref 0–2)
BILIRUB SERPL-MCNC: 1.2 MG/DL — SIGNIFICANT CHANGE UP (ref 0.2–1.2)
BUN SERPL-MCNC: 19 MG/DL — SIGNIFICANT CHANGE UP (ref 7–23)
CALCIUM SERPL-MCNC: 9.8 MG/DL — SIGNIFICANT CHANGE UP (ref 8.5–10.1)
CHLORIDE SERPL-SCNC: 105 MMOL/L — SIGNIFICANT CHANGE UP (ref 96–108)
CO2 SERPL-SCNC: 30 MMOL/L — SIGNIFICANT CHANGE UP (ref 22–31)
CREAT SERPL-MCNC: 0.92 MG/DL — SIGNIFICANT CHANGE UP (ref 0.5–1.3)
EGFR: 68 ML/MIN/1.73M2 — SIGNIFICANT CHANGE UP
EOSINOPHIL # BLD AUTO: 0.05 K/UL — SIGNIFICANT CHANGE UP (ref 0–0.5)
EOSINOPHIL NFR BLD AUTO: 0.5 % — SIGNIFICANT CHANGE UP (ref 0–6)
FLUAV AG NPH QL: SIGNIFICANT CHANGE UP
FLUBV AG NPH QL: SIGNIFICANT CHANGE UP
GLUCOSE SERPL-MCNC: 116 MG/DL — HIGH (ref 70–99)
HCT VFR BLD CALC: 39.1 % — SIGNIFICANT CHANGE UP (ref 34.5–45)
HGB BLD-MCNC: 12.6 G/DL — SIGNIFICANT CHANGE UP (ref 11.5–15.5)
IMM GRANULOCYTES NFR BLD AUTO: 0.7 % — SIGNIFICANT CHANGE UP (ref 0–0.9)
INR BLD: 0.92 RATIO — SIGNIFICANT CHANGE UP (ref 0.88–1.16)
LACTATE SERPL-SCNC: 1.2 MMOL/L — SIGNIFICANT CHANGE UP (ref 0.7–2)
LIDOCAIN IGE QN: 132 U/L — SIGNIFICANT CHANGE UP (ref 73–393)
LYMPHOCYTES # BLD AUTO: 1.97 K/UL — SIGNIFICANT CHANGE UP (ref 1–3.3)
LYMPHOCYTES # BLD AUTO: 18.8 % — SIGNIFICANT CHANGE UP (ref 13–44)
MCHC RBC-ENTMCNC: 30.3 PG — SIGNIFICANT CHANGE UP (ref 27–34)
MCHC RBC-ENTMCNC: 32.2 GM/DL — SIGNIFICANT CHANGE UP (ref 32–36)
MCV RBC AUTO: 94 FL — SIGNIFICANT CHANGE UP (ref 80–100)
MONOCYTES # BLD AUTO: 0.93 K/UL — HIGH (ref 0–0.9)
MONOCYTES NFR BLD AUTO: 8.9 % — SIGNIFICANT CHANGE UP (ref 2–14)
NEUTROPHILS # BLD AUTO: 7.44 K/UL — HIGH (ref 1.8–7.4)
NEUTROPHILS NFR BLD AUTO: 70.7 % — SIGNIFICANT CHANGE UP (ref 43–77)
NRBC # BLD: 0 /100 WBCS — SIGNIFICANT CHANGE UP (ref 0–0)
PLATELET # BLD AUTO: 268 K/UL — SIGNIFICANT CHANGE UP (ref 150–400)
POTASSIUM SERPL-MCNC: 4.4 MMOL/L — SIGNIFICANT CHANGE UP (ref 3.5–5.3)
POTASSIUM SERPL-SCNC: 4.4 MMOL/L — SIGNIFICANT CHANGE UP (ref 3.5–5.3)
PROT SERPL-MCNC: 7.6 G/DL — SIGNIFICANT CHANGE UP (ref 6–8.3)
PROTHROM AB SERPL-ACNC: 10.8 SEC — SIGNIFICANT CHANGE UP (ref 10.5–13.4)
RBC # BLD: 4.16 M/UL — SIGNIFICANT CHANGE UP (ref 3.8–5.2)
RBC # FLD: 13.3 % — SIGNIFICANT CHANGE UP (ref 10.3–14.5)
RSV RNA NPH QL NAA+NON-PROBE: SIGNIFICANT CHANGE UP
SARS-COV-2 RNA SPEC QL NAA+PROBE: SIGNIFICANT CHANGE UP
SODIUM SERPL-SCNC: 141 MMOL/L — SIGNIFICANT CHANGE UP (ref 135–145)
WBC # BLD: 10.5 K/UL — SIGNIFICANT CHANGE UP (ref 3.8–10.5)
WBC # FLD AUTO: 10.5 K/UL — SIGNIFICANT CHANGE UP (ref 3.8–10.5)

## 2022-12-08 PROCEDURE — 71045 X-RAY EXAM CHEST 1 VIEW: CPT | Mod: 26

## 2022-12-08 PROCEDURE — 93010 ELECTROCARDIOGRAM REPORT: CPT

## 2022-12-08 PROCEDURE — 74177 CT ABD & PELVIS W/CONTRAST: CPT | Mod: 26,MA

## 2022-12-08 RX ORDER — ACETAMINOPHEN 500 MG
1000 TABLET ORAL ONCE
Refills: 0 | Status: COMPLETED | OUTPATIENT
Start: 2022-12-09 | End: 2022-12-09

## 2022-12-08 RX ORDER — ONDANSETRON 8 MG/1
4 TABLET, FILM COATED ORAL ONCE
Refills: 0 | Status: COMPLETED | OUTPATIENT
Start: 2022-12-08 | End: 2022-12-08

## 2022-12-08 RX ORDER — HYDROMORPHONE HYDROCHLORIDE 2 MG/ML
0.5 INJECTION INTRAMUSCULAR; INTRAVENOUS; SUBCUTANEOUS ONCE
Refills: 0 | Status: DISCONTINUED | OUTPATIENT
Start: 2022-12-08 | End: 2022-12-08

## 2022-12-08 RX ORDER — ACETAMINOPHEN 500 MG
1000 TABLET ORAL ONCE
Refills: 0 | Status: COMPLETED | OUTPATIENT
Start: 2022-12-08 | End: 2022-12-08

## 2022-12-08 RX ORDER — OMEGA-3 ACID ETHYL ESTERS 1 G
1 CAPSULE ORAL
Qty: 0 | Refills: 0 | DISCHARGE

## 2022-12-08 RX ORDER — METOCLOPRAMIDE HCL 10 MG
10 TABLET ORAL ONCE
Refills: 0 | Status: COMPLETED | OUTPATIENT
Start: 2022-12-08 | End: 2022-12-08

## 2022-12-08 RX ORDER — LANOLIN ALCOHOL/MO/W.PET/CERES
5 CREAM (GRAM) TOPICAL AT BEDTIME
Refills: 0 | Status: COMPLETED | OUTPATIENT
Start: 2022-12-08 | End: 2022-12-09

## 2022-12-08 RX ORDER — LEVOTHYROXINE SODIUM 125 MCG
88 TABLET ORAL DAILY
Refills: 0 | Status: DISCONTINUED | OUTPATIENT
Start: 2022-12-08 | End: 2022-12-13

## 2022-12-08 RX ORDER — KETOROLAC TROMETHAMINE 30 MG/ML
15 SYRINGE (ML) INJECTION EVERY 6 HOURS
Refills: 0 | Status: DISCONTINUED | OUTPATIENT
Start: 2022-12-08 | End: 2022-12-08

## 2022-12-08 RX ORDER — LANOLIN ALCOHOL/MO/W.PET/CERES
5 CREAM (GRAM) TOPICAL AT BEDTIME
Refills: 0 | Status: DISCONTINUED | OUTPATIENT
Start: 2022-12-08 | End: 2022-12-08

## 2022-12-08 RX ORDER — PANTOPRAZOLE SODIUM 20 MG/1
1 TABLET, DELAYED RELEASE ORAL
Qty: 0 | Refills: 0 | DISCHARGE

## 2022-12-08 RX ORDER — PANTOPRAZOLE SODIUM 20 MG/1
40 TABLET, DELAYED RELEASE ORAL DAILY
Refills: 0 | Status: DISCONTINUED | OUTPATIENT
Start: 2022-12-08 | End: 2022-12-11

## 2022-12-08 RX ORDER — ONDANSETRON 8 MG/1
4 TABLET, FILM COATED ORAL EVERY 6 HOURS
Refills: 0 | Status: DISCONTINUED | OUTPATIENT
Start: 2022-12-08 | End: 2022-12-13

## 2022-12-08 RX ORDER — CYCLOSPORINE 0.5 MG/ML
1 EMULSION OPHTHALMIC
Qty: 0 | Refills: 0 | DISCHARGE

## 2022-12-08 RX ORDER — HYDROMORPHONE HYDROCHLORIDE 2 MG/ML
1 INJECTION INTRAMUSCULAR; INTRAVENOUS; SUBCUTANEOUS ONCE
Refills: 0 | Status: DISCONTINUED | OUTPATIENT
Start: 2022-12-08 | End: 2022-12-08

## 2022-12-08 RX ORDER — SODIUM CHLORIDE 9 MG/ML
1000 INJECTION INTRAMUSCULAR; INTRAVENOUS; SUBCUTANEOUS
Refills: 0 | Status: DISCONTINUED | OUTPATIENT
Start: 2022-12-08 | End: 2022-12-12

## 2022-12-08 RX ORDER — BENZOCAINE 10 %
1 GEL (GRAM) MUCOUS MEMBRANE THREE TIMES A DAY
Refills: 0 | Status: DISCONTINUED | OUTPATIENT
Start: 2022-12-08 | End: 2022-12-13

## 2022-12-08 RX ORDER — DIPHENHYDRAMINE HCL 50 MG
12.5 CAPSULE ORAL AT BEDTIME
Refills: 0 | Status: DISCONTINUED | OUTPATIENT
Start: 2022-12-08 | End: 2022-12-11

## 2022-12-08 RX ORDER — HYDROMORPHONE HYDROCHLORIDE 2 MG/ML
1 INJECTION INTRAMUSCULAR; INTRAVENOUS; SUBCUTANEOUS EVERY 4 HOURS
Refills: 0 | Status: DISCONTINUED | OUTPATIENT
Start: 2022-12-08 | End: 2022-12-13

## 2022-12-08 RX ORDER — ROSUVASTATIN CALCIUM 5 MG/1
1 TABLET ORAL
Qty: 0 | Refills: 0 | DISCHARGE

## 2022-12-08 RX ORDER — ATORVASTATIN CALCIUM 80 MG/1
20 TABLET, FILM COATED ORAL AT BEDTIME
Refills: 0 | Status: DISCONTINUED | OUTPATIENT
Start: 2022-12-08 | End: 2022-12-13

## 2022-12-08 RX ORDER — FLUOROMETHOLONE 1 MG/ML
1 SOLUTION/ DROPS OPHTHALMIC
Qty: 0 | Refills: 0 | DISCHARGE

## 2022-12-08 RX ORDER — ACETAMINOPHEN 500 MG
1000 TABLET ORAL EVERY 6 HOURS
Refills: 0 | Status: DISCONTINUED | OUTPATIENT
Start: 2022-12-09 | End: 2022-12-13

## 2022-12-08 RX ADMIN — ONDANSETRON 4 MILLIGRAM(S): 8 TABLET, FILM COATED ORAL at 01:43

## 2022-12-08 RX ADMIN — HYDROMORPHONE HYDROCHLORIDE 1 MILLIGRAM(S): 2 INJECTION INTRAMUSCULAR; INTRAVENOUS; SUBCUTANEOUS at 22:54

## 2022-12-08 RX ADMIN — Medication 10 MILLIGRAM(S): at 05:55

## 2022-12-08 RX ADMIN — Medication 400 MILLIGRAM(S): at 14:10

## 2022-12-08 RX ADMIN — Medication 12.5 MILLIGRAM(S): at 21:20

## 2022-12-08 RX ADMIN — SODIUM CHLORIDE 1000 MILLILITER(S): 9 INJECTION INTRAMUSCULAR; INTRAVENOUS; SUBCUTANEOUS at 00:47

## 2022-12-08 RX ADMIN — HYDROMORPHONE HYDROCHLORIDE 1 MILLIGRAM(S): 2 INJECTION INTRAMUSCULAR; INTRAVENOUS; SUBCUTANEOUS at 18:26

## 2022-12-08 RX ADMIN — Medication 1000 MILLIGRAM(S): at 07:00

## 2022-12-08 RX ADMIN — Medication 1000 MILLIGRAM(S): at 07:06

## 2022-12-08 RX ADMIN — HYDROMORPHONE HYDROCHLORIDE 0.5 MILLIGRAM(S): 2 INJECTION INTRAMUSCULAR; INTRAVENOUS; SUBCUTANEOUS at 03:46

## 2022-12-08 RX ADMIN — HYDROMORPHONE HYDROCHLORIDE 1 MILLIGRAM(S): 2 INJECTION INTRAMUSCULAR; INTRAVENOUS; SUBCUTANEOUS at 23:15

## 2022-12-08 RX ADMIN — HYDROMORPHONE HYDROCHLORIDE 1 MILLIGRAM(S): 2 INJECTION INTRAMUSCULAR; INTRAVENOUS; SUBCUTANEOUS at 18:45

## 2022-12-08 RX ADMIN — Medication 400 MILLIGRAM(S): at 06:36

## 2022-12-08 RX ADMIN — HYDROMORPHONE HYDROCHLORIDE 1 MILLIGRAM(S): 2 INJECTION INTRAMUSCULAR; INTRAVENOUS; SUBCUTANEOUS at 00:28

## 2022-12-08 RX ADMIN — PANTOPRAZOLE SODIUM 40 MILLIGRAM(S): 20 TABLET, DELAYED RELEASE ORAL at 11:21

## 2022-12-08 RX ADMIN — Medication 400 MILLIGRAM(S): at 20:05

## 2022-12-08 RX ADMIN — HYDROMORPHONE HYDROCHLORIDE 1 MILLIGRAM(S): 2 INJECTION INTRAMUSCULAR; INTRAVENOUS; SUBCUTANEOUS at 13:20

## 2022-12-08 RX ADMIN — HYDROMORPHONE HYDROCHLORIDE 1 MILLIGRAM(S): 2 INJECTION INTRAMUSCULAR; INTRAVENOUS; SUBCUTANEOUS at 01:40

## 2022-12-08 RX ADMIN — Medication 1000 MILLIGRAM(S): at 20:35

## 2022-12-08 RX ADMIN — HYDROMORPHONE HYDROCHLORIDE 1 MILLIGRAM(S): 2 INJECTION INTRAMUSCULAR; INTRAVENOUS; SUBCUTANEOUS at 02:10

## 2022-12-08 RX ADMIN — HYDROMORPHONE HYDROCHLORIDE 1 MILLIGRAM(S): 2 INJECTION INTRAMUSCULAR; INTRAVENOUS; SUBCUTANEOUS at 08:29

## 2022-12-08 RX ADMIN — ATORVASTATIN CALCIUM 20 MILLIGRAM(S): 80 TABLET, FILM COATED ORAL at 21:20

## 2022-12-08 RX ADMIN — HYDROMORPHONE HYDROCHLORIDE 0.5 MILLIGRAM(S): 2 INJECTION INTRAMUSCULAR; INTRAVENOUS; SUBCUTANEOUS at 04:16

## 2022-12-08 NOTE — H&P ADULT - NSHPREVIEWOFSYSTEMS_GEN_ALL_CORE
Constitutional: Denies fever, fatigue or weight loss.  Endocrine: Denies thyroid problems.  Cardiovascular: Denies chest pain or palpation.  Respiratory: Denies cough, shortness of breath, congestion, or wheezing.  Gastrointestinal: SEE HPI  Genitourinary: Denies dysuria.  Musculoskeletal: Denies joint swelling.  Neurologic: Denies headache.

## 2022-12-08 NOTE — CHART NOTE - NSCHARTNOTEFT_GEN_A_CORE
Request for secondary opinion received from Dr. Yanez.  Patient known to me from prior admission as well.  Ms. Herrmann is s/p small bowel resection during the Summer with concern for SBO due to bezoar mechanical obstruction.  Pathology negative for obstructing lesion.  Ms. Herrmann had multiple subsequent events/ admissions concerning for recurrent SBO with resection and revision of anastomosis in September.  Now back in ER with complaints and imaging concerning for recurrent SBO at or around the now revised anastomosis.  Ms. Herrmann seen in ER now with her report of improvement, though not resolution of pain with insertion of NG.  Vital non suggestive.  Abdomen with mild tenderness to deep palpation around umbilicus, but no guarding/ rebound/ referred pain or diffuse tenderness.  Labs negative for leukocytosis or acidosis.  CT scan report and images personally reviewed and as above.  As such:  -Clinically, concern now fro recurrent +/- "early" post operative bowel obstruction  -In light of her history and pathology findings, I am concerned for adhesions at resection/ anastomosis as opposed to lesion/ faulty anastomosis.  -Would attempt to manage conservatively unless clinical condition mandates further exploration/ resection  -NPO/ NGT/ IVF  -SBFT/ Gastrograffin challenge within next 24 hours  Reviewed with patient in detail.  Discussed with Dr. Yanez in full.  I remain available.

## 2022-12-08 NOTE — H&P ADULT - PROBLEM SELECTOR PLAN 1
- NPO, NGT  - Poss SBS later today if improving exam & NG tube output  - Pain control, supportive care  - F/u AM labs  - Case d/w Dr. Yanez

## 2022-12-08 NOTE — H&P ADULT - NSHPPHYSICALEXAM_GEN_ALL_CORE
Patient Instructions by Blanca Guzman DO at 11/15/17 09:46 AM     Author:  Blanca Guzman DO Service:  (none) Author Type:  Physician     Filed:  11/15/17 09:46 AM Encounter Date:  11/15/2017 Status:  Signed     :  Blanca Guzman DO (Physician)            Continue Tamsulosin 0.8 mg daily  Scrotal US in 6 months  Please call [phone] to schedule the ultrasound of the scrotum.   Follow up in 6 months      Revision History        User Key Date/Time User Provider Type Action    > [N/A] 11/15/17 09:46 AM Blanca Guzman DO Physician Sign            
General: No acute distress, appears comfortable, well nourished, well-groomed, appears stated age  Head, Eyes, Ears, Nose, Throat: Normal cephalic/atraumatic, anicteric, conjunctiva-non injected and moist, vision grossly intact, hearing grossly intact, no nasal discharge, ears and nose symmetrical and atraumatic.  Nasal, oral, and oropharyngeal mucosa pink moist with no evidence of ulceration  Chest: Lungs are clear to P&A, no wheezing, no rales, no ronchi, with good inspiratory effort  Heart: Heart rhythm regular, no murmurs  Abdomen: Soft, distended, moderate ttp in bonifacio-umbilical region; Hypoactive bowel sounds; No guarding or rebound tenderness.  Extremity: No swelling, or open sores, no gross deformities,  good range of motion  Neuro: Alert and oriented x3, motor and sensory intact   Skin: Good color, turgor, texture with no gross lesions, no eruptions, no rashes, no subcutaneous nodules and normal temperature.

## 2022-12-08 NOTE — H&P ADULT - ASSESSMENT
67 w/ PMH Hypothyroidism, HLD, multiple SBOs in the past year, with SBR in July and SB anastomosis revision in September, p/w abdominal pain x1 week associated with N/V. CT suggesting SBO with transition point at prior SB anastomosis site. VSS, labs reviewed.

## 2022-12-08 NOTE — H&P ADULT - NSHPLABSRESULTS_GEN_ALL_CORE
12.6   10.50 )-----------( 268      ( 08 Dec 2022 00:00 )             39.1     12-08    141  |  105  |  19  ----------------------------<  116<H>  4.4   |  30  |  0.92    Ca    9.8      08 Dec 2022 00:00    TPro  7.6  /  Alb  3.9  /  TBili  1.2  /  DBili  x   /  AST  39<H>  /  ALT  62  /  AlkPhos  111  12-08    PT/INR - ( 08 Dec 2022 00:00 )   PT: 10.8 sec;   INR: 0.92 ratio    PTT - ( 08 Dec 2022 00:00 )  PTT:32.5 sec  Lactate, Blood: 1.2 mmol/L (12-08 @ 00:00)  -------------------------------------------------------------  ACC: [id number] EXAM:  CT ABDOMEN AND PELVIS IC                        PROCEDURE DATE:  12/08/2022      FINDINGS:  LOWER CHEST: Mild bibasilar atelectasis.    LIVER: Hepatic steatosis.  BILE DUCTS: Normal caliber.  GALLBLADDER: Within normal limits.  SPLEEN: Within normal limits.  PANCREAS: Within normal limits.  ADRENALS: Within normal limits.  KIDNEYS/URETERS: Kidneys enhance symmetrically without hydronephrosis or   focal renal parenchymal lesion.    BLADDER: Within normal limits.  REPRODUCTIVE ORGANS: Calcified uterine fibroid. Adnexa are unremarkable.    BOWEL: Dilated fluid-filled small bowel loops inthe left lower quadrant   and central abdomen with transition point at a small bowel anastomosis in   the midline lower abdomen. Associated mesenteric edema. Distal small   bowel loops are relatively decompressed. Appendix is normal. Distal   colonicanastomosis.  PERITONEUM: Small volume pelvic free fluid. No pneumoperitoneum or   loculated collection. No mesenteric lymphadenopathy.  VESSELS: Atherosclerotic calcification of the aortoiliac tree. Normal   caliber abdominal aorta.  RETROPERITONEUM/LYMPH NODES: No lymphadenopathy.  ABDOMINAL WALL: Postsurgical changes.  BONES: Degenerative changes of the spine.    IMPRESSION:  Small bowel obstruction with dilated fluid-filled small bowel loops in   the left lower quadrant and central abdomen with transition point at a   small bowel anastomosis in the midline lower abdomen. Associated   mesenteric edema and small volume pelvic free fluid.    --- End of Report ---  JERILYN ALFREDO DO; Attending Radiologist

## 2022-12-08 NOTE — H&P ADULT - HISTORY OF PRESENT ILLNESS
67 female w/ PMH Hypothyroidism, HLD, s/p SBR in July for SBO, with subsequent SBO that resulted in revision of SB anastomosis in September, p/w intermittent abdominal pain x1 week. Patient reports she recently traveled to [place] in November, where she suffered another SBO and was managed conservatively with a NG tube, recovered after 4 days. She f/u with Dr. Yanez upon returning to [place] without symptoms. This past week she endorses abdominal pain described as intermittent spasms in her bonifacio-umbilical region. She attempted to alleviate her pain by walking, without avail. Her pain became constant yesterday evening and 10/10 in severity, which prompted her to return to the ED. Last BM shortly before onset of symptoms, flatus + before visit.  Denies subjective fever, chest pain, SOB, recent change in bowel/urinary habits, or LOC.

## 2022-12-08 NOTE — H&P ADULT - NSICDXPASTSURGICALHX_GEN_ALL_CORE_FT
PAST SURGICAL HISTORY:  History of colon resection      PAST SURGICAL HISTORY:  History of colon resection     S/P small bowel resection     S/P small bowel resection

## 2022-12-09 LAB
ANION GAP SERPL CALC-SCNC: 6 MMOL/L — SIGNIFICANT CHANGE UP (ref 5–17)
BASOPHILS # BLD AUTO: 0.02 K/UL — SIGNIFICANT CHANGE UP (ref 0–0.2)
BASOPHILS NFR BLD AUTO: 0.4 % — SIGNIFICANT CHANGE UP (ref 0–2)
BUN SERPL-MCNC: 11 MG/DL — SIGNIFICANT CHANGE UP (ref 7–23)
CALCIUM SERPL-MCNC: 8.6 MG/DL — SIGNIFICANT CHANGE UP (ref 8.5–10.1)
CHLORIDE SERPL-SCNC: 108 MMOL/L — SIGNIFICANT CHANGE UP (ref 96–108)
CO2 SERPL-SCNC: 29 MMOL/L — SIGNIFICANT CHANGE UP (ref 22–31)
CREAT SERPL-MCNC: 0.66 MG/DL — SIGNIFICANT CHANGE UP (ref 0.5–1.3)
EGFR: 96 ML/MIN/1.73M2 — SIGNIFICANT CHANGE UP
EOSINOPHIL # BLD AUTO: 0.15 K/UL — SIGNIFICANT CHANGE UP (ref 0–0.5)
EOSINOPHIL NFR BLD AUTO: 3 % — SIGNIFICANT CHANGE UP (ref 0–6)
GLUCOSE SERPL-MCNC: 92 MG/DL — SIGNIFICANT CHANGE UP (ref 70–99)
HCT VFR BLD CALC: 34.1 % — LOW (ref 34.5–45)
HGB BLD-MCNC: 10.9 G/DL — LOW (ref 11.5–15.5)
IMM GRANULOCYTES NFR BLD AUTO: 0.4 % — SIGNIFICANT CHANGE UP (ref 0–0.9)
LYMPHOCYTES # BLD AUTO: 1.1 K/UL — SIGNIFICANT CHANGE UP (ref 1–3.3)
LYMPHOCYTES # BLD AUTO: 21.7 % — SIGNIFICANT CHANGE UP (ref 13–44)
MCHC RBC-ENTMCNC: 30.8 PG — SIGNIFICANT CHANGE UP (ref 27–34)
MCHC RBC-ENTMCNC: 32 GM/DL — SIGNIFICANT CHANGE UP (ref 32–36)
MCV RBC AUTO: 96.3 FL — SIGNIFICANT CHANGE UP (ref 80–100)
MONOCYTES # BLD AUTO: 0.56 K/UL — SIGNIFICANT CHANGE UP (ref 0–0.9)
MONOCYTES NFR BLD AUTO: 11.1 % — SIGNIFICANT CHANGE UP (ref 2–14)
NEUTROPHILS # BLD AUTO: 3.21 K/UL — SIGNIFICANT CHANGE UP (ref 1.8–7.4)
NEUTROPHILS NFR BLD AUTO: 63.4 % — SIGNIFICANT CHANGE UP (ref 43–77)
NRBC # BLD: 0 /100 WBCS — SIGNIFICANT CHANGE UP (ref 0–0)
PLATELET # BLD AUTO: 186 K/UL — SIGNIFICANT CHANGE UP (ref 150–400)
POTASSIUM SERPL-MCNC: 3.8 MMOL/L — SIGNIFICANT CHANGE UP (ref 3.5–5.3)
POTASSIUM SERPL-SCNC: 3.8 MMOL/L — SIGNIFICANT CHANGE UP (ref 3.5–5.3)
RBC # BLD: 3.54 M/UL — LOW (ref 3.8–5.2)
RBC # FLD: 13.2 % — SIGNIFICANT CHANGE UP (ref 10.3–14.5)
SODIUM SERPL-SCNC: 143 MMOL/L — SIGNIFICANT CHANGE UP (ref 135–145)
WBC # BLD: 5.06 K/UL — SIGNIFICANT CHANGE UP (ref 3.8–10.5)
WBC # FLD AUTO: 5.06 K/UL — SIGNIFICANT CHANGE UP (ref 3.8–10.5)

## 2022-12-09 PROCEDURE — 99231 SBSQ HOSP IP/OBS SF/LOW 25: CPT

## 2022-12-09 PROCEDURE — 74018 RADEX ABDOMEN 1 VIEW: CPT | Mod: 26

## 2022-12-09 RX ADMIN — PANTOPRAZOLE SODIUM 40 MILLIGRAM(S): 20 TABLET, DELAYED RELEASE ORAL at 11:09

## 2022-12-09 RX ADMIN — HYDROMORPHONE HYDROCHLORIDE 1 MILLIGRAM(S): 2 INJECTION INTRAMUSCULAR; INTRAVENOUS; SUBCUTANEOUS at 12:09

## 2022-12-09 RX ADMIN — Medication 1000 MILLIGRAM(S): at 08:00

## 2022-12-09 RX ADMIN — HYDROMORPHONE HYDROCHLORIDE 1 MILLIGRAM(S): 2 INJECTION INTRAMUSCULAR; INTRAVENOUS; SUBCUTANEOUS at 08:00

## 2022-12-09 RX ADMIN — Medication 1000 MILLIGRAM(S): at 01:40

## 2022-12-09 RX ADMIN — SODIUM CHLORIDE 100 MILLILITER(S): 9 INJECTION INTRAMUSCULAR; INTRAVENOUS; SUBCUTANEOUS at 07:38

## 2022-12-09 RX ADMIN — Medication 5 MILLIGRAM(S): at 21:20

## 2022-12-09 RX ADMIN — Medication 12.5 MILLIGRAM(S): at 21:20

## 2022-12-09 RX ADMIN — ATORVASTATIN CALCIUM 20 MILLIGRAM(S): 80 TABLET, FILM COATED ORAL at 21:20

## 2022-12-09 RX ADMIN — HYDROMORPHONE HYDROCHLORIDE 1 MILLIGRAM(S): 2 INJECTION INTRAMUSCULAR; INTRAVENOUS; SUBCUTANEOUS at 07:31

## 2022-12-09 RX ADMIN — HYDROMORPHONE HYDROCHLORIDE 1 MILLIGRAM(S): 2 INJECTION INTRAMUSCULAR; INTRAVENOUS; SUBCUTANEOUS at 12:26

## 2022-12-09 RX ADMIN — Medication 88 MICROGRAM(S): at 05:29

## 2022-12-09 RX ADMIN — Medication 1 SPRAY(S): at 05:29

## 2022-12-09 RX ADMIN — HYDROMORPHONE HYDROCHLORIDE 1 MILLIGRAM(S): 2 INJECTION INTRAMUSCULAR; INTRAVENOUS; SUBCUTANEOUS at 18:20

## 2022-12-09 RX ADMIN — Medication 400 MILLIGRAM(S): at 07:31

## 2022-12-09 RX ADMIN — Medication 400 MILLIGRAM(S): at 00:32

## 2022-12-09 NOTE — PROGRESS NOTE ADULT - SUBJECTIVE AND OBJECTIVE BOX
pt seen  feeling better  slight gas last night  -  ICU Vital Signs Last 24 Hrs  T(C): 36.7 (09 Dec 2022 05:31), Max: 37.1 (08 Dec 2022 20:57)  T(F): 98.1 (09 Dec 2022 05:31), Max: 98.8 (08 Dec 2022 20:57)  HR: 75 (09 Dec 2022 05:31) (70 - 75)  BP: 111/69 (09 Dec 2022 05:31) (111/69 - 138/60)  BP(mean): --  ABP: --  ABP(mean): --  RR: 18 (09 Dec 2022 05:31) (16 - 18)  SpO2: 91% (09 Dec 2022 05:31) (91% - 95%)    O2 Parameters below as of 09 Dec 2022 05:31  Patient On (Oxygen Delivery Method): room air        gen-NAD  soft   improved distension, minimal tenderness    I&O's Detail    08 Dec 2022 07:01  -  09 Dec 2022 07:00  --------------------------------------------------------  IN:  Total IN: 0 mL    OUT:    Nasogastric/Oral tube (mL): 1600 mL  Total OUT: 1600 mL    Total NET: -1600 mL

## 2022-12-10 LAB
ANION GAP SERPL CALC-SCNC: 8 MMOL/L — SIGNIFICANT CHANGE UP (ref 5–17)
BUN SERPL-MCNC: 14 MG/DL — SIGNIFICANT CHANGE UP (ref 7–23)
CALCIUM SERPL-MCNC: 8.5 MG/DL — SIGNIFICANT CHANGE UP (ref 8.5–10.1)
CHLORIDE SERPL-SCNC: 107 MMOL/L — SIGNIFICANT CHANGE UP (ref 96–108)
CO2 SERPL-SCNC: 27 MMOL/L — SIGNIFICANT CHANGE UP (ref 22–31)
CREAT SERPL-MCNC: 0.57 MG/DL — SIGNIFICANT CHANGE UP (ref 0.5–1.3)
EGFR: 100 ML/MIN/1.73M2 — SIGNIFICANT CHANGE UP
GLUCOSE SERPL-MCNC: 62 MG/DL — LOW (ref 70–99)
HCT VFR BLD CALC: 35.4 % — SIGNIFICANT CHANGE UP (ref 34.5–45)
HGB BLD-MCNC: 11 G/DL — LOW (ref 11.5–15.5)
MAGNESIUM SERPL-MCNC: 2.3 MG/DL — SIGNIFICANT CHANGE UP (ref 1.6–2.6)
MCHC RBC-ENTMCNC: 30.3 PG — SIGNIFICANT CHANGE UP (ref 27–34)
MCHC RBC-ENTMCNC: 31.1 GM/DL — LOW (ref 32–36)
MCV RBC AUTO: 97.5 FL — SIGNIFICANT CHANGE UP (ref 80–100)
NRBC # BLD: 0 /100 WBCS — SIGNIFICANT CHANGE UP (ref 0–0)
PHOSPHATE SERPL-MCNC: 3.2 MG/DL — SIGNIFICANT CHANGE UP (ref 2.5–4.5)
PLATELET # BLD AUTO: 196 K/UL — SIGNIFICANT CHANGE UP (ref 150–400)
POTASSIUM SERPL-MCNC: 3.7 MMOL/L — SIGNIFICANT CHANGE UP (ref 3.5–5.3)
POTASSIUM SERPL-SCNC: 3.7 MMOL/L — SIGNIFICANT CHANGE UP (ref 3.5–5.3)
RBC # BLD: 3.63 M/UL — LOW (ref 3.8–5.2)
RBC # FLD: 12.7 % — SIGNIFICANT CHANGE UP (ref 10.3–14.5)
SODIUM SERPL-SCNC: 142 MMOL/L — SIGNIFICANT CHANGE UP (ref 135–145)
WBC # BLD: 6.08 K/UL — SIGNIFICANT CHANGE UP (ref 3.8–10.5)
WBC # FLD AUTO: 6.08 K/UL — SIGNIFICANT CHANGE UP (ref 3.8–10.5)

## 2022-12-10 RX ADMIN — Medication 88 MICROGRAM(S): at 05:08

## 2022-12-10 RX ADMIN — HYDROMORPHONE HYDROCHLORIDE 1 MILLIGRAM(S): 2 INJECTION INTRAMUSCULAR; INTRAVENOUS; SUBCUTANEOUS at 04:52

## 2022-12-10 RX ADMIN — HYDROMORPHONE HYDROCHLORIDE 1 MILLIGRAM(S): 2 INJECTION INTRAMUSCULAR; INTRAVENOUS; SUBCUTANEOUS at 04:37

## 2022-12-10 RX ADMIN — HYDROMORPHONE HYDROCHLORIDE 1 MILLIGRAM(S): 2 INJECTION INTRAMUSCULAR; INTRAVENOUS; SUBCUTANEOUS at 20:34

## 2022-12-10 RX ADMIN — ATORVASTATIN CALCIUM 20 MILLIGRAM(S): 80 TABLET, FILM COATED ORAL at 22:25

## 2022-12-10 RX ADMIN — HYDROMORPHONE HYDROCHLORIDE 1 MILLIGRAM(S): 2 INJECTION INTRAMUSCULAR; INTRAVENOUS; SUBCUTANEOUS at 09:56

## 2022-12-10 RX ADMIN — HYDROMORPHONE HYDROCHLORIDE 1 MILLIGRAM(S): 2 INJECTION INTRAMUSCULAR; INTRAVENOUS; SUBCUTANEOUS at 16:10

## 2022-12-10 RX ADMIN — SODIUM CHLORIDE 100 MILLILITER(S): 9 INJECTION INTRAMUSCULAR; INTRAVENOUS; SUBCUTANEOUS at 04:37

## 2022-12-10 RX ADMIN — HYDROMORPHONE HYDROCHLORIDE 1 MILLIGRAM(S): 2 INJECTION INTRAMUSCULAR; INTRAVENOUS; SUBCUTANEOUS at 10:15

## 2022-12-10 RX ADMIN — HYDROMORPHONE HYDROCHLORIDE 1 MILLIGRAM(S): 2 INJECTION INTRAMUSCULAR; INTRAVENOUS; SUBCUTANEOUS at 15:51

## 2022-12-10 RX ADMIN — Medication 12.5 MILLIGRAM(S): at 22:23

## 2022-12-10 RX ADMIN — PANTOPRAZOLE SODIUM 40 MILLIGRAM(S): 20 TABLET, DELAYED RELEASE ORAL at 12:42

## 2022-12-10 RX ADMIN — SODIUM CHLORIDE 100 MILLILITER(S): 9 INJECTION INTRAMUSCULAR; INTRAVENOUS; SUBCUTANEOUS at 22:32

## 2022-12-10 RX ADMIN — HYDROMORPHONE HYDROCHLORIDE 1 MILLIGRAM(S): 2 INJECTION INTRAMUSCULAR; INTRAVENOUS; SUBCUTANEOUS at 20:49

## 2022-12-10 NOTE — PROGRESS NOTE ADULT - SUBJECTIVE AND OBJECTIVE BOX
NAEON, reports passing a small amount of flatus overnight, KUB yesterday with some air in bowels, no distended loops    a/w SBO    SUBJECTIVE:  Patient is doing well this morning, seen and examined at bedside, denies any new complaints. Denies any nausea, vomiting, chest pain, shortness of breath, calf tenderness, fever or chills. Reports a small amount of flatus overnight but has not passed any this morning and denies any bowel movements. NGT in place.     MEDICATIONS  (STANDING):  atorvastatin 20 milliGRAM(s) Oral at bedtime  levothyroxine 88 MICROGram(s) Oral daily  pantoprazole  Injectable 40 milliGRAM(s) IV Push daily  sodium chloride 0.9%. 1000 milliLiter(s) (100 mL/Hr) IV Continuous <Continuous>    MEDICATIONS  (PRN):  acetaminophen     Tablet .. 1000 milliGRAM(s) Oral every 6 hours PRN Mild Pain (1 - 3)  benzocaine 20% Spray 1 Spray(s) Topical three times a day PRN Sore throat/NG tube  diphenhydrAMINE Injectable 12.5 milliGRAM(s) IV Push at bedtime PRN Insomnia  HYDROmorphone  Injectable 1 milliGRAM(s) IV Push every 4 hours PRN Severe Pain (7 - 10)  ketorolac   Injectable 15 milliGRAM(s) IV Push every 6 hours PRN Moderate Pain (4 - 6)  ondansetron Injectable 4 milliGRAM(s) IV Push every 6 hours PRN Nausea      Vital Signs Last 24 Hrs  T(C): 36.6 (10 Dec 2022 04:49), Max: 36.9 (09 Dec 2022 13:16)  T(F): 97.8 (10 Dec 2022 04:49), Max: 98.5 (09 Dec 2022 13:16)  HR: 73 (10 Dec 2022 04:49) (71 - 74)  BP: 125/77 (10 Dec 2022 04:49) (99/53 - 125/77)  BP(mean): --  RR: 17 (10 Dec 2022 04:49) (17 - 18)  SpO2: 92% (10 Dec 2022 04:49) (92% - 97%)    Parameters below as of 10 Dec 2022 04:49  Patient On (Oxygen Delivery Method): room air        PHYSICAL EXAM:  Constitutional: AAOx3, no acute distress  HEENT: NCAT, airway patent  Cardiovascular: RRR, pulses present bilaterally  Respiratory: nonlabored breathing  Gastrointestinal: abdomen soft, nontender, non distended, no rebound or guarding  Neuro: no focal deficits    I&O's Detail    09 Dec 2022 07:01  -  10 Dec 2022 07:00  --------------------------------------------------------  IN:  Total IN: 0 mL    OUT:    Nasogastric/Oral tube (mL): 2250 mL  Total OUT: 2250 mL    Total NET: -2250 mL          LABS:                        11.0   6.08  )-----------( 196      ( 10 Dec 2022 07:37 )             35.4     12-10    142  |  107  |  14  ----------------------------<  62<L>  3.7   |  27  |  0.57    Ca    8.5      10 Dec 2022 07:37  Phos  3.2     12-10  Mg     2.3     12-10

## 2022-12-11 ENCOUNTER — TRANSCRIPTION ENCOUNTER (OUTPATIENT)
Age: 67
End: 2022-12-11

## 2022-12-11 LAB
ANION GAP SERPL CALC-SCNC: 8 MMOL/L — SIGNIFICANT CHANGE UP (ref 5–17)
BUN SERPL-MCNC: 8 MG/DL — SIGNIFICANT CHANGE UP (ref 7–23)
CALCIUM SERPL-MCNC: 8.3 MG/DL — LOW (ref 8.5–10.1)
CHLORIDE SERPL-SCNC: 110 MMOL/L — HIGH (ref 96–108)
CO2 SERPL-SCNC: 27 MMOL/L — SIGNIFICANT CHANGE UP (ref 22–31)
CREAT SERPL-MCNC: 0.76 MG/DL — SIGNIFICANT CHANGE UP (ref 0.5–1.3)
EGFR: 86 ML/MIN/1.73M2 — SIGNIFICANT CHANGE UP
GLUCOSE SERPL-MCNC: 128 MG/DL — HIGH (ref 70–99)
HCT VFR BLD CALC: 35.7 % — SIGNIFICANT CHANGE UP (ref 34.5–45)
HGB BLD-MCNC: 11.3 G/DL — LOW (ref 11.5–15.5)
MAGNESIUM SERPL-MCNC: 2.4 MG/DL — SIGNIFICANT CHANGE UP (ref 1.6–2.6)
MCHC RBC-ENTMCNC: 30.7 PG — SIGNIFICANT CHANGE UP (ref 27–34)
MCHC RBC-ENTMCNC: 31.7 GM/DL — LOW (ref 32–36)
MCV RBC AUTO: 97 FL — SIGNIFICANT CHANGE UP (ref 80–100)
NRBC # BLD: 0 /100 WBCS — SIGNIFICANT CHANGE UP (ref 0–0)
PHOSPHATE SERPL-MCNC: 2.7 MG/DL — SIGNIFICANT CHANGE UP (ref 2.5–4.5)
PLATELET # BLD AUTO: 202 K/UL — SIGNIFICANT CHANGE UP (ref 150–400)
POTASSIUM SERPL-MCNC: 3.2 MMOL/L — LOW (ref 3.5–5.3)
POTASSIUM SERPL-SCNC: 3.2 MMOL/L — LOW (ref 3.5–5.3)
RBC # BLD: 3.68 M/UL — LOW (ref 3.8–5.2)
RBC # FLD: 12.7 % — SIGNIFICANT CHANGE UP (ref 10.3–14.5)
SODIUM SERPL-SCNC: 145 MMOL/L — SIGNIFICANT CHANGE UP (ref 135–145)
WBC # BLD: 6.17 K/UL — SIGNIFICANT CHANGE UP (ref 3.8–10.5)
WBC # FLD AUTO: 6.17 K/UL — SIGNIFICANT CHANGE UP (ref 3.8–10.5)

## 2022-12-11 PROCEDURE — 74018 RADEX ABDOMEN 1 VIEW: CPT | Mod: 26

## 2022-12-11 RX ORDER — PANTOPRAZOLE SODIUM 20 MG/1
40 TABLET, DELAYED RELEASE ORAL
Refills: 0 | Status: DISCONTINUED | OUTPATIENT
Start: 2022-12-11 | End: 2022-12-13

## 2022-12-11 RX ORDER — LANOLIN ALCOHOL/MO/W.PET/CERES
5 CREAM (GRAM) TOPICAL AT BEDTIME
Refills: 0 | Status: DISCONTINUED | OUTPATIENT
Start: 2022-12-11 | End: 2022-12-13

## 2022-12-11 RX ORDER — POTASSIUM CHLORIDE 20 MEQ
10 PACKET (EA) ORAL
Refills: 0 | Status: COMPLETED | OUTPATIENT
Start: 2022-12-11 | End: 2022-12-11

## 2022-12-11 RX ORDER — DIPHENHYDRAMINE HCL 50 MG
25 CAPSULE ORAL AT BEDTIME
Refills: 0 | Status: DISCONTINUED | OUTPATIENT
Start: 2022-12-11 | End: 2022-12-13

## 2022-12-11 RX ADMIN — HYDROMORPHONE HYDROCHLORIDE 1 MILLIGRAM(S): 2 INJECTION INTRAMUSCULAR; INTRAVENOUS; SUBCUTANEOUS at 11:55

## 2022-12-11 RX ADMIN — PANTOPRAZOLE SODIUM 40 MILLIGRAM(S): 20 TABLET, DELAYED RELEASE ORAL at 06:59

## 2022-12-11 RX ADMIN — SODIUM CHLORIDE 100 MILLILITER(S): 9 INJECTION INTRAMUSCULAR; INTRAVENOUS; SUBCUTANEOUS at 17:37

## 2022-12-11 RX ADMIN — HYDROMORPHONE HYDROCHLORIDE 1 MILLIGRAM(S): 2 INJECTION INTRAMUSCULAR; INTRAVENOUS; SUBCUTANEOUS at 17:29

## 2022-12-11 RX ADMIN — Medication 100 MILLIEQUIVALENT(S): at 13:31

## 2022-12-11 RX ADMIN — Medication 25 MILLIGRAM(S): at 22:14

## 2022-12-11 RX ADMIN — PANTOPRAZOLE SODIUM 40 MILLIGRAM(S): 20 TABLET, DELAYED RELEASE ORAL at 17:37

## 2022-12-11 RX ADMIN — HYDROMORPHONE HYDROCHLORIDE 1 MILLIGRAM(S): 2 INJECTION INTRAMUSCULAR; INTRAVENOUS; SUBCUTANEOUS at 06:09

## 2022-12-11 RX ADMIN — Medication 100 MILLIEQUIVALENT(S): at 15:09

## 2022-12-11 RX ADMIN — HYDROMORPHONE HYDROCHLORIDE 1 MILLIGRAM(S): 2 INJECTION INTRAMUSCULAR; INTRAVENOUS; SUBCUTANEOUS at 06:24

## 2022-12-11 RX ADMIN — HYDROMORPHONE HYDROCHLORIDE 1 MILLIGRAM(S): 2 INJECTION INTRAMUSCULAR; INTRAVENOUS; SUBCUTANEOUS at 01:58

## 2022-12-11 RX ADMIN — Medication 5 MILLIGRAM(S): at 22:14

## 2022-12-11 RX ADMIN — HYDROMORPHONE HYDROCHLORIDE 1 MILLIGRAM(S): 2 INJECTION INTRAMUSCULAR; INTRAVENOUS; SUBCUTANEOUS at 11:40

## 2022-12-11 RX ADMIN — HYDROMORPHONE HYDROCHLORIDE 1 MILLIGRAM(S): 2 INJECTION INTRAMUSCULAR; INTRAVENOUS; SUBCUTANEOUS at 02:13

## 2022-12-11 RX ADMIN — Medication 88 MICROGRAM(S): at 05:09

## 2022-12-11 RX ADMIN — Medication 100 MILLIEQUIVALENT(S): at 11:37

## 2022-12-11 RX ADMIN — HYDROMORPHONE HYDROCHLORIDE 1 MILLIGRAM(S): 2 INJECTION INTRAMUSCULAR; INTRAVENOUS; SUBCUTANEOUS at 17:45

## 2022-12-11 NOTE — DISCHARGE NOTE PROVIDER - HOSPITAL COURSE
HPI:  67 female w/ PMH Hypothyroidism, HLD, s/p SBR in July for SBO, with subsequent SBO that resulted in revision of SB anastomosis in September, p/w intermittent abdominal pain x1 week. Patient reports she recently traveled to [place] in November, where she suffered another SBO and was managed conservatively with a NG tube, recovered after 4 days. She had f/u with Dr. Yanez upon returning to [place] without symptoms. This past week she endorses abdominal pain described as intermittent spasms in her bonifacio-umbilical region. She attempted to alleviate her pain by walking, without avail. Her pain became constant yesterday evening and 10/10 in severity, which prompted her to return to the ED. Last BM shortly before onset of symptoms, flatus + before visit.  Denies subjective fever, chest pain, SOB, recent change in bowel/urinary habits, or LOC.  (08 Dec 2022 04:48)    HOSPITAL COURSE:  Pt admitted with NGT placement.  Minimal output from NGT but distention and abdominal pain and discomfort.  Pt continued to improve following NGT placement.  Eventual return of GI function, with passage of flatus and bowel movements. Diet was advanced as tolerated until a return to a low fiber diet was met.  Pain was well controlled.  All electrolytes were repleted as necessary.  Pt did well and was stable for discharge.     DISPO:   Stable for discharge with outpatient follow up with Dr. Yanez.    HPI:  67 female w/ PMH Hypothyroidism, HLD, s/p SBR in July for SBO, with subsequent SBO that resulted in revision of SB anastomosis in September, p/w intermittent abdominal pain x1 week. Patient reports she recently traveled to [place] in November, where she suffered another SBO and was managed conservatively with a NG tube, recovered after 4 days. She had f/u with Dr. Yanez upon returning to [place] without symptoms. This past week she endorses abdominal pain described as intermittent spasms in her bonifacio-umbilical region. She attempted to alleviate her pain by walking, without avail. Her pain became constant yesterday evening and 10/10 in severity, which prompted her to return to the ED. Last BM shortly before onset of symptoms, flatus + before visit.  Denies subjective fever, chest pain, SOB, recent change in bowel/urinary habits, or LOC.  (08 Dec 2022 04:48)    HOSPITAL COURSE:  Pt admitted with NGT placement.  Minimal output from NGT but distention and abdominal pain and discomfort.  Pt continued to improve following NGT placement.  Eventual return of GI function, with passage of flatus and bowel movements. NGT removed, diet was advanced as tolerated.  Pain was well controlled.  All electrolytes were repleted as necessary.  Pt did well and was stable for discharge.     DISPO:   Stable for discharge with outpatient follow up with Dr. Yanez.

## 2022-12-11 NOTE — DIETITIAN INITIAL EVALUATION ADULT - NSICDXPASTSURGICALHX_GEN_ALL_CORE_FT
PAST SURGICAL HISTORY:  History of colon resection     S/P small bowel resection     S/P small bowel resection

## 2022-12-11 NOTE — DISCHARGE NOTE PROVIDER - NSDCMRMEDTOKEN_GEN_ALL_CORE_FT
Fish Oil 1000 mg oral capsule: 1 cap(s) orally 2 times a day  fluorometholone 0.1% ophthalmic suspension: 1 drop(s) to each affected eye once a day, As Needed  levothyroxine 88 mcg (0.088 mg) oral tablet: 1 tab(s) orally once a day  pantoprazole 40 mg oral delayed release tablet: 1 tab(s) orally once a day  Restasis 0.05% ophthalmic emulsion: 1 drop(s) to each affected eye every 12 hours  rosuvastatin 5 mg oral tablet: 1 tab(s) orally once a day   Fish Oil 1000 mg oral capsule: 1 cap(s) orally 2 times a day  fluorometholone 0.1% ophthalmic suspension: 1 drop(s) to each affected eye once a day, As Needed  ibuprofen 600 mg oral tablet: 1 tab(s) orally every 6 hours, As Needed  levothyroxine 88 mcg (0.088 mg) oral tablet: 1 tab(s) orally once a day  pantoprazole 40 mg oral delayed release tablet: 1 tab(s) orally once a day  Restasis 0.05% ophthalmic emulsion: 1 drop(s) to each affected eye every 12 hours  rosuvastatin 5 mg oral tablet: 1 tab(s) orally once a day

## 2022-12-11 NOTE — DIETITIAN INITIAL EVALUATION ADULT - OTHER INFO
67 w/ PMH Hypothyroidism, HLD, multiple SBOs in the past year, with SBR in July and SB anastomosis revision in September, p/w abdominal pain x1 week associated with N/V. CT suggesting SBO with transition point at prior SB anastomosis site. VSS, labs reviewed.    Pt A+Ox4 during visit. NPO since 12/8. On IVF-NS@100cc/hr. NGT removed today. No report N/V. Improved abdominal pain. +flatus. Per pt, "Surgeon plans to start me on full liquid diet." At present no active diet. Surgical PA made aware. Food preferences obtained. Low K 3.2-supplemeted with KCl. UBW 140lbs. Stable per pt. Pt familiar with progression from full liquid to low fiber diet. Declined written/verbal diet education. Declined need for oral nutritional supplements.

## 2022-12-11 NOTE — DIETITIAN INITIAL EVALUATION ADULT - PERTINENT MEDS FT
MEDICATIONS  (STANDING):  atorvastatin 20 milliGRAM(s) Oral at bedtime  levothyroxine 88 MICROGram(s) Oral daily  melatonin 5 milliGRAM(s) Oral at bedtime  pantoprazole  Injectable 40 milliGRAM(s) IV Push two times a day  potassium chloride  10 mEq/100 mL IVPB 10 milliEquivalent(s) IV Intermittent every 1 hour  sodium chloride 0.9%. 1000 milliLiter(s) (100 mL/Hr) IV Continuous <Continuous>    MEDICATIONS  (PRN):  acetaminophen     Tablet .. 1000 milliGRAM(s) Oral every 6 hours PRN Mild Pain (1 - 3)  benzocaine 20% Spray 1 Spray(s) Topical three times a day PRN Sore throat/NG tube  diphenhydrAMINE Injectable 12.5 milliGRAM(s) IV Push at bedtime PRN Insomnia  HYDROmorphone  Injectable 1 milliGRAM(s) IV Push every 4 hours PRN Severe Pain (7 - 10)  ketorolac   Injectable 15 milliGRAM(s) IV Push every 6 hours PRN Moderate Pain (4 - 6)  ondansetron Injectable 4 milliGRAM(s) IV Push every 6 hours PRN Nausea

## 2022-12-11 NOTE — DISCHARGE NOTE PROVIDER - NSDCFUADDINST_GEN_ALL_CORE_FT
Regular ambulation  No strenuous activity  No reaching, pulling, pushing, lifting   May shower, bathe

## 2022-12-11 NOTE — PROGRESS NOTE ADULT - SUBJECTIVE AND OBJECTIVE BOX
SURGERY PA NOTE ON BEHALF OF DR. CAMARILLO:    S: Patient seen and examined at bedside.   No acute events overnight.  Patient reports less abdominal pain, passing flatus, no BMs yet.  Denies fevers, chills, chest pain, SOB, palpitations, calf pain.      MEDICATIONS:  acetaminophen     Tablet .. 1000 milliGRAM(s) Oral every 6 hours PRN  atorvastatin 20 milliGRAM(s) Oral at bedtime  benzocaine 20% Spray 1 Spray(s) Topical three times a day PRN  diphenhydrAMINE Injectable 12.5 milliGRAM(s) IV Push at bedtime PRN  HYDROmorphone  Injectable 1 milliGRAM(s) IV Push every 4 hours PRN  ketorolac   Injectable 15 milliGRAM(s) IV Push every 6 hours PRN  levothyroxine 88 MICROGram(s) Oral daily  melatonin 5 milliGRAM(s) Oral at bedtime  ondansetron Injectable 4 milliGRAM(s) IV Push every 6 hours PRN  pantoprazole  Injectable 40 milliGRAM(s) IV Push two times a day  sodium chloride 0.9%. 1000 milliLiter(s) IV Continuous <Continuous>      O:  Vital Signs Last 24 Hrs  T(C): 36.7 (11 Dec 2022 05:01), Max: 36.8 (10 Dec 2022 13:32)  T(F): 98.1 (11 Dec 2022 05:01), Max: 98.3 (10 Dec 2022 20:25)  HR: 66 (11 Dec 2022 06:04) (66 - 75)  BP: 119/62 (11 Dec 2022 06:04) (101/64 - 147/75)  BP(mean): --  RR: 18 (11 Dec 2022 05:01) (18 - 19)  SpO2: 94% (11 Dec 2022 05:01) (93% - 96%)    Parameters below as of 11 Dec 2022 05:01  Patient On (Oxygen Delivery Method): room air        I&O SUMMARY:    12-10-22 @ 07:01  -  12-11-22 @ 07:00  --------------------------------------------------------  IN: 2300 mL / OUT: 0 mL / NET: 2300 mL        PHYSICAL EXAM:  Lungs: CTA bilat without W/R/R  Card: S1S2  Abd: Soft, NT, ND.  +BS x 4.  No rebound/guarding.  NGT in place.   Ext: Calves soft, NT, without edema bilat    LABS:                        11.3   6.17  )-----------( 202      ( 11 Dec 2022 06:57 )             35.7     12-11    145  |  110<H>  |  8   ----------------------------<  128<H>  3.2<L>   |  27  |  0.76    Ca    8.3<L>      11 Dec 2022 06:57  Phos  2.7     12-11  Mg     2.4     12-11

## 2022-12-11 NOTE — DISCHARGE NOTE PROVIDER - NSDCCPCAREPLAN_GEN_ALL_CORE_FT
PRINCIPAL DISCHARGE DIAGNOSIS  Diagnosis: Small bowel obstruction  Assessment and Plan of Treatment:       SECONDARY DISCHARGE DIAGNOSES  Diagnosis: Hypothyroidism  Assessment and Plan of Treatment:     Diagnosis: HLD (hyperlipidemia)  Assessment and Plan of Treatment:

## 2022-12-11 NOTE — DISCHARGE NOTE PROVIDER - NSDCFUADDAPPT_GEN_ALL_CORE_FT
Please call to schedule Follow up with Dr. Yanez in 1 week. Call the office to schedule an appointment.

## 2022-12-11 NOTE — DIETITIAN INITIAL EVALUATION ADULT - ADD RECOMMEND
Recommend full liquid diet and advance as tolerated to Low fiber. MVI with minerals daily. Electrolyte replacement prn.

## 2022-12-11 NOTE — DIETITIAN INITIAL EVALUATION ADULT - PERTINENT LABORATORY DATA
12-11    145  |  110<H>  |  8   ----------------------------<  128<H>  3.2<L>   |  27  |  0.76    Ca    8.3<L>      11 Dec 2022 06:57  Phos  2.7     12-11  Mg     2.4     12-11

## 2022-12-12 LAB
ALBUMIN SERPL ELPH-MCNC: 2.7 G/DL — LOW (ref 3.3–5)
ALP SERPL-CCNC: 89 U/L — SIGNIFICANT CHANGE UP (ref 40–120)
ALT FLD-CCNC: 55 U/L — SIGNIFICANT CHANGE UP (ref 12–78)
ANION GAP SERPL CALC-SCNC: 4 MMOL/L — LOW (ref 5–17)
AST SERPL-CCNC: 64 U/L — HIGH (ref 15–37)
BILIRUB SERPL-MCNC: 0.5 MG/DL — SIGNIFICANT CHANGE UP (ref 0.2–1.2)
BUN SERPL-MCNC: 5 MG/DL — LOW (ref 7–23)
CALCIUM SERPL-MCNC: 8.1 MG/DL — LOW (ref 8.5–10.1)
CHLORIDE SERPL-SCNC: 113 MMOL/L — HIGH (ref 96–108)
CO2 SERPL-SCNC: 29 MMOL/L — SIGNIFICANT CHANGE UP (ref 22–31)
CREAT SERPL-MCNC: 0.64 MG/DL — SIGNIFICANT CHANGE UP (ref 0.5–1.3)
EGFR: 97 ML/MIN/1.73M2 — SIGNIFICANT CHANGE UP
GLUCOSE SERPL-MCNC: 113 MG/DL — HIGH (ref 70–99)
HCT VFR BLD CALC: 32.3 % — LOW (ref 34.5–45)
HGB BLD-MCNC: 10.3 G/DL — LOW (ref 11.5–15.5)
MAGNESIUM SERPL-MCNC: 2.1 MG/DL — SIGNIFICANT CHANGE UP (ref 1.6–2.6)
MCHC RBC-ENTMCNC: 30.5 PG — SIGNIFICANT CHANGE UP (ref 27–34)
MCHC RBC-ENTMCNC: 31.9 GM/DL — LOW (ref 32–36)
MCV RBC AUTO: 95.6 FL — SIGNIFICANT CHANGE UP (ref 80–100)
NRBC # BLD: 0 /100 WBCS — SIGNIFICANT CHANGE UP (ref 0–0)
PHOSPHATE SERPL-MCNC: 2.3 MG/DL — LOW (ref 2.5–4.5)
PLATELET # BLD AUTO: 169 K/UL — SIGNIFICANT CHANGE UP (ref 150–400)
POTASSIUM SERPL-MCNC: 3.4 MMOL/L — LOW (ref 3.5–5.3)
POTASSIUM SERPL-SCNC: 3.4 MMOL/L — LOW (ref 3.5–5.3)
PROT SERPL-MCNC: 5.8 G/DL — LOW (ref 6–8.3)
RBC # BLD: 3.38 M/UL — LOW (ref 3.8–5.2)
RBC # FLD: 13 % — SIGNIFICANT CHANGE UP (ref 10.3–14.5)
SODIUM SERPL-SCNC: 146 MMOL/L — HIGH (ref 135–145)
WBC # BLD: 4.39 K/UL — SIGNIFICANT CHANGE UP (ref 3.8–10.5)
WBC # FLD AUTO: 4.39 K/UL — SIGNIFICANT CHANGE UP (ref 3.8–10.5)

## 2022-12-12 PROCEDURE — 72196 MRI PELVIS W/DYE: CPT | Mod: 26

## 2022-12-12 PROCEDURE — 74182 MRI ABDOMEN W/CONTRAST: CPT | Mod: 26

## 2022-12-12 RX ORDER — SODIUM,POTASSIUM PHOSPHATES 278-250MG
1 POWDER IN PACKET (EA) ORAL ONCE
Refills: 0 | Status: COMPLETED | OUTPATIENT
Start: 2022-12-12 | End: 2022-12-12

## 2022-12-12 RX ORDER — HYOSCYAMINE SULFATE 0.13 MG
0.12 TABLET ORAL ONCE
Refills: 0 | Status: COMPLETED | OUTPATIENT
Start: 2022-12-12 | End: 2022-12-12

## 2022-12-12 RX ADMIN — PANTOPRAZOLE SODIUM 40 MILLIGRAM(S): 20 TABLET, DELAYED RELEASE ORAL at 17:28

## 2022-12-12 RX ADMIN — Medication 88 MICROGRAM(S): at 05:25

## 2022-12-12 RX ADMIN — HYDROMORPHONE HYDROCHLORIDE 1 MILLIGRAM(S): 2 INJECTION INTRAMUSCULAR; INTRAVENOUS; SUBCUTANEOUS at 20:50

## 2022-12-12 RX ADMIN — HYDROMORPHONE HYDROCHLORIDE 1 MILLIGRAM(S): 2 INJECTION INTRAMUSCULAR; INTRAVENOUS; SUBCUTANEOUS at 15:25

## 2022-12-12 RX ADMIN — Medication 1 PACKET(S): at 22:48

## 2022-12-12 RX ADMIN — Medication 0.12 MILLIGRAM(S): at 15:26

## 2022-12-12 RX ADMIN — HYDROMORPHONE HYDROCHLORIDE 1 MILLIGRAM(S): 2 INJECTION INTRAMUSCULAR; INTRAVENOUS; SUBCUTANEOUS at 07:28

## 2022-12-12 RX ADMIN — SODIUM CHLORIDE 100 MILLILITER(S): 9 INJECTION INTRAMUSCULAR; INTRAVENOUS; SUBCUTANEOUS at 12:57

## 2022-12-12 RX ADMIN — PANTOPRAZOLE SODIUM 40 MILLIGRAM(S): 20 TABLET, DELAYED RELEASE ORAL at 05:25

## 2022-12-12 RX ADMIN — HYDROMORPHONE HYDROCHLORIDE 1 MILLIGRAM(S): 2 INJECTION INTRAMUSCULAR; INTRAVENOUS; SUBCUTANEOUS at 21:05

## 2022-12-12 RX ADMIN — HYDROMORPHONE HYDROCHLORIDE 1 MILLIGRAM(S): 2 INJECTION INTRAMUSCULAR; INTRAVENOUS; SUBCUTANEOUS at 07:58

## 2022-12-12 RX ADMIN — Medication 25 MILLIGRAM(S): at 22:48

## 2022-12-12 RX ADMIN — Medication 5 MILLIGRAM(S): at 22:47

## 2022-12-12 NOTE — PROGRESS NOTE ADULT - SUBJECTIVE AND OBJECTIVE BOX
SURGERY PA NOTE ON BEHALF OF DR. CARR:    S: Patient seen and examined at bedside. Pt with PMHx SBO s/p diagnostic laparotomy & revision of anastomosis. No acute overnight events. Reports small/hard BM last night, for which she strained accompanied headache/dizziness/nausea. Reports occasional gas pain which travels from lower chest to abdomen. Tolerating diet and ambulating. Denies current headache, dizziness, chest pain, SOB, nausea, vomiting.     MEDICATIONS:  acetaminophen     Tablet .. 1000 milliGRAM(s) Oral every 6 hours PRN  atorvastatin 20 milliGRAM(s) Oral at bedtime  benzocaine 20% Spray 1 Spray(s) Topical three times a day PRN  diphenhydrAMINE Injectable 25 milliGRAM(s) IV Push at bedtime  HYDROmorphone  Injectable 1 milliGRAM(s) IV Push every 4 hours PRN  ketorolac   Injectable 15 milliGRAM(s) IV Push every 6 hours PRN  levothyroxine 88 MICROGram(s) Oral daily  melatonin 5 milliGRAM(s) Oral at bedtime  ondansetron Injectable 4 milliGRAM(s) IV Push every 6 hours PRN  pantoprazole  Injectable 40 milliGRAM(s) IV Push two times a day  sodium chloride 0.9%. 1000 milliLiter(s) IV Continuous <Continuous>    O:  Vital Signs Last 24 Hrs  T(C): 36.7 (12 Dec 2022 04:53), Max: 37 (12 Dec 2022 01:22)  T(F): 98 (12 Dec 2022 04:53), Max: 98.6 (12 Dec 2022 01:22)  HR: 64 (12 Dec 2022 04:53) (61 - 71)  BP: 120/75 (12 Dec 2022 04:53) (111/74 - 125/83)  RR: 19 (12 Dec 2022 04:53) (17 - 19)  SpO2: 96% (12 Dec 2022 04:53) (96% - 96%)    Parameters below as of 12 Dec 2022 04:53  Patient On (Oxygen Delivery Method): room air    I&O SUMMARY:  12-11-22 @ 07:01  -  12-12-22 @ 07:00  --------------------------------------------------------  IN: 2000 mL / OUT: 0 mL / NET: 2000 mL    PHYSICAL EXAM:  GENERAL: No acute distress, well-developed  HEAD:  Atraumatic, Normocephalic  CHEST/LUNG: non-labored respirations, no accessory muscle use  HEART: Regular rate and rhythm  ABDOMEN: Soft, non-distended, well healed midline surgical scars, ttp at the lower abdomen.  NEUROLOGY: A&O x 3, no focal deficits    LABS:                        10.3   4.39  )-----------( 169      ( 12 Dec 2022 06:10 )             32.3     12-12    146<H>  |  113<H>  |  5<L>  ----------------------------<  113<H>  3.4<L>   |  29  |  0.64    Ca    8.1<L>      12 Dec 2022 06:10  Phos  2.3     12-12  Mg     2.1     12-12    TPro  5.8<L>  /  Alb  2.7<L>  /  TBili  0.5  /  DBili  x   /  AST  64<H>  /  ALT  55  /  AlkPhos  89  12-12    RADIOLOGY:  < from: Xray Kidney Ureter Bladder (12.11.22 @ 09:24) >  ACC: [id number] EXAM:  XR KUB 1 VIEW                        ACC: [id number] EXAM:  XR KUB 1 VIEW                          PROCEDURE DATE:  12/09/2022      INTERPRETATION:  HISTORY:  SBO    TECHNIQUE:  Supine views of the abdomen were obtained on 12/9/2022 and   12/11/2022. Comparison is made to a recent abdominal CT dated 12/8/2022   and abdominal x-ray dated 9/30/2022.    FINDINGS:    12/9/2022: There is a few air-filled nondilated loops of colon seen in   the left abdomen. There is a paucity small bowel gas. Anastomotic sutures   are seen in the left pelvis. An NG tube is noted in the stomach. There is   no solid organomegaly. Degenerative changes are seen in the lumbar spine.    12/11/2022: There is increased air seen in mildly distendedloops of   small bowel in the left lower quadrant which is improved from the prior   CT. Air and stool is seen in nondilated colon.    IMPRESSION:  Improving small bowel obstruction.    --- End of Report ---    NETTA LAMB MD; AttendingRadiologist  This document has been electronically signed. Dec 11 2022 12:20PM    < end of copied text >     SURGERY PA NOTE ON BEHALF OF DR. CAMARILLO:    S: Patient seen and examined at bedside. Pt with PMHx SBO s/p diagnostic laparotomy & revision of anastomosis. No acute overnight events. Reports small/hard BM last night, for which she strained accompanied headache/dizziness/nausea. Reports occasional gas pain which travels from lower chest to abdomen. Tolerating diet and ambulating. Denies current headache, dizziness, chest pain, SOB, nausea, vomiting.     MEDICATIONS:  acetaminophen     Tablet .. 1000 milliGRAM(s) Oral every 6 hours PRN  atorvastatin 20 milliGRAM(s) Oral at bedtime  benzocaine 20% Spray 1 Spray(s) Topical three times a day PRN  diphenhydrAMINE Injectable 25 milliGRAM(s) IV Push at bedtime  HYDROmorphone  Injectable 1 milliGRAM(s) IV Push every 4 hours PRN  ketorolac   Injectable 15 milliGRAM(s) IV Push every 6 hours PRN  levothyroxine 88 MICROGram(s) Oral daily  melatonin 5 milliGRAM(s) Oral at bedtime  ondansetron Injectable 4 milliGRAM(s) IV Push every 6 hours PRN  pantoprazole  Injectable 40 milliGRAM(s) IV Push two times a day  sodium chloride 0.9%. 1000 milliLiter(s) IV Continuous <Continuous>    O:  Vital Signs Last 24 Hrs  T(C): 36.7 (12 Dec 2022 04:53), Max: 37 (12 Dec 2022 01:22)  T(F): 98 (12 Dec 2022 04:53), Max: 98.6 (12 Dec 2022 01:22)  HR: 64 (12 Dec 2022 04:53) (61 - 71)  BP: 120/75 (12 Dec 2022 04:53) (111/74 - 125/83)  RR: 19 (12 Dec 2022 04:53) (17 - 19)  SpO2: 96% (12 Dec 2022 04:53) (96% - 96%)    Parameters below as of 12 Dec 2022 04:53  Patient On (Oxygen Delivery Method): room air    I&O SUMMARY:  12-11-22 @ 07:01  -  12-12-22 @ 07:00  --------------------------------------------------------  IN: 2000 mL / OUT: 0 mL / NET: 2000 mL    PHYSICAL EXAM:  GENERAL: No acute distress, well-developed  HEAD:  Atraumatic, Normocephalic  CHEST/LUNG: non-labored respirations, no accessory muscle use  HEART: Regular rate and rhythm  ABDOMEN: Soft, non-distended, well healed midline surgical scars, ttp at the lower abdomen.  NEUROLOGY: A&O x 3, no focal deficits    LABS:                        10.3   4.39  )-----------( 169      ( 12 Dec 2022 06:10 )             32.3     12-12    146<H>  |  113<H>  |  5<L>  ----------------------------<  113<H>  3.4<L>   |  29  |  0.64    Ca    8.1<L>      12 Dec 2022 06:10  Phos  2.3     12-12  Mg     2.1     12-12    TPro  5.8<L>  /  Alb  2.7<L>  /  TBili  0.5  /  DBili  x   /  AST  64<H>  /  ALT  55  /  AlkPhos  89  12-12    RADIOLOGY:  < from: Xray Kidney Ureter Bladder (12.11.22 @ 09:24) >  ACC: [id number] EXAM:  XR KUB 1 VIEW                        ACC: [id number] EXAM:  XR KUB 1 VIEW                          PROCEDURE DATE:  12/09/2022      INTERPRETATION:  HISTORY:  SBO    TECHNIQUE:  Supine views of the abdomen were obtained on 12/9/2022 and   12/11/2022. Comparison is made to a recent abdominal CT dated 12/8/2022   and abdominal x-ray dated 9/30/2022.    FINDINGS:    12/9/2022: There is a few air-filled nondilated loops of colon seen in   the left abdomen. There is a paucity small bowel gas. Anastomotic sutures   are seen in the left pelvis. An NG tube is noted in the stomach. There is   no solid organomegaly. Degenerative changes are seen in the lumbar spine.    12/11/2022: There is increased air seen in mildly distendedloops of   small bowel in the left lower quadrant which is improved from the prior   CT. Air and stool is seen in nondilated colon.    IMPRESSION:  Improving small bowel obstruction.    --- End of Report ---    NETTA LAMB MD; AttendingRadiologist  This document has been electronically signed. Dec 11 2022 12:20PM    < end of copied text >

## 2022-12-13 ENCOUNTER — TRANSCRIPTION ENCOUNTER (OUTPATIENT)
Age: 67
End: 2022-12-13

## 2022-12-13 VITALS
TEMPERATURE: 98 F | HEART RATE: 69 BPM | DIASTOLIC BLOOD PRESSURE: 75 MMHG | OXYGEN SATURATION: 93 % | RESPIRATION RATE: 18 BRPM | SYSTOLIC BLOOD PRESSURE: 112 MMHG

## 2022-12-13 LAB
ALBUMIN SERPL ELPH-MCNC: 3 G/DL — LOW (ref 3.3–5)
ALP SERPL-CCNC: 91 U/L — SIGNIFICANT CHANGE UP (ref 40–120)
ALT FLD-CCNC: 52 U/L — SIGNIFICANT CHANGE UP (ref 12–78)
ANION GAP SERPL CALC-SCNC: 2 MMOL/L — LOW (ref 5–17)
AST SERPL-CCNC: 42 U/L — HIGH (ref 15–37)
BILIRUB SERPL-MCNC: 0.4 MG/DL — SIGNIFICANT CHANGE UP (ref 0.2–1.2)
BUN SERPL-MCNC: 4 MG/DL — LOW (ref 7–23)
CALCIUM SERPL-MCNC: 8.3 MG/DL — LOW (ref 8.5–10.1)
CHLORIDE SERPL-SCNC: 109 MMOL/L — HIGH (ref 96–108)
CO2 SERPL-SCNC: 32 MMOL/L — HIGH (ref 22–31)
CREAT SERPL-MCNC: 0.64 MG/DL — SIGNIFICANT CHANGE UP (ref 0.5–1.3)
EGFR: 97 ML/MIN/1.73M2 — SIGNIFICANT CHANGE UP
GLUCOSE SERPL-MCNC: 98 MG/DL — SIGNIFICANT CHANGE UP (ref 70–99)
HCT VFR BLD CALC: 32 % — LOW (ref 34.5–45)
HGB BLD-MCNC: 10.3 G/DL — LOW (ref 11.5–15.5)
MAGNESIUM SERPL-MCNC: 2.3 MG/DL — SIGNIFICANT CHANGE UP (ref 1.6–2.6)
MCHC RBC-ENTMCNC: 30.7 PG — SIGNIFICANT CHANGE UP (ref 27–34)
MCHC RBC-ENTMCNC: 32.2 GM/DL — SIGNIFICANT CHANGE UP (ref 32–36)
MCV RBC AUTO: 95.2 FL — SIGNIFICANT CHANGE UP (ref 80–100)
NRBC # BLD: 0 /100 WBCS — SIGNIFICANT CHANGE UP (ref 0–0)
PHOSPHATE SERPL-MCNC: 3 MG/DL — SIGNIFICANT CHANGE UP (ref 2.5–4.5)
PLATELET # BLD AUTO: 181 K/UL — SIGNIFICANT CHANGE UP (ref 150–400)
POTASSIUM SERPL-MCNC: 3.5 MMOL/L — SIGNIFICANT CHANGE UP (ref 3.5–5.3)
POTASSIUM SERPL-SCNC: 3.5 MMOL/L — SIGNIFICANT CHANGE UP (ref 3.5–5.3)
PROT SERPL-MCNC: 5.9 G/DL — LOW (ref 6–8.3)
RBC # BLD: 3.36 M/UL — LOW (ref 3.8–5.2)
RBC # FLD: 13 % — SIGNIFICANT CHANGE UP (ref 10.3–14.5)
SODIUM SERPL-SCNC: 143 MMOL/L — SIGNIFICANT CHANGE UP (ref 135–145)
WBC # BLD: 4.92 K/UL — SIGNIFICANT CHANGE UP (ref 3.8–10.5)
WBC # FLD AUTO: 4.92 K/UL — SIGNIFICANT CHANGE UP (ref 3.8–10.5)

## 2022-12-13 PROCEDURE — 85610 PROTHROMBIN TIME: CPT

## 2022-12-13 PROCEDURE — 87637 SARSCOV2&INF A&B&RSV AMP PRB: CPT

## 2022-12-13 PROCEDURE — 86850 RBC ANTIBODY SCREEN: CPT

## 2022-12-13 PROCEDURE — 83690 ASSAY OF LIPASE: CPT

## 2022-12-13 PROCEDURE — 80048 BASIC METABOLIC PNL TOTAL CA: CPT

## 2022-12-13 PROCEDURE — 85025 COMPLETE CBC W/AUTO DIFF WBC: CPT

## 2022-12-13 PROCEDURE — A9579: CPT

## 2022-12-13 PROCEDURE — 96376 TX/PRO/DX INJ SAME DRUG ADON: CPT

## 2022-12-13 PROCEDURE — 74182 MRI ABDOMEN W/CONTRAST: CPT

## 2022-12-13 PROCEDURE — 86901 BLOOD TYPING SEROLOGIC RH(D): CPT

## 2022-12-13 PROCEDURE — 96375 TX/PRO/DX INJ NEW DRUG ADDON: CPT

## 2022-12-13 PROCEDURE — 80053 COMPREHEN METABOLIC PANEL: CPT

## 2022-12-13 PROCEDURE — A9698: CPT

## 2022-12-13 PROCEDURE — 83605 ASSAY OF LACTIC ACID: CPT

## 2022-12-13 PROCEDURE — 85730 THROMBOPLASTIN TIME PARTIAL: CPT

## 2022-12-13 PROCEDURE — 85027 COMPLETE CBC AUTOMATED: CPT

## 2022-12-13 PROCEDURE — 72196 MRI PELVIS W/DYE: CPT

## 2022-12-13 PROCEDURE — 84100 ASSAY OF PHOSPHORUS: CPT

## 2022-12-13 PROCEDURE — 83735 ASSAY OF MAGNESIUM: CPT

## 2022-12-13 PROCEDURE — 93005 ELECTROCARDIOGRAM TRACING: CPT

## 2022-12-13 PROCEDURE — 99285 EMERGENCY DEPT VISIT HI MDM: CPT | Mod: 25

## 2022-12-13 PROCEDURE — 96374 THER/PROPH/DIAG INJ IV PUSH: CPT

## 2022-12-13 PROCEDURE — 36415 COLL VENOUS BLD VENIPUNCTURE: CPT

## 2022-12-13 PROCEDURE — 71045 X-RAY EXAM CHEST 1 VIEW: CPT

## 2022-12-13 PROCEDURE — 86900 BLOOD TYPING SEROLOGIC ABO: CPT

## 2022-12-13 PROCEDURE — 74018 RADEX ABDOMEN 1 VIEW: CPT

## 2022-12-13 PROCEDURE — 74177 CT ABD & PELVIS W/CONTRAST: CPT | Mod: MA

## 2022-12-13 RX ADMIN — HYDROMORPHONE HYDROCHLORIDE 1 MILLIGRAM(S): 2 INJECTION INTRAMUSCULAR; INTRAVENOUS; SUBCUTANEOUS at 10:00

## 2022-12-13 RX ADMIN — Medication 88 MICROGRAM(S): at 05:59

## 2022-12-13 RX ADMIN — HYDROMORPHONE HYDROCHLORIDE 1 MILLIGRAM(S): 2 INJECTION INTRAMUSCULAR; INTRAVENOUS; SUBCUTANEOUS at 01:45

## 2022-12-13 RX ADMIN — HYDROMORPHONE HYDROCHLORIDE 1 MILLIGRAM(S): 2 INJECTION INTRAMUSCULAR; INTRAVENOUS; SUBCUTANEOUS at 10:15

## 2022-12-13 RX ADMIN — HYDROMORPHONE HYDROCHLORIDE 1 MILLIGRAM(S): 2 INJECTION INTRAMUSCULAR; INTRAVENOUS; SUBCUTANEOUS at 02:00

## 2022-12-13 RX ADMIN — PANTOPRAZOLE SODIUM 40 MILLIGRAM(S): 20 TABLET, DELAYED RELEASE ORAL at 05:58

## 2022-12-13 NOTE — PROGRESS NOTE ADULT - ASSESSMENT
66 yo with sbo   KUB  cont ngt/npo/ivf
67 year old female a/w SBO. NGT removed yesterday. VSS, and PE unremarkable. Patient now passing flatus, having BM, and improved abdominal exam.
66 yo F with SBO     -NGT clamped will follow for status change, to be pulled tomorrow if not reconnected to suction  -NPO/IVF  -repeat KUB tomorrow  -Discussed with Dr. Yanez 
67 year old female admitted with SBO, resolving.
67 year old female a/w SBO.  NGT in place, clamped since yesterday morning.   Patient now passing flatus and improved abdominal exam.

## 2022-12-13 NOTE — PROGRESS NOTE ADULT - PROBLEM SELECTOR PLAN 1
VSS, PE reassuring, +flatus, +BM  Replete lytes PRN  Adv diet as tolerated  Continue with conservative management, MRE inpt or outpt    Will discuss with Dr. Yanez
- Continue full liquids for now  - Await MRI read  - Serial abdominal exams  - Encourage ambulation  - Follow up AM labs  - Case to be discussed with Dr. Yanez
- consider NGT removal this am  - serial abdominal exams  - F/u am labs  - Will discuss w/ Dr. Yanez

## 2022-12-13 NOTE — DISCHARGE NOTE NURSING/CASE MANAGEMENT/SOCIAL WORK - PATIENT PORTAL LINK FT
You can access the FollowMyHealth Patient Portal offered by [facility] by registering at the following website: [email or web address]. By joining [employer]s FollowMyHealth portal, you will also be able to view your health information using other applications (apps) compatible with our system.

## 2022-12-13 NOTE — PROGRESS NOTE ADULT - SUBJECTIVE AND OBJECTIVE BOX
SUBJECTIVE:  Patient seen and examined at bedside.  No overnight events.  Patient reports crampy abdominal pain since receiving contrast yesterday around 1600, however now improved. Reports passing substantial flatus, last BM 2 days ago. No nausea/vomiting. Eager to advance diet. Awaiting MRI read.    VITALS  Vital Signs Last 24 Hrs  T(C): 36.8 (13 Dec 2022 04:29), Max: 36.8 (13 Dec 2022 04:29)  T(F): 98.2 (13 Dec 2022 04:29), Max: 98.2 (13 Dec 2022 04:29)  HR: 64 (13 Dec 2022 04:29) (61 - 69)  BP: 129/76 (13 Dec 2022 04:29) (121/71 - 130/78)  RR: 18 (13 Dec 2022 04:29) (18 - 18)  SpO2: 96% (13 Dec 2022 04:29) (95% - 96%)    Parameters below as of 13 Dec 2022 04:29  Patient On (Oxygen Delivery Method): room air    PHYSICAL EXAM  GENERAL:  Well-nourished, well-developed female lying comfortably in bed in NAD.  HEENT:  NC/AT. Sclera white. Mucous membranes moist.  CARDIO:  Regular rate and rhythm.   RESPIRATORY:  Nonlabored breathing, no accessory muscle use.  ABDOMEN:  Soft, nondistended, mild TTP in RLQ. No rebound tenderness or guarding.  SKIN:  No jaundice, pallor, or cyanosis  NEURO:  A&O x 3    MEDICATIONS  MEDICATIONS  (STANDING):  atorvastatin 20 milliGRAM(s) Oral at bedtime  diphenhydrAMINE Injectable 25 milliGRAM(s) IV Push at bedtime  levothyroxine 88 MICROGram(s) Oral daily  melatonin 5 milliGRAM(s) Oral at bedtime  pantoprazole  Injectable 40 milliGRAM(s) IV Push two times a day    MEDICATIONS  (PRN):  acetaminophen     Tablet .. 1000 milliGRAM(s) Oral every 6 hours PRN Mild Pain (1 - 3)  benzocaine 20% Spray 1 Spray(s) Topical three times a day PRN Sore throat/NG tube  HYDROmorphone  Injectable 1 milliGRAM(s) IV Push every 4 hours PRN Severe Pain (7 - 10)  ketorolac   Injectable 15 milliGRAM(s) IV Push every 6 hours PRN Moderate Pain (4 - 6)  ondansetron Injectable 4 milliGRAM(s) IV Push every 6 hours PRN Nausea    LABS:                        10.3   4.92  )-----------( 181      ( 13 Dec 2022 06:15 )             32.0     12-13    143  |  109<H>  |  4<L>  ----------------------------<  98  3.5   |  32<H>  |  0.64    Ca    8.3<L>      13 Dec 2022 06:15  Phos  3.0     12-13  Mg     2.3     12-13    TPro  5.9<L>  /  Alb  3.0<L>  /  TBili  0.4  /  DBili  x   /  AST  42<H>  /  ALT  52  /  AlkPhos  91  12-13

## 2022-12-30 ENCOUNTER — NON-APPOINTMENT (OUTPATIENT)
Age: 67
End: 2022-12-30

## 2022-12-30 ENCOUNTER — APPOINTMENT (OUTPATIENT)
Dept: INTERNAL MEDICINE | Facility: CLINIC | Age: 67
End: 2022-12-30
Payer: MEDICARE

## 2022-12-30 VITALS
WEIGHT: 144 LBS | BODY MASS INDEX: 26.5 KG/M2 | HEART RATE: 84 BPM | OXYGEN SATURATION: 94 % | HEIGHT: 62 IN | TEMPERATURE: 98.5 F | SYSTOLIC BLOOD PRESSURE: 120 MMHG | DIASTOLIC BLOOD PRESSURE: 88 MMHG

## 2022-12-30 DIAGNOSIS — U07.1 COVID-19: ICD-10-CM

## 2022-12-30 PROCEDURE — 99214 OFFICE O/P EST MOD 30 MIN: CPT | Mod: CS,25

## 2022-12-30 PROCEDURE — 36415 COLL VENOUS BLD VENIPUNCTURE: CPT

## 2022-12-30 PROCEDURE — 93000 ELECTROCARDIOGRAM COMPLETE: CPT

## 2022-12-30 RX ORDER — PHENTERMINE HYDROCHLORIDE 30 MG/1
30 CAPSULE ORAL
Refills: 0 | Status: DISCONTINUED | COMMUNITY
End: 2022-12-30

## 2022-12-30 RX ORDER — LIOTHYRONINE SODIUM 5 UG/1
5 TABLET ORAL DAILY
Refills: 0 | Status: DISCONTINUED | COMMUNITY
End: 2022-12-30

## 2022-12-30 NOTE — PHYSICAL EXAM
[No Acute Distress] : no acute distress [Well Nourished] : well nourished [Well Developed] : well developed [Well-Appearing] : well-appearing [PERRL] : pupils equal round and reactive to light [Normal Sclera/Conjunctiva] : normal sclera/conjunctiva [EOMI] : extraocular movements intact [Normal Outer Ear/Nose] : the outer ears and nose were normal in appearance [Normal Oropharynx] : the oropharynx was normal [No JVD] : no jugular venous distention [No Lymphadenopathy] : no lymphadenopathy [Supple] : supple [Thyroid Normal, No Nodules] : the thyroid was normal and there were no nodules present [No Respiratory Distress] : no respiratory distress  [No Accessory Muscle Use] : no accessory muscle use [Clear to Auscultation] : lungs were clear to auscultation bilaterally [Normal Rate] : normal rate  [Regular Rhythm] : with a regular rhythm [Normal S1, S2] : normal S1 and S2 [No Murmur] : no murmur heard [No Carotid Bruits] : no carotid bruits [No Varicosities] : no varicosities [No Abdominal Bruit] : a ~M bruit was not heard ~T in the abdomen [Pedal Pulses Present] : the pedal pulses are present [No Edema] : there was no peripheral edema [No Palpable Aorta] : no palpable aorta [No Extremity Clubbing/Cyanosis] : no extremity clubbing/cyanosis [Soft] : abdomen soft [Non Tender] : non-tender [Non-distended] : non-distended [No Masses] : no abdominal mass palpated [No HSM] : no HSM [Normal Bowel Sounds] : normal bowel sounds [Normal Posterior Cervical Nodes] : no posterior cervical lymphadenopathy [Normal Anterior Cervical Nodes] : no anterior cervical lymphadenopathy [No CVA Tenderness] : no CVA  tenderness [No Spinal Tenderness] : no spinal tenderness [No Joint Swelling] : no joint swelling [Grossly Normal Strength/Tone] : grossly normal strength/tone [No Rash] : no rash [Coordination Grossly Intact] : coordination grossly intact [No Focal Deficits] : no focal deficits [Normal Gait] : normal gait [Deep Tendon Reflexes (DTR)] : deep tendon reflexes were 2+ and symmetric [Normal Affect] : the affect was normal [Normal Insight/Judgement] : insight and judgment were intact

## 2022-12-30 NOTE — PLAN
[FreeTextEntry1] : 67 year old patient presented today for surgical clearance.\par \par \par \par EKG is normal\par Patient is in optimal medical condition and medically cleared for proposed surgery
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
## 2022-12-30 NOTE — ADDENDUM
[FreeTextEntry1] : This note was written by Key Grayson, acting as the  for Dr. Méndez. This note accurately reflects the work and decisions made by Dr. Méndez.

## 2022-12-31 ENCOUNTER — INPATIENT (INPATIENT)
Facility: HOSPITAL | Age: 67
LOS: 1 days | Discharge: HOME | DRG: 390 | End: 2023-01-02
Attending: SURGERY | Admitting: SURGERY
Payer: MEDICARE

## 2022-12-31 VITALS
SYSTOLIC BLOOD PRESSURE: 159 MMHG | RESPIRATION RATE: 18 BRPM | HEART RATE: 91 BPM | OXYGEN SATURATION: 99 % | HEIGHT: 62 IN | TEMPERATURE: 98 F | DIASTOLIC BLOOD PRESSURE: 91 MMHG | WEIGHT: 141.98 LBS

## 2022-12-31 DIAGNOSIS — Z90.49 ACQUIRED ABSENCE OF OTHER SPECIFIED PARTS OF DIGESTIVE TRACT: Chronic | ICD-10-CM

## 2022-12-31 DIAGNOSIS — Z98.89 OTHER SPECIFIED POSTPROCEDURAL STATES: Chronic | ICD-10-CM

## 2022-12-31 LAB
ALBUMIN SERPL ELPH-MCNC: 4 G/DL — SIGNIFICANT CHANGE UP (ref 3.3–5)
ALP SERPL-CCNC: 113 U/L — SIGNIFICANT CHANGE UP (ref 40–120)
ALT FLD-CCNC: 59 U/L — SIGNIFICANT CHANGE UP (ref 12–78)
ANION GAP SERPL CALC-SCNC: 9 MMOL/L — SIGNIFICANT CHANGE UP (ref 5–17)
AST SERPL-CCNC: 38 U/L — HIGH (ref 15–37)
BASOPHILS # BLD AUTO: 0.03 K/UL — SIGNIFICANT CHANGE UP (ref 0–0.2)
BASOPHILS NFR BLD AUTO: 0.3 % — SIGNIFICANT CHANGE UP (ref 0–2)
BILIRUB SERPL-MCNC: 1.3 MG/DL — HIGH (ref 0.2–1.2)
BUN SERPL-MCNC: 13 MG/DL — SIGNIFICANT CHANGE UP (ref 7–23)
CALCIUM SERPL-MCNC: 9.8 MG/DL — SIGNIFICANT CHANGE UP (ref 8.5–10.1)
CHLORIDE SERPL-SCNC: 104 MMOL/L — SIGNIFICANT CHANGE UP (ref 96–108)
CO2 SERPL-SCNC: 29 MMOL/L — SIGNIFICANT CHANGE UP (ref 22–31)
CREAT SERPL-MCNC: 0.8 MG/DL — SIGNIFICANT CHANGE UP (ref 0.5–1.3)
EGFR: 81 ML/MIN/1.73M2 — SIGNIFICANT CHANGE UP
EOSINOPHIL # BLD AUTO: 0.15 K/UL — SIGNIFICANT CHANGE UP (ref 0–0.5)
EOSINOPHIL NFR BLD AUTO: 1.7 % — SIGNIFICANT CHANGE UP (ref 0–6)
GLUCOSE SERPL-MCNC: 123 MG/DL — HIGH (ref 70–99)
HCT VFR BLD CALC: 42.1 % — SIGNIFICANT CHANGE UP (ref 34.5–45)
HGB BLD-MCNC: 13.7 G/DL — SIGNIFICANT CHANGE UP (ref 11.5–15.5)
IMM GRANULOCYTES NFR BLD AUTO: 0.3 % — SIGNIFICANT CHANGE UP (ref 0–0.9)
LACTATE SERPL-SCNC: 1 MMOL/L — SIGNIFICANT CHANGE UP (ref 0.7–2)
LIDOCAIN IGE QN: 95 U/L — SIGNIFICANT CHANGE UP (ref 73–393)
LYMPHOCYTES # BLD AUTO: 1.89 K/UL — SIGNIFICANT CHANGE UP (ref 1–3.3)
LYMPHOCYTES # BLD AUTO: 20.8 % — SIGNIFICANT CHANGE UP (ref 13–44)
MCHC RBC-ENTMCNC: 30.2 PG — SIGNIFICANT CHANGE UP (ref 27–34)
MCHC RBC-ENTMCNC: 32.5 GM/DL — SIGNIFICANT CHANGE UP (ref 32–36)
MCV RBC AUTO: 92.9 FL — SIGNIFICANT CHANGE UP (ref 80–100)
MONOCYTES # BLD AUTO: 0.98 K/UL — HIGH (ref 0–0.9)
MONOCYTES NFR BLD AUTO: 10.8 % — SIGNIFICANT CHANGE UP (ref 2–14)
NEUTROPHILS # BLD AUTO: 6.01 K/UL — SIGNIFICANT CHANGE UP (ref 1.8–7.4)
NEUTROPHILS NFR BLD AUTO: 66.1 % — SIGNIFICANT CHANGE UP (ref 43–77)
NRBC # BLD: 0 /100 WBCS — SIGNIFICANT CHANGE UP (ref 0–0)
PLATELET # BLD AUTO: 293 K/UL — SIGNIFICANT CHANGE UP (ref 150–400)
POTASSIUM SERPL-MCNC: 3.7 MMOL/L — SIGNIFICANT CHANGE UP (ref 3.5–5.3)
POTASSIUM SERPL-SCNC: 3.7 MMOL/L — SIGNIFICANT CHANGE UP (ref 3.5–5.3)
PROT SERPL-MCNC: 7.8 G/DL — SIGNIFICANT CHANGE UP (ref 6–8.3)
RBC # BLD: 4.53 M/UL — SIGNIFICANT CHANGE UP (ref 3.8–5.2)
RBC # FLD: 12.8 % — SIGNIFICANT CHANGE UP (ref 10.3–14.5)
SARS-COV-2 RNA SPEC QL NAA+PROBE: SIGNIFICANT CHANGE UP
SODIUM SERPL-SCNC: 142 MMOL/L — SIGNIFICANT CHANGE UP (ref 135–145)
TROPONIN I, HIGH SENSITIVITY RESULT: <3 NG/L — SIGNIFICANT CHANGE UP
WBC # BLD: 9.09 K/UL — SIGNIFICANT CHANGE UP (ref 3.8–10.5)
WBC # FLD AUTO: 9.09 K/UL — SIGNIFICANT CHANGE UP (ref 3.8–10.5)

## 2022-12-31 PROCEDURE — 71045 X-RAY EXAM CHEST 1 VIEW: CPT | Mod: 26

## 2022-12-31 PROCEDURE — 99285 EMERGENCY DEPT VISIT HI MDM: CPT

## 2022-12-31 RX ORDER — DIATRIZOATE MEGLUMINE 180 MG/ML
30 INJECTION, SOLUTION INTRAVESICAL ONCE
Refills: 0 | Status: COMPLETED | OUTPATIENT
Start: 2022-12-31 | End: 2022-12-31

## 2022-12-31 RX ORDER — MORPHINE SULFATE 50 MG/1
4 CAPSULE, EXTENDED RELEASE ORAL ONCE
Refills: 0 | Status: DISCONTINUED | OUTPATIENT
Start: 2022-12-31 | End: 2022-12-31

## 2022-12-31 RX ORDER — SODIUM CHLORIDE 9 MG/ML
1000 INJECTION INTRAMUSCULAR; INTRAVENOUS; SUBCUTANEOUS ONCE
Refills: 0 | Status: COMPLETED | OUTPATIENT
Start: 2022-12-31 | End: 2022-12-31

## 2022-12-31 RX ORDER — DIPHENHYDRAMINE HCL 50 MG
50 CAPSULE ORAL ONCE
Refills: 0 | Status: COMPLETED | OUTPATIENT
Start: 2022-12-31 | End: 2022-12-31

## 2022-12-31 RX ORDER — HYDROMORPHONE HYDROCHLORIDE 2 MG/ML
1 INJECTION INTRAMUSCULAR; INTRAVENOUS; SUBCUTANEOUS ONCE
Refills: 0 | Status: DISCONTINUED | OUTPATIENT
Start: 2022-12-31 | End: 2022-12-31

## 2022-12-31 RX ORDER — METOCLOPRAMIDE HCL 10 MG
10 TABLET ORAL ONCE
Refills: 0 | Status: COMPLETED | OUTPATIENT
Start: 2022-12-31 | End: 2022-12-31

## 2022-12-31 RX ORDER — ONDANSETRON 8 MG/1
4 TABLET, FILM COATED ORAL ONCE
Refills: 0 | Status: COMPLETED | OUTPATIENT
Start: 2022-12-31 | End: 2022-12-31

## 2022-12-31 RX ADMIN — HYDROMORPHONE HYDROCHLORIDE 1 MILLIGRAM(S): 2 INJECTION INTRAMUSCULAR; INTRAVENOUS; SUBCUTANEOUS at 23:28

## 2022-12-31 RX ADMIN — SODIUM CHLORIDE 1000 MILLILITER(S): 9 INJECTION INTRAMUSCULAR; INTRAVENOUS; SUBCUTANEOUS at 23:04

## 2022-12-31 RX ADMIN — MORPHINE SULFATE 4 MILLIGRAM(S): 50 CAPSULE, EXTENDED RELEASE ORAL at 23:04

## 2022-12-31 RX ADMIN — ONDANSETRON 4 MILLIGRAM(S): 8 TABLET, FILM COATED ORAL at 23:04

## 2022-12-31 RX ADMIN — DIATRIZOATE MEGLUMINE 30 MILLILITER(S): 180 INJECTION, SOLUTION INTRAVESICAL at 23:41

## 2022-12-31 RX ADMIN — Medication 10 MILLIGRAM(S): at 23:34

## 2022-12-31 RX ADMIN — SODIUM CHLORIDE 1000 MILLILITER(S): 9 INJECTION INTRAMUSCULAR; INTRAVENOUS; SUBCUTANEOUS at 23:34

## 2022-12-31 NOTE — ED ADULT NURSE NOTE - CHIEF COMPLAINT QUOTE
Patient resting in stretcher.  Patient is complaining of abd discomfort. Patient is awake, alert and orientated. Patient is able to speak coherently incomplete sentences without any shortness of breath.  No other signs or symptoms of injury. Patient has history of bowel obstruction. Patient has had multiple surgeries for this.  Patient was at this hospital recently for this issue but obstruction resolved on its own.

## 2022-12-31 NOTE — ED ADULT TRIAGE NOTE - CHIEF COMPLAINT QUOTE
Patient resting in stretcher.  Patient is complaining of abd discomfort. Patient is awake, alert and orientated. Patient is able to speak coherently incomplete sentences without any shortness of breath.  No other signs or symptoms of injury. Patient has history of bowel obstruction. Patient has had multiple surgeries for this.  Patient was at this hospital recently for this issue but  obstruction resolved on its own. Patient resting in stretcher.  Patient is complaining of abd discomfort. Patient is awake, alert and orientated. Patient is able to speak coherently incomplete sentences without any shortness of breath.  No other signs or symptoms of injury. Patient has history of bowel obstruction. Patient has had multiple surgeries for this.  Patient was at this hospital recently for this issue but obstruction resolved on its own.

## 2022-12-31 NOTE — ED ADULT NURSE NOTE - OBJECTIVE STATEMENT
Pt. received alert and oriented x3 with chief complaint of severe abdominal pain post diagnosis of bowel obstruction. Pt. denies N/V at current time.

## 2023-01-01 DIAGNOSIS — K56.609 UNSPECIFIED INTESTINAL OBSTRUCTION, UNSPECIFIED AS TO PARTIAL VERSUS COMPLETE OBSTRUCTION: ICD-10-CM

## 2023-01-01 LAB
APPEARANCE UR: CLEAR — SIGNIFICANT CHANGE UP
BILIRUB UR-MCNC: NEGATIVE — SIGNIFICANT CHANGE UP
COLOR SPEC: YELLOW — SIGNIFICANT CHANGE UP
DIFF PNL FLD: NEGATIVE — SIGNIFICANT CHANGE UP
GLUCOSE UR QL: NEGATIVE — SIGNIFICANT CHANGE UP
KETONES UR-MCNC: NEGATIVE — SIGNIFICANT CHANGE UP
LEUKOCYTE ESTERASE UR-ACNC: ABNORMAL
NITRITE UR-MCNC: NEGATIVE — SIGNIFICANT CHANGE UP
PH UR: 5 — SIGNIFICANT CHANGE UP (ref 5–8)
PROT UR-MCNC: NEGATIVE — SIGNIFICANT CHANGE UP
SP GR SPEC: 1.01 — SIGNIFICANT CHANGE UP (ref 1.01–1.02)
UROBILINOGEN FLD QL: NEGATIVE — SIGNIFICANT CHANGE UP

## 2023-01-01 PROCEDURE — 74177 CT ABD & PELVIS W/CONTRAST: CPT | Mod: 26,MA

## 2023-01-01 RX ORDER — DIPHENHYDRAMINE HCL 50 MG
25 CAPSULE ORAL AT BEDTIME
Refills: 0 | Status: DISCONTINUED | OUTPATIENT
Start: 2023-01-01 | End: 2023-01-02

## 2023-01-01 RX ORDER — SODIUM CHLORIDE 9 MG/ML
1000 INJECTION INTRAMUSCULAR; INTRAVENOUS; SUBCUTANEOUS
Refills: 0 | Status: DISCONTINUED | OUTPATIENT
Start: 2023-01-01 | End: 2023-01-02

## 2023-01-01 RX ORDER — HEPARIN SODIUM 5000 [USP'U]/ML
5000 INJECTION INTRAVENOUS; SUBCUTANEOUS EVERY 8 HOURS
Refills: 0 | Status: DISCONTINUED | OUTPATIENT
Start: 2023-01-01 | End: 2023-01-02

## 2023-01-01 RX ORDER — PANTOPRAZOLE SODIUM 20 MG/1
40 TABLET, DELAYED RELEASE ORAL DAILY
Refills: 0 | Status: DISCONTINUED | OUTPATIENT
Start: 2023-01-01 | End: 2023-01-02

## 2023-01-01 RX ORDER — ATORVASTATIN CALCIUM 80 MG/1
20 TABLET, FILM COATED ORAL AT BEDTIME
Refills: 0 | Status: DISCONTINUED | OUTPATIENT
Start: 2023-01-01 | End: 2023-01-01

## 2023-01-01 RX ORDER — ONDANSETRON 8 MG/1
4 TABLET, FILM COATED ORAL ONCE
Refills: 0 | Status: COMPLETED | OUTPATIENT
Start: 2023-01-01 | End: 2023-01-01

## 2023-01-01 RX ORDER — ACETAMINOPHEN 500 MG
1000 TABLET ORAL ONCE
Refills: 0 | Status: COMPLETED | OUTPATIENT
Start: 2023-01-02 | End: 2023-01-02

## 2023-01-01 RX ORDER — KETOROLAC TROMETHAMINE 30 MG/ML
30 SYRINGE (ML) INJECTION EVERY 6 HOURS
Refills: 0 | Status: DISCONTINUED | OUTPATIENT
Start: 2023-01-01 | End: 2023-01-02

## 2023-01-01 RX ORDER — HYDROMORPHONE HYDROCHLORIDE 2 MG/ML
1 INJECTION INTRAMUSCULAR; INTRAVENOUS; SUBCUTANEOUS ONCE
Refills: 0 | Status: DISCONTINUED | OUTPATIENT
Start: 2023-01-01 | End: 2023-01-01

## 2023-01-01 RX ORDER — KETOROLAC TROMETHAMINE 30 MG/ML
15 SYRINGE (ML) INJECTION EVERY 6 HOURS
Refills: 0 | Status: DISCONTINUED | OUTPATIENT
Start: 2023-01-01 | End: 2023-01-02

## 2023-01-01 RX ORDER — ATORVASTATIN CALCIUM 80 MG/1
20 TABLET, FILM COATED ORAL AT BEDTIME
Refills: 0 | Status: DISCONTINUED | OUTPATIENT
Start: 2023-01-01 | End: 2023-01-02

## 2023-01-01 RX ORDER — LEVOTHYROXINE SODIUM 125 MCG
88 TABLET ORAL DAILY
Refills: 0 | Status: DISCONTINUED | OUTPATIENT
Start: 2023-01-01 | End: 2023-01-02

## 2023-01-01 RX ORDER — ONDANSETRON 8 MG/1
4 TABLET, FILM COATED ORAL EVERY 6 HOURS
Refills: 0 | Status: DISCONTINUED | OUTPATIENT
Start: 2023-01-01 | End: 2023-01-02

## 2023-01-01 RX ORDER — FLUOROMETHOLONE 1 MG/ML
1 SOLUTION/ DROPS OPHTHALMIC DAILY
Refills: 0 | Status: DISCONTINUED | OUTPATIENT
Start: 2023-01-01 | End: 2023-01-01

## 2023-01-01 RX ORDER — HYDROMORPHONE HYDROCHLORIDE 2 MG/ML
0.5 INJECTION INTRAMUSCULAR; INTRAVENOUS; SUBCUTANEOUS EVERY 4 HOURS
Refills: 0 | Status: DISCONTINUED | OUTPATIENT
Start: 2023-01-01 | End: 2023-01-02

## 2023-01-01 RX ORDER — ACETAMINOPHEN 500 MG
1000 TABLET ORAL ONCE
Refills: 0 | Status: COMPLETED | OUTPATIENT
Start: 2023-01-01 | End: 2023-01-01

## 2023-01-01 RX ORDER — LEVOTHYROXINE SODIUM 125 MCG
88 TABLET ORAL DAILY
Refills: 0 | Status: DISCONTINUED | OUTPATIENT
Start: 2023-01-01 | End: 2023-01-01

## 2023-01-01 RX ADMIN — ONDANSETRON 4 MILLIGRAM(S): 8 TABLET, FILM COATED ORAL at 05:51

## 2023-01-01 RX ADMIN — Medication 400 MILLIGRAM(S): at 20:41

## 2023-01-01 RX ADMIN — HYDROMORPHONE HYDROCHLORIDE 0.5 MILLIGRAM(S): 2 INJECTION INTRAMUSCULAR; INTRAVENOUS; SUBCUTANEOUS at 07:30

## 2023-01-01 RX ADMIN — HEPARIN SODIUM 5000 UNIT(S): 5000 INJECTION INTRAVENOUS; SUBCUTANEOUS at 22:29

## 2023-01-01 RX ADMIN — Medication 30 MILLIGRAM(S): at 14:44

## 2023-01-01 RX ADMIN — Medication 15 MILLIGRAM(S): at 18:16

## 2023-01-01 RX ADMIN — HYDROMORPHONE HYDROCHLORIDE 1 MILLIGRAM(S): 2 INJECTION INTRAMUSCULAR; INTRAVENOUS; SUBCUTANEOUS at 20:14

## 2023-01-01 RX ADMIN — PANTOPRAZOLE SODIUM 40 MILLIGRAM(S): 20 TABLET, DELAYED RELEASE ORAL at 14:02

## 2023-01-01 RX ADMIN — HEPARIN SODIUM 5000 UNIT(S): 5000 INJECTION INTRAVENOUS; SUBCUTANEOUS at 16:42

## 2023-01-01 RX ADMIN — ONDANSETRON 4 MILLIGRAM(S): 8 TABLET, FILM COATED ORAL at 07:05

## 2023-01-01 RX ADMIN — Medication 25 MILLIGRAM(S): at 22:28

## 2023-01-01 RX ADMIN — SODIUM CHLORIDE 80 MILLILITER(S): 9 INJECTION INTRAMUSCULAR; INTRAVENOUS; SUBCUTANEOUS at 07:00

## 2023-01-01 RX ADMIN — ATORVASTATIN CALCIUM 20 MILLIGRAM(S): 80 TABLET, FILM COATED ORAL at 22:28

## 2023-01-01 RX ADMIN — HYDROMORPHONE HYDROCHLORIDE 0.5 MILLIGRAM(S): 2 INJECTION INTRAMUSCULAR; INTRAVENOUS; SUBCUTANEOUS at 07:05

## 2023-01-01 RX ADMIN — ONDANSETRON 4 MILLIGRAM(S): 8 TABLET, FILM COATED ORAL at 18:15

## 2023-01-01 RX ADMIN — Medication 50 MILLIGRAM(S): at 01:13

## 2023-01-01 RX ADMIN — HYDROMORPHONE HYDROCHLORIDE 0.5 MILLIGRAM(S): 2 INJECTION INTRAMUSCULAR; INTRAVENOUS; SUBCUTANEOUS at 11:39

## 2023-01-01 RX ADMIN — HYDROMORPHONE HYDROCHLORIDE 0.5 MILLIGRAM(S): 2 INJECTION INTRAMUSCULAR; INTRAVENOUS; SUBCUTANEOUS at 17:13

## 2023-01-01 RX ADMIN — Medication 30 MILLIGRAM(S): at 14:02

## 2023-01-01 RX ADMIN — HYDROMORPHONE HYDROCHLORIDE 1 MILLIGRAM(S): 2 INJECTION INTRAMUSCULAR; INTRAVENOUS; SUBCUTANEOUS at 05:56

## 2023-01-01 RX ADMIN — HYDROMORPHONE HYDROCHLORIDE 0.5 MILLIGRAM(S): 2 INJECTION INTRAMUSCULAR; INTRAVENOUS; SUBCUTANEOUS at 11:50

## 2023-01-01 RX ADMIN — SODIUM CHLORIDE 80 MILLILITER(S): 9 INJECTION INTRAMUSCULAR; INTRAVENOUS; SUBCUTANEOUS at 20:23

## 2023-01-01 NOTE — ED PROVIDER NOTE - CLINICAL SUMMARY MEDICAL DECISION MAKING FREE TEXT BOX
67-year-old female with history of multiple episodes of small bowel obstruction presenting with abdominal pain, nausea and vomiting reports pains feel similar to her prior SBO's.  Denies chest pain or shortness of breath.  Will rule out recurrent SBO.  Patient does have positive bowel sounds in all 4 quadrants.  Her abdomen is soft.  Will check labs, UA, CT abdomen and pelvis.  Will give pain management, Zofran, Pepcid.  Will reassess.

## 2023-01-01 NOTE — H&P ADULT - NSHPREVIEWOFSYSTEMS_GEN_ALL_CORE
CONSTITUTIONAL: No weakness, fevers or chills  EYES/ENT: No visual changes;  No vertigo or throat pain   NECK: No pain or stiffness  RESPIRATORY: No cough, wheezing, hemoptysis; No shortness of breath  CARDIOVASCULAR: No chest pain or palpitations  GASTROINTESTINAL See HPI. No hematemesis; No diarrhea. No melena or hematochezia.  GENITOURINARY: No dysuria, frequency or hematuria  NEUROLOGICAL: No numbness or weakness  SKIN: No itching, burning, rashes, or lesions   All other review of systems is negative unless indicated above.

## 2023-01-01 NOTE — H&P ADULT - ASSESSMENT
67 year old female w/PMH hypothyroidism, HLD, hx multiple SBOs, s/p SBR in July 2022, with subsequent SBO that resulted in revision of SB anastomosis in September 2022, here with an SBO.

## 2023-01-01 NOTE — PATIENT PROFILE ADULT - FUNCTIONAL ASSESSMENT - DAILY ACTIVITY SCORE.
24 Tremfya Counseling: I discussed with the patient the risks of guselkumab including but not limited to immunosuppression, serious infections, worsening of inflammatory bowel disease and drug reactions.  The patient understands that monitoring is required including a PPD at baseline and must alert us or the primary physician if symptoms of infection or other concerning signs are noted.

## 2023-01-01 NOTE — PATIENT PROFILE ADULT - CONTRAINDICATIONS & PRECAUTIONS (SELECT ALL THAT APPLY)
Advancement Flap (Double) Text: The defect edges were debeveled with a #15 scalpel blade.  Given the location of the defect and the proximity to free margins a double advancement flap was deemed most appropriate.  Using a sterile surgical marker, the appropriate advancement flaps were drawn incorporating the defect and placing the expected incisions within the relaxed skin tension lines where possible.    The area thus outlined was incised deep to adipose tissue with a #15 scalpel blade.  The skin margins were undermined to an appropriate distance in all directions utilizing iris scissors. Patient/surrogate refused vaccine...

## 2023-01-01 NOTE — CHART NOTE - NSCHARTNOTEFT_GEN_A_CORE
Pt called to bedside by day nurse.  Pt very upset and anxious.  States she vomited and had episode of diarrhea prior to coming to the floor.  States she has pain in her abdomen and the toradol and dilaudid 0.5mg are not helping her.  Pt was reassured, and chart will be reviewed.  States she was recently at [facility] for similar symptoms with complete resolution that has since returned.  Is scheduled for surgery on 1/10, but [facility] surgeon returns on 1/4 and if continues, may have surgery sooner.     Physical:   General: 68 yo female presents tearful and appearing mildly uncomfortable.    Neuro: Awake and alert x3  ABD:+ mild distention.  BS+ passing flatus.       Plan:  1. Will evaluate pain regimen. GIven 1 mg dilaudid one time to get better control of pain.  OFirmev ordered throughout the evening.  2. Restart IV fluids  3. Zofran prn  4. Benadryl 25 mg IV at bedtime  5. Offered xanax 0.25 but patient declined at this time. May also attempt melatonin to help patient with sleep  6. Encourage ambulation, OOB  7. Reviewed with nursing staff.    8. Will continue to monitor overnight.

## 2023-01-01 NOTE — H&P ADULT - HISTORY OF PRESENT ILLNESS
67 year old female w/PMH hypothyroidism, HLD, hx multiple SBOs, s/p SBR in July 2022, with subsequent SBO that resulted in revision of SB anastomosis in September 2022, here with 12 hour history of "squeezing" centralized abdominal pain and 7 episodes of vomiting small amounts since last night. 2 days ago, she felt constipated so she took miralax and senna with BM production. Her last BM was this AM and she reports passing flatus.  She was recently admitted to [facility] ([facility]) for a partial SBO which resolved in 2-3 days with conservative management. She has an appointment on 1/10/23 with Dr. Hardy Veras for another revision, however he is on vacation until 1/4/23 and patient would like to stay at [place]. She was also recently admitted here 12/8/22 with an SBO, seen by Dr. Yanez and Dr. Collins, resolved with conservative management after 3-4 days. Patient denies any fever, chills, chest pain, shortness of breath, current nausea.

## 2023-01-01 NOTE — PATIENT PROFILE ADULT - FALL HARM RISK - UNIVERSAL INTERVENTIONS
Bed in lowest position, wheels locked, appropriate side rails in place/Call bell, personal items and telephone in reach/Instruct patient to call for assistance before getting out of bed or chair/Non-slip footwear when patient is out of bed/[place] to call system/Physically safe environment - no spills, clutter or unnecessary equipment/Purposeful Proactive Rounding/Room/bathroom lighting operational, light cord in reach

## 2023-01-01 NOTE — H&P ADULT - NSICDXPASTMEDICALHX_GEN_ALL_CORE_FT
PAST MEDICAL HISTORY:  HLD (hyperlipidemia)     Hypothyroidism     SBO (small bowel obstruction)

## 2023-01-01 NOTE — ED PROVIDER NOTE - PROGRESS NOTE DETAILS
CT shows SBO  Pt seen by surgical PA, advised admission to Dr. Yanez  Pt agrees to be admitted.  Pt is now having diarrhea. Pain is improved.

## 2023-01-01 NOTE — H&P ADULT - NSHPPHYSICALEXAM_GEN_ALL_CORE
GENERAL:  Well-nourished, well-developed female lying comfortably in bed in NAD.  HEENT:  NC/AT. Sclera white. Mucous membranes moist.  CARDIO:  Regular rate and rhythm.  No murmur, gallop or rub appreciated.  RESPIRATORY:  Nonlabored breathing, no accessory muscle use. Lungs clear to auscultation bilaterally.  No wheezing, rales or rhonchi appreciated.  ABDOMEN:  Soft, minimally distended, mildly tender throughout.  No rebound tenderness or guarding.  SKIN:  No jaundice, pallor, or cyanosis  NEURO:  A&O x 3

## 2023-01-01 NOTE — H&P ADULT - PROBLEM SELECTOR PLAN 1
- NPO, IV fluids  - Patient reluctant to place NGT at this time as she is feeling better and just had a large BM and is passing flatus. Will defer for now, however if patient gets nauseous again, will need NGT. Patient expressed understanding.  - Pain control, zofran PRN  - Ambulate as tolerated  - Is & Os  - Possible KUB later today or in the AM  - Endorsed to day team to follow up and discuss with Dr. Yanez

## 2023-01-01 NOTE — ED PROVIDER NOTE - OBJECTIVE STATEMENT
67-year-old female with history of hypothyroidism, multiple episodes of small bowel obstruction requiring multiple surgeries, last episode of a partial SBO was on [place] patient was admitted had an NG tube placed and treated conservatively.  Patient's prior to that had an SBO on July 10 had surgery.  Patient states she went and found another surgeon in [place] at [place] who has scheduled her for surgery for January 10 however she started having pain again last night states it was mild and she felt a little constipated so she took Senokot.  Patient reports she had a bowel movement this morning and was feeling okay during the day.  At about 6 PM the pain worsened had an episode of vomiting at home and came to the ED as the pain was feeling like she is having another SBO.  She reports she is passing flatus.  Patient denies fever or chills.  Denies chest pain or shortness of breath.  Denies urinary complaints.  Patient did not take any medications to help alleviate the pain no exacerbating factors.

## 2023-01-01 NOTE — ED ADULT NURSE REASSESSMENT NOTE - NS ED NURSE REASSESS COMMENT FT1
Received pt on stretcher A&Ox3, no labored breathing, pt pending CT scan after oral contrast is finished.
pt is A&Ox4, updated with the plan of care, verbalizes understanding, pt given meds as per MD orders, pt denies any new onset of pain at this moment, resp even and unlabored, nad noted, pending bed, will continue to monitor
Pt received from the previous shift nurse in stable condition, pt denies any new onset of pain at this moment, vss (see MAR), pt appears in NAD, resp even and unlabored, will continue to monitor

## 2023-01-01 NOTE — PATIENT PROFILE ADULT - FALL HARM RISK - FACTORS NURSING JUDGEMENT
Assessment/Plan


Problems:  


(1) Acute respiratory failure


(2) HCAP (healthcare-associated pneumonia)


(3) Functional quadriplegia


(4) Diabetes mellitus


(5) Feeding by G-tube


Assessment/Plan


cxr reviewed, RLL atelectasis


continue abc


chest PT


incentive spirometry


continue abx, 


check cultures


keep in JULIENNE


tolerating feeding


d/w DPOA at the bed site/





Subjective


ROS Limited/Unobtainable:  No


Constitutional:  Reports: no symptoms


HEENT:  Repors: no symptoms


Respiratory:  Reports: no symptoms


Allergies:  


Coded Allergies:  


     No Known Allergies (Unverified , 3/23/16)





Objective





Last 24 Hour Vital Signs








  Date Time  Temp Pulse Resp B/P (MAP) Pulse Ox O2 Delivery O2 Flow Rate FiO2


 


3/10/18 07:37  116 20  97 Venturi Mask 14.0 55


 


3/10/18 07:27     93 Venturi Mask 14.0 55


 


3/10/18 07:27      Venturi Mask 14.0 55


 


3/10/18 07:26  115 20  93 Venturi Mask 14.0 55


 


3/10/18 04:00  117      


 


3/10/18 04:00 98.6 119 28 148/86 93 Venturi Mask 14.0 55





 98.6       


 


3/10/18 00:00 98.1 101 24 111/67 94 Venturi Mask 14.0 55





 98.1       


 


3/9/18 23:48  99      


 


3/9/18 21:11 98.2       


 


3/9/18 20:43  125  142/75    


 


3/9/18 20:41 98.2       


 


3/9/18 20:00 99.1 125 20 142/75 92 Venturi Mask 14.0 55





 99.1       


 


3/9/18 19:46  81 20  96 Venturi Mask 14.0 55


 


3/9/18 19:36  109 18  94 Venturi Mask 14.0 55


 


3/9/18 19:36     94 Non-Rebreather 15.0 100


 


3/9/18 19:36      Non-Rebreather 15.0 100


 


3/9/18 19:28  113      


 


3/9/18 16:00  110      


 


3/9/18 16:00 98.2 106 20 119/64 94 Venturi Mask 14.0 55





 98.2       


 


3/9/18 13:05  99 20  96 Venturi Mask 14.0 55


 


3/9/18 12:49  103 18  94 Venturi Mask 14.0 55


 


3/9/18 12:00 97.3 79 22 135/74 95 Venturi Mask 14.0 55





 97.3       


 


3/9/18 12:00  120      


 


3/9/18 08:55  105  92/59    

















Intake and Output  


 


 3/9/18 3/10/18





 19:00 07:00


 


Intake Total 840.0 ml 550.0 ml


 


Output Total 550 ml 


 


Balance 290.0 ml 550.0 ml


 


  


 


IV Total 220.0 ml 110.0 ml


 


Tube Feeding 480 ml 400 ml


 


Other 140 ml 40 ml


 


Output Urine Total 550 ml 


 


# Voids 1 


 


# Bowel Movements 1 








General Appearance:  cachetic


HEENT:  normocephalic, atraumatic


Respiratory/Chest:  chest wall non-tender, lungs clear, crackles/rales


Cardiovascular:  normal peripheral pulses, normal rate, regular rhythm


Abdomen:  normal bowel sounds, soft, non tender


Genitourinary:  normal external genitalia


Extremities:  no cyanosis


Skin:  no lesions


Neurologic/Psychiatric:  CNs II-XII grossly normal, abnormal gait





Microbiology








 Date/Time


Source Procedure


Growth Status


 


 


 3/8/18 08:00


Sputum Expectorated Gram Stain - Final Complete


 


 3/8/18 08:00 Sputum Culture - Final


Providencia Stuartii


Usual Upper Respiratory Kailee Complete








Laboratory Tests


3/10/18 03:50: 


White Blood Count 9.3, Red Blood Count 4.11L, Hemoglobin 13.4L, Hematocrit 39.9L

, Mean Corpuscular Volume 97, Mean Corpuscular Hemoglobin 32.6H, Mean 

Corpuscular Hemoglobin Concent 33.6, Red Cell Distribution Width 12.5, Platelet 

Count 198, Mean Platelet Volume 8.6, Neutrophils (%) (Auto) , Lymphocytes (%) (

Auto) , Monocytes (%) (Auto) , Eosinophils (%) (Auto) , Basophils (%) (Auto) , 

Neutrophils % (Manual) [Pending], Lymphocytes % (Manual) [Pending], Platelet 

Estimate [Pending], Platelet Morphology [Pending], Sodium Level 149H, Potassium 

Level 3.0L, Chloride Level 112H, Carbon Dioxide Level 32, Anion Gap 5, Blood 

Urea Nitrogen 38H, Creatinine 1.2, Estimat Glomerular Filtration Rate , Glucose 

Level 135#H, Lactic Acid Level 1.80, Uric Acid 4.8, Calcium Level 10.0, 

Phosphorus Level 3.0, Magnesium Level 2.1, Total Bilirubin 0.6, Aspartate Amino 

Transf (AST/SGOT) 16, Alanine Aminotransferase (ALT/SGPT) 16, Alkaline 

Phosphatase 70, Troponin I 0.000, Total Protein 7.1, Albumin 1.8L, Globulin 5.3

, Albumin/Globulin Ratio 0.3L





Current Medications








 Medications


  (Trade)  Dose


 Ordered  Sig/Shasta


 Route


 PRN Reason  Start Time


 Stop Time Status Last Admin


Dose Admin


 


 Acetaminophen


  (Tylenol)  650 mg  Q6H  PRN


 GT


 Mild Pain/Temp > 100.5  3/7/18 12:15


 4/6/18 12:14  3/9/18 20:41


 


 


 Aspirin


  (ASA)  81 mg  DAILY


 GT


   3/8/18 09:00


 4/7/18 08:59  3/9/18 08:54


 


 


 Docusate Sodium


  (Colace)  100 mg  TID


 GT


   3/7/18 13:00


 4/6/18 12:59  3/9/18 18:03


 


 


 Furosemide


  (Lasix)  40 mg  DAILY


 GT


   3/8/18 09:00


 4/7/18 08:59  3/9/18 08:54


 


 


 Insulin Detemir


  (Levemir)  20 units  Q12HR


 SUBQ


   3/9/18 21:00


 4/7/18 08:59  3/9/18 20:54


 


 


 Levalbuterol HCl


  (Xopenex)  1.25 mg  TIDRT


 HHN


   3/9/18 08:00


 3/14/18 07:59  3/10/18 07:26


 


 


 Metoclopramide HCl


  (Reglan)  5 mg  EVERY 6  HOURS


 GT


   3/7/18 12:30


 4/6/18 12:29  3/10/18 06:16


 


 


 Metoprolol


 Tartrate


  (Lopressor)  12.5 mg  Q12HR


 ORAL


   3/7/18 21:00


 4/6/18 20:59  3/9/18 20:43


 


 


 Piperacillin Sod/


 Tazobactam Sod


 3.375 gm/Sodium


 Chloride  110 ml @ 


 27.5 mls/hr  EVERY 8  HOURS


 IVPB


   3/7/18 14:00


 3/12/18 13:59  3/10/18 06:04


 


 


 Potassium


 Chloride 50 meq/


 Sodium Chloride  575 ml @ 


 115 mls/hr  ONCE  ONCE


 IVPB


   3/10/18 09:00


 3/10/18 13:59   


 


 


 Sitagliptin


 Phosphate


  (Januvia)  50 mg  DAILY


 GT


   3/8/18 09:00


 4/7/18 08:59  3/9/18 08:54


 


 


 Theophylline


  (Theophylline)  80 mg  Q8HR


 GT


   3/7/18 14:00


 4/6/18 13:59  3/10/18 06:16


 


 


 Vancomycin HCl


  (Vanco rx to


 dose)  1 ea  DAILY  PRN


 MISC


 Per rx protocol  3/7/18 12:00


 4/6/18 11:59   


 

















JOAN DEMARCO Mar 10, 2018 08:17 No

## 2023-01-01 NOTE — H&P ADULT - NSHPLABSRESULTS_GEN_ALL_CORE
LABS:                        13.7   9.09  )-----------( 293      ( 31 Dec 2022 22:50 )             42.1     12-31    142  |  104  |  13  ----------------------------<  123<H>  3.7   |  29  |  0.80    Ca    9.8      31 Dec 2022 22:50    TPro  7.8  /  Alb  4.0  /  TBili  1.3<H>  /  DBili  x   /  AST  38<H>  /  ALT  59  /  AlkPhos  113  12-31    RADIOLOGY:  < from: CT Abdomen and Pelvis w/ Oral Cont and w/ IV Cont (01.01.23 @ 01:53) >    IMPRESSION:    There are dilated loops of small bowel measuring up to 3.5 cm in diameter   with a transition point at a small bowel anastomosis in the midline lower   abdomen/pelvis. There is mild bowel wall thickening and associated   mesenteric edema. There is no pneumatosis.

## 2023-01-02 ENCOUNTER — TRANSCRIPTION ENCOUNTER (OUTPATIENT)
Age: 68
End: 2023-01-02

## 2023-01-02 VITALS
RESPIRATION RATE: 18 BRPM | TEMPERATURE: 98 F | OXYGEN SATURATION: 94 % | SYSTOLIC BLOOD PRESSURE: 101 MMHG | HEART RATE: 71 BPM | DIASTOLIC BLOOD PRESSURE: 63 MMHG

## 2023-01-02 LAB
ALBUMIN SERPL ELPH-MCNC: 3 G/DL — LOW (ref 3.3–5)
ALP SERPL-CCNC: 80 U/L — SIGNIFICANT CHANGE UP (ref 40–120)
ALT FLD-CCNC: 36 U/L — SIGNIFICANT CHANGE UP (ref 12–78)
ANION GAP SERPL CALC-SCNC: 3 MMOL/L — LOW (ref 5–17)
AST SERPL-CCNC: 21 U/L — SIGNIFICANT CHANGE UP (ref 15–37)
BILIRUB SERPL-MCNC: 0.6 MG/DL — SIGNIFICANT CHANGE UP (ref 0.2–1.2)
BUN SERPL-MCNC: 5 MG/DL — LOW (ref 7–23)
CALCIUM SERPL-MCNC: 8.4 MG/DL — LOW (ref 8.5–10.1)
CHLORIDE SERPL-SCNC: 112 MMOL/L — HIGH (ref 96–108)
CO2 SERPL-SCNC: 31 MMOL/L — SIGNIFICANT CHANGE UP (ref 22–31)
CREAT SERPL-MCNC: 0.71 MG/DL — SIGNIFICANT CHANGE UP (ref 0.5–1.3)
EGFR: 93 ML/MIN/1.73M2 — SIGNIFICANT CHANGE UP
GLUCOSE SERPL-MCNC: 96 MG/DL — SIGNIFICANT CHANGE UP (ref 70–99)
HCT VFR BLD CALC: 33.4 % — LOW (ref 34.5–45)
HGB BLD-MCNC: 10.4 G/DL — LOW (ref 11.5–15.5)
MAGNESIUM SERPL-MCNC: 2.2 MG/DL — SIGNIFICANT CHANGE UP (ref 1.6–2.6)
MCHC RBC-ENTMCNC: 30 PG — SIGNIFICANT CHANGE UP (ref 27–34)
MCHC RBC-ENTMCNC: 31.1 GM/DL — LOW (ref 32–36)
MCV RBC AUTO: 96.3 FL — SIGNIFICANT CHANGE UP (ref 80–100)
NRBC # BLD: 0 /100 WBCS — SIGNIFICANT CHANGE UP (ref 0–0)
PHOSPHATE SERPL-MCNC: 3.8 MG/DL — SIGNIFICANT CHANGE UP (ref 2.5–4.5)
PLATELET # BLD AUTO: 187 K/UL — SIGNIFICANT CHANGE UP (ref 150–400)
POTASSIUM SERPL-MCNC: 3.9 MMOL/L — SIGNIFICANT CHANGE UP (ref 3.5–5.3)
POTASSIUM SERPL-SCNC: 3.9 MMOL/L — SIGNIFICANT CHANGE UP (ref 3.5–5.3)
PROT SERPL-MCNC: 5.6 G/DL — LOW (ref 6–8.3)
RBC # BLD: 3.47 M/UL — LOW (ref 3.8–5.2)
RBC # FLD: 12.6 % — SIGNIFICANT CHANGE UP (ref 10.3–14.5)
SODIUM SERPL-SCNC: 146 MMOL/L — HIGH (ref 135–145)
WBC # BLD: 4.17 K/UL — SIGNIFICANT CHANGE UP (ref 3.8–10.5)
WBC # FLD AUTO: 4.17 K/UL — SIGNIFICANT CHANGE UP (ref 3.8–10.5)

## 2023-01-02 PROCEDURE — 81001 URINALYSIS AUTO W/SCOPE: CPT

## 2023-01-02 PROCEDURE — 36415 COLL VENOUS BLD VENIPUNCTURE: CPT

## 2023-01-02 PROCEDURE — 96374 THER/PROPH/DIAG INJ IV PUSH: CPT

## 2023-01-02 PROCEDURE — 71045 X-RAY EXAM CHEST 1 VIEW: CPT

## 2023-01-02 PROCEDURE — 83605 ASSAY OF LACTIC ACID: CPT

## 2023-01-02 PROCEDURE — 85027 COMPLETE CBC AUTOMATED: CPT

## 2023-01-02 PROCEDURE — 96375 TX/PRO/DX INJ NEW DRUG ADDON: CPT

## 2023-01-02 PROCEDURE — 83735 ASSAY OF MAGNESIUM: CPT

## 2023-01-02 PROCEDURE — 99285 EMERGENCY DEPT VISIT HI MDM: CPT | Mod: 25

## 2023-01-02 PROCEDURE — 85025 COMPLETE CBC W/AUTO DIFF WBC: CPT

## 2023-01-02 PROCEDURE — 80053 COMPREHEN METABOLIC PANEL: CPT

## 2023-01-02 PROCEDURE — 83690 ASSAY OF LIPASE: CPT

## 2023-01-02 PROCEDURE — 84100 ASSAY OF PHOSPHORUS: CPT

## 2023-01-02 PROCEDURE — 84484 ASSAY OF TROPONIN QUANT: CPT

## 2023-01-02 PROCEDURE — 87635 SARS-COV-2 COVID-19 AMP PRB: CPT

## 2023-01-02 PROCEDURE — 74177 CT ABD & PELVIS W/CONTRAST: CPT | Mod: MA

## 2023-01-02 RX ORDER — IBUPROFEN 200 MG
1 TABLET ORAL
Qty: 0 | Refills: 0 | DISCHARGE

## 2023-01-02 RX ADMIN — Medication 1000 MILLIGRAM(S): at 04:39

## 2023-01-02 RX ADMIN — HEPARIN SODIUM 5000 UNIT(S): 5000 INJECTION INTRAVENOUS; SUBCUTANEOUS at 13:49

## 2023-01-02 RX ADMIN — Medication 400 MILLIGRAM(S): at 04:09

## 2023-01-02 RX ADMIN — Medication 88 MICROGRAM(S): at 05:41

## 2023-01-02 RX ADMIN — PANTOPRAZOLE SODIUM 40 MILLIGRAM(S): 20 TABLET, DELAYED RELEASE ORAL at 11:10

## 2023-01-02 RX ADMIN — HYDROMORPHONE HYDROCHLORIDE 0.5 MILLIGRAM(S): 2 INJECTION INTRAMUSCULAR; INTRAVENOUS; SUBCUTANEOUS at 11:00

## 2023-01-02 RX ADMIN — HYDROMORPHONE HYDROCHLORIDE 0.5 MILLIGRAM(S): 2 INJECTION INTRAMUSCULAR; INTRAVENOUS; SUBCUTANEOUS at 10:39

## 2023-01-02 RX ADMIN — HEPARIN SODIUM 5000 UNIT(S): 5000 INJECTION INTRAVENOUS; SUBCUTANEOUS at 05:41

## 2023-01-02 NOTE — DISCHARGE NOTE PROVIDER - HOSPITAL COURSE
HPI:  67 year old female w/PMH hypothyroidism, HLD, hx multiple SBOs, s/p SBR in July 2022, with subsequent SBO that resulted in revision of SB anastomosis in September 2022, here with 12 hour history of "squeezing" centralized abdominal pain and 7 episodes of vomiting small amounts since last night. 2 days ago, she felt constipated so she took miralax and senna with BM production. Her last BM was this AM and she reports passing flatus.  She was recently admitted to [facility] (12/25/22) for a partial SBO which resolved in 2-3 days with conservative management. She has an appointment on 1/10/23 with Dr. Hardy Veras for another revision, however he is on vacation until 1/4/23 and patient would like to stay at [place]. She was also recently admitted here 12/8/22 with an SBO, seen by Dr. Yanez and Dr. Collins, resolved with conservative management after 3-4 days. Patient denies any fever, chills, chest pain, shortness of breath, current nausea. (01 Jan 2023 05:51)    Hospitalization:  Pt presenting with recurrent partial sbo as patient is having loose BM.  Recently seen at [facility]. Pt did well overnight.  Received on floor.  Noted to have episode of vomiting with associated diarrhea. Pt improved slowly, and was stable for discharge once tolerating PO intake, with noted GI Function.  Pt will see Dr Veras on his arrival back from vacation.      DISPO:   Stable for discharge home with outpatient follow up with Dr. Veras.

## 2023-01-02 NOTE — DISCHARGE NOTE NURSING/CASE MANAGEMENT/SOCIAL WORK - PATIENT PORTAL LINK FT
You can access the FollowMyHealth Patient Portal offered by [facility] by registering at the following website: [email or web address]. By joining [facility]’s FollowMyHealth portal, you will also be able to view your health information using other applications (apps) compatible with our system.

## 2023-01-02 NOTE — PROGRESS NOTE ADULT - SUBJECTIVE AND OBJECTIVE BOX
SURGERY PA NOTE ON BEHALF OF DR. YANEZ / GENERAL SURGERY:    S: Patient seen and examined at bedside.  1 vomiting episode last night noted - denies vomiting since.  Denies nausea, has not taken diet PO since last night - though has been able to keep water down.  Has been having loose BM's and passing flatus.  Urinating without issue.  Denies CP/SOB/PENA/palpitations/dizziness/lightheadedness.      MEDICATIONS:  atorvastatin 20 milliGRAM(s) Oral at bedtime  diphenhydrAMINE Injectable 25 milliGRAM(s) IV Push at bedtime PRN  heparin   Injectable 5000 Unit(s) SubCutaneous every 8 hours  HYDROmorphone  Injectable 0.5 milliGRAM(s) IV Push every 4 hours PRN  ketorolac   Injectable 15 milliGRAM(s) IV Push every 6 hours PRN  ketorolac   Injectable 30 milliGRAM(s) IV Push every 6 hours PRN  levothyroxine 88 MICROGram(s) Oral daily  ondansetron Injectable 4 milliGRAM(s) IV Push every 6 hours PRN  pantoprazole  Injectable 40 milliGRAM(s) IV Push daily  sodium chloride 0.9%. 1000 milliLiter(s) IV Continuous <Continuous>    O:  Vital Signs Last 24 Hrs  T(C): 36.6 (02 Jan 2023 05:05), Max: 37.1 (01 Jan 2023 11:00)  T(F): 97.8 (02 Jan 2023 05:05), Max: 98.8 (01 Jan 2023 11:00)  HR: 62 (02 Jan 2023 05:05) (62 - 65)  BP: 102/61 (02 Jan 2023 05:05) (95/60 - 105/69)  RR: 18 (02 Jan 2023 05:05) (18 - 19)  SpO2: 94% (02 Jan 2023 05:05) (94% - 98%)    Parameters below as of 02 Jan 2023 05:05  Patient On (Oxygen Delivery Method): room air    PHYSICAL EXAM:  Lungs: CTA bilat without W/R/R  Card: S1S2  Abd: Soft, ND.  Mild tenderness to deep palpation throughout.  +BS x 4.  No rebound/guarding.    Ext: Calves soft, NT, without edema bilat    LABS:                        10.4   4.17  )-----------( 187      ( 02 Jan 2023 07:45 )             33.4     12-31    142  |  104  |  13  ----------------------------<  123<H>  3.7   |  29  |  0.80    Ca    9.8      31 Dec 2022 22:50  TPro  7.8  /  Alb  4.0  /  TBili  1.3<H>  /  DBili  x   /  AST  38<H>  /  ALT  59  /  AlkPhos  113  12-31    A:  67 year old female w/PMH hypothyroidism, HLD, hx multiple SBOs, s/p SBR in July 2022, with subsequent SBO that resulted in revision of SB anastomosis in September 2022  Admitted here with recurrence of SBO    P:  Patient demonstrating improvement, question if true SBO  No leukocytosis, H&H on trend  Vitals stable and reassuring  Bowel function maintained   May continue CLD for breakfast, consider advance to fulls for lunch pending clinical appearance/clears tolerance  Continue current care  Discussed with Dr. Yanez  Will follow

## 2023-01-02 NOTE — DISCHARGE NOTE PROVIDER - CARE PROVIDER_API CALL
JOSEE RICKETTS  Surgery  [id number]  [place] [address]  Phone: [phone]  Fax: [phone]  Follow Up Time:

## 2023-01-02 NOTE — DISCHARGE NOTE PROVIDER - NSDCMRMEDTOKEN_GEN_ALL_CORE_FT
Fish Oil 1000 mg oral capsule: 1 cap(s) orally 2 times a day  fluorometholone 0.1% ophthalmic suspension: 1 drop(s) to each affected eye once a day, As Needed  ibuprofen 600 mg oral tablet: 1 tab(s) orally every 6 hours, As Needed  levothyroxine 88 mcg (0.088 mg) oral tablet: 1 tab(s) orally once a day  pantoprazole 40 mg oral delayed release tablet: 1 tab(s) orally once a day  Restasis 0.05% ophthalmic emulsion: 1 drop(s) to each affected eye every 12 hours  rosuvastatin 5 mg oral tablet: 1 tab(s) orally once a day   Fish Oil 1000 mg oral capsule: 1 cap(s) orally 2 times a day  fluorometholone 0.1% ophthalmic suspension: 1 drop(s) to each affected eye once a day, As Needed  levothyroxine 88 mcg (0.088 mg) oral tablet: 1 tab(s) orally once a day  pantoprazole 40 mg oral delayed release tablet: 1 tab(s) orally once a day  Restasis 0.05% ophthalmic emulsion: 1 drop(s) to each affected eye every 12 hours  rosuvastatin 5 mg oral tablet: 1 tab(s) orally once a day

## 2023-01-03 ENCOUNTER — NON-APPOINTMENT (OUTPATIENT)
Age: 68
End: 2023-01-03

## 2023-01-03 LAB
ALBUMIN SERPL ELPH-MCNC: 4.6 G/DL
ALP BLD-CCNC: 112 U/L
ALT SERPL-CCNC: 48 U/L
ANION GAP SERPL CALC-SCNC: 11 MMOL/L
APTT BLD: 29.4 SEC
AST SERPL-CCNC: 44 U/L
BASOPHILS # BLD AUTO: 0.04 K/UL
BASOPHILS NFR BLD AUTO: 0.7 %
BILIRUB SERPL-MCNC: 0.6 MG/DL
BUN SERPL-MCNC: 12 MG/DL
CALCIUM SERPL-MCNC: 9.6 MG/DL
CHLORIDE SERPL-SCNC: 102 MMOL/L
CO2 SERPL-SCNC: 29 MMOL/L
CREAT SERPL-MCNC: 0.7 MG/DL
EGFR: 95 ML/MIN/1.73M2
EOSINOPHIL # BLD AUTO: 0.13 K/UL
EOSINOPHIL NFR BLD AUTO: 2.3 %
GLUCOSE SERPL-MCNC: 96 MG/DL
HCT VFR BLD CALC: 38 %
HGB BLD-MCNC: 12.1 G/DL
IMM GRANULOCYTES NFR BLD AUTO: 0.4 %
INR PPP: 0.95 RATIO
LYMPHOCYTES # BLD AUTO: 1.83 K/UL
LYMPHOCYTES NFR BLD AUTO: 32.8 %
MAN DIFF?: NORMAL
MCHC RBC-ENTMCNC: 31 PG
MCHC RBC-ENTMCNC: 31.8 GM/DL
MCV RBC AUTO: 97.4 FL
MONOCYTES # BLD AUTO: 0.59 K/UL
MONOCYTES NFR BLD AUTO: 10.6 %
NEUTROPHILS # BLD AUTO: 2.97 K/UL
NEUTROPHILS NFR BLD AUTO: 53.2 %
PLATELET # BLD AUTO: 260 K/UL
POTASSIUM SERPL-SCNC: 4.2 MMOL/L
PROT SERPL-MCNC: 6.8 G/DL
PT BLD: 11.2 SEC
RBC # BLD: 3.9 M/UL
RBC # FLD: 13.1 %
SODIUM SERPL-SCNC: 141 MMOL/L
WBC # FLD AUTO: 5.58 K/UL

## 2023-01-05 NOTE — ED ADULT NURSE NOTE - CCCP TRG CHIEF CMPLNT
Next office visit: 01/17/23  Last office visit: 07/19/22  Last refill: 11/20/22  Medication(s) Requested:   lisdexamfetamine (Vyvanse) 50 MG capsule 30 capsule 0 11/20/2022     Sig - Route: Take 1 capsule by mouth every morning.       Is the patient due for refill of this medication(s): Yes  PDMP review: Forwarded to Physician/REBA for signature.     Last Note Reviewed: 07/19/22 RTC 5-6 months        abdominal pain

## 2023-01-16 ENCOUNTER — NON-APPOINTMENT (OUTPATIENT)
Age: 68
End: 2023-01-16

## 2023-01-19 NOTE — ED ADULT NURSE NOTE - ED CARDIAC CAPILLARY REFILL
2 seconds or less Doxepin Counseling:  Patient advised that the medication is sedating and not to drive a car after taking this medication. Patient informed of potential adverse effects including but not limited to dry mouth, urinary retention, and blurry vision.  The patient verbalized understanding of the proper use and possible adverse effects of doxepin.  All of the patient's questions and concerns were addressed.

## 2023-06-21 ENCOUNTER — INPATIENT (INPATIENT)
Facility: HOSPITAL | Age: 68
LOS: 3 days | Discharge: HOME | DRG: 390 | End: 2023-06-25
Attending: SURGERY | Admitting: SURGERY
Payer: MEDICARE

## 2023-06-21 VITALS
WEIGHT: 139.99 LBS | HEIGHT: 62 IN | SYSTOLIC BLOOD PRESSURE: 168 MMHG | TEMPERATURE: 98 F | RESPIRATION RATE: 20 BRPM | HEART RATE: 90 BPM | DIASTOLIC BLOOD PRESSURE: 91 MMHG | OXYGEN SATURATION: 97 %

## 2023-06-21 DIAGNOSIS — K56.609 UNSPECIFIED INTESTINAL OBSTRUCTION, UNSPECIFIED AS TO PARTIAL VERSUS COMPLETE OBSTRUCTION: ICD-10-CM

## 2023-06-21 DIAGNOSIS — E78.5 HYPERLIPIDEMIA, UNSPECIFIED: ICD-10-CM

## 2023-06-21 DIAGNOSIS — E03.9 HYPOTHYROIDISM, UNSPECIFIED: ICD-10-CM

## 2023-06-21 DIAGNOSIS — Z90.49 ACQUIRED ABSENCE OF OTHER SPECIFIED PARTS OF DIGESTIVE TRACT: Chronic | ICD-10-CM

## 2023-06-21 LAB
ALBUMIN SERPL ELPH-MCNC: 3.8 G/DL — SIGNIFICANT CHANGE UP (ref 3.3–5)
ALP SERPL-CCNC: 108 U/L — SIGNIFICANT CHANGE UP (ref 40–120)
ALT FLD-CCNC: 66 U/L — SIGNIFICANT CHANGE UP (ref 12–78)
AMYLASE P1 CFR SERPL: 41 U/L — SIGNIFICANT CHANGE UP (ref 25–125)
ANION GAP SERPL CALC-SCNC: 5 MMOL/L — SIGNIFICANT CHANGE UP (ref 5–17)
AST SERPL-CCNC: 48 U/L — HIGH (ref 15–37)
BASOPHILS # BLD AUTO: 0.04 K/UL — SIGNIFICANT CHANGE UP (ref 0–0.2)
BASOPHILS NFR BLD AUTO: 0.6 % — SIGNIFICANT CHANGE UP (ref 0–2)
BILIRUB SERPL-MCNC: 0.6 MG/DL — SIGNIFICANT CHANGE UP (ref 0.2–1.2)
BUN SERPL-MCNC: 18 MG/DL — SIGNIFICANT CHANGE UP (ref 7–23)
CALCIUM SERPL-MCNC: 9.1 MG/DL — SIGNIFICANT CHANGE UP (ref 8.5–10.1)
CHLORIDE SERPL-SCNC: 106 MMOL/L — SIGNIFICANT CHANGE UP (ref 96–108)
CO2 SERPL-SCNC: 31 MMOL/L — SIGNIFICANT CHANGE UP (ref 22–31)
CREAT SERPL-MCNC: 0.69 MG/DL — SIGNIFICANT CHANGE UP (ref 0.5–1.3)
EGFR: 95 ML/MIN/1.73M2 — SIGNIFICANT CHANGE UP
EOSINOPHIL # BLD AUTO: 0.14 K/UL — SIGNIFICANT CHANGE UP (ref 0–0.5)
EOSINOPHIL NFR BLD AUTO: 2.2 % — SIGNIFICANT CHANGE UP (ref 0–6)
GLUCOSE SERPL-MCNC: 135 MG/DL — HIGH (ref 70–99)
HCT VFR BLD CALC: 40.1 % — SIGNIFICANT CHANGE UP (ref 34.5–45)
HGB BLD-MCNC: 12.9 G/DL — SIGNIFICANT CHANGE UP (ref 11.5–15.5)
IMM GRANULOCYTES NFR BLD AUTO: 0.8 % — SIGNIFICANT CHANGE UP (ref 0–0.9)
LACTATE SERPL-SCNC: 1.5 MMOL/L — SIGNIFICANT CHANGE UP (ref 0.7–2)
LIDOCAIN IGE QN: 141 U/L — SIGNIFICANT CHANGE UP (ref 73–393)
LYMPHOCYTES # BLD AUTO: 1.79 K/UL — SIGNIFICANT CHANGE UP (ref 1–3.3)
LYMPHOCYTES # BLD AUTO: 28.1 % — SIGNIFICANT CHANGE UP (ref 13–44)
MCHC RBC-ENTMCNC: 30.6 PG — SIGNIFICANT CHANGE UP (ref 27–34)
MCHC RBC-ENTMCNC: 32.2 GM/DL — SIGNIFICANT CHANGE UP (ref 32–36)
MCV RBC AUTO: 95 FL — SIGNIFICANT CHANGE UP (ref 80–100)
MONOCYTES # BLD AUTO: 0.55 K/UL — SIGNIFICANT CHANGE UP (ref 0–0.9)
MONOCYTES NFR BLD AUTO: 8.6 % — SIGNIFICANT CHANGE UP (ref 2–14)
NEUTROPHILS # BLD AUTO: 3.81 K/UL — SIGNIFICANT CHANGE UP (ref 1.8–7.4)
NEUTROPHILS NFR BLD AUTO: 59.7 % — SIGNIFICANT CHANGE UP (ref 43–77)
NRBC # BLD: 0 /100 WBCS — SIGNIFICANT CHANGE UP (ref 0–0)
PLATELET # BLD AUTO: 272 K/UL — SIGNIFICANT CHANGE UP (ref 150–400)
POTASSIUM SERPL-MCNC: 4.2 MMOL/L — SIGNIFICANT CHANGE UP (ref 3.5–5.3)
POTASSIUM SERPL-SCNC: 4.2 MMOL/L — SIGNIFICANT CHANGE UP (ref 3.5–5.3)
PROT SERPL-MCNC: 7.8 G/DL — SIGNIFICANT CHANGE UP (ref 6–8.3)
RBC # BLD: 4.22 M/UL — SIGNIFICANT CHANGE UP (ref 3.8–5.2)
RBC # FLD: 12.8 % — SIGNIFICANT CHANGE UP (ref 10.3–14.5)
SODIUM SERPL-SCNC: 142 MMOL/L — SIGNIFICANT CHANGE UP (ref 135–145)
TROPONIN I, HIGH SENSITIVITY RESULT: 3 NG/L — SIGNIFICANT CHANGE UP
WBC # BLD: 6.38 K/UL — SIGNIFICANT CHANGE UP (ref 3.8–10.5)
WBC # FLD AUTO: 6.38 K/UL — SIGNIFICANT CHANGE UP (ref 3.8–10.5)

## 2023-06-21 PROCEDURE — 71045 X-RAY EXAM CHEST 1 VIEW: CPT | Mod: 26,76

## 2023-06-21 PROCEDURE — 99285 EMERGENCY DEPT VISIT HI MDM: CPT

## 2023-06-21 PROCEDURE — 74177 CT ABD & PELVIS W/CONTRAST: CPT | Mod: 26,MA

## 2023-06-21 PROCEDURE — 93010 ELECTROCARDIOGRAM REPORT: CPT

## 2023-06-21 RX ORDER — ACETAMINOPHEN 500 MG
1000 TABLET ORAL ONCE
Refills: 0 | Status: COMPLETED | OUTPATIENT
Start: 2023-06-21 | End: 2023-06-22

## 2023-06-21 RX ORDER — BENZOCAINE 10 %
1 GEL (GRAM) MUCOUS MEMBRANE ONCE
Refills: 0 | Status: DISCONTINUED | OUTPATIENT
Start: 2023-06-21 | End: 2023-06-22

## 2023-06-21 RX ORDER — HYDROMORPHONE HYDROCHLORIDE 2 MG/ML
1 INJECTION INTRAMUSCULAR; INTRAVENOUS; SUBCUTANEOUS ONCE
Refills: 0 | Status: DISCONTINUED | OUTPATIENT
Start: 2023-06-21 | End: 2023-06-21

## 2023-06-21 RX ORDER — ATORVASTATIN CALCIUM 80 MG/1
20 TABLET, FILM COATED ORAL AT BEDTIME
Refills: 0 | Status: DISCONTINUED | OUTPATIENT
Start: 2023-06-21 | End: 2023-06-25

## 2023-06-21 RX ORDER — DIPHENHYDRAMINE HCL 50 MG
25 CAPSULE ORAL ONCE
Refills: 0 | Status: COMPLETED | OUTPATIENT
Start: 2023-06-21 | End: 2023-06-21

## 2023-06-21 RX ORDER — PANTOPRAZOLE SODIUM 20 MG/1
40 TABLET, DELAYED RELEASE ORAL DAILY
Refills: 0 | Status: DISCONTINUED | OUTPATIENT
Start: 2023-06-21 | End: 2023-06-25

## 2023-06-21 RX ORDER — ACETAMINOPHEN 500 MG
1000 TABLET ORAL ONCE
Refills: 0 | Status: COMPLETED | OUTPATIENT
Start: 2023-06-22 | End: 2023-06-22

## 2023-06-21 RX ORDER — MORPHINE SULFATE 50 MG/1
4 CAPSULE, EXTENDED RELEASE ORAL ONCE
Refills: 0 | Status: DISCONTINUED | OUTPATIENT
Start: 2023-06-21 | End: 2023-06-21

## 2023-06-21 RX ORDER — HYDROMORPHONE HYDROCHLORIDE 2 MG/ML
0.5 INJECTION INTRAMUSCULAR; INTRAVENOUS; SUBCUTANEOUS ONCE
Refills: 0 | Status: DISCONTINUED | OUTPATIENT
Start: 2023-06-21 | End: 2023-06-21

## 2023-06-21 RX ORDER — SODIUM CHLORIDE 9 MG/ML
1000 INJECTION INTRAMUSCULAR; INTRAVENOUS; SUBCUTANEOUS
Refills: 0 | Status: DISCONTINUED | OUTPATIENT
Start: 2023-06-21 | End: 2023-06-21

## 2023-06-21 RX ORDER — HYDROMORPHONE HYDROCHLORIDE 2 MG/ML
0.5 INJECTION INTRAMUSCULAR; INTRAVENOUS; SUBCUTANEOUS EVERY 4 HOURS
Refills: 0 | Status: DISCONTINUED | OUTPATIENT
Start: 2023-06-21 | End: 2023-06-25

## 2023-06-21 RX ORDER — ACETAMINOPHEN 500 MG
1000 TABLET ORAL ONCE
Refills: 0 | Status: COMPLETED | OUTPATIENT
Start: 2023-06-21 | End: 2023-06-21

## 2023-06-21 RX ORDER — METOCLOPRAMIDE HCL 10 MG
5 TABLET ORAL ONCE
Refills: 0 | Status: COMPLETED | OUTPATIENT
Start: 2023-06-21 | End: 2023-06-21

## 2023-06-21 RX ORDER — IOHEXOL 300 MG/ML
30 INJECTION, SOLUTION INTRAVENOUS ONCE
Refills: 0 | Status: COMPLETED | OUTPATIENT
Start: 2023-06-21 | End: 2023-06-21

## 2023-06-21 RX ORDER — ONDANSETRON 8 MG/1
4 TABLET, FILM COATED ORAL ONCE
Refills: 0 | Status: COMPLETED | OUTPATIENT
Start: 2023-06-21 | End: 2023-06-21

## 2023-06-21 RX ORDER — KETOROLAC TROMETHAMINE 30 MG/ML
15 SYRINGE (ML) INJECTION EVERY 6 HOURS
Refills: 0 | Status: DISCONTINUED | OUTPATIENT
Start: 2023-06-21 | End: 2023-06-25

## 2023-06-21 RX ORDER — LANOLIN ALCOHOL/MO/W.PET/CERES
3 CREAM (GRAM) TOPICAL AT BEDTIME
Refills: 0 | Status: DISCONTINUED | OUTPATIENT
Start: 2023-06-21 | End: 2023-06-25

## 2023-06-21 RX ORDER — SODIUM CHLORIDE 9 MG/ML
1000 INJECTION, SOLUTION INTRAVENOUS
Refills: 0 | Status: DISCONTINUED | OUTPATIENT
Start: 2023-06-21 | End: 2023-06-23

## 2023-06-21 RX ORDER — FLUOROMETHOLONE 1 MG/ML
1 SOLUTION/ DROPS OPHTHALMIC DAILY
Refills: 0 | Status: DISCONTINUED | OUTPATIENT
Start: 2023-06-21 | End: 2023-06-21

## 2023-06-21 RX ORDER — DIPHENHYDRAMINE HCL 50 MG
25 CAPSULE ORAL AT BEDTIME
Refills: 0 | Status: DISCONTINUED | OUTPATIENT
Start: 2023-06-21 | End: 2023-06-25

## 2023-06-21 RX ORDER — LEVOTHYROXINE SODIUM 125 MCG
88 TABLET ORAL DAILY
Refills: 0 | Status: DISCONTINUED | OUTPATIENT
Start: 2023-06-21 | End: 2023-06-25

## 2023-06-21 RX ADMIN — ONDANSETRON 4 MILLIGRAM(S): 8 TABLET, FILM COATED ORAL at 06:46

## 2023-06-21 RX ADMIN — SODIUM CHLORIDE 100 MILLILITER(S): 9 INJECTION, SOLUTION INTRAVENOUS at 19:13

## 2023-06-21 RX ADMIN — MORPHINE SULFATE 4 MILLIGRAM(S): 50 CAPSULE, EXTENDED RELEASE ORAL at 06:47

## 2023-06-21 RX ADMIN — Medication 25 MILLIGRAM(S): at 21:46

## 2023-06-21 RX ADMIN — Medication 1000 MILLIGRAM(S): at 19:05

## 2023-06-21 RX ADMIN — Medication 5 MILLIGRAM(S): at 10:10

## 2023-06-21 RX ADMIN — HYDROMORPHONE HYDROCHLORIDE 0.5 MILLIGRAM(S): 2 INJECTION INTRAMUSCULAR; INTRAVENOUS; SUBCUTANEOUS at 08:00

## 2023-06-21 RX ADMIN — IOHEXOL 30 MILLILITER(S): 300 INJECTION, SOLUTION INTRAVENOUS at 06:47

## 2023-06-21 RX ADMIN — Medication 400 MILLIGRAM(S): at 11:57

## 2023-06-21 RX ADMIN — SODIUM CHLORIDE 75 MILLILITER(S): 9 INJECTION INTRAMUSCULAR; INTRAVENOUS; SUBCUTANEOUS at 12:01

## 2023-06-21 RX ADMIN — HYDROMORPHONE HYDROCHLORIDE 0.5 MILLIGRAM(S): 2 INJECTION INTRAMUSCULAR; INTRAVENOUS; SUBCUTANEOUS at 14:35

## 2023-06-21 RX ADMIN — HYDROMORPHONE HYDROCHLORIDE 0.5 MILLIGRAM(S): 2 INJECTION INTRAMUSCULAR; INTRAVENOUS; SUBCUTANEOUS at 22:04

## 2023-06-21 RX ADMIN — Medication 400 MILLIGRAM(S): at 18:57

## 2023-06-21 RX ADMIN — Medication 25 MILLIGRAM(S): at 08:18

## 2023-06-21 RX ADMIN — HYDROMORPHONE HYDROCHLORIDE 1 MILLIGRAM(S): 2 INJECTION INTRAMUSCULAR; INTRAVENOUS; SUBCUTANEOUS at 10:18

## 2023-06-21 RX ADMIN — HYDROMORPHONE HYDROCHLORIDE 0.5 MILLIGRAM(S): 2 INJECTION INTRAMUSCULAR; INTRAVENOUS; SUBCUTANEOUS at 22:20

## 2023-06-21 RX ADMIN — HYDROMORPHONE HYDROCHLORIDE 0.5 MILLIGRAM(S): 2 INJECTION INTRAMUSCULAR; INTRAVENOUS; SUBCUTANEOUS at 00:33

## 2023-06-21 RX ADMIN — PANTOPRAZOLE SODIUM 40 MILLIGRAM(S): 20 TABLET, DELAYED RELEASE ORAL at 13:24

## 2023-06-21 RX ADMIN — HYDROMORPHONE HYDROCHLORIDE 0.5 MILLIGRAM(S): 2 INJECTION INTRAMUSCULAR; INTRAVENOUS; SUBCUTANEOUS at 18:01

## 2023-06-21 NOTE — ED ADULT NURSE REASSESSMENT NOTE - NS ED NURSE REASSESS COMMENT FT1
1735:  patient requesting another dose of Dilaudid for pain, states the pain is severe.  NG tube continues to drain (moreso than before).  I explained to her that her next dose is not scheduled until 1835.  Called surgical resident and explained situation to him.  He will place a one time order for Dilaudid 0.25.  kodak gutierrez.

## 2023-06-21 NOTE — ED PROVIDER NOTE - OBJECTIVE STATEMENT
With a past medical history of hypothyroidism and prior small bowel obstruction is presenting with concern of abdominal pain.  Symptoms started last night and have been constant since onset.  States feels similar to prior bowel obstruction in the past.  Has history of anastomosis.  Previous surgeon is Dr. Yanez.  Having nausea with vomiting.  Had 1 episode of diarrhea last night.  Also endorses burning in her chest.

## 2023-06-21 NOTE — H&P ADULT - HISTORY OF PRESENT ILLNESS
66 yo female with pmhx of hypothyroidism and prior small bowel obstructions.  Pt returns to ER today with concerns for another SBO.  Pt had surgery in January 2023 at [facility].  Revised the anastamosis.  States had been symptom free for 5 months.  Had been tolerating diet. Over the last week, noted stools decreasing, and last night had dinner then developed pain and discomfort, had an episode of diarrhea then began vomiting.  States has been nauseas since.  Given Zofran and pain meds in ER.  CT Scan showing SBO with transition at anastamosis. NO headaches, no dizziness, NO SOB, no chest pain, no back pain, no other complaints.

## 2023-06-21 NOTE — ED ADULT NURSE REASSESSMENT NOTE - NS ED NURSE REASSESS COMMENT FT1
1138:  patient complaining about increased pain / distention of abdomen.  NG remains in right nare, there is a scant return, but it is bringing up fluid.  I called Leah and spoke to her.  she is going to review xray and will go from there.  kodak gutierrez.

## 2023-06-21 NOTE — ED ADULT NURSE REASSESSMENT NOTE - NS ED NURSE REASSESS COMMENT FT1
0713:  received patient.  she states that the morphine which she was given did nothing for her, as she told the night staff it wouldn't and is insisting the only medication that will help her is the one that starts with a "d".  she states she also needs benadryl before the CT scan.  She states she has not yet seen the doctor (came in end of night shift).  MD at bedside now performing evaluation.  kodak gutierrez.

## 2023-06-21 NOTE — ED ADULT NURSE REASSESSMENT NOTE - NS ED NURSE REASSESS COMMENT FT1
1150:  Leah CALLAHAN called, xray looks perfect.  more fluid being aspirated by NG tube.  patient tolerating well.  awaiting bed assignment.  IV Tylenol now given for pain.  tholler, rn.

## 2023-06-21 NOTE — ED ADULT NURSE NOTE - OBJECTIVE STATEMENT
Received Pt awake and alert, Pt sts she has Hx of SBO, last surgery was in January, sts she has been good since then, was awoken form sleep with pain and vomiting tonight.

## 2023-06-21 NOTE — ED ADULT NURSE NOTE - NSFALLUNIVINTERV_ED_ALL_ED
Bed/Stretcher in lowest position, wheels locked, appropriate side rails in place/Call bell, personal items and telephone in reach/Instruct patient to call for assistance before getting out of bed/chair/stretcher/Non-slip footwear applied when patient is off stretcher/[place] to call system/Physically safe environment - no spills, clutter or unnecessary equipment/Purposeful proactive rounding/Room/bathroom lighting operational, light cord in reach

## 2023-06-21 NOTE — ED PROVIDER NOTE - PROGRESS NOTE DETAILS
Patient with small bowel obstruction.  Spoke with surgical PA who will see patient and talk to Dr. Yanez.  Navarro Ojeda MD. Patient seen by surgery and Dr. Yanez, NG tube placed.  Will admit to their service.  Navarro Ojeda MD.

## 2023-06-21 NOTE — H&P ADULT - NSHPLABSRESULTS_GEN_ALL_CORE
< from: CT Abdomen and Pelvis w/ IV Cont (06.21.23 @ 08:28) >    ACC: [id number] EXAM:  CT ABDOMEN AND PELVIS IC   ORDERED BY:  TANMAY HECK     PROCEDURE DATE:  06/21/2023          INTERPRETATION:  CT ABDOMEN AND PELVIS WITH CONTRAST    CLINICAL INFORMATION: Abdominal pain. History of small bowel obstruction,   status post prior resection/anastomosis.    CONTRAST/COMPLICATIONS:  IV Contrast: Omnipaque 350  90 cc administered   10 cc discarded  Oral Contrast: NONE  Complications: None reported at time of study completion    Technique: Axial CT images of the abdomen and pelvis were obtained from   the lung bases to the pubic symphysis after the administration of IV   contrast.  Coronal and sagittal reformatted images were created and   reviewed.    COMPARISON: Prior CT of the abdomen and pelvis from 1/1/2023. MRI of the   abdomen from 12/12/2022. CT of the abdomen and pelvis from 12/8/2022,   9/25/2022, 9/9/2022 and 7/10/2022    Findings:  LOWER CHEST: The included lung bases are clear aside for linear   atelectasis/scarring at the medial right lower lobe, stable. 2 mm nodule   in the right middle lobe is stable.    LIVER: There is diffuse hepatic steatosis.  BILE DUCTS: Normal caliber.  GALLBLADDER: The gallbladder has a grossly unremarkable CT appearance.  SPLEEN: Within normal limits.  PANCREAS: Within normal limits.  ADRENALS: Within normal limits.  KIDNEYS/URETERS: Within normal limits.    BLADDER: The urinary bladder has a grossly unremarkable CT appearance.  REPRODUCTIVE ORGANS:  Calcified uterine fibroid.    BOWEL: Dilated small bowel loops are seen, with transition point at the   level of the small bowel anastomosis located at the lower abdomen/pelvis,   just left of midline (best seen on coronal images 24 - 34, series 602);   there is mild bowel wall thickening of the small bowel distal to the   anastomosis. The small bowel loops distal to the transition point are   collapsed to the level of the terminal ileum. The colon contains air and   stool.  No pneumatosis is seen. There is mild associated mesenteric edema   and trace ascites.    Normal appendix. Rectosigmoid anastomosis is noted.    PERITONEUM: Trace pelvic ascites. No free air.  VESSELS:  Patent celiac axis, SMA and MORGAN. Hepatic veins, portal vein,   SMV, splenic vein and renal veins are patent. Circumaortic left renal   vein noted. Mild aortoiliac atherosclerotic disease is seen without   aneurysm.  LYMPH NODES: No lymphadenopathy.  ABDOMINAL WALL: Anterior midline subcutaneous incisional scarring. Tiny,   fat-containing midline epigastric ventral hernia. There is a small   supraumbilical hernia containing a short segment of the antimesenteric   portion of the transverse colon, without evidence for inflammation or   strangulation.  BONES: No suspicious osseous lesion. Ultimately level degenerative   changes are seen throughout the visualized spine.  OTHER:  None    IMPRESSION:  Small bowel obstruction with transition point at small bowel anastomosis   in the lower abdomen/pelvis, just left of midline. Short segment bowel   wall thickening involving the small bowel just distal to the anastomosis.   Mild mesenteric edema and pelvic ascites.  No pneumatosis. No free air.    Other findings, as above.    --- End of Report ---            TICO EARL MD; Attending Radiologist  This document has been electronically signed. Jun 21 2023  9:07AM    < end of copied text >

## 2023-06-21 NOTE — ED ADULT NURSE REASSESSMENT NOTE - NS ED NURSE REASSESS COMMENT FT1
1836:  transport arrived to take the patient to the unit.  medicated with Dilaudid before leaving, IV tylenol sent as well to be initiated up on the floor.  the patient still complaining of some discomfort but apparently states she is feeling some improvement.  vitals previously recorded.  she left for the unit via wheelchair and with all of her belongings.  kodak gutierrez.

## 2023-06-21 NOTE — ED PROVIDER NOTE - CLINICAL SUMMARY MEDICAL DECISION MAKING FREE TEXT BOX
Concern for SBO given history of similar episodes.  Less likely ACS without shortness of breath though she does endorse burning in her chest so we will check with troponin.  Less likely pancreatitis or biliary pathology.  Do not suspect appendicitis given sudden nature of symptoms though will assess with CT scan.  Plan on lab work, imaging, anticipate surgical consult pending imaging.  Patient states she cannot tolerate oral contrast at this time.

## 2023-06-21 NOTE — H&P ADULT - PROBLEM SELECTOR PLAN 1
NGT placed at bedside.  Awaiting confirmatory Xray  Admit to Dr. Yanez  Analgesia with toradol and tylenol, Dilaudid prn  Ambulation/OOB  Zofran prn  NO ABX  Vte with SCD, OOB, possible start chemical prophylaxis in AM  Will monitor.

## 2023-06-21 NOTE — ED ADULT TRIAGE NOTE - CHIEF COMPLAINT QUOTE
Pt has hx of SBO and thinks she has one again. Pt has abd pain and is vomiting at home. [place] 10 was the last SBO. Dr. billy is her surgeon.

## 2023-06-21 NOTE — ED ADULT NURSE REASSESSMENT NOTE - NS ED NURSE REASSESS COMMENT FT1
0819:  patient taken to CT.  she states that her stomach feels bloated, but pain improved since being given Dilaudid.  states she has had a bm every night (last night being the last one as of now).  left for CT in stable condition.  kodak gutierrez.

## 2023-06-21 NOTE — ED ADULT NURSE REASSESSMENT NOTE - NS ED NURSE REASSESS COMMENT FT1
1150:  Per Leah, surgical PA, try to pull back on tube, approximately 1".  this was performed, no noticable change at this time, will give it 10 minutes and re-assess.  kodak gutierrez.

## 2023-06-21 NOTE — ED PROVIDER NOTE - PHYSICAL EXAMINATION
Constitutional: Awake, Alert, non-toxic. No acute distress.  HEAD: Normocephalic, atraumatic.   EYES: PERRL, EOM intact, conjunctiva and sclera are clear bilaterally.  ENT: External ears normal. No rhinorrhea, no tracheal deviation   NECK: Supple, non-tender  CARDIOVASCULAR: regular rate and rhythm.  RESPIRATORY: Normal respiratory effort; breath sounds CTAB, no wheezes, rhonchi, or rales. Speaking in full sentences. No accessory muscle use.   ABDOMEN: Soft; generalized TTP, non-distended. No rebound or guarding.   MSK:  no lower extremity edema, no deformities  SKIN: Warm, dry  NEURO: A&O x3. Sensory and motor functions are grossly intact. Speech is normal. No facial droop.  PSYCH: Appearance and judgement seem appropriate for gender and age.

## 2023-06-22 ENCOUNTER — TRANSCRIPTION ENCOUNTER (OUTPATIENT)
Age: 68
End: 2023-06-22

## 2023-06-22 LAB
ALBUMIN SERPL ELPH-MCNC: 3 G/DL — LOW (ref 3.3–5)
ALP SERPL-CCNC: 91 U/L — SIGNIFICANT CHANGE UP (ref 40–120)
ALT FLD-CCNC: 41 U/L — SIGNIFICANT CHANGE UP (ref 12–78)
ANION GAP SERPL CALC-SCNC: 5 MMOL/L — SIGNIFICANT CHANGE UP (ref 5–17)
AST SERPL-CCNC: 25 U/L — SIGNIFICANT CHANGE UP (ref 15–37)
BILIRUB SERPL-MCNC: 1.3 MG/DL — HIGH (ref 0.2–1.2)
BUN SERPL-MCNC: 16 MG/DL — SIGNIFICANT CHANGE UP (ref 7–23)
CALCIUM SERPL-MCNC: 8.2 MG/DL — LOW (ref 8.5–10.1)
CHLORIDE SERPL-SCNC: 106 MMOL/L — SIGNIFICANT CHANGE UP (ref 96–108)
CO2 SERPL-SCNC: 28 MMOL/L — SIGNIFICANT CHANGE UP (ref 22–31)
CREAT SERPL-MCNC: 0.64 MG/DL — SIGNIFICANT CHANGE UP (ref 0.5–1.3)
EGFR: 97 ML/MIN/1.73M2 — SIGNIFICANT CHANGE UP
GLUCOSE SERPL-MCNC: 128 MG/DL — HIGH (ref 70–99)
HCT VFR BLD CALC: 34.2 % — LOW (ref 34.5–45)
HGB BLD-MCNC: 11.1 G/DL — LOW (ref 11.5–15.5)
MAGNESIUM SERPL-MCNC: 2.1 MG/DL — SIGNIFICANT CHANGE UP (ref 1.6–2.6)
MCHC RBC-ENTMCNC: 31 PG — SIGNIFICANT CHANGE UP (ref 27–34)
MCHC RBC-ENTMCNC: 32.5 GM/DL — SIGNIFICANT CHANGE UP (ref 32–36)
MCV RBC AUTO: 95.5 FL — SIGNIFICANT CHANGE UP (ref 80–100)
NRBC # BLD: 0 /100 WBCS — SIGNIFICANT CHANGE UP (ref 0–0)
PHOSPHATE SERPL-MCNC: 3.5 MG/DL — SIGNIFICANT CHANGE UP (ref 2.5–4.5)
PLATELET # BLD AUTO: 203 K/UL — SIGNIFICANT CHANGE UP (ref 150–400)
POTASSIUM SERPL-MCNC: 3.3 MMOL/L — LOW (ref 3.5–5.3)
POTASSIUM SERPL-SCNC: 3.3 MMOL/L — LOW (ref 3.5–5.3)
PROT SERPL-MCNC: 5.8 G/DL — LOW (ref 6–8.3)
RBC # BLD: 3.58 M/UL — LOW (ref 3.8–5.2)
RBC # FLD: 12.7 % — SIGNIFICANT CHANGE UP (ref 10.3–14.5)
SODIUM SERPL-SCNC: 139 MMOL/L — SIGNIFICANT CHANGE UP (ref 135–145)
WBC # BLD: 5.61 K/UL — SIGNIFICANT CHANGE UP (ref 3.8–10.5)
WBC # FLD AUTO: 5.61 K/UL — SIGNIFICANT CHANGE UP (ref 3.8–10.5)

## 2023-06-22 RX ORDER — BENZOCAINE 10 %
1 GEL (GRAM) MUCOUS MEMBRANE THREE TIMES A DAY
Refills: 0 | Status: DISCONTINUED | OUTPATIENT
Start: 2023-06-22 | End: 2023-06-25

## 2023-06-22 RX ORDER — ACETAMINOPHEN 500 MG
1000 TABLET ORAL ONCE
Refills: 0 | Status: COMPLETED | OUTPATIENT
Start: 2023-06-22 | End: 2023-06-22

## 2023-06-22 RX ORDER — POTASSIUM CHLORIDE 20 MEQ
10 PACKET (EA) ORAL
Refills: 0 | Status: COMPLETED | OUTPATIENT
Start: 2023-06-22 | End: 2023-06-22

## 2023-06-22 RX ADMIN — HYDROMORPHONE HYDROCHLORIDE 0.5 MILLIGRAM(S): 2 INJECTION INTRAMUSCULAR; INTRAVENOUS; SUBCUTANEOUS at 22:00

## 2023-06-22 RX ADMIN — HYDROMORPHONE HYDROCHLORIDE 0.5 MILLIGRAM(S): 2 INJECTION INTRAMUSCULAR; INTRAVENOUS; SUBCUTANEOUS at 05:38

## 2023-06-22 RX ADMIN — Medication 100 MILLIEQUIVALENT(S): at 18:45

## 2023-06-22 RX ADMIN — Medication 1000 MILLIGRAM(S): at 01:00

## 2023-06-22 RX ADMIN — SODIUM CHLORIDE 100 MILLILITER(S): 9 INJECTION, SOLUTION INTRAVENOUS at 07:34

## 2023-06-22 RX ADMIN — Medication 3 MILLIGRAM(S): at 21:41

## 2023-06-22 RX ADMIN — Medication 400 MILLIGRAM(S): at 07:34

## 2023-06-22 RX ADMIN — Medication 25 MILLIGRAM(S): at 22:28

## 2023-06-22 RX ADMIN — HYDROMORPHONE HYDROCHLORIDE 0.5 MILLIGRAM(S): 2 INJECTION INTRAMUSCULAR; INTRAVENOUS; SUBCUTANEOUS at 11:00

## 2023-06-22 RX ADMIN — HYDROMORPHONE HYDROCHLORIDE 0.5 MILLIGRAM(S): 2 INJECTION INTRAMUSCULAR; INTRAVENOUS; SUBCUTANEOUS at 06:05

## 2023-06-22 RX ADMIN — HYDROMORPHONE HYDROCHLORIDE 0.5 MILLIGRAM(S): 2 INJECTION INTRAMUSCULAR; INTRAVENOUS; SUBCUTANEOUS at 16:00

## 2023-06-22 RX ADMIN — Medication 400 MILLIGRAM(S): at 00:35

## 2023-06-22 RX ADMIN — Medication 88 MICROGRAM(S): at 05:39

## 2023-06-22 RX ADMIN — Medication 1000 MILLIGRAM(S): at 12:40

## 2023-06-22 RX ADMIN — Medication 100 MILLIEQUIVALENT(S): at 17:03

## 2023-06-22 RX ADMIN — HYDROMORPHONE HYDROCHLORIDE 0.5 MILLIGRAM(S): 2 INJECTION INTRAMUSCULAR; INTRAVENOUS; SUBCUTANEOUS at 10:42

## 2023-06-22 RX ADMIN — HYDROMORPHONE HYDROCHLORIDE 0.5 MILLIGRAM(S): 2 INJECTION INTRAMUSCULAR; INTRAVENOUS; SUBCUTANEOUS at 15:44

## 2023-06-22 RX ADMIN — HYDROMORPHONE HYDROCHLORIDE 0.5 MILLIGRAM(S): 2 INJECTION INTRAMUSCULAR; INTRAVENOUS; SUBCUTANEOUS at 21:41

## 2023-06-22 RX ADMIN — Medication 100 MILLIEQUIVALENT(S): at 21:41

## 2023-06-22 RX ADMIN — Medication 400 MILLIGRAM(S): at 12:21

## 2023-06-22 NOTE — CARE COORDINATION ASSESSMENT. - OTHER PERTINENT REFERRAL INFORMATION
NPO/ NGT to low suction - Zofran prn: Spoke with patient at bedside. Patient states she lives w family  in a [place]. She was independent PTA w/o a device . Family to transport home when medically cleared  No anticipated DC needs identified. CM to remain available

## 2023-06-22 NOTE — DISCHARGE NOTE PROVIDER - HOSPITAL COURSE
HPI: 67 year old female w/PMH hypothyroidism, HLD, hx multiple SBOs, s/p SBR in July 2022, with subsequent SBO that resulted in revision of SB anastomosis x2, September 2022 and January 2023 at [facility], presenting with concerns for another SBO. States had been symptom free for 5 months.  Had been tolerating diet. Over the last week, noted stools decreasing, and last night had dinner then developed pain and discomfort, had an episode of diarrhea then began vomiting.  States has been nauseous since.  Given Zofran and pain meds in ER.  CT Scan showing SBO with transition at anastomosis. No headaches, no dizziness, NO SOB, no chest pain, no back pain, no other complaints.  (21 Jun 2023 10:18)    Hospital course:  NGT placed in the ED and put to LCWS. Patient was transferred to the floor for monitoring. Patient was given pain control. Diet was advanced appropriately until return of normal GI function was obtained as evidence by passing of flatus and BM in addition to toleration of diet. Lab values were monitored.    Disposition: Patient to follow-up with          HPI: 67 year old female w/PMH hypothyroidism, HLD, hx multiple SBOs, s/p SBR in July 2022, with subsequent SBO that resulted in revision of SB anastomosis x2, September 2022 and January 2023 at [facility], presenting with concerns for another SBO. States had been symptom free for 5 months.  Had been tolerating diet. Over the last week, noted stools decreasing, and last night had dinner then developed pain and discomfort, had an episode of diarrhea then began vomiting.  States has been nauseous since.  Given Zofran and pain meds in ER.  CT Scan showing SBO with transition at anastomosis. No headaches, no dizziness, NO SOB, no chest pain, no back pain, no other complaints.  (21 Jun 2023 10:18)    Hospital course:  NGT placed in the ED and put to LCWS. Patient was transferred to the floor for monitoring. Patient was given pain control. Diet was advanced appropriately until return of normal GI function was obtained as evidence by passing of flatus and BM in addition to toleration of diet. Lab values were monitored. On hospital day 3, patient began to complain of RUQ pain and discomfort that radiated around to her back.  LFT's were mildly elevated.  US performed showing no evidence of acute cholecystitis.  Pt diet advanced, tolerated.  Pt will follow up outpatient with PCP for follow up and to have labs performed     Disposition: Patient to follow-up with

## 2023-06-22 NOTE — DISCHARGE NOTE PROVIDER - NSDCFUADDINST_GEN_ALL_CORE_FT
Please stick to a low fiber, low fat diet.   Will be reevaluated by PCP for follow up lab work  No heavy lifting for now

## 2023-06-22 NOTE — CARE COORDINATION ASSESSMENT. - NSPASTMEDSURGHISTORY_GEN_ALL_CORE_FT
PAST MEDICAL & SURGICAL HISTORY:  Hypothyroidism      HLD (hyperlipidemia)      S/P small bowel resection      SBO (small bowel obstruction)

## 2023-06-22 NOTE — CARE COORDINATION ASSESSMENT. - NSCAREPROVIDERS_GEN_ALL_CORE_FT
CARE PROVIDERS:  Accepting Physician: Dustin Yanez  Administration: Laurence Kuhn  Admitting: Dustin Yanez  Attending: Dustin Yanez  Case Management: Behringer, Megan  Consultant: Leah Lang  Consultant: Pierre Perez  Covering Team: Darci Collins  ED Attending: Navarro Ojeda  ED Nurse: Araceli Reid  Emergency Medicine: Justin Lee  Nurse: Grecia Anderson  Nurse: Patricia Gutiérrez  Nurse: Teresa Arce  Ordered: Justin Lee  Ordered: ADM, User  Ordered: Physician, Ordering  Override: Kia Holland  Override: Snehal Carver  PCA/Nursing Assistant: Rea Sow  Primary Team: Dustin Yanez  Primary Team: Yadira Au  Primary Team: Jackie Jiménez  Primary Team: Garima Munguia  Registered Dietitian: Alexus Fenton  Research: Laura Mccall// Supp. Assoc.: Latha Dhaliwal

## 2023-06-22 NOTE — DISCHARGE NOTE PROVIDER - CARE PROVIDER_API CALL
Dustin Yanez [facility]  [address] [place] [address]  Phone: [phone]  Fax: [phone]  Follow Up Time:

## 2023-06-22 NOTE — PROGRESS NOTE ADULT - SUBJECTIVE AND OBJECTIVE BOX
SUBJECTIVE:  Patient seen and examined at bedside.  No overnight events.  Patient reports feeling "so so," slightly better than yesterday but still with periods of distension. Passing flatus. No BM. She denies any fever, chills, chest pain, shortness of breath, nausea/vomiting.    VITALS  Vital Signs Last 24 Hrs  T(C): 36.7 (21 Jun 2023 18:51), Max: 36.9 (21 Jun 2023 17:38)  T(F): 98 (21 Jun 2023 18:51), Max: 98.4 (21 Jun 2023 17:38)  HR: 93 (21 Jun 2023 18:51) (79 - 93)  BP: 131/79 (21 Jun 2023 18:51) (131/79 - 162/88)  RR: 18 (21 Jun 2023 18:51) (18 - 18)  SpO2: 92% (21 Jun 2023 18:51) (92% - 97%)    Parameters below as of 21 Jun 2023 17:38  Patient On (Oxygen Delivery Method): room air    PHYSICAL EXAM  GENERAL:  Well-nourished, well-developed female lying comfortably in bed in [facility].  HEENT:  NC/AT. NGT in place with 400cc enteric contents in cannister.  CARDIO:  Regular rate and rhythm.  RESPIRATORY:  Nonlabored breathing, no accessory muscle use.  ABDOMEN:  Healed midline scar. Abd soft, nondistended, mild tenderness in lower abdomen. No rebound tenderness or guarding.  SKIN:  No jaundice, pallor, or cyanosis  NEURO:  A&O x 3    INTAKE & OUTPUT  I&O's Summary    21 Jun 2023 07:01  -  22 Jun 2023 07:00  --------------------------------------------------------  IN: 0 mL / OUT: 75 mL / NET: -75 mL    I&O's Detail    21 Jun 2023 07:01  -  22 Jun 2023 07:00  --------------------------------------------------------  IN:  Total IN: 0 mL    OUT:    Voided (mL): 75 mL  Total OUT: 75 mL    Total NET: -75 mL    MEDICATIONS  MEDICATIONS  (STANDING):  acetaminophen   IVPB .. 1000 milliGRAM(s) IV Intermittent once  acetaminophen   IVPB .. 1000 milliGRAM(s) IV Intermittent once  atorvastatin 20 milliGRAM(s) Oral at bedtime  lactated ringers. 1000 milliLiter(s) (100 mL/Hr) IV Continuous <Continuous>  levothyroxine 88 MICROGram(s) Oral daily  melatonin 3 milliGRAM(s) Oral at bedtime  pantoprazole  Injectable 40 milliGRAM(s) IV Push daily    MEDICATIONS  (PRN):  benzocaine 20% Spray 1 Spray(s) Topical three times a day PRN throat irritation  diphenhydrAMINE Injectable 25 milliGRAM(s) IV Push at bedtime PRN Insomnia  HYDROmorphone  Injectable 0.5 milliGRAM(s) IV Push every 4 hours PRN Severe Pain (7 - 10)  ketorolac   Injectable 15 milliGRAM(s) IV Push every 6 hours PRN Moderate Pain (4 - 6)    LABS:                        12.9   6.38  )-----------( 272      ( 21 Jun 2023 06:10 )             40.1     06-21    142  |  106  |  18  ----------------------------<  135<H>  4.2   |  31  |  0.69    Ca    9.1      21 Jun 2023 06:10    TPro  7.8  /  Alb  3.8  /  TBili  0.6  /  DBili  x   /  AST  48<H>  /  ALT  66  /  AlkPhos  108  06-21    ASSESSMENT & PLAN   67 year old female with PMH ypothyroidism, HLD, hx multiple SBOs, s/p SBR in July 2022, with subsequent SBO that resulted in revision of SB anastomosis in September 2022, now here with an SBO.    - Continue NPO, NGT, IV fluids  - Monitor Is and Os  - Serial abdominal exams  - Encourage ambulation; SCDs while in bed  - Pain control, antiemetics, hurricaine spray PRN  - Follow up AM labs; monitor and replete electrolytes PRN  - Case to be discussed with Dr. Yanez

## 2023-06-22 NOTE — DISCHARGE NOTE PROVIDER - NSTOBACCOUSAGEY/N_GEN_A_CS
[FreeTextEntry1] : Previously seen at [facility].\par \par 75 yo M with a history of CAD s/p 1v CABG, (4/1982), MI in 8/1993 (no intervention), HTN presents for risk stratification prior to left TKR with Dr. Womack.  Occasional "heartburn" sometimes after meals.  Denies dyspnea, palpitations, dizziness, presyncope or syncope.  Limited functional capacity due to arthritis.\par \par Lexiscan MPI showed mid-distal inferior infarction (scar) consistent with known prior MI and mild LV dysfunction LVEF 45-50%.  NO NEW evidence of stress-induced ischemia.  Mild valvular heart disease.\par \par \par EKG (5/4/2022):  NSR, IWMI, no ST-T changes\par 
No

## 2023-06-22 NOTE — DISCHARGE NOTE PROVIDER - NSDCMRMEDTOKEN_GEN_ALL_CORE_FT
Fish Oil 1000 mg oral capsule: 1 cap(s) orally 2 times a day  fluorometholone 0.1% ophthalmic suspension: 1 drop(s) to each affected eye once a day, As Needed  levothyroxine 88 mcg (0.088 mg) oral tablet: 1 tab(s) orally once a day  pantoprazole 40 mg oral delayed release tablet: 1 tab(s) orally once a day  Restasis 0.05% ophthalmic emulsion: 1 drop(s) to each affected eye every 12 hours  rosuvastatin 5 mg oral tablet: 1 tab(s) orally once a day

## 2023-06-23 LAB
ANION GAP SERPL CALC-SCNC: 5 MMOL/L — SIGNIFICANT CHANGE UP (ref 5–17)
BUN SERPL-MCNC: 9 MG/DL — SIGNIFICANT CHANGE UP (ref 7–23)
CALCIUM SERPL-MCNC: 9 MG/DL — SIGNIFICANT CHANGE UP (ref 8.5–10.1)
CHLORIDE SERPL-SCNC: 105 MMOL/L — SIGNIFICANT CHANGE UP (ref 96–108)
CO2 SERPL-SCNC: 30 MMOL/L — SIGNIFICANT CHANGE UP (ref 22–31)
CREAT SERPL-MCNC: 0.6 MG/DL — SIGNIFICANT CHANGE UP (ref 0.5–1.3)
EGFR: 98 ML/MIN/1.73M2 — SIGNIFICANT CHANGE UP
GLUCOSE SERPL-MCNC: 100 MG/DL — HIGH (ref 70–99)
HCT VFR BLD CALC: 36.4 % — SIGNIFICANT CHANGE UP (ref 34.5–45)
HGB BLD-MCNC: 11.8 G/DL — SIGNIFICANT CHANGE UP (ref 11.5–15.5)
MAGNESIUM SERPL-MCNC: 2.2 MG/DL — SIGNIFICANT CHANGE UP (ref 1.6–2.6)
MCHC RBC-ENTMCNC: 31.1 PG — SIGNIFICANT CHANGE UP (ref 27–34)
MCHC RBC-ENTMCNC: 32.4 GM/DL — SIGNIFICANT CHANGE UP (ref 32–36)
MCV RBC AUTO: 95.8 FL — SIGNIFICANT CHANGE UP (ref 80–100)
NRBC # BLD: 0 /100 WBCS — SIGNIFICANT CHANGE UP (ref 0–0)
PHOSPHATE SERPL-MCNC: 3.2 MG/DL — SIGNIFICANT CHANGE UP (ref 2.5–4.5)
PLATELET # BLD AUTO: 224 K/UL — SIGNIFICANT CHANGE UP (ref 150–400)
POTASSIUM SERPL-MCNC: 3.9 MMOL/L — SIGNIFICANT CHANGE UP (ref 3.5–5.3)
POTASSIUM SERPL-SCNC: 3.9 MMOL/L — SIGNIFICANT CHANGE UP (ref 3.5–5.3)
RBC # BLD: 3.8 M/UL — SIGNIFICANT CHANGE UP (ref 3.8–5.2)
RBC # FLD: 12.4 % — SIGNIFICANT CHANGE UP (ref 10.3–14.5)
SODIUM SERPL-SCNC: 140 MMOL/L — SIGNIFICANT CHANGE UP (ref 135–145)
WBC # BLD: 4.75 K/UL — SIGNIFICANT CHANGE UP (ref 3.8–10.5)
WBC # FLD AUTO: 4.75 K/UL — SIGNIFICANT CHANGE UP (ref 3.8–10.5)

## 2023-06-23 PROCEDURE — 74018 RADEX ABDOMEN 1 VIEW: CPT | Mod: 26

## 2023-06-23 RX ADMIN — Medication 88 MICROGRAM(S): at 06:17

## 2023-06-23 RX ADMIN — HYDROMORPHONE HYDROCHLORIDE 0.5 MILLIGRAM(S): 2 INJECTION INTRAMUSCULAR; INTRAVENOUS; SUBCUTANEOUS at 23:00

## 2023-06-23 RX ADMIN — ATORVASTATIN CALCIUM 20 MILLIGRAM(S): 80 TABLET, FILM COATED ORAL at 21:38

## 2023-06-23 RX ADMIN — HYDROMORPHONE HYDROCHLORIDE 0.5 MILLIGRAM(S): 2 INJECTION INTRAMUSCULAR; INTRAVENOUS; SUBCUTANEOUS at 22:30

## 2023-06-23 RX ADMIN — HYDROMORPHONE HYDROCHLORIDE 0.5 MILLIGRAM(S): 2 INJECTION INTRAMUSCULAR; INTRAVENOUS; SUBCUTANEOUS at 13:00

## 2023-06-23 RX ADMIN — HYDROMORPHONE HYDROCHLORIDE 0.5 MILLIGRAM(S): 2 INJECTION INTRAMUSCULAR; INTRAVENOUS; SUBCUTANEOUS at 05:10

## 2023-06-23 RX ADMIN — HYDROMORPHONE HYDROCHLORIDE 0.5 MILLIGRAM(S): 2 INJECTION INTRAMUSCULAR; INTRAVENOUS; SUBCUTANEOUS at 18:46

## 2023-06-23 RX ADMIN — Medication 25 MILLIGRAM(S): at 23:24

## 2023-06-23 RX ADMIN — HYDROMORPHONE HYDROCHLORIDE 0.5 MILLIGRAM(S): 2 INJECTION INTRAMUSCULAR; INTRAVENOUS; SUBCUTANEOUS at 04:53

## 2023-06-23 RX ADMIN — PANTOPRAZOLE SODIUM 40 MILLIGRAM(S): 20 TABLET, DELAYED RELEASE ORAL at 12:45

## 2023-06-23 RX ADMIN — HYDROMORPHONE HYDROCHLORIDE 0.5 MILLIGRAM(S): 2 INJECTION INTRAMUSCULAR; INTRAVENOUS; SUBCUTANEOUS at 12:45

## 2023-06-23 RX ADMIN — Medication 3 MILLIGRAM(S): at 21:38

## 2023-06-23 RX ADMIN — HYDROMORPHONE HYDROCHLORIDE 0.5 MILLIGRAM(S): 2 INJECTION INTRAMUSCULAR; INTRAVENOUS; SUBCUTANEOUS at 18:31

## 2023-06-23 NOTE — PROGRESS NOTE ADULT - SUBJECTIVE AND OBJECTIVE BOX
SURGERY PA NOTE ON BEHALF OF DR. CAMARILLO:    S: Patient seen and examined at bedside.   No acute events overnight.  Patient reports improved abdominal pain this am with less distention.  Patient passing flatus, no BMs.  NGT in place.  Denies fevers, chills, chest pain, SOB, palpitations, calf pain.      MEDICATIONS:  atorvastatin 20 milliGRAM(s) Oral at bedtime  benzocaine 20% Spray 1 Spray(s) Topical three times a day PRN  diphenhydrAMINE Injectable 25 milliGRAM(s) IV Push at bedtime PRN  HYDROmorphone  Injectable 0.5 milliGRAM(s) IV Push every 4 hours PRN  ketorolac   Injectable 15 milliGRAM(s) IV Push every 6 hours PRN  lactated ringers. 1000 milliLiter(s) IV Continuous <Continuous>  levothyroxine 88 MICROGram(s) Oral daily  melatonin 3 milliGRAM(s) Oral at bedtime  pantoprazole  Injectable 40 milliGRAM(s) IV Push daily      O:  Vital Signs Last 24 Hrs  T(C): 36.7 (23 Jun 2023 05:01), Max: 36.9 (22 Jun 2023 19:42)  T(F): 98.1 (23 Jun 2023 05:01), Max: 98.5 (22 Jun 2023 19:42)  HR: 74 (23 Jun 2023 05:01) (71 - 80)  BP: 125/67 (23 Jun 2023 05:01) (125/67 - 128/70)  BP(mean): --  RR: 18 (23 Jun 2023 05:01) (18 - 18)  SpO2: 95% (23 Jun 2023 05:01) (91% - 97%)    Parameters below as of 23 Jun 2023 05:01  Patient On (Oxygen Delivery Method): room air        I&O SUMMARY:    06-22-23 @ 07:01  -  06-23-23 @ 07:00  --------------------------------------------------------  IN: 1100 mL / OUT: 2550 mL / NET: -1450 mL        PHYSICAL EXAM:  Lungs: CTA bilat without W/R/R  Card: S1S2  Abd: Soft, Tenderness to palpation on B/L lower quadrants. ND.  +BS x 4.  No rebound/guarding.  NGT in place.    Ext: Calves soft, NT, without edema bilat    LABS:                        11.8   4.75  )-----------( 224      ( 23 Jun 2023 06:18 )             36.4     06-23    140  |  105  |  9   ----------------------------<  100<H>  3.9   |  30  |  0.60    Ca    9.0      23 Jun 2023 06:18  Phos  3.2     06-23  Mg     2.2     06-23    TPro  5.8<L>  /  Alb  3.0<L>  /  TBili  1.3<H>  /  DBili  x   /  AST  25  /  ALT  41  /  AlkPhos  91  06-22

## 2023-06-23 NOTE — PROGRESS NOTE ADULT - ASSESSMENT
67 year old female with hx multiple SBOs, s/p SBR in July 2022, with subsequent SBO that resulted in revision of SB anastomosis in September 2022, a/w SBO.  NGT in place with 200cc output in 24hours.  VSS.  Labs unremarkable.  Abdominal exam reassuring.

## 2023-06-23 NOTE — SBIRT NOTE ADULT - NSSBIRTUNABLESCR_GEN_A_CORE
Pt was on the phone - denied substance / ETOH use. Denied further exploration. SW will remain available for any needs./Patient refused

## 2023-06-24 LAB
ALBUMIN SERPL ELPH-MCNC: 3.6 G/DL — SIGNIFICANT CHANGE UP (ref 3.3–5)
ALP SERPL-CCNC: 122 U/L — HIGH (ref 40–120)
ALT FLD-CCNC: 53 U/L — SIGNIFICANT CHANGE UP (ref 12–78)
ANION GAP SERPL CALC-SCNC: 5 MMOL/L — SIGNIFICANT CHANGE UP (ref 5–17)
AST SERPL-CCNC: 58 U/L — HIGH (ref 15–37)
BILIRUB SERPL-MCNC: 0.6 MG/DL — SIGNIFICANT CHANGE UP (ref 0.2–1.2)
BUN SERPL-MCNC: 7 MG/DL — SIGNIFICANT CHANGE UP (ref 7–23)
CALCIUM SERPL-MCNC: 9.1 MG/DL — SIGNIFICANT CHANGE UP (ref 8.5–10.1)
CHLORIDE SERPL-SCNC: 104 MMOL/L — SIGNIFICANT CHANGE UP (ref 96–108)
CO2 SERPL-SCNC: 32 MMOL/L — HIGH (ref 22–31)
CREAT SERPL-MCNC: 0.69 MG/DL — SIGNIFICANT CHANGE UP (ref 0.5–1.3)
EGFR: 95 ML/MIN/1.73M2 — SIGNIFICANT CHANGE UP
GLUCOSE SERPL-MCNC: 95 MG/DL — SIGNIFICANT CHANGE UP (ref 70–99)
HCT VFR BLD CALC: 41.1 % — SIGNIFICANT CHANGE UP (ref 34.5–45)
HGB BLD-MCNC: 13 G/DL — SIGNIFICANT CHANGE UP (ref 11.5–15.5)
MCHC RBC-ENTMCNC: 30.7 PG — SIGNIFICANT CHANGE UP (ref 27–34)
MCHC RBC-ENTMCNC: 31.6 GM/DL — LOW (ref 32–36)
MCV RBC AUTO: 97.2 FL — SIGNIFICANT CHANGE UP (ref 80–100)
NRBC # BLD: 0 /100 WBCS — SIGNIFICANT CHANGE UP (ref 0–0)
PLATELET # BLD AUTO: 292 K/UL — SIGNIFICANT CHANGE UP (ref 150–400)
POTASSIUM SERPL-MCNC: 3 MMOL/L — LOW (ref 3.5–5.3)
POTASSIUM SERPL-SCNC: 3 MMOL/L — LOW (ref 3.5–5.3)
PROT SERPL-MCNC: 7.4 G/DL — SIGNIFICANT CHANGE UP (ref 6–8.3)
RBC # BLD: 4.23 M/UL — SIGNIFICANT CHANGE UP (ref 3.8–5.2)
RBC # FLD: 12.6 % — SIGNIFICANT CHANGE UP (ref 10.3–14.5)
SODIUM SERPL-SCNC: 141 MMOL/L — SIGNIFICANT CHANGE UP (ref 135–145)
WBC # BLD: 7.04 K/UL — SIGNIFICANT CHANGE UP (ref 3.8–10.5)
WBC # FLD AUTO: 7.04 K/UL — SIGNIFICANT CHANGE UP (ref 3.8–10.5)

## 2023-06-24 PROCEDURE — 99233 SBSQ HOSP IP/OBS HIGH 50: CPT

## 2023-06-24 RX ORDER — POTASSIUM CHLORIDE 20 MEQ
40 PACKET (EA) ORAL EVERY 4 HOURS
Refills: 0 | Status: COMPLETED | OUTPATIENT
Start: 2023-06-24 | End: 2023-06-24

## 2023-06-24 RX ORDER — SODIUM CHLORIDE 9 MG/ML
1000 INJECTION INTRAMUSCULAR; INTRAVENOUS; SUBCUTANEOUS
Refills: 0 | Status: DISCONTINUED | OUTPATIENT
Start: 2023-06-24 | End: 2023-06-25

## 2023-06-24 RX ORDER — DIPHENHYDRAMINE HCL 50 MG
25 CAPSULE ORAL ONCE
Refills: 0 | Status: COMPLETED | OUTPATIENT
Start: 2023-06-24 | End: 2023-06-24

## 2023-06-24 RX ADMIN — Medication 88 MICROGRAM(S): at 05:29

## 2023-06-24 RX ADMIN — SODIUM CHLORIDE 65 MILLILITER(S): 9 INJECTION INTRAMUSCULAR; INTRAVENOUS; SUBCUTANEOUS at 23:59

## 2023-06-24 RX ADMIN — Medication 3 MILLIGRAM(S): at 22:15

## 2023-06-24 RX ADMIN — HYDROMORPHONE HYDROCHLORIDE 0.5 MILLIGRAM(S): 2 INJECTION INTRAMUSCULAR; INTRAVENOUS; SUBCUTANEOUS at 20:11

## 2023-06-24 RX ADMIN — HYDROMORPHONE HYDROCHLORIDE 0.5 MILLIGRAM(S): 2 INJECTION INTRAMUSCULAR; INTRAVENOUS; SUBCUTANEOUS at 08:59

## 2023-06-24 RX ADMIN — HYDROMORPHONE HYDROCHLORIDE 0.5 MILLIGRAM(S): 2 INJECTION INTRAMUSCULAR; INTRAVENOUS; SUBCUTANEOUS at 08:44

## 2023-06-24 RX ADMIN — Medication 40 MILLIEQUIVALENT(S): at 18:40

## 2023-06-24 RX ADMIN — HYDROMORPHONE HYDROCHLORIDE 0.5 MILLIGRAM(S): 2 INJECTION INTRAMUSCULAR; INTRAVENOUS; SUBCUTANEOUS at 14:04

## 2023-06-24 RX ADMIN — Medication 25 MILLIGRAM(S): at 22:15

## 2023-06-24 RX ADMIN — HYDROMORPHONE HYDROCHLORIDE 0.5 MILLIGRAM(S): 2 INJECTION INTRAMUSCULAR; INTRAVENOUS; SUBCUTANEOUS at 20:16

## 2023-06-24 RX ADMIN — ATORVASTATIN CALCIUM 20 MILLIGRAM(S): 80 TABLET, FILM COATED ORAL at 22:15

## 2023-06-24 RX ADMIN — Medication 25 MILLIGRAM(S): at 23:54

## 2023-06-24 RX ADMIN — PANTOPRAZOLE SODIUM 40 MILLIGRAM(S): 20 TABLET, DELAYED RELEASE ORAL at 12:24

## 2023-06-24 RX ADMIN — HYDROMORPHONE HYDROCHLORIDE 0.5 MILLIGRAM(S): 2 INJECTION INTRAMUSCULAR; INTRAVENOUS; SUBCUTANEOUS at 13:49

## 2023-06-24 RX ADMIN — Medication 40 MILLIEQUIVALENT(S): at 14:52

## 2023-06-24 NOTE — PROGRESS NOTE ADULT - SUBJECTIVE AND OBJECTIVE BOX
[Alive] : alive [FreeTextEntry1] : throat cancer [FreeTextEntry7] : ovarian cancer Hospital day: 3    67y Female admitted with Intestinal obstruction        Patient seen and examined bedside resting comfortably.  States continues to have some soreness localized to abdomen, right side.  Tolerating clears.  Ambulating.  Spontaneously voiding.  Passing flatus, states had a small bowel movement yesterday.  No overnight events.      T(F): 98.2 (06-24-23 @ 05:29), Max: 98.2 (06-23-23 @ 11:34)  HR: 73 (06-24-23 @ 05:29) (73 - 79)  BP: 132/70 (06-24-23 @ 05:29) (115/74 - 132/70)  RR: 18 (06-24-23 @ 05:29) (18 - 18)  SpO2: 97% (06-24-23 @ 05:29) (93% - 97%)  Wt(kg): --  CAPILLARY BLOOD GLUCOSE          PHYSICAL EXAM:  General: NAD  Neuro:  Alert & oriented  Abdomen:Incisions healing well.  BS+ Soft. ND.  Mild soreness to deep palpation.   Extremities: no pedal edema or calf tenderness noted       LABS:                        11.8   4.75  )-----------( 224      ( 23 Jun 2023 06:18 )             36.4     06-23    140  |  105  |  9   ----------------------------<  100<H>  3.9   |  30  |  0.60    Ca    9.0      23 Jun 2023 06:18  Phos  3.2     06-23  Mg     2.2     06-23        I&O's Detail        RADIOLOGY:

## 2023-06-24 NOTE — PROGRESS NOTE ADULT - ASSESSMENT
68 yo female here for SBO, now resolving.  PMHX of hypothyroidism, HLD, recurrent SBO s/p revision in January 2023.  Pt is tolerating clears.

## 2023-06-24 NOTE — PROGRESS NOTE ADULT - PROBLEM SELECTOR PLAN 1
Will advance diet this am to low fiber.   Continue ambulation, OOB  Pt would like to go home, if tolerating diet, good possibility for discharge.   Awaiting Am labs.   Will discuss with Dr. Pina (covering for Dr. Yanez)

## 2023-06-25 ENCOUNTER — TRANSCRIPTION ENCOUNTER (OUTPATIENT)
Age: 68
End: 2023-06-25

## 2023-06-25 VITALS
DIASTOLIC BLOOD PRESSURE: 69 MMHG | HEART RATE: 68 BPM | TEMPERATURE: 98 F | SYSTOLIC BLOOD PRESSURE: 108 MMHG | OXYGEN SATURATION: 94 % | RESPIRATION RATE: 17 BRPM

## 2023-06-25 LAB
ALBUMIN SERPL ELPH-MCNC: 3 G/DL — LOW (ref 3.3–5)
ALP SERPL-CCNC: 122 U/L — HIGH (ref 40–120)
ALT FLD-CCNC: 72 U/L — SIGNIFICANT CHANGE UP (ref 12–78)
ANION GAP SERPL CALC-SCNC: 4 MMOL/L — LOW (ref 5–17)
AST SERPL-CCNC: 58 U/L — HIGH (ref 15–37)
BILIRUB SERPL-MCNC: 0.5 MG/DL — SIGNIFICANT CHANGE UP (ref 0.2–1.2)
BUN SERPL-MCNC: 8 MG/DL — SIGNIFICANT CHANGE UP (ref 7–23)
CALCIUM SERPL-MCNC: 8.9 MG/DL — SIGNIFICANT CHANGE UP (ref 8.5–10.1)
CHLORIDE SERPL-SCNC: 109 MMOL/L — HIGH (ref 96–108)
CO2 SERPL-SCNC: 30 MMOL/L — SIGNIFICANT CHANGE UP (ref 22–31)
CREAT SERPL-MCNC: 0.63 MG/DL — SIGNIFICANT CHANGE UP (ref 0.5–1.3)
EGFR: 97 ML/MIN/1.73M2 — SIGNIFICANT CHANGE UP
GLUCOSE SERPL-MCNC: 119 MG/DL — HIGH (ref 70–99)
HCT VFR BLD CALC: 35.6 % — SIGNIFICANT CHANGE UP (ref 34.5–45)
HGB BLD-MCNC: 11.5 G/DL — SIGNIFICANT CHANGE UP (ref 11.5–15.5)
MAGNESIUM SERPL-MCNC: 2 MG/DL — SIGNIFICANT CHANGE UP (ref 1.6–2.6)
MCHC RBC-ENTMCNC: 31.3 PG — SIGNIFICANT CHANGE UP (ref 27–34)
MCHC RBC-ENTMCNC: 32.3 GM/DL — SIGNIFICANT CHANGE UP (ref 32–36)
MCV RBC AUTO: 96.7 FL — SIGNIFICANT CHANGE UP (ref 80–100)
NRBC # BLD: 0 /100 WBCS — SIGNIFICANT CHANGE UP (ref 0–0)
PHOSPHATE SERPL-MCNC: 3.6 MG/DL — SIGNIFICANT CHANGE UP (ref 2.5–4.5)
PLATELET # BLD AUTO: 237 K/UL — SIGNIFICANT CHANGE UP (ref 150–400)
POTASSIUM SERPL-MCNC: 3.9 MMOL/L — SIGNIFICANT CHANGE UP (ref 3.5–5.3)
POTASSIUM SERPL-SCNC: 3.9 MMOL/L — SIGNIFICANT CHANGE UP (ref 3.5–5.3)
PROT SERPL-MCNC: 6.3 G/DL — SIGNIFICANT CHANGE UP (ref 6–8.3)
RBC # BLD: 3.68 M/UL — LOW (ref 3.8–5.2)
RBC # FLD: 12.3 % — SIGNIFICANT CHANGE UP (ref 10.3–14.5)
SODIUM SERPL-SCNC: 143 MMOL/L — SIGNIFICANT CHANGE UP (ref 135–145)
WBC # BLD: 5.46 K/UL — SIGNIFICANT CHANGE UP (ref 3.8–10.5)
WBC # FLD AUTO: 5.46 K/UL — SIGNIFICANT CHANGE UP (ref 3.8–10.5)

## 2023-06-25 PROCEDURE — 74177 CT ABD & PELVIS W/CONTRAST: CPT | Mod: MA

## 2023-06-25 PROCEDURE — 83605 ASSAY OF LACTIC ACID: CPT

## 2023-06-25 PROCEDURE — 85027 COMPLETE CBC AUTOMATED: CPT

## 2023-06-25 PROCEDURE — 74018 RADEX ABDOMEN 1 VIEW: CPT

## 2023-06-25 PROCEDURE — 83690 ASSAY OF LIPASE: CPT

## 2023-06-25 PROCEDURE — 82150 ASSAY OF AMYLASE: CPT

## 2023-06-25 PROCEDURE — 76705 ECHO EXAM OF ABDOMEN: CPT

## 2023-06-25 PROCEDURE — 71045 X-RAY EXAM CHEST 1 VIEW: CPT

## 2023-06-25 PROCEDURE — 96376 TX/PRO/DX INJ SAME DRUG ADON: CPT

## 2023-06-25 PROCEDURE — 80048 BASIC METABOLIC PNL TOTAL CA: CPT

## 2023-06-25 PROCEDURE — 93005 ELECTROCARDIOGRAM TRACING: CPT

## 2023-06-25 PROCEDURE — 96374 THER/PROPH/DIAG INJ IV PUSH: CPT

## 2023-06-25 PROCEDURE — 76705 ECHO EXAM OF ABDOMEN: CPT | Mod: 26

## 2023-06-25 PROCEDURE — 99285 EMERGENCY DEPT VISIT HI MDM: CPT | Mod: 25

## 2023-06-25 PROCEDURE — 83735 ASSAY OF MAGNESIUM: CPT

## 2023-06-25 PROCEDURE — 96375 TX/PRO/DX INJ NEW DRUG ADDON: CPT

## 2023-06-25 PROCEDURE — 84100 ASSAY OF PHOSPHORUS: CPT

## 2023-06-25 PROCEDURE — 80053 COMPREHEN METABOLIC PANEL: CPT

## 2023-06-25 PROCEDURE — 85025 COMPLETE CBC W/AUTO DIFF WBC: CPT

## 2023-06-25 PROCEDURE — 36415 COLL VENOUS BLD VENIPUNCTURE: CPT

## 2023-06-25 PROCEDURE — 84484 ASSAY OF TROPONIN QUANT: CPT

## 2023-06-25 RX ADMIN — HYDROMORPHONE HYDROCHLORIDE 0.5 MILLIGRAM(S): 2 INJECTION INTRAMUSCULAR; INTRAVENOUS; SUBCUTANEOUS at 06:26

## 2023-06-25 RX ADMIN — HYDROMORPHONE HYDROCHLORIDE 0.5 MILLIGRAM(S): 2 INJECTION INTRAMUSCULAR; INTRAVENOUS; SUBCUTANEOUS at 10:50

## 2023-06-25 RX ADMIN — HYDROMORPHONE HYDROCHLORIDE 0.5 MILLIGRAM(S): 2 INJECTION INTRAMUSCULAR; INTRAVENOUS; SUBCUTANEOUS at 00:57

## 2023-06-25 RX ADMIN — HYDROMORPHONE HYDROCHLORIDE 0.5 MILLIGRAM(S): 2 INJECTION INTRAMUSCULAR; INTRAVENOUS; SUBCUTANEOUS at 06:48

## 2023-06-25 RX ADMIN — HYDROMORPHONE HYDROCHLORIDE 0.5 MILLIGRAM(S): 2 INJECTION INTRAMUSCULAR; INTRAVENOUS; SUBCUTANEOUS at 01:13

## 2023-06-25 RX ADMIN — HYDROMORPHONE HYDROCHLORIDE 0.5 MILLIGRAM(S): 2 INJECTION INTRAMUSCULAR; INTRAVENOUS; SUBCUTANEOUS at 10:32

## 2023-06-25 RX ADMIN — Medication 88 MICROGRAM(S): at 06:26

## 2023-06-25 NOTE — DISCHARGE NOTE NURSING/CASE MANAGEMENT/SOCIAL WORK - PATIENT PORTAL LINK FT
You can access the FollowMyHealth Patient Portal offered by [facility] by registering at the following website: [email or web address]. By joining [employer]’s FollowMyHealth portal, you will also be able to view your health information using other applications (apps) compatible with our system.

## 2023-06-25 NOTE — PROGRESS NOTE ADULT - PROBLEM SELECTOR PLAN 1
Awaiting AM read of US  LFT appear to be stable- mildly elevated  Pending read of US, may need add on to OR for Lap janeth-will determine post read  If US negative, will feed and prepare for discharge  Will discuss with Dr. Pina (Covering for Dr. Yanez). US read  LFT appear to be stable- mildly elevated  Further plan per Dr. Pina.  Will discuss with Dr. Pina (Covering for Dr. Yanez).

## 2023-06-25 NOTE — PROGRESS NOTE ADULT - NS ATTEND AMEND GEN_ALL_CORE FT
I have personally seen and examined the patient.  I fully participated in the care of this patient.  I have made amendments to the documentation where necessary, and agree with the history, physical exam, impression/assessment, and plan as documented by the PA    Patient c/o RUQ pain radiating to the back  Denies nausea  Otherwise tolerating lowfiber diet  Abdomen is soft, ND, mildly tender in RUQ    SBO resolved  ?biliary pathology biliary colic vs acute janeth  -will check labs incl LFTs given RUQ pain  -RUQ ultrasound ordered  -continue diet for now  -OOB ambulate
I have personally seen and examined the patient.  I fully participated in the care of this patient.  I have made amendments to the documentation where necessary, and agree with the history, physical exam, impression/assessment, and plan as documented by the PA    Patient c/o RUQ pain radiating to the back still present  Otherwise tolerating lowfiber diet  Abdomen is soft, ND, mildly tender in RUQ  RUQ US reviewed - no evidence of acute janeth or cholelithiasis  LFT still elevated but stable    SBO resolved  Transaminitis  -continue diet  -OOB ambulate  -ok to d/c home, f/u with Dr. Yanez  -pt educated to f/u with PCP in 1-2 weeks for repeat labwork to ensure normalization of LFTs. Patient verbalized understanding and all questions answered

## 2023-06-25 NOTE — PROGRESS NOTE ADULT - SUBJECTIVE AND OBJECTIVE BOX
Hospital day: 4    67y Female admitted with Intestinal obstruction      Patient seen and examined bedside resting comfortably. States overnight had worsening RUQ and epigastric discomfort.  Has been NPO overnight in preparation for US this AM, which has since been performed.  Had been tolerating diet prior to.  No associated N/V.  No overnight complaints/issues.        T(F): 98 (06-25-23 @ 04:31), Max: 98.3 (06-24-23 @ 12:36)  HR: 69 (06-25-23 @ 04:31) (69 - 78)  BP: 105/67 (06-25-23 @ 04:31) (105/67 - 132/80)  RR: 18 (06-25-23 @ 04:31) (17 - 18)  SpO2: 95% (06-25-23 @ 04:31) (94% - 95%)  Wt(kg): --  CAPILLARY BLOOD GLUCOSE          PHYSICAL EXAM:  General: NAD  Neuro:  Alert & oriented  Abdomen: soft, ND. BS+ Mild discomfort to RUQ/epigastric region.   Extremities: no pedal edema or calf tenderness noted       LABS:                        11.5   5.46  )-----------( 237      ( 25 Jun 2023 07:40 )             35.6     06-25    143  |  109<H>  |  8   ----------------------------<  119<H>  3.9   |  30  |  0.63    Ca    8.9      25 Jun 2023 07:40  Phos  3.6     06-25  Mg     2.0     06-25    TPro  6.3  /  Alb  3.0<L>  /  TBili  0.5  /  DBili  x   /  AST  58<H>  /  ALT  72  /  AlkPhos  122<H>  06-25      I&O's Detail    24 Jun 2023 07:01  -  25 Jun 2023 07:00  --------------------------------------------------------  IN:    sodium chloride 0.9%: 455 mL  Total IN: 455 mL    OUT:  Total OUT: 0 mL    Total NET: 455 mL            RADIOLOGY:  US pending read Hospital day: 4    67y Female admitted with Intestinal obstruction      Patient seen and examined bedside resting comfortably. States overnight had worsening RUQ and epigastric discomfort.  Has been NPO overnight in preparation for US this AM, which has since been performed.  Had been tolerating diet prior to.  No associated N/V.  No overnight complaints/issues.        T(F): 98 (06-25-23 @ 04:31), Max: 98.3 (06-24-23 @ 12:36)  HR: 69 (06-25-23 @ 04:31) (69 - 78)  BP: 105/67 (06-25-23 @ 04:31) (105/67 - 132/80)  RR: 18 (06-25-23 @ 04:31) (17 - 18)  SpO2: 95% (06-25-23 @ 04:31) (94% - 95%)  Wt(kg): --  CAPILLARY BLOOD GLUCOSE          PHYSICAL EXAM:  General: NAD  Neuro:  Alert & oriented  Abdomen: soft, ND. BS+ Mild discomfort to RUQ/epigastric region.   Extremities: no pedal edema or calf tenderness noted       LABS:                        11.5   5.46  )-----------( 237      ( 25 Jun 2023 07:40 )             35.6     06-25    143  |  109<H>  |  8   ----------------------------<  119<H>  3.9   |  30  |  0.63    Ca    8.9      25 Jun 2023 07:40  Phos  3.6     06-25  Mg     2.0     06-25    TPro  6.3  /  Alb  3.0<L>  /  TBili  0.5  /  DBili  x   /  AST  58<H>  /  ALT  72  /  AlkPhos  122<H>  06-25      I&O's Detail    24 Jun 2023 07:01  -  25 Jun 2023 07:00  --------------------------------------------------------  IN:    sodium chloride 0.9%: 455 mL  Total IN: 455 mL    OUT:  Total OUT: 0 mL    Total NET: 455 mL            RADIOLOGY:  < from: US Abdomen Upper Quadrant Right (06.25.23 @ 08:31) >  ACC: [id number] EXAM:  US ABDOMEN RT UPR QUADRANT   ORDERED BY: SHELLY PATE     PROCEDURE DATE:  06/25/2023          INTERPRETATION:  CLINICAL INFORMATION: Right upper quadrant pain.    COMPARISON: CT abdomen pelvis 6/21/2023    TECHNIQUE: Sonography of the right upper quadrant.    FINDINGS:    Liver: Measures 13 cm in craniocaudal dimension with increased   echogenicity likely reflecting steatosis. Focal areas of fatty sparing   adjacent to the gallbladder. Liver parenchyma is only partially imaged.  Bile ducts: Normal caliber. Common bile duct measures 5 mm.  Gallbladder: No gallbladder wall thickening, stones, sludge, or   sonographic Collazo sign.  Pancreas: Imaged portions are unremarkable.  Right kidney: Measures 10.2 cm. No hydronephrosis.  Ascites: None.  Aorta/ IVC: Imaged portions are unremarkable.    IMPRESSION:    No sonographic evidence of acute cholecystitis. No biliary distention.   Hepatic steatosis. Correlate with LFTs.    --- End of Report ---            DENNIS WOODS M.D., ATTENDING RADIOLOGIST  This document has been electronically signed. Jun 25 2023  9:07AM    < end of copied text >

## 2023-06-25 NOTE — PROGRESS NOTE ADULT - ASSESSMENT
68 yo female with pmhx of Hypothyroidism, HLD, Recurrent SBO, now with right upper quadrant discomfort, and elevation of LFT's.  US performed this AM, pending read.  68 yo female with pmhx of Hypothyroidism, HLD, Recurrent SBO, now with right upper quadrant discomfort, and elevation of LFT's.  US performed this AM, no signs of Acute cholecystitis.

## 2023-06-25 NOTE — DISCHARGE NOTE NURSING/CASE MANAGEMENT/SOCIAL WORK - NSDCPEFALRISK_GEN_ALL_CORE
For information on Fall & Injury Prevention, visit: [email or web address] OR  [email or web address] OR  https://www.cdc.gov/steadi/patient.html

## 2023-06-26 ENCOUNTER — NON-APPOINTMENT (OUTPATIENT)
Age: 68
End: 2023-06-26

## 2023-07-06 NOTE — ED ADULT NURSE REASSESSMENT NOTE - NS ED NURSE REASSESS COMMENT FT1
Pt c/o severe abd pain, states "my abd is ripping apart and I feel nauseous". Received Pt with NG tube on low intermittent suction with serosanguineous drainage. SINDY Bueno made aware. Waiting for PA to evaluate the pt. Odomzo Counseling- I discussed with the patient the risks of Odomzo including but not limited to nausea, vomiting, diarrhea, constipation, weight loss, changes in the sense of taste, decreased appetite, muscle spasms, and hair loss.  The patient verbalized understanding of the proper use and possible adverse effects of Odomzo.  All of the patient's questions and concerns were addressed.

## 2023-07-11 ENCOUNTER — APPOINTMENT (OUTPATIENT)
Dept: INTERNAL MEDICINE | Facility: CLINIC | Age: 68
End: 2023-07-11
Payer: MEDICARE

## 2023-07-11 VITALS
OXYGEN SATURATION: 96 % | TEMPERATURE: 97.4 F | DIASTOLIC BLOOD PRESSURE: 88 MMHG | WEIGHT: 140 LBS | HEIGHT: 62 IN | BODY MASS INDEX: 25.76 KG/M2 | SYSTOLIC BLOOD PRESSURE: 140 MMHG | HEART RATE: 88 BPM

## 2023-07-11 PROCEDURE — 99214 OFFICE O/P EST MOD 30 MIN: CPT | Mod: 25

## 2023-07-11 PROCEDURE — 36415 COLL VENOUS BLD VENIPUNCTURE: CPT

## 2023-07-13 LAB
ALBUMIN SERPL ELPH-MCNC: 4.8 G/DL
ALP BLD-CCNC: 113 U/L
ALT SERPL-CCNC: 40 U/L
ANION GAP SERPL CALC-SCNC: 15 MMOL/L
AST SERPL-CCNC: 39 U/L
BILIRUB SERPL-MCNC: 0.9 MG/DL
BUN SERPL-MCNC: 17 MG/DL
CALCIUM SERPL-MCNC: 10.2 MG/DL
CHLORIDE SERPL-SCNC: 100 MMOL/L
CO2 SERPL-SCNC: 24 MMOL/L
CREAT SERPL-MCNC: 0.7 MG/DL
EGFR: 95 ML/MIN/1.73M2
GLUCOSE SERPL-MCNC: 100 MG/DL
POTASSIUM SERPL-SCNC: 4.6 MMOL/L
PROT SERPL-MCNC: 7.1 G/DL
SODIUM SERPL-SCNC: 138 MMOL/L

## 2023-07-13 NOTE — HISTORY OF PRESENT ILLNESS
[FreeTextEntry1] : Pt is present for med refills & to check her liver. Pt had surgery for small bowel obstruction back in January and then had reoccurring symptoms in June of abdominal pain & vomiting. Pt was readmitted to the hospital where she was found to have chronic elevated liver enzymes. Thus pt has come in today to receive a CMP blood draw to verify her liver enzyme levels.

## 2023-07-13 NOTE — PLAN
[FreeTextEntry1] : Will review CMP results in a week. \par Follow up w surgeon as needed.\par Refill Pantoprazole, Crestor, & Xanax.\par

## 2023-08-18 ENCOUNTER — NON-APPOINTMENT (OUTPATIENT)
Age: 68
End: 2023-08-18

## 2023-08-18 ENCOUNTER — APPOINTMENT (OUTPATIENT)
Dept: INTERNAL MEDICINE | Facility: CLINIC | Age: 68
End: 2023-08-18
Payer: MEDICARE

## 2023-08-18 VITALS
BODY MASS INDEX: 26.13 KG/M2 | WEIGHT: 142 LBS | SYSTOLIC BLOOD PRESSURE: 146 MMHG | OXYGEN SATURATION: 97 % | HEART RATE: 79 BPM | HEIGHT: 62 IN | DIASTOLIC BLOOD PRESSURE: 83 MMHG

## 2023-08-18 PROCEDURE — 99214 OFFICE O/P EST MOD 30 MIN: CPT | Mod: 25

## 2023-08-18 PROCEDURE — 93000 ELECTROCARDIOGRAM COMPLETE: CPT

## 2023-08-18 PROCEDURE — 36415 COLL VENOUS BLD VENIPUNCTURE: CPT

## 2023-08-21 LAB
ALBUMIN SERPL ELPH-MCNC: 4.8 G/DL
ALP BLD-CCNC: 120 U/L
ALT SERPL-CCNC: 40 U/L
ANION GAP SERPL CALC-SCNC: 12 MMOL/L
APTT BLD: 32.1 SEC
AST SERPL-CCNC: 35 U/L
BILIRUB SERPL-MCNC: 0.6 MG/DL
BUN SERPL-MCNC: 17 MG/DL
CALCIUM SERPL-MCNC: 10.4 MG/DL
CHLORIDE SERPL-SCNC: 103 MMOL/L
CO2 SERPL-SCNC: 28 MMOL/L
CREAT SERPL-MCNC: 0.69 MG/DL
EGFR: 95 ML/MIN/1.73M2
GLUCOSE SERPL-MCNC: 103 MG/DL
INR PPP: 0.82 RATIO
POTASSIUM SERPL-SCNC: 4.5 MMOL/L
PROT SERPL-MCNC: 7 G/DL
PT BLD: 9.3 SEC
SODIUM SERPL-SCNC: 142 MMOL/L

## 2023-08-21 NOTE — ASSESSMENT
[Patient Optimized for Surgery] : Patient optimized for surgery [No Further Testing Recommended] : no further testing recommended [FreeTextEntry4] : EKG is normal.

## 2023-08-30 NOTE — ED ADULT NURSE NOTE - SUICIDE SCREENING DEPRESSION
Discontinue losartan, start hydralazine 25mg BID continue amlodipine. Monitor BP at home     Negative

## 2023-10-02 ENCOUNTER — APPOINTMENT (OUTPATIENT)
Dept: INTERNAL MEDICINE | Facility: CLINIC | Age: 68
End: 2023-10-02
Payer: MEDICARE

## 2023-10-02 VITALS
HEART RATE: 90 BPM | OXYGEN SATURATION: 99 % | DIASTOLIC BLOOD PRESSURE: 80 MMHG | BODY MASS INDEX: 26.13 KG/M2 | WEIGHT: 142 LBS | SYSTOLIC BLOOD PRESSURE: 140 MMHG | TEMPERATURE: 98.3 F | HEIGHT: 62 IN

## 2023-10-02 PROCEDURE — 99213 OFFICE O/P EST LOW 20 MIN: CPT

## 2023-10-04 ENCOUNTER — INPATIENT (INPATIENT)
Facility: HOSPITAL | Age: 68
LOS: 18 days | Discharge: HOME | DRG: 389 | End: 2023-10-23
Attending: STUDENT IN AN ORGANIZED HEALTH CARE EDUCATION/TRAINING PROGRAM | Admitting: STUDENT IN AN ORGANIZED HEALTH CARE EDUCATION/TRAINING PROGRAM
Payer: MEDICARE

## 2023-10-04 VITALS
DIASTOLIC BLOOD PRESSURE: 84 MMHG | WEIGHT: 141.98 LBS | TEMPERATURE: 98 F | HEART RATE: 95 BPM | HEIGHT: 62 IN | OXYGEN SATURATION: 93 % | SYSTOLIC BLOOD PRESSURE: 159 MMHG | RESPIRATION RATE: 16 BRPM

## 2023-10-04 DIAGNOSIS — Z98.890 OTHER SPECIFIED POSTPROCEDURAL STATES: Chronic | ICD-10-CM

## 2023-10-04 DIAGNOSIS — K56.609 UNSPECIFIED INTESTINAL OBSTRUCTION, UNSPECIFIED AS TO PARTIAL VERSUS COMPLETE OBSTRUCTION: ICD-10-CM

## 2023-10-04 DIAGNOSIS — Z90.49 ACQUIRED ABSENCE OF OTHER SPECIFIED PARTS OF DIGESTIVE TRACT: Chronic | ICD-10-CM

## 2023-10-04 LAB
ALBUMIN SERPL ELPH-MCNC: 3.9 G/DL — SIGNIFICANT CHANGE UP (ref 3.3–5)
ALP SERPL-CCNC: 107 U/L — SIGNIFICANT CHANGE UP (ref 40–120)
ALT FLD-CCNC: 51 U/L — SIGNIFICANT CHANGE UP (ref 12–78)
ANION GAP SERPL CALC-SCNC: 6 MMOL/L — SIGNIFICANT CHANGE UP (ref 5–17)
APPEARANCE UR: CLEAR — SIGNIFICANT CHANGE UP
APTT BLD: 29.4 SEC — SIGNIFICANT CHANGE UP (ref 24.5–35.6)
AST SERPL-CCNC: 23 U/L — SIGNIFICANT CHANGE UP (ref 15–37)
BASOPHILS # BLD AUTO: 0.04 K/UL — SIGNIFICANT CHANGE UP (ref 0–0.2)
BASOPHILS NFR BLD AUTO: 0.3 % — SIGNIFICANT CHANGE UP (ref 0–2)
BILIRUB SERPL-MCNC: 1.7 MG/DL — HIGH (ref 0.2–1.2)
BILIRUB UR-MCNC: NEGATIVE — SIGNIFICANT CHANGE UP
BUN SERPL-MCNC: 23 MG/DL — SIGNIFICANT CHANGE UP (ref 7–23)
CALCIUM SERPL-MCNC: 10 MG/DL — SIGNIFICANT CHANGE UP (ref 8.5–10.1)
CHLORIDE SERPL-SCNC: 102 MMOL/L — SIGNIFICANT CHANGE UP (ref 96–108)
CO2 SERPL-SCNC: 32 MMOL/L — HIGH (ref 22–31)
COLOR SPEC: YELLOW — SIGNIFICANT CHANGE UP
CREAT SERPL-MCNC: 0.76 MG/DL — SIGNIFICANT CHANGE UP (ref 0.5–1.3)
DIFF PNL FLD: ABNORMAL
EGFR: 85 ML/MIN/1.73M2 — SIGNIFICANT CHANGE UP
EOSINOPHIL # BLD AUTO: 0.02 K/UL — SIGNIFICANT CHANGE UP (ref 0–0.5)
EOSINOPHIL NFR BLD AUTO: 0.1 % — SIGNIFICANT CHANGE UP (ref 0–6)
GLUCOSE SERPL-MCNC: 108 MG/DL — HIGH (ref 70–99)
GLUCOSE UR QL: NEGATIVE MG/DL — SIGNIFICANT CHANGE UP
HCT VFR BLD CALC: 40.6 % — SIGNIFICANT CHANGE UP (ref 34.5–45)
HGB BLD-MCNC: 13.2 G/DL — SIGNIFICANT CHANGE UP (ref 11.5–15.5)
IMM GRANULOCYTES NFR BLD AUTO: 1 % — HIGH (ref 0–0.9)
INR BLD: 0.84 RATIO — LOW (ref 0.85–1.18)
KETONES UR-MCNC: NEGATIVE MG/DL — SIGNIFICANT CHANGE UP
LACTATE SERPL-SCNC: 1 MMOL/L — SIGNIFICANT CHANGE UP (ref 0.7–2)
LEUKOCYTE ESTERASE UR-ACNC: ABNORMAL
LIDOCAIN IGE QN: 46 U/L — SIGNIFICANT CHANGE UP (ref 13–75)
LYMPHOCYTES # BLD AUTO: 19.9 % — SIGNIFICANT CHANGE UP (ref 13–44)
LYMPHOCYTES # BLD AUTO: 2.7 K/UL — SIGNIFICANT CHANGE UP (ref 1–3.3)
MCHC RBC-ENTMCNC: 30.7 PG — SIGNIFICANT CHANGE UP (ref 27–34)
MCHC RBC-ENTMCNC: 32.5 GM/DL — SIGNIFICANT CHANGE UP (ref 32–36)
MCV RBC AUTO: 94.4 FL — SIGNIFICANT CHANGE UP (ref 80–100)
MONOCYTES # BLD AUTO: 1.02 K/UL — HIGH (ref 0–0.9)
MONOCYTES NFR BLD AUTO: 7.5 % — SIGNIFICANT CHANGE UP (ref 2–14)
NEUTROPHILS # BLD AUTO: 9.64 K/UL — HIGH (ref 1.8–7.4)
NEUTROPHILS NFR BLD AUTO: 71.2 % — SIGNIFICANT CHANGE UP (ref 43–77)
NITRITE UR-MCNC: NEGATIVE — SIGNIFICANT CHANGE UP
NRBC # BLD: 0 /100 WBCS — SIGNIFICANT CHANGE UP (ref 0–0)
PH UR: 6.5 — SIGNIFICANT CHANGE UP (ref 5–8)
PLATELET # BLD AUTO: 243 K/UL — SIGNIFICANT CHANGE UP (ref 150–400)
POTASSIUM SERPL-MCNC: 4.7 MMOL/L — SIGNIFICANT CHANGE UP (ref 3.5–5.3)
POTASSIUM SERPL-SCNC: 4.7 MMOL/L — SIGNIFICANT CHANGE UP (ref 3.5–5.3)
PROT SERPL-MCNC: 7.6 G/DL — SIGNIFICANT CHANGE UP (ref 6–8.3)
PROT UR-MCNC: NEGATIVE MG/DL — SIGNIFICANT CHANGE UP
PROTHROM AB SERPL-ACNC: 9.9 SEC — SIGNIFICANT CHANGE UP (ref 9.5–13)
RAPID RVP RESULT: SIGNIFICANT CHANGE UP
RBC # BLD: 4.3 M/UL — SIGNIFICANT CHANGE UP (ref 3.8–5.2)
RBC # FLD: 13.1 % — SIGNIFICANT CHANGE UP (ref 10.3–14.5)
SARS-COV-2 RNA SPEC QL NAA+PROBE: SIGNIFICANT CHANGE UP
SODIUM SERPL-SCNC: 140 MMOL/L — SIGNIFICANT CHANGE UP (ref 135–145)
SP GR SPEC: 1.01 — SIGNIFICANT CHANGE UP (ref 1–1.03)
UROBILINOGEN FLD QL: 0.2 MG/DL — SIGNIFICANT CHANGE UP (ref 0.2–1)
WBC # BLD: 13.56 K/UL — HIGH (ref 3.8–10.5)
WBC # FLD AUTO: 13.56 K/UL — HIGH (ref 3.8–10.5)

## 2023-10-04 PROCEDURE — 74177 CT ABD & PELVIS W/CONTRAST: CPT | Mod: 26,MA

## 2023-10-04 PROCEDURE — 99285 EMERGENCY DEPT VISIT HI MDM: CPT

## 2023-10-04 RX ORDER — ACETAMINOPHEN 500 MG
1000 TABLET ORAL ONCE
Refills: 0 | Status: COMPLETED | OUTPATIENT
Start: 2023-10-05 | End: 2023-10-05

## 2023-10-04 RX ORDER — HYDROMORPHONE HYDROCHLORIDE 2 MG/ML
0.5 INJECTION INTRAMUSCULAR; INTRAVENOUS; SUBCUTANEOUS EVERY 4 HOURS
Refills: 0 | Status: DISCONTINUED | OUTPATIENT
Start: 2023-10-04 | End: 2023-10-06

## 2023-10-04 RX ORDER — HYDROMORPHONE HYDROCHLORIDE 2 MG/ML
1 INJECTION INTRAMUSCULAR; INTRAVENOUS; SUBCUTANEOUS ONCE
Refills: 0 | Status: DISCONTINUED | OUTPATIENT
Start: 2023-10-04 | End: 2023-10-04

## 2023-10-04 RX ORDER — SODIUM CHLORIDE 9 MG/ML
1000 INJECTION INTRAMUSCULAR; INTRAVENOUS; SUBCUTANEOUS
Refills: 0 | Status: DISCONTINUED | OUTPATIENT
Start: 2023-10-04 | End: 2023-10-07

## 2023-10-04 RX ORDER — PANTOPRAZOLE SODIUM 20 MG/1
40 TABLET, DELAYED RELEASE ORAL DAILY
Refills: 0 | Status: DISCONTINUED | OUTPATIENT
Start: 2023-10-04 | End: 2023-10-08

## 2023-10-04 RX ORDER — ONDANSETRON 8 MG/1
4 TABLET, FILM COATED ORAL ONCE
Refills: 0 | Status: COMPLETED | OUTPATIENT
Start: 2023-10-04 | End: 2023-10-04

## 2023-10-04 RX ORDER — SODIUM CHLORIDE 9 MG/ML
1000 INJECTION INTRAMUSCULAR; INTRAVENOUS; SUBCUTANEOUS ONCE
Refills: 0 | Status: COMPLETED | OUTPATIENT
Start: 2023-10-04 | End: 2023-10-04

## 2023-10-04 RX ORDER — DIPHENHYDRAMINE HCL 50 MG
25 CAPSULE ORAL ONCE
Refills: 0 | Status: COMPLETED | OUTPATIENT
Start: 2023-10-04 | End: 2023-10-04

## 2023-10-04 RX ORDER — KETOROLAC TROMETHAMINE 30 MG/ML
15 SYRINGE (ML) INJECTION EVERY 4 HOURS
Refills: 0 | Status: DISCONTINUED | OUTPATIENT
Start: 2023-10-04 | End: 2023-10-04

## 2023-10-04 RX ORDER — ACETAMINOPHEN 500 MG
1000 TABLET ORAL ONCE
Refills: 0 | Status: COMPLETED | OUTPATIENT
Start: 2023-10-04 | End: 2023-10-04

## 2023-10-04 RX ORDER — ONDANSETRON 8 MG/1
4 TABLET, FILM COATED ORAL EVERY 6 HOURS
Refills: 0 | Status: DISCONTINUED | OUTPATIENT
Start: 2023-10-04 | End: 2023-10-12

## 2023-10-04 RX ADMIN — HYDROMORPHONE HYDROCHLORIDE 1 MILLIGRAM(S): 2 INJECTION INTRAMUSCULAR; INTRAVENOUS; SUBCUTANEOUS at 23:45

## 2023-10-04 RX ADMIN — HYDROMORPHONE HYDROCHLORIDE 1 MILLIGRAM(S): 2 INJECTION INTRAMUSCULAR; INTRAVENOUS; SUBCUTANEOUS at 20:43

## 2023-10-04 RX ADMIN — Medication 25 MILLIGRAM(S): at 21:40

## 2023-10-04 RX ADMIN — ONDANSETRON 4 MILLIGRAM(S): 8 TABLET, FILM COATED ORAL at 20:43

## 2023-10-04 RX ADMIN — HYDROMORPHONE HYDROCHLORIDE 1 MILLIGRAM(S): 2 INJECTION INTRAMUSCULAR; INTRAVENOUS; SUBCUTANEOUS at 21:00

## 2023-10-04 RX ADMIN — SODIUM CHLORIDE 1000 MILLILITER(S): 9 INJECTION INTRAMUSCULAR; INTRAVENOUS; SUBCUTANEOUS at 20:43

## 2023-10-04 RX ADMIN — HYDROMORPHONE HYDROCHLORIDE 1 MILLIGRAM(S): 2 INJECTION INTRAMUSCULAR; INTRAVENOUS; SUBCUTANEOUS at 23:17

## 2023-10-04 NOTE — ED PROVIDER NOTE - PROGRESS NOTE DETAILS
Call received from Radiology pt with SBO, surgery PA made aware, admit to [facility], decision to place NGT on surgery

## 2023-10-04 NOTE — H&P ADULT - HISTORY OF PRESENT ILLNESS
69 y/o female with hx of multiple bouts of SBO, first surgery July 2022, [facility], pt presents with diffuse abdominal pain, distension started this AM, progressively getting worse, 3 episodes of vomiting, no fever, no chills, no CP, no SOB, + two small BM today, pt requesting Dilaudid for pain, recent facelift on steroids and Levaquin last dose yesterday,  Currently patient states that she has been vomiting x3 episodes.  Notes incomplete bowel emptying.  Has had diarrhea for past few days.  No recent illness.      69 y/o female with hx of multiple bouts of SBO, first surgery July 2022, [facility], pt presents with diffuse abdominal pain, distension started this AM, progressively getting worse, 3 episodes of vomiting, no fever, no chills, no CP, no SOB, + two small BM today, pt requesting Dilaudid for pain, recent facelift on steroids and Levaquin last dose yesterday,  Currently patient states that she has been vomiting x3 episodes.  Notes incomplete bowel emptying.  Has had diarrhea for past few days.  No recent illness that she is aware of.  Also with complaints of urinary symptoms- pt could not elaborate.

## 2023-10-04 NOTE — ED PROVIDER NOTE - CLINICAL SUMMARY MEDICAL DECISION MAKING FREE TEXT BOX
67 y/o female with hx of SBO, here with distended abdomen, vomiting, small BM today, no fever, no chills, no CP, no SOB, on exam pt with distended abdomen, tenderness,   ddx includes but not limited to: SBO, colitis, infectious  will get CBC, CMP, Lipase, lactate, pt/ptt, t&s, surgery consult, fluids, pain management pt requesting zofran  admit

## 2023-10-04 NOTE — H&P ADULT - ASSESSMENT
67 y/o female with hx of multiple bouts of SBO, hypothyroidism.  Recently had surgery at [facility] in January 2023, which allowed for some resolve for a few months.  Currently with complaints of abdominal discomfort, distention, no solid BM in a few days, and vomiting x3 episodes.

## 2023-10-04 NOTE — ED PROVIDER NOTE - OBJECTIVE STATEMENT
69 y/o female with hx of multiple bouts of SBO, first surgery July 2022, [facility], pt presents with diffuse abdominal pain, distension started this AM, progressively getting worse, 3 episodes of vomiting, no fever, no chills, no CP, no SOB, + two small BM today, pt requesting Dilaudid for pain, recent facelift on steroids and Levaquin last dose yesterday,

## 2023-10-04 NOTE — H&P ADULT - NSHPLABSRESULTS_GEN_ALL_CORE
< from: CT Abdomen and Pelvis w/ IV Cont (10.04.23 @ 21:59) >    ACC: [id number] EXAM:  CT ABDOMEN AND PELVIS IC   ORDERED BY: MICEHLINE CARRILLO     PROCEDURE DATE:  10/04/2023          INTERPRETATION:  CLINICAL INFORMATION: Distended abdomen and vomiting.    COMPARISON: 6/21/2023. Multiple additional exams dating back to 7/10/2022.    CONTRAST/COMPLICATIONS:  IV Contrast: Omnipaque 350  90 cc administered   10 cc discarded  Oral Contrast: NONE  Complications: None reported at time of study completion    PROCEDURE:  CT of the Abdomen and Pelvis was performed.  Sagittal and coronal reformats were performed.    FINDINGS:  LOWER CHEST: Minimal dependent changes are seen posteriorly..    LIVER: Steatosis.  BILE DUCTS: Mild prominence of the common bile duct which measures 6 mm,   similar compared to previous exam..  GALLBLADDER: Within normal limits.  SPLEEN: Within normal limits.  PANCREAS: The main pancreatic duct is also mildly prominent measuring up   to 5 mm the level of the pancreatic head. This is also similar compared   to previous exams.  ADRENALS: Within normal limits.  KIDNEYS/URETERS: Subcentimeter hypodensity left upper pole kidney too   small to characterize. The kidneys otherwise enhance symmetrically.  There is no urinary tract obstruction.    BLADDER: Minimally distended, though appears thick-walled.  REPRODUCTIVE ORGANS: Calcified fibroids    BOWEL: No bowel obstruction. Appendix is normal. The cecum is seen deep   within the posterior pelvis. This is possibly tethered. There is a low   rectal anastomosis. A large amount of stool seenthroughout the colon.   The stomach is diffusely thick-walled, despite its underdistended nature.   Dilated fluid-filled thick-walled segment of small bowel is seen within   the pelvis measuring 3.6 cm in diameter. This appears associated with a   small bowel anastomosis. Transition is seen distal to the anastomotic   sutures, with distal small bowel loops being relatively under distended.   There is associated mesenteric edema.  PERITONEUM: No large volume ascites or free air..  VESSELS: Atherosclerotic changes.  RETROPERITONEUM/LYMPH NODES: No lymphadenopathy.  ABDOMINAL WALL: Postsurgical changes. The small fat-containing ventral   hernia.  BONES: Degenerative changes.    IMPRESSION:  Findings compatible with small bowel obstruction, with transition seen   just distal to a small bowel anastomosis. There is associated wall   thickening and mesenteric edema, which may suggest vascular compromise.    Steatosis.    Mild prominence of the biliary and pancreatic ducts, similar compared to   previous exam and has remained stable dating back to 7/10/2022.    Gastritis.    Thick-walled appearance of the urinary bladder which may represent   cystitis.    Additional findings as above.    Findings were discussed with Dr. MICHELINE CARRILLO [phone] 10/4/2023 10:53   PM by Dr. AYERS with read back confirmation.    --- End of Report ---             HANG AYERS M.D., Attending Radiologist  This document has been electronically signed. Oct  4 2023 10:55PM    < end of copied text >

## 2023-10-04 NOTE — ED ADULT TRIAGE NOTE - CHIEF COMPLAINT QUOTE
Pt states that she is having a bowel obstruction. Pt is c/o abd pain, N and V. Pt states she has a hx of bowel obstructions. Pt actively vomiting in triage. Pt states las JOSY was today.

## 2023-10-04 NOTE — ED ADULT NURSE NOTE - NSFALLUNIVINTERV_ED_ALL_ED
Bed/Stretcher in lowest position, wheels locked, appropriate side rails in place/Call bell, personal items and telephone in reach/Instruct patient to call for assistance before getting out of bed/chair/stretcher/Non-slip footwear applied when patient is off stretcher/[place] to call system/Physically safe environment - no spills, clutter or unnecessary equipment/Purposeful proactive rounding/Room/bathroom lighting operational, light cord in reach

## 2023-10-04 NOTE — H&P ADULT - ENMT
negative Niacinamide Counseling: I recommended taking niacin or niacinamide, also know as vitamin B3, twice daily. Recent evidence suggests that taking vitamin B3 (500 mg twice daily) can reduce the risk of actinic keratoses and non-melanoma skin cancers. Side effects of vitamin B3 include flushing and headache.

## 2023-10-04 NOTE — H&P ADULT - PROBLEM SELECTOR PLAN 1
Admit to Dr Yanez  NPO with NGT  IV fluids  Analgesia prn  Anti emetics prn  Ambulation/OOB  Serial abdominal exams  SCD while in bed.   Urinalysis for ?cystitis  Will discuss with Dr. Yanez. Admit to Dr Yanez  NPO with NGT: patient refusing NGT.  Does not want one since she had a recent facelift.   IV fluids  Analgesia prn  Anti emetics prn  Ambulation/OOB  Serial abdominal exams  SCD while in bed.   Home medications  Urine culture as patient is now complaining of urinary symptoms.   Will discuss with Dr. Yanez.

## 2023-10-05 ENCOUNTER — TRANSCRIPTION ENCOUNTER (OUTPATIENT)
Age: 68
End: 2023-10-05

## 2023-10-05 LAB
ANION GAP SERPL CALC-SCNC: 5 MMOL/L — SIGNIFICANT CHANGE UP (ref 5–17)
BASOPHILS # BLD AUTO: 0.04 K/UL — SIGNIFICANT CHANGE UP (ref 0–0.2)
BASOPHILS NFR BLD AUTO: 0.6 % — SIGNIFICANT CHANGE UP (ref 0–2)
BUN SERPL-MCNC: 14 MG/DL — SIGNIFICANT CHANGE UP (ref 7–23)
CALCIUM SERPL-MCNC: 8.5 MG/DL — SIGNIFICANT CHANGE UP (ref 8.5–10.1)
CHLORIDE SERPL-SCNC: 107 MMOL/L — SIGNIFICANT CHANGE UP (ref 96–108)
CO2 SERPL-SCNC: 28 MMOL/L — SIGNIFICANT CHANGE UP (ref 22–31)
CREAT SERPL-MCNC: 0.62 MG/DL — SIGNIFICANT CHANGE UP (ref 0.5–1.3)
EGFR: 97 ML/MIN/1.73M2 — SIGNIFICANT CHANGE UP
EOSINOPHIL # BLD AUTO: 0.11 K/UL — SIGNIFICANT CHANGE UP (ref 0–0.5)
EOSINOPHIL NFR BLD AUTO: 1.5 % — SIGNIFICANT CHANGE UP (ref 0–6)
GLUCOSE SERPL-MCNC: 100 MG/DL — HIGH (ref 70–99)
HCT VFR BLD CALC: 34.3 % — LOW (ref 34.5–45)
HGB BLD-MCNC: 11 G/DL — LOW (ref 11.5–15.5)
IMM GRANULOCYTES NFR BLD AUTO: 1 % — HIGH (ref 0–0.9)
LACTATE SERPL-SCNC: 0.4 MMOL/L — LOW (ref 0.7–2)
LYMPHOCYTES # BLD AUTO: 2.01 K/UL — SIGNIFICANT CHANGE UP (ref 1–3.3)
LYMPHOCYTES # BLD AUTO: 28.2 % — SIGNIFICANT CHANGE UP (ref 13–44)
MAGNESIUM SERPL-MCNC: 2.4 MG/DL — SIGNIFICANT CHANGE UP (ref 1.6–2.6)
MCHC RBC-ENTMCNC: 30.6 PG — SIGNIFICANT CHANGE UP (ref 27–34)
MCHC RBC-ENTMCNC: 32.1 GM/DL — SIGNIFICANT CHANGE UP (ref 32–36)
MCV RBC AUTO: 95.5 FL — SIGNIFICANT CHANGE UP (ref 80–100)
MONOCYTES # BLD AUTO: 0.56 K/UL — SIGNIFICANT CHANGE UP (ref 0–0.9)
MONOCYTES NFR BLD AUTO: 7.8 % — SIGNIFICANT CHANGE UP (ref 2–14)
NEUTROPHILS # BLD AUTO: 4.35 K/UL — SIGNIFICANT CHANGE UP (ref 1.8–7.4)
NEUTROPHILS NFR BLD AUTO: 60.9 % — SIGNIFICANT CHANGE UP (ref 43–77)
NRBC # BLD: 0 /100 WBCS — SIGNIFICANT CHANGE UP (ref 0–0)
PHOSPHATE SERPL-MCNC: 3.6 MG/DL — SIGNIFICANT CHANGE UP (ref 2.5–4.5)
PLATELET # BLD AUTO: 211 K/UL — SIGNIFICANT CHANGE UP (ref 150–400)
POTASSIUM SERPL-MCNC: 3.9 MMOL/L — SIGNIFICANT CHANGE UP (ref 3.5–5.3)
POTASSIUM SERPL-SCNC: 3.9 MMOL/L — SIGNIFICANT CHANGE UP (ref 3.5–5.3)
RBC # BLD: 3.59 M/UL — LOW (ref 3.8–5.2)
RBC # FLD: 13.2 % — SIGNIFICANT CHANGE UP (ref 10.3–14.5)
SODIUM SERPL-SCNC: 140 MMOL/L — SIGNIFICANT CHANGE UP (ref 135–145)
WBC # BLD: 7.14 K/UL — SIGNIFICANT CHANGE UP (ref 3.8–10.5)
WBC # FLD AUTO: 7.14 K/UL — SIGNIFICANT CHANGE UP (ref 3.8–10.5)

## 2023-10-05 PROCEDURE — 99231 SBSQ HOSP IP/OBS SF/LOW 25: CPT

## 2023-10-05 PROCEDURE — 93010 ELECTROCARDIOGRAM REPORT: CPT

## 2023-10-05 PROCEDURE — 74018 RADEX ABDOMEN 1 VIEW: CPT | Mod: 26

## 2023-10-05 RX ORDER — MESALAMINE 400 MG
400 TABLET, DELAYED RELEASE (ENTERIC COATED) ORAL THREE TIMES A DAY
Refills: 0 | Status: DISCONTINUED | OUTPATIENT
Start: 2023-10-05 | End: 2023-10-08

## 2023-10-05 RX ORDER — DIPHENHYDRAMINE HCL 50 MG
25 CAPSULE ORAL EVERY 6 HOURS
Refills: 0 | Status: DISCONTINUED | OUTPATIENT
Start: 2023-10-05 | End: 2023-10-12

## 2023-10-05 RX ORDER — INFLUENZA VIRUS VACCINE 15; 15; 15; 15 UG/.5ML; UG/.5ML; UG/.5ML; UG/.5ML
0.7 SUSPENSION INTRAMUSCULAR ONCE
Refills: 0 | Status: DISCONTINUED | OUTPATIENT
Start: 2023-10-05 | End: 2023-10-23

## 2023-10-05 RX ORDER — ACETAMINOPHEN 500 MG
1000 TABLET ORAL ONCE
Refills: 0 | Status: COMPLETED | OUTPATIENT
Start: 2023-10-05 | End: 2023-10-05

## 2023-10-05 RX ORDER — CEFTRIAXONE 500 MG/1
1000 INJECTION, POWDER, FOR SOLUTION INTRAMUSCULAR; INTRAVENOUS EVERY 24 HOURS
Refills: 0 | Status: COMPLETED | OUTPATIENT
Start: 2023-10-05 | End: 2023-10-07

## 2023-10-05 RX ORDER — LEVOTHYROXINE SODIUM 125 MCG
88 TABLET ORAL DAILY
Refills: 0 | Status: DISCONTINUED | OUTPATIENT
Start: 2023-10-05 | End: 2023-10-23

## 2023-10-05 RX ADMIN — HYDROMORPHONE HYDROCHLORIDE 0.5 MILLIGRAM(S): 2 INJECTION INTRAMUSCULAR; INTRAVENOUS; SUBCUTANEOUS at 03:58

## 2023-10-05 RX ADMIN — HYDROMORPHONE HYDROCHLORIDE 0.5 MILLIGRAM(S): 2 INJECTION INTRAMUSCULAR; INTRAVENOUS; SUBCUTANEOUS at 03:43

## 2023-10-05 RX ADMIN — CEFTRIAXONE 100 MILLIGRAM(S): 500 INJECTION, POWDER, FOR SOLUTION INTRAMUSCULAR; INTRAVENOUS at 13:42

## 2023-10-05 RX ADMIN — Medication 400 MILLIGRAM(S): at 21:45

## 2023-10-05 RX ADMIN — HYDROMORPHONE HYDROCHLORIDE 0.5 MILLIGRAM(S): 2 INJECTION INTRAMUSCULAR; INTRAVENOUS; SUBCUTANEOUS at 22:47

## 2023-10-05 RX ADMIN — HYDROMORPHONE HYDROCHLORIDE 0.5 MILLIGRAM(S): 2 INJECTION INTRAMUSCULAR; INTRAVENOUS; SUBCUTANEOUS at 21:44

## 2023-10-05 RX ADMIN — ONDANSETRON 4 MILLIGRAM(S): 8 TABLET, FILM COATED ORAL at 03:51

## 2023-10-05 RX ADMIN — HYDROMORPHONE HYDROCHLORIDE 0.5 MILLIGRAM(S): 2 INJECTION INTRAMUSCULAR; INTRAVENOUS; SUBCUTANEOUS at 13:00

## 2023-10-05 RX ADMIN — Medication 1000 MILLIGRAM(S): at 22:47

## 2023-10-05 RX ADMIN — Medication 1000 MILLIGRAM(S): at 10:55

## 2023-10-05 RX ADMIN — SODIUM CHLORIDE 100 MILLILITER(S): 9 INJECTION INTRAMUSCULAR; INTRAVENOUS; SUBCUTANEOUS at 22:34

## 2023-10-05 RX ADMIN — HYDROMORPHONE HYDROCHLORIDE 0.5 MILLIGRAM(S): 2 INJECTION INTRAMUSCULAR; INTRAVENOUS; SUBCUTANEOUS at 07:56

## 2023-10-05 RX ADMIN — HYDROMORPHONE HYDROCHLORIDE 0.5 MILLIGRAM(S): 2 INJECTION INTRAMUSCULAR; INTRAVENOUS; SUBCUTANEOUS at 17:38

## 2023-10-05 RX ADMIN — HYDROMORPHONE HYDROCHLORIDE 0.5 MILLIGRAM(S): 2 INJECTION INTRAMUSCULAR; INTRAVENOUS; SUBCUTANEOUS at 12:15

## 2023-10-05 RX ADMIN — Medication 400 MILLIGRAM(S): at 00:38

## 2023-10-05 RX ADMIN — Medication 400 MILLIGRAM(S): at 08:55

## 2023-10-05 RX ADMIN — Medication 1000 MILLIGRAM(S): at 16:30

## 2023-10-05 RX ADMIN — HYDROMORPHONE HYDROCHLORIDE 0.5 MILLIGRAM(S): 2 INJECTION INTRAMUSCULAR; INTRAVENOUS; SUBCUTANEOUS at 18:40

## 2023-10-05 RX ADMIN — Medication 400 MILLIGRAM(S): at 15:29

## 2023-10-05 RX ADMIN — PANTOPRAZOLE SODIUM 40 MILLIGRAM(S): 20 TABLET, DELAYED RELEASE ORAL at 13:42

## 2023-10-05 RX ADMIN — HYDROMORPHONE HYDROCHLORIDE 0.5 MILLIGRAM(S): 2 INJECTION INTRAMUSCULAR; INTRAVENOUS; SUBCUTANEOUS at 09:00

## 2023-10-05 RX ADMIN — Medication 25 MILLIGRAM(S): at 22:34

## 2023-10-05 NOTE — CONSULT NOTE ADULT - SUBJECTIVE AND OBJECTIVE BOX
[facility], Division of Infectious Diseases  ARIEL Epps S. Shah, Y. Patel, G. [facility]  [phone]    JONNA KANG  68y, Female  [id number]    HPI--  HPI:   67 y/o female with hx of multiple bouts of SBO, first surgery July 2022, Renata, pt presents with diffuse abdominal pain, distension started this AM, progressively getting worse, 3 episodes of vomiting, no fever, no chills, no CP, no SOB, + two small BM today, pt requesting Dilaudid for pain, recent facelift on steroids and Levaquin last dose yesterday,  Currently patient states that she has been vomiting x3 episodes.  Notes incomplete bowel emptying.  Has had diarrhea for past few days.  No recent illness that she is aware of.  Also with complaints of urinary symptoms- pt could not elaborate.    (04 Oct 2023 23:01)        Active Medications--  acetaminophen   IVPB .. 1000 milliGRAM(s) IV Intermittent once  acetaminophen   IVPB .. 1000 milliGRAM(s) IV Intermittent once  cefTRIAXone   IVPB 1000 milliGRAM(s) IV Intermittent every 24 hours  HYDROmorphone  Injectable 0.5 milliGRAM(s) IV Push every 4 hours PRN  influenza  Vaccine (HIGH DOSE) 0.7 milliLiter(s) IntraMuscular once  ketorolac   Injectable 15 milliGRAM(s) IV Push every 4 hours PRN  levothyroxine 88 MICROGram(s) Oral daily  ondansetron Injectable 4 milliGRAM(s) IV Push every 6 hours PRN  pantoprazole  Injectable 40 milliGRAM(s) IV Push daily  sodium chloride 0.9%. 1000 milliLiter(s) IV Continuous <Continuous>    Antimicrobials:   cefTRIAXone   IVPB 1000 milliGRAM(s) IV Intermittent every 24 hours    Immunologic: influenza  Vaccine (HIGH DOSE) 0.7 milliLiter(s) IntraMuscular once      ROS:  CONSTITUTIONAL: No fevers or chills. No weakness or headache. No weight changes.  EYES/ENT: No visual or hearing changes. No sore throat or throat pain .  NECK: No pain or stiffness  RESPIRATORY: No cough, wheezing, or hemoptysis. No shortness of breath  CARDIOVASCULAR: No chest pain or palpitations  GASTROINTESTINAL: No abdominal pain. No nausea or vomiting. No diarrhea or constipation.  GENITOURINARY: No dysuria, frequency or hematuria  NEUROLOGICAL: No numbness or weakness  SKIN: No itching or rashes  PSYCHIATRIC: Pleasant. Appropriate affect    Allergies: tetanus toxoid (Anaphylaxis)    PMH -- Hypothyroidism    HLD (hyperlipidemia)    SBO (small bowel obstruction)      PSH -- History of colon resection    S/P small bowel resection    S/P small bowel resection    History of facelift      FH -- No pertinent family history in first degree relatives      Social History --  EtOH: denies   Tobacco: denies   Drug Use: denies     Travel/Environmental/Occupational History:    Physical Exam--  Vital Signs Last 24 Hrs  T(F): 98.4 (05 Oct 2023 05:25), Max: 98.4 (04 Oct 2023 18:29)  HR: 68 (05 Oct 2023 05:25) (68 - 95)  BP: 108/66 (05 Oct 2023 05:25) (108/66 - 159/84)  RR: 16 (05 Oct 2023 05:25) (16 - 16)  SpO2: 96% (05 Oct 2023 05:25) (93% - 97%)  General: nontoxic-appearing, no acute distress  HEENT: NC/AT, EOMI, anicteric, conjunctiva pink and moist, oropharynx clear, dentition fair  Neck: Not rigid. No sense of mass. No LAD  Lungs: Clear bilaterally without rales, wheezing or rhonchi  Heart: Regular rate and rhythm. No murmur, rub or gallop.  Abdomen: Soft. Nondistended. Nontender. Bowel sounds present. No organomegaly.  Back: No spinal tenderness. No costovertebral angle tenderness.  Extremities: No cyanosis or clubbing. No edema.   Skin: Warm. Dry. Good turgor. No rash. No vasculitic stigmata.  Psychiatric: Appropriate affect and mood for situation.   Lines:    Laboratory & Imaging Data:  CBC:                       13.2   13.56 )-----------( 243      ( 04 Oct 2023 20:37 )             40.6     CMP: 10-05    140  |  107  |  14  ----------------------------<  100<H>  3.9   |  28  |  0.62    Ca    8.5      05 Oct 2023 07:52  Phos  3.6     10-05  Mg     2.4     10-05    TPro  7.6  /  Alb  3.9  /  TBili  1.7<H>  /  DBili  x   /  AST  23  /  ALT  51  /  AlkPhos  107  10-04    LIVER FUNCTIONS - ( 04 Oct 2023 20:37 )  Alb: 3.9 g/dL / Pro: 7.6 g/dL / ALK PHOS: 107 U/L / ALT: 51 U/L / AST: 23 U/L / GGT: x           Urinalysis Basic - ( 05 Oct 2023 07:52 )    Color: x / Appearance: x / SG: x / pH: x  Gluc: 100 mg/dL / Ketone: x  / Bili: x / Urobili: x   Blood: x / Protein: x / Nitrite: x   Leuk Esterase: x / RBC: x / WBC x   Sq Epi: x / Non Sq Epi: x / Bacteria: x        Microbiology: reviewed        Radiology: reviewed     [facility], Division of Infectious Diseases  ARIEL Epps S. Shah, Y. Patel, G. [facility]  [phone]    JONNA KANG  68y, Female  [id number]    HPI--  HPI:   69 y/o female with hx of multiple bouts of SBO, first surgery July 2022, Renata, pt presents with diffuse abdominal pain, distension started this AM, progressively getting worse, 3 episodes of vomiting, no fever, no chills, no CP, no SOB, + two small BM today, pt requesting Dilaudid for pain, recent facelift on steroids and Levaquin last dose yesterday,  Currently patient states that she has been vomiting x3 episodes.  Notes incomplete bowel emptying.  Has had diarrhea for past few days.  No recent illness that she is aware of.  Also with complaints of urinary symptoms- pt could not elaborate.    (04 Oct 2023 23:01)        Active Medications--  acetaminophen   IVPB .. 1000 milliGRAM(s) IV Intermittent once  acetaminophen   IVPB .. 1000 milliGRAM(s) IV Intermittent once  cefTRIAXone   IVPB 1000 milliGRAM(s) IV Intermittent every 24 hours  HYDROmorphone  Injectable 0.5 milliGRAM(s) IV Push every 4 hours PRN  influenza  Vaccine (HIGH DOSE) 0.7 milliLiter(s) IntraMuscular once  ketorolac   Injectable 15 milliGRAM(s) IV Push every 4 hours PRN  levothyroxine 88 MICROGram(s) Oral daily  ondansetron Injectable 4 milliGRAM(s) IV Push every 6 hours PRN  pantoprazole  Injectable 40 milliGRAM(s) IV Push daily  sodium chloride 0.9%. 1000 milliLiter(s) IV Continuous <Continuous>    Antimicrobials:   cefTRIAXone   IVPB 1000 milliGRAM(s) IV Intermittent every 24 hours    Immunologic: influenza  Vaccine (HIGH DOSE) 0.7 milliLiter(s) IntraMuscular once      ROS:  CONSTITUTIONAL: No fevers or chills. No weakness or headache. No weight changes.  EYES/ENT: No visual or hearing changes. No sore throat or throat pain .  NECK: No pain or stiffness  RESPIRATORY: No cough, wheezing, or hemoptysis. No shortness of breath  CARDIOVASCULAR: No chest pain or palpitations  GASTROINTESTINAL: No abdominal pain. No nausea or vomiting. No diarrhea or constipation.  GENITOURINARY: No dysuria, frequency or hematuria  NEUROLOGICAL: No numbness or weakness  SKIN: No itching or rashes  PSYCHIATRIC: Pleasant. Appropriate affect    Allergies: tetanus toxoid (Anaphylaxis)    PMH -- Hypothyroidism    HLD (hyperlipidemia)    SBO (small bowel obstruction)      PSH -- History of colon resection    S/P small bowel resection    S/P small bowel resection    History of facelift      FH -- No pertinent family history in first degree relatives      Social History --  EtOH: denies   Tobacco: denies   Drug Use: denies     Travel/Environmental/Occupational History:    Physical Exam--  Vital Signs Last 24 Hrs  T(F): 98.4 (05 Oct 2023 05:25), Max: 98.4 (04 Oct 2023 18:29)  HR: 68 (05 Oct 2023 05:25) (68 - 95)  BP: 108/66 (05 Oct 2023 05:25) (108/66 - 159/84)  RR: 16 (05 Oct 2023 05:25) (16 - 16)  SpO2: 96% (05 Oct 2023 05:25) (93% - 97%)  General: nontoxic-appearing, no acute distress  HEENT: NC/AT, EOMI, anicteric, conjunctiva pink and moist, oropharynx clear, dentition fair  Neck: Not rigid. No sense of mass. No LAD  Lungs: Clear bilaterally without rales, wheezing or rhonchi  Heart: Regular rate and rhythm. No murmur, rub or gallop.  Abdomen: Soft. Nondistended. Nontender.   Extremities: No cyanosis or clubbing. No edema.   Skin: Warm. Dry. Good turgor.     Laboratory & Imaging Data:  CBC:                       13.2   13.56 )-----------( 243      ( 04 Oct 2023 20:37 )             40.6     CMP: 10-05    140  |  107  |  14  ----------------------------<  100<H>  3.9   |  28  |  0.62    Ca    8.5      05 Oct 2023 07:52  Phos  3.6     10-05  Mg     2.4     10-05    TPro  7.6  /  Alb  3.9  /  TBili  1.7<H>  /  DBili  x   /  AST  23  /  ALT  51  /  AlkPhos  107  10-04    LIVER FUNCTIONS - ( 04 Oct 2023 20:37 )  Alb: 3.9 g/dL / Pro: 7.6 g/dL / ALK PHOS: 107 U/L / ALT: 51 U/L / AST: 23 U/L / GGT: x           Urinalysis Basic - ( 05 Oct 2023 07:52 )    Color: x / Appearance: x / SG: x / pH: x  Gluc: 100 mg/dL / Ketone: x  / Bili: x / Urobili: x   Blood: x / Protein: x / Nitrite: x   Leuk Esterase: x / RBC: x / WBC x   Sq Epi: x / Non Sq Epi: x / Bacteria: x        Microbiology: reviewed        Radiology: reviewed    < from: CT Abdomen and Pelvis w/ IV Cont (10.04.23 @ 21:59) >    ACC: [id number] EXAM:  CT ABDOMEN AND PELVIS IC   ORDERED BY: MICHELINE CARRILLO     PROCEDURE DATE:  10/04/2023          INTERPRETATION:  CLINICAL INFORMATION: Distended abdomen and vomiting.    COMPARISON: 6/21/2023. Multiple additional exams dating back to 7/10/2022.    CONTRAST/COMPLICATIONS:  IV Contrast: Omnipaque 350  90 cc administered   10 cc discarded  Oral Contrast: NONE  Complications: None reported at time of study completion    PROCEDURE:  CT of the Abdomen and Pelvis was performed.  Sagittal and coronal reformats were performed.    FINDINGS:  LOWER CHEST: Minimal dependent changes are seen posteriorly..    LIVER: Steatosis.  BILE DUCTS: Mild prominence of the common bile duct which measures 6 mm,   similar compared to previous exam..  GALLBLADDER: Within normal limits.  SPLEEN: Within normal limits.  PANCREAS: The main pancreatic duct is also mildly prominent measuring up   to 5 mm the level of the pancreatic head. This is also similar compared   to previous exams.  ADRENALS: Within normal limits.  KIDNEYS/URETERS: Subcentimeter hypodensity left upper pole kidney too   small to characterize. The kidneys otherwise enhance symmetrically.  There is no urinary tract obstruction.    BLADDER: Minimally distended, though appears thick-walled.  REPRODUCTIVE ORGANS: Calcified fibroids    BOWEL: No bowel obstruction. Appendix is normal. The cecum is seen deep   within the posterior pelvis. This is possibly tethered. There is a low   rectal anastomosis. A large amount of stool seenthroughout the colon.   The stomach is diffusely thick-walled, despite its underdistended nature.   Dilated fluid-filled thick-walled segment of small bowel is seen within   the pelvis measuring 3.6 cm in diameter. This appears associated with a   small bowel anastomosis. Transition is seen distal to the anastomotic   sutures, with distal small bowel loops being relatively under distended.   There is associated mesenteric edema.  PERITONEUM: No large volume ascites or free air..  VESSELS: Atherosclerotic changes.  RETROPERITONEUM/LYMPH NODES: No lymphadenopathy.  ABDOMINAL WALL: Postsurgical changes. The small fat-containing ventral   hernia.  BONES: Degenerative changes.    IMPRESSION:  Findings compatible with small bowel obstruction, with transition seen   just distal to a small bowel anastomosis. There is associated wall   thickening and mesenteric edema, which may suggest vascular compromise.    Steatosis.    Mild prominence of the biliary and pancreatic ducts, similar compared to   previous exam and has remained stable dating back to 7/10/2022.    Gastritis.    Thick-walled appearance of the urinary bladder which may represent   cystitis.    Additional findings as above.    Findings were discussed with Dr. MICHELINE CARRILLO [phone] 10/4/2023 10:53   PM by Dr. AYERS with read back confirmation.    --- End of Report ---             HANG AYERS M.D., Attending Radiologist  This document has been electronically signed. Oct  4 2023 10:55PM    < end of copied text >   [facility], Division of Infectious Diseases  ARIEL Epps S. Shah, Y. Patel, G. [facility]  [phone]    JONNA KANG  68y, Female  [id number]    HPI--  HPI:   67 y/o female with hx of multiple bouts of SBO, first surgery July 2022, Renata, pt presents with diffuse abdominal pain, distension started this AM, progressively getting worse, 3 episodes of vomiting, no fever, no chills, no CP, no SOB, + two small BM today, pt requesting Dilaudid for pain, recent facelift on steroids and Levaquin last dose yesterday,  Currently patient states that she has been vomiting x3 episodes.  Notes incomplete bowel emptying.  Has had diarrhea for past few days.  No recent illness that she is aware of.  Also with complaints of urinary symptoms- pt could not elaborate.    (04 Oct 2023 23:01)    Pt seen at bedside  Hx as above  Found to have SBO, refusing NGT pe rnotes    Reporting urinary frequency and burning for a few days  Took levofloxacin x7 days as well      Active Medications--  acetaminophen   IVPB .. 1000 milliGRAM(s) IV Intermittent once  acetaminophen   IVPB .. 1000 milliGRAM(s) IV Intermittent once  cefTRIAXone   IVPB 1000 milliGRAM(s) IV Intermittent every 24 hours  HYDROmorphone  Injectable 0.5 milliGRAM(s) IV Push every 4 hours PRN  influenza  Vaccine (HIGH DOSE) 0.7 milliLiter(s) IntraMuscular once  ketorolac   Injectable 15 milliGRAM(s) IV Push every 4 hours PRN  levothyroxine 88 MICROGram(s) Oral daily  ondansetron Injectable 4 milliGRAM(s) IV Push every 6 hours PRN  pantoprazole  Injectable 40 milliGRAM(s) IV Push daily  sodium chloride 0.9%. 1000 milliLiter(s) IV Continuous <Continuous>    Antimicrobials:   cefTRIAXone   IVPB 1000 milliGRAM(s) IV Intermittent every 24 hours    Immunologic: influenza  Vaccine (HIGH DOSE) 0.7 milliLiter(s) IntraMuscular once      ROS:  CONSTITUTIONAL: No fevers or chills. No weakness or headache. No weight changes.  EYES/ENT: No visual or hearing changes. No sore throat or throat pain .  NECK: No pain or stiffness  RESPIRATORY: No cough, wheezing, or hemoptysis. No shortness of breath  CARDIOVASCULAR: No chest pain or palpitations  GASTROINTESTINAL: No abdominal pain. No nausea or vomiting. No diarrhea or constipation.  GENITOURINARY: No dysuria, frequency or hematuria  NEUROLOGICAL: No numbness or weakness  SKIN: No itching or rashes  PSYCHIATRIC: Pleasant. Appropriate affect    Allergies: tetanus toxoid (Anaphylaxis)    PMH -- Hypothyroidism    HLD (hyperlipidemia)    SBO (small bowel obstruction)      PSH -- History of colon resection    S/P small bowel resection    S/P small bowel resection    History of facelift      FH -- No pertinent family history in first degree relatives      Social History --  EtOH: denies   Tobacco: denies   Drug Use: denies     Travel/Environmental/Occupational History:    Physical Exam--  Vital Signs Last 24 Hrs  T(F): 98.4 (05 Oct 2023 05:25), Max: 98.4 (04 Oct 2023 18:29)  HR: 68 (05 Oct 2023 05:25) (68 - 95)  BP: 108/66 (05 Oct 2023 05:25) (108/66 - 159/84)  RR: 16 (05 Oct 2023 05:25) (16 - 16)  SpO2: 96% (05 Oct 2023 05:25) (93% - 97%)  General: nontoxic-appearing, no acute distress  HEENT: NC/AT, EOMI, anicteric  Lungs: Clear bilaterally without rales, wheezing or rhonchi  Heart: Regular rate and rhythm. No murmur, rub or gallop.  Abdomen: Soft. slight distended  Extremities: No cyanosis or clubbing. No edema.   Skin: Warm. Dry. Good turgor.     Laboratory & Imaging Data:  CBC:                       13.2   13.56 )-----------( 243      ( 04 Oct 2023 20:37 )             40.6     CMP: 10-05    140  |  107  |  14  ----------------------------<  100<H>  3.9   |  28  |  0.62    Ca    8.5      05 Oct 2023 07:52  Phos  3.6     10-05  Mg     2.4     10-05    TPro  7.6  /  Alb  3.9  /  TBili  1.7<H>  /  DBili  x   /  AST  23  /  ALT  51  /  AlkPhos  107  10-04    LIVER FUNCTIONS - ( 04 Oct 2023 20:37 )  Alb: 3.9 g/dL / Pro: 7.6 g/dL / ALK PHOS: 107 U/L / ALT: 51 U/L / AST: 23 U/L / GGT: x           Urinalysis Basic - ( 05 Oct 2023 07:52 )    Color: x / Appearance: x / SG: x / pH: x  Gluc: 100 mg/dL / Ketone: x  / Bili: x / Urobili: x   Blood: x / Protein: x / Nitrite: x   Leuk Esterase: x / RBC: x / WBC x   Sq Epi: x / Non Sq Epi: x / Bacteria: x        Microbiology: reviewed        Radiology: reviewed    < from: CT Abdomen and Pelvis w/ IV Cont (10.04.23 @ 21:59) >    ACC: [id number] EXAM:  CT ABDOMEN AND PELVIS IC   ORDERED BY: MICHELINE CARRILLO     PROCEDURE DATE:  10/04/2023          INTERPRETATION:  CLINICAL INFORMATION: Distended abdomen and vomiting.    COMPARISON: 6/21/2023. Multiple additional exams dating back to 7/10/2022.    CONTRAST/COMPLICATIONS:  IV Contrast: Omnipaque 350  90 cc administered   10 cc discarded  Oral Contrast: NONE  Complications: None reported at time of study completion    PROCEDURE:  CT of the Abdomen and Pelvis was performed.  Sagittal and coronal reformats were performed.    FINDINGS:  LOWER CHEST: Minimal dependent changes are seen posteriorly..    LIVER: Steatosis.  BILE DUCTS: Mild prominence of the common bile duct which measures 6 mm,   similar compared to previous exam..  GALLBLADDER: Within normal limits.  SPLEEN: Within normal limits.  PANCREAS: The main pancreatic duct is also mildly prominent measuring up   to 5 mm the level of the pancreatic head. This is also similar compared   to previous exams.  ADRENALS: Within normal limits.  KIDNEYS/URETERS: Subcentimeter hypodensity left upper pole kidney too   small to characterize. The kidneys otherwise enhance symmetrically.  There is no urinary tract obstruction.    BLADDER: Minimally distended, though appears thick-walled.  REPRODUCTIVE ORGANS: Calcified fibroids    BOWEL: No bowel obstruction. Appendix is normal. The cecum is seen deep   within the posterior pelvis. This is possibly tethered. There is a low   rectal anastomosis. A large amount of stool seenthroughout the colon.   The stomach is diffusely thick-walled, despite its underdistended nature.   Dilated fluid-filled thick-walled segment of small bowel is seen within   the pelvis measuring 3.6 cm in diameter. This appears associated with a   small bowel anastomosis. Transition is seen distal to the anastomotic   sutures, with distal small bowel loops being relatively under distended.   There is associated mesenteric edema.  PERITONEUM: No large volume ascites or free air..  VESSELS: Atherosclerotic changes.  RETROPERITONEUM/LYMPH NODES: No lymphadenopathy.  ABDOMINAL WALL: Postsurgical changes. The small fat-containing ventral   hernia.  BONES: Degenerative changes.    IMPRESSION:  Findings compatible with small bowel obstruction, with transition seen   just distal to a small bowel anastomosis. There is associated wall   thickening and mesenteric edema, which may suggest vascular compromise.    Steatosis.    Mild prominence of the biliary and pancreatic ducts, similar compared to   previous exam and has remained stable dating back to 7/10/2022.    Gastritis.    Thick-walled appearance of the urinary bladder which may represent   cystitis.    Additional findings as above.    Findings were discussed with Dr. MICHELINE CARRILLO [phone] 10/4/2023 10:53   PM by Dr. AYERS with read back confirmation.    --- End of Report ---             HANG AYERS M.D., Attending Radiologist  This document has been electronically signed. Oct  4 2023 10:55PM    < end of copied text >

## 2023-10-05 NOTE — PROGRESS NOTE ADULT - SUBJECTIVE AND OBJECTIVE BOX
pt seen  better today  +F+BM  still some achiness  ICU Vital Signs Last 24 Hrs  T(C): 36.9 (05 Oct 2023 05:25), Max: 36.9 (04 Oct 2023 18:29)  T(F): 98.4 (05 Oct 2023 05:25), Max: 98.4 (04 Oct 2023 18:29)  HR: 68 (05 Oct 2023 05:25) (68 - 95)  BP: 108/66 (05 Oct 2023 05:25) (108/66 - 159/84)  BP(mean): --  ABP: --  ABP(mean): --  RR: 16 (05 Oct 2023 05:25) (16 - 16)  SpO2: 96% (05 Oct 2023 05:25) (93% - 97%)    O2 Parameters below as of 05 Oct 2023 05:25  Patient On (Oxygen Delivery Method): room air        gen-NAD  resp-clear  abd-soft, mildly distended                            13.2   13.56 )-----------( 243      ( 04 Oct 2023 20:37 )             40.6

## 2023-10-05 NOTE — DISCHARGE NOTE PROVIDER - NSDCMRMEDTOKEN_GEN_ALL_CORE_FT
Fish Oil 1000 mg oral capsule: 1 cap(s) orally 2 times a day  fluorometholone 0.1% ophthalmic suspension: 1 drop(s) to each affected eye once a day, As Needed  levothyroxine 88 mcg (0.088 mg) oral tablet: 1 tab(s) orally once a day  pantoprazole 40 mg oral delayed release tablet: 1 tab(s) orally once a day  Restasis 0.05% ophthalmic emulsion: 1 drop(s) to each affected eye every 12 hours  rosuvastatin 5 mg oral tablet: 1 tab(s) orally once a day   diazePAM 5 mg oral tablet: 1 tab(s) orally 2 times a day as needed for Spasm MDD: 2  Fish Oil 1000 mg oral capsule: 1 cap(s) orally 2 times a day  fluorometholone 0.1% ophthalmic suspension: 1 drop(s) to each affected eye once a day, As Needed  levothyroxine 88 mcg (0.088 mg) oral tablet: 1 tab(s) orally once a day  pantoprazole 40 mg oral delayed release tablet: 1 tab(s) orally once a day  predniSONE 20 mg oral tablet: 1 tab(s) orally once a day  Reglan 5 mg oral tablet: 1 tab(s) orally 3 times a day  Restasis 0.05% ophthalmic emulsion: 1 drop(s) to each affected eye every 12 hours  rosuvastatin 5 mg oral tablet: 1 tab(s) orally once a day

## 2023-10-05 NOTE — PROGRESS NOTE ADULT - SUBJECTIVE AND OBJECTIVE BOX
S: Patient seen and examined at bedside.  No acute overnight events. Pt reports abdominal pain localized to the LLQ described as "boring, burning pain".  Admits to flatus and BM, described as "pencil thin" overnight.  Voiding, ambulating. Patient denies any fever, chills, chest pain, shortness of breath, nausea, vomiting, or urinary complaints.    MEDICATIONS:  acetaminophen   IVPB .. 1000 milliGRAM(s) IV Intermittent once  acetaminophen   IVPB .. 1000 milliGRAM(s) IV Intermittent once  cefTRIAXone   IVPB 1000 milliGRAM(s) IV Intermittent every 24 hours  HYDROmorphone  Injectable 0.5 milliGRAM(s) IV Push every 4 hours PRN  influenza  Vaccine (HIGH DOSE) 0.7 milliLiter(s) IntraMuscular once  ketorolac   Injectable 15 milliGRAM(s) IV Push every 4 hours PRN  levothyroxine 88 MICROGram(s) Oral daily  ondansetron Injectable 4 milliGRAM(s) IV Push every 6 hours PRN  pantoprazole  Injectable 40 milliGRAM(s) IV Push daily  sodium chloride 0.9%. 1000 milliLiter(s) IV Continuous <Continuous>    O:  Vital Signs Last 24 Hrs  T(C): 36.9 (05 Oct 2023 05:25), Max: 36.9 (04 Oct 2023 18:29)  T(F): 98.4 (05 Oct 2023 05:25), Max: 98.4 (04 Oct 2023 18:29)  HR: 68 (05 Oct 2023 05:25) (68 - 95)  BP: 108/66 (05 Oct 2023 05:25) (108/66 - 159/84)  RR: 16 (05 Oct 2023 05:25) (16 - 16)  SpO2: 96% (05 Oct 2023 05:25) (93% - 97%)    Parameters below as of 05 Oct 2023 05:25  Patient On (Oxygen Delivery Method): room air    PHYSICAL EXAM:  GENERAL: No acute distress, lying comfortably in bed  HEAD:  Atraumatic, Normocephalic  CHEST/LUNG: Non labored respirations, no accessory muscle use  HEART: Regular rate and rhythm  ABDOMEN: Soft, non-distended, midline surgical incision well healed, suprapubic tenderness and LLQ ttp  EXT: calves non-tender b/l, no edema  NEUROLOGY: A&O x 3, no focal deficits    I&O SUMMARY:    10-04-23 @ 07:01  -  10-05-23 @ 07:00  --------------------------------------------------------  IN:    sodium chloride 0.9%: 400 mL  Total IN: 400 mL    OUT:  Total OUT: 0 mL    Total NET: 400 mL    LABS:                        13.2   13.56 )-----------( 243      ( 04 Oct 2023 20:37 )             40.6     10-05    140  |  107  |  14  ----------------------------<  100<H>  3.9   |  28  |  0.62    Ca    8.5      05 Oct 2023 07:52  Phos  3.6     10-05  Mg     2.4     10-05    TPro  7.6  /  Alb  3.9  /  TBili  1.7<H>  /  DBili  x   /  AST  23  /  ALT  51  /  AlkPhos  107  10-04    PT/INR - ( 04 Oct 2023 20:48 )   PT: 9.9 sec;   INR: 0.84 ratio         PTT - ( 04 Oct 2023 20:48 )  PTT:29.4 sec      Lactate, Blood: 0.4 mmol/L (10-05 @ 07:52)  Lactate, Blood: 1.0 mmol/L (10-04 @ 20:37)    RADIOLOGY:  < from: CT Abdomen and Pelvis w/ IV Cont (10.04.23 @ 21:59) >  IMPRESSION:  Findings compatible with small bowel obstruction, with transition seen   just distal to a small bowel anastomosis. There is associated wall   thickening and mesenteric edema, which may suggest vascular compromise.    Steatosis.    Mild prominence of the biliary and pancreatic ducts, similar compared to   previous exam and has remained stable dating back to 7/10/2022.    Gastritis.    Thick-walled appearance of the urinary bladder which may represent   cystitis.    Additional findings as above.    Findings were discussed with Dr. MICHELINE CARRILLO [phone] 10/4/2023 10:53   PM by Dr. AYERS with read back confirmation.    --- End of Report ---    < end of copied text >

## 2023-10-05 NOTE — PROGRESS NOTE ADULT - ASSESSMENT
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

## 2023-10-05 NOTE — CARE COORDINATION ASSESSMENT. - OTHER PERTINENT REFERRAL INFORMATION
Met with patient at the bedside. Explained the role of case management/discharge planning. Patient verbalized understanding. Provided contact information to patient. Patient resides with her spouse and was independent PTA. Patient admitted with abd pain DX: SBO Met with patient at the bedside. Explained the role of case management/discharge planning. Patient verbalized understanding. Provided contact information to patient. Patient resides with her spouse and was independent PTA. Patient admitted with abd pain DX: SBO. Will remain available to patient and family throughout hospital stay.

## 2023-10-05 NOTE — DISCHARGE NOTE PROVIDER - NSDCACTIVITY_GEN_ALL_CORE
Showering allowed/Stairs allowed/Walking - Indoors allowed/No heavy lifting/straining/Walking - Outdoors allowed No heavy lifting/straining

## 2023-10-05 NOTE — DISCHARGE NOTE PROVIDER - NSDCFUSCHEDAPPT_GEN_ALL_CORE_FT
[facility]  [facility] [address] [facility] [address]  Scheduled Appointment: 11/13/2023

## 2023-10-05 NOTE — CARE COORDINATION ASSESSMENT. - NSCAREPROVIDERS_GEN_ALL_CORE_FT
CARE PROVIDERS:  Accepting Physician: Dustin Yanez  Access Services: Kylah Adkins  Administration: Bridgette Marie  Administration: Jimena Vee  Administration: Quique Chang  Admitting: Dustin Yanez  Attending: Dustin Yanez  Case Management: Tangela Keller  Consultant: Marjorie Curtis  Consultant: Leah Lang  Consultant: Weil, Patricia  ED Attending: Alena Jones  ED Nurse: Joselin Henning  Nurse: Jimena Hodge  Nurse: Grecia Anderson  Nurse: Alicia Curtis  Ordered: ADM, User  Override: Jimena Hodge  PCA/Nursing Assistant: Sena Roa  PCA/Nursing Assistant: Kajal Zapata  Primary Team: Renaldo Mueller  Primary Team: Garima Munguia  Registered Dietitian: Loni Thomas  Student: Cheryl Brasher  UR// Supp. Assoc.: Dana Chaudhari  UR// Supp. Assoc.: Latha Dhaliwal   CARE PROVIDERS:  Accepting Physician: Dustin Yanez  Access Services: Kylah Adkins  Administration: Jimena Vee  Administration: Quique Chang  Admitting: Dustin Yanez  Attending: Dustin Yanez  Case Management: Tangela Keller  Consultant: Marjorie Curtis  Consultant: Leah Lang  Consultant: Weil, Patricia  ED Attending: Alena Jones ED Nurse: Joselin Henning  Nurse: Jimena Hodge  Nurse: Grecia Anderson  Nurse: Alicia Curtis  Ordered: ADM, User  Override: Jimena Hodge  PCA/Nursing Assistant: Sena Roa  PCA/Nursing Assistant: Kajal Zapata  Primary Team: Renaldo Mueller  Primary Team: Garima Munguia  Registered Dietitian: Loni Thomas  Student: Cheryl Brasher  UR// Supp. Assoc.: Latha Dhaliwal

## 2023-10-05 NOTE — CONSULT NOTE ADULT - ASSESSMENT
Full consult to follow    Infectious Diseases will continue to follow. Please call with any questions.   Marjorie Curtis M.D.  [facility] Infectious [facility] [phone]  For after 5 P.M. and weekends, please call [phone]   67 y/o female with hx of multiple bouts of SBO, first surgery July 2022, Renata, pt presents with diffuse abdominal pain, distension started this AM, progressively getting worse, 3 episodes of vomiting, no fever, no chills, no CP, no SOB, + two small BM today, pt requesting Dilaudid for pain, recent facelift on steroids and Levaquin last dose yesterday,  Currently patient states that she has been vomiting x3 episodes.  Notes incomplete bowel emptying.  Has had diarrhea for past few days.  No recent illness that she is aware of.  Also with complaints of urinary symptoms- pt could not elaborate.   Found to have SBO, refusing NGT per notes    Recommendations:  Reporting urinary frequency and burning for a few days  UA not impressive, however given reported sx, would tx ceftriaxone x3-5 days pending clinical improvement  Additional management per surgery team    D/w pt  D/w Renata    Infectious Diseases will continue to follow. Please call with any questions.   Marjorie Curtis M.D.  [facility] [phone]  For after 5 P.M. and weekends, please call [phone]

## 2023-10-05 NOTE — DISCHARGE NOTE PROVIDER - PROVIDER TOKENS
PROVIDER:[TOKEN:[[id number]]],PROVIDER:[TOKEN:[[id number]]] PROVIDER:[TOKEN:[[id number]]],PROVIDER:[TOKEN:[[id number]]],PROVIDER:[TOKEN:[[id number]],FOLLOWUP:[1 week],ESTABLISHEDPATIENT:[T]] PROVIDER:[TOKEN:[[id number]]],PROVIDER:[TOKEN:[[id number]]],PROVIDER:[TOKEN:[[id number]],FOLLOWUP:[1 week],ESTABLISHEDPATIENT:[T]],FREE:[LAST:[Earl],FIRST:[Yesi],PHONE:[(   )    -],FAX:[(   )    -],ADDRESS:[[phone]]]

## 2023-10-05 NOTE — CONSULT NOTE ADULT - ASSESSMENT
bowel obstruction    diet per surgery  chart reviewed  unusual to have obstruction at same site multiple times  she has had it revised twice  ? component of inflammation  start mesalamine  will need CT enterography as outpatient; if negative would consider capsule but may need patency first  she prefers to follow up with our group as outpatient

## 2023-10-05 NOTE — CASE MANAGEMENT PROGRESS NOTE - NSCMPROGRESSNOTE_GEN_ALL_CORE
CM consult noted for stairs in the patients home. Patient is independent with ambulation, no skilled needs noted at the present time.

## 2023-10-05 NOTE — CARE COORDINATION ASSESSMENT. - NSPASTMEDSURGHISTORY_GEN_ALL_CORE_FT
PAST MEDICAL & SURGICAL HISTORY:  Hypothyroidism      HLD (hyperlipidemia)      S/P small bowel resection      SBO (small bowel obstruction)      History of facelift

## 2023-10-05 NOTE — DISCHARGE NOTE PROVIDER - NSDCCPCAREPLAN_GEN_ALL_CORE_FT
PRINCIPAL DISCHARGE DIAGNOSIS  Diagnosis: Small bowel obstruction  Assessment and Plan of Treatment:       SECONDARY DISCHARGE DIAGNOSES  Diagnosis: Hypothyroidism  Assessment and Plan of Treatment:     Diagnosis: Hyperlipidemia  Assessment and Plan of Treatment:      PRINCIPAL DISCHARGE DIAGNOSIS  Diagnosis: Small bowel obstruction  Assessment and Plan of Treatment: Recommend following up with [place] gastrointestinal motility specialist. Monitor symptoms closely. Return to the hospital for evaluation.      SECONDARY DISCHARGE DIAGNOSES  Diagnosis: Hypothyroidism  Assessment and Plan of Treatment: Cont your meds    Diagnosis: Hyperlipidemia  Assessment and Plan of Treatment: Cont your meds    Diagnosis: Laryngospasm  Assessment and Plan of Treatment: You were treated for a possible laryngospasm. You were given steroids, pepcid, benadryl, pantoprazole. Recommend continuing meds for short interval and following up with primary care provider within 2 weeks.

## 2023-10-05 NOTE — DISCHARGE NOTE PROVIDER - HOSPITAL COURSE
HPI:   69 y/o female with hx of multiple bouts of SBO, first surgery July 2022, Renata, pt presents with diffuse abdominal pain, distension started this AM, progressively getting worse, 3 episodes of vomiting, no fever, no chills, no CP, no SOB, + two small BM today, pt requesting Dilaudid for pain, recent facelift on steroids and Levaquin last dose yesterday,  Currently patient states that she has been vomiting x3 episodes.  Notes incomplete bowel emptying.  Has had diarrhea for past few days.  No recent illness that she is aware of.  Also with complaints of urinary symptoms- pt could not elaborate.    (04 Oct 2023 23:01)    HOSPITAL COURSE:  Pt admitted to the hospital.  Did well overnight, and began passing flatus and having bowel movements on hospital day 1.  Was started on rocephin for possible UTI.  GI consulted and started patient on mesalamine for possible inflammation in and around anastamosis site.  Will continues to follow up with GI group outpatient as well.   AS gi function returned, Diet was slowly advanced until tolerating a low fiber diet.       DISPO  Outpatient follow up in 1-2 weeks with Dr. Yanez and GI specialist (Dr. Carpenter/Dr Wei)   HPI:   67 y/o female with hx of multiple bouts of SBO, first surgery July 2022, Renata, pt presents with diffuse abdominal pain, distension started this AM, progressively getting worse, 3 episodes of vomiting, no fever, no chills, no CP, no SOB, + two small BM today, pt requesting Dilaudid for pain, recent facelift on steroids and Levaquin last dose yesterday,  Currently patient states that she has been vomiting x3 episodes.  Notes incomplete bowel emptying.  Has had diarrhea for past few days.  No recent illness that she is aware of.  Also with complaints of urinary symptoms- pt could not elaborate.    (04 Oct 2023 23:01)    HOSPITAL COURSE:  Pt admitted to the hospital.  Did well overnight, and began passing flatus and having bowel movements on hospital day 1.  Was started on rocephin for possible UTI.  GI consulted and started patient on mesalamine for possible inflammation in and around anastamosis site.  Will continues to follow up with GI group outpatient as well.   The patient had a prolonged course. Developed intractable nausea and intractable abd pain. She was transferred to the hospitalist service.   in AM on 10/20/23, the pt had RRT called due to sensation of inability to breath. She felt as if her throat were closely and she couldn't breath or swallow. She was given epi, solu-medrol, benadryl out of concern for anaphylaxis. There were no skin manifestation, hypotension. She was upgraded to MICU for short interval for close monitoring. Weaned off BiPAP to room air without events.   Pt underwent gastric emptying study. Results PENDING    ---  T(C): 36.8 (10-23-23 @ 04:48), Max: 36.9 (10-22-23 @ 19:41)  HR: 70 (10-23-23 @ 04:48) (70 - 97)  BP: 116/67 (10-23-23 @ 04:48) (116/67 - 122/77)  RR: 16 (10-23-23 @ 04:48) (16 - 18)  SpO2: 93% (10-23-23 @ 04:48) (93% - 97%)    PHYSICAL EXAM:  GENERAL: patient appears calm, alert, comfortable  ENMT: oropharynx clear without erythema, moist mucous membranes  LUNGS: no wheezing, no accessory muscle use  HEART: soft S1/S2, tachy, distant heart sounds  GASTROINTESTINAL: abdomen is softly distended, normoactive bowel sounds  MUSCULOSKELETAL: no clubbing or cyanosis, no obvious deformity  NEUROLOGIC: awake, alert, readily interactive HPI:   69 y/o female with hx of multiple bouts of SBO, first surgery July 2022, Renata, pt presents with diffuse abdominal pain, distension started this AM, progressively getting worse, 3 episodes of vomiting, no fever, no chills, no CP, no SOB, + two small BM today, pt requesting Dilaudid for pain, recent facelift on steroids and Levaquin last dose yesterday,  Currently patient states that she has been vomiting x3 episodes.  Notes incomplete bowel emptying.  Has had diarrhea for past few days.  No recent illness that she is aware of.  Also with complaints of urinary symptoms- pt could not elaborate.    (04 Oct 2023 23:01)    HOSPITAL COURSE:  Pt admitted to the hospital.  Did well overnight, and began passing flatus and having bowel movements on hospital day 1.  Was started on rocephin for possible UTI.  GI consulted and started patient on mesalamine for possible inflammation in and around anastamosis site.  Will continues to follow up with GI group outpatient as well.   The patient had a prolonged course. Developed intractable nausea and intractable abd pain. She was transferred to the hospitalist service.   in AM on 10/20/23, the pt had RRT called due to sensation of inability to breath. She felt as if her throat were closely and she couldn't breath or swallow. She was given epi, solu-medrol, benadryl out of concern for anaphylaxis. There were no skin manifestation, hypotension. She was upgraded to MICU for short interval for close monitoring. Weaned off BiPAP to room air without events.   Pt underwent gastric emptying study.   Results:  FINDINGS:    TIME                 % RETENTION        NORMAL VALUES      0                               100%    1                               90  %                     37- 90%    2                               35  %                     30- 60%    3                               23  %                  up to 30%    4                               11  %                  up to 10%    Visual inspection of the images was adequate.    IMPRESSION:    Abnormal gastric emptying study.    Borderline delayed gastric emptying study solid phase.        Rec outpatient f/u with motility specialists. Pt agreeable. Has appt's pending.   Tolerating full liquids and will transition to soft diet as outpatient.   Agreeable w/ dc planning.    ---  T(C): 36.8 (10-23-23 @ 04:48), Max: 36.9 (10-22-23 @ 19:41)  HR: 70 (10-23-23 @ 04:48) (70 - 97)  BP: 116/67 (10-23-23 @ 04:48) (116/67 - 122/77)  RR: 16 (10-23-23 @ 04:48) (16 - 18)  SpO2: 93% (10-23-23 @ 04:48) (93% - 97%)    PHYSICAL EXAM:  GENERAL: patient appears calm, alert, comfortable  ENMT: oropharynx clear without erythema, moist mucous membranes  LUNGS: no wheezing, no accessory muscle use  HEART: soft S1/S2, tachy, distant heart sounds  GASTROINTESTINAL: abdomen is softly distended, normoactive bowel sounds  MUSCULOSKELETAL: no clubbing or cyanosis, no obvious deformity  NEUROLOGIC: awake, alert, readily interactive

## 2023-10-05 NOTE — DISCHARGE NOTE PROVIDER - NPI NUMBER (FOR SYSADMIN USE ONLY) :
[[id number] [[id number]],[[id number]] [[id number]],[[id number]],[UNKNOWN]

## 2023-10-05 NOTE — DISCHARGE NOTE PROVIDER - NSDCFUADDAPPT_GEN_ALL_CORE_FT
Please call to schedule appointment with Dr Yanez, Dr Carpenter/Dr Wei (GI specialists) Please call to schedule appointment with Dr Yanez, Dr Carpenter/Dr Wei (GI specialists). Follow up with [facility].

## 2023-10-05 NOTE — PROGRESS NOTE ADULT - ASSESSMENT
67y/o Female PSHx colon resection w/ rectopexy (2016), SBR with anastomosis x2 (7/10/22, 9/27/22 - open; both with Dr. Yanez), revision of anastomosis 1/10/23 at [facility], now a/w recurrent SBO with transition point distal to the small bowel anastomosis. Pt with VSSAF, now +F, +BM (pencil thin stools overnight), however with "boring, burning" pain of the LLQ & L CVA tenderness, of note UA with +leukocyte esterase, occasional bacteria, urine culture pending.

## 2023-10-05 NOTE — DISCHARGE NOTE PROVIDER - CARE PROVIDER_API CALL
Dustin Yanez Savage  Surgery  [address] [place] [address]  Phone: [phone]  Fax: [phone]  Follow Up Time:     Chris Carpenter  Gastroenterology  [address] [place] [address]  Phone: [phone]  Fax: [phone]  Follow Up Time:    Dustin Yanez  Surgery  [address] [place] [address]  Phone: [phone]  Fax: [phone]  Follow Up Time:     Chris Carpenter  Gastroenterology  [address] [place] [address]  Phone: [phone]  Fax: [phone]  Follow Up Time:     Anshu Méndez  Internal Medicine  [address] [facility], [address]  [place] [address]  Phone: [phone]  Fax: [phone]  Established Patient  Follow Up Time: 1 week   Dustin Yanez  Surgery  [address] [place] [address]  Phone: [phone]  Fax: [phone]  Follow Up Time:     Chris Carpenter  Gastroenterology  [address] [place] [address]  Phone: [phone]  Fax: [phone]  Follow Up Time:     Anshu Méndez  Internal Medicine  [address] [facility] [facility], [address]  [place] [address]  Phone: [phone]  Fax: [phone]  Established Patient  Follow Up Time: 1 week    Yesi Elliott  [phone]  Phone: (   )    -  Fax: (   )    -  Follow Up Time:

## 2023-10-05 NOTE — CONSULT NOTE ADULT - SUBJECTIVE AND OBJECTIVE BOX
[facility]  J Luis Almanza PA-C  [address] [place] [address] [phone]      Chief Complaint:  Patient is a 68y old  Female who presents with a chief complaint of SBO (05 Oct 2023 10:22)      HPI: 69 y/o female with hx of multiple bouts of SBO, first surgery July 2022, Renata, pt presents with diffuse abdominal pain, distension started this AM, progressively getting worse, 3 episodes of vomiting, no fever, no chills, no CP, no SOB, + two small BM today, pt requesting Dilaudid for pain, recent facelift on steroids and Levaquin last dose yesterday,  Currently patient states that she has been vomiting x3 episodes.  Notes incomplete bowel emptying.  Has had diarrhea for past few days.  No recent illness that she is aware of.  Also with complaints of urinary symptoms- pt could not elaborate.     Allergies:  tetanus toxoid (Anaphylaxis)      Medications:  acetaminophen   IVPB .. 1000 milliGRAM(s) IV Intermittent once  cefTRIAXone   IVPB 1000 milliGRAM(s) IV Intermittent every 24 hours  HYDROmorphone  Injectable 0.5 milliGRAM(s) IV Push every 4 hours PRN  influenza  Vaccine (HIGH DOSE) 0.7 milliLiter(s) IntraMuscular once  ketorolac   Injectable 15 milliGRAM(s) IV Push every 4 hours PRN  levothyroxine 88 MICROGram(s) Oral daily  ondansetron Injectable 4 milliGRAM(s) IV Push every 6 hours PRN  pantoprazole  Injectable 40 milliGRAM(s) IV Push daily  sodium chloride 0.9%. 1000 milliLiter(s) IV Continuous <Continuous>      PMHX/PSHX:  Hypothyroidism    HLD (hyperlipidemia)    SBO (small bowel obstruction)    History of colon resection    S/P small bowel resection    S/P small bowel resection    History of facelift        Family history:  No pertinent family history in first degree relatives        Social History:     ROS:     General:  no fevers, chills, night sweats, fatigue,   Eyes:  Good vision, no reported pain  ENT:  No sore throat, pain, runny nose, dysphagia  CV:  No pain, palpitations, hypo/hypertension  Resp:  No dyspnea, cough, tachypnea, wheezing  GI:  No pain, No nausea, No vomiting, No diarrhea, No constipation, No weight loss, No fever, No pruritis, No rectal bleeding, No tarry stools, No dysphagia,  :  No pain, bleeding, incontinence, nocturia  Muscle:  No pain, weakness  Neuro:  No weakness, tingling, memory problems  Psych:  No fatigue, insomnia, mood problems, depression  Endocrine:  No polyuria, polydipsia, cold/heat intolerance  Heme:  No petechiae, ecchymosis, easy bruisability  Skin:  No rash, tattoos, scars, edema      PHYSICAL EXAM:   Vital Signs:  Vital Signs Last 24 Hrs  T(C): 36.7 (05 Oct 2023 12:10), Max: 36.9 (04 Oct 2023 18:29)  T(F): 98.1 (05 Oct 2023 12:10), Max: 98.4 (04 Oct 2023 18:29)  HR: 71 (05 Oct 2023 12:10) (68 - 95)  BP: 102/65 (05 Oct 2023 12:10) (102/65 - 159/84)  BP(mean): --  RR: 14 (05 Oct 2023 12:10) (14 - 16)  SpO2: 93% (05 Oct 2023 12:10) (93% - 97%)    Parameters below as of 05 Oct 2023 12:10  Patient On (Oxygen Delivery Method): room air      Daily Height in cm: 157.48 (04 Oct 2023 18:29)    Daily     GENERAL:  Appears stated age,   HEENT:  NC/AT,    CHEST:  Full & symmetric excursion,   HEART:  Regular rhythm  ABDOMEN:  Soft, non-tender, non-distended,   EXTEREMITIES:  no cyanosis,clubbing or edema  SKIN:  No rash  NEURO:  Alert,    LABS:                        11.0   7.14  )-----------( 211      ( 05 Oct 2023 10:00 )             34.3     10-05    140  |  107  |  14  ----------------------------<  100<H>  3.9   |  28  |  0.62    Ca    8.5      05 Oct 2023 07:52  Phos  3.6     10-05  Mg     2.4     10-05    TPro  7.6  /  Alb  3.9  /  TBili  1.7<H>  /  DBili  x   /  AST  23  /  ALT  51  /  AlkPhos  107  10-04    LIVER FUNCTIONS - ( 04 Oct 2023 20:37 )  Alb: 3.9 g/dL / Pro: 7.6 g/dL / ALK PHOS: 107 U/L / ALT: 51 U/L / AST: 23 U/L / GGT: x           PT/INR - ( 04 Oct 2023 20:48 )   PT: 9.9 sec;   INR: 0.84 ratio         PTT - ( 04 Oct 2023 20:48 )  PTT:29.4 sec  Urinalysis Basic - ( 05 Oct 2023 07:52 )    Color: x / Appearance: x / SG: x / pH: x  Gluc: 100 mg/dL / Ketone: x  / Bili: x / Urobili: x   Blood: x / Protein: x / Nitrite: x   Leuk Esterase: x / RBC: x / WBC x   Sq Epi: x / Non Sq Epi: x / Bacteria: x      Amylase Serum--      Lipase serum46       Ammonia--      Imaging:

## 2023-10-06 ENCOUNTER — TRANSCRIPTION ENCOUNTER (OUTPATIENT)
Age: 68
End: 2023-10-06

## 2023-10-06 LAB
ANION GAP SERPL CALC-SCNC: 3 MMOL/L — LOW (ref 5–17)
BUN SERPL-MCNC: 5 MG/DL — LOW (ref 7–23)
CALCIUM SERPL-MCNC: 8.3 MG/DL — LOW (ref 8.5–10.1)
CHLORIDE SERPL-SCNC: 109 MMOL/L — HIGH (ref 96–108)
CO2 SERPL-SCNC: 32 MMOL/L — HIGH (ref 22–31)
CREAT SERPL-MCNC: 0.57 MG/DL — SIGNIFICANT CHANGE UP (ref 0.5–1.3)
EGFR: 99 ML/MIN/1.73M2 — SIGNIFICANT CHANGE UP
GLUCOSE SERPL-MCNC: 69 MG/DL — LOW (ref 70–99)
HCT VFR BLD CALC: 34.7 % — SIGNIFICANT CHANGE UP (ref 34.5–45)
HGB BLD-MCNC: 11 G/DL — LOW (ref 11.5–15.5)
MAGNESIUM SERPL-MCNC: 2.5 MG/DL — SIGNIFICANT CHANGE UP (ref 1.6–2.6)
MCHC RBC-ENTMCNC: 30.9 PG — SIGNIFICANT CHANGE UP (ref 27–34)
MCHC RBC-ENTMCNC: 31.7 GM/DL — LOW (ref 32–36)
MCV RBC AUTO: 97.5 FL — SIGNIFICANT CHANGE UP (ref 80–100)
NRBC # BLD: 0 /100 WBCS — SIGNIFICANT CHANGE UP (ref 0–0)
PHOSPHATE SERPL-MCNC: 2.3 MG/DL — LOW (ref 2.5–4.5)
PLATELET # BLD AUTO: 196 K/UL — SIGNIFICANT CHANGE UP (ref 150–400)
POTASSIUM SERPL-MCNC: 3.4 MMOL/L — LOW (ref 3.5–5.3)
POTASSIUM SERPL-SCNC: 3.4 MMOL/L — LOW (ref 3.5–5.3)
RBC # BLD: 3.56 M/UL — LOW (ref 3.8–5.2)
RBC # FLD: 13.2 % — SIGNIFICANT CHANGE UP (ref 10.3–14.5)
SODIUM SERPL-SCNC: 144 MMOL/L — SIGNIFICANT CHANGE UP (ref 135–145)
WBC # BLD: 7.69 K/UL — SIGNIFICANT CHANGE UP (ref 3.8–10.5)
WBC # FLD AUTO: 7.69 K/UL — SIGNIFICANT CHANGE UP (ref 3.8–10.5)

## 2023-10-06 PROCEDURE — 74018 RADEX ABDOMEN 1 VIEW: CPT | Mod: 26

## 2023-10-06 RX ORDER — ONDANSETRON 8 MG/1
4 TABLET, FILM COATED ORAL ONCE
Refills: 0 | Status: COMPLETED | OUTPATIENT
Start: 2023-10-06 | End: 2023-10-06

## 2023-10-06 RX ORDER — ACETAMINOPHEN 500 MG
1000 TABLET ORAL ONCE
Refills: 0 | Status: COMPLETED | OUTPATIENT
Start: 2023-10-06 | End: 2023-10-06

## 2023-10-06 RX ORDER — POTASSIUM PHOSPHATE, MONOBASIC POTASSIUM PHOSPHATE, DIBASIC 236; 224 MG/ML; MG/ML
30 INJECTION, SOLUTION INTRAVENOUS ONCE
Refills: 0 | Status: COMPLETED | OUTPATIENT
Start: 2023-10-06 | End: 2023-10-06

## 2023-10-06 RX ORDER — HYDROMORPHONE HYDROCHLORIDE 2 MG/ML
1 INJECTION INTRAMUSCULAR; INTRAVENOUS; SUBCUTANEOUS ONCE
Refills: 0 | Status: DISCONTINUED | OUTPATIENT
Start: 2023-10-06 | End: 2023-10-06

## 2023-10-06 RX ORDER — ACETAMINOPHEN 500 MG
1000 TABLET ORAL ONCE
Refills: 0 | Status: COMPLETED | OUTPATIENT
Start: 2023-10-07 | End: 2023-10-07

## 2023-10-06 RX ORDER — POTASSIUM CHLORIDE 20 MEQ
20 PACKET (EA) ORAL ONCE
Refills: 0 | Status: DISCONTINUED | OUTPATIENT
Start: 2023-10-06 | End: 2023-10-06

## 2023-10-06 RX ORDER — ACETAMINOPHEN 500 MG
1000 TABLET ORAL ONCE
Refills: 0 | Status: DISCONTINUED | OUTPATIENT
Start: 2023-10-06 | End: 2023-10-06

## 2023-10-06 RX ORDER — ENOXAPARIN SODIUM 100 MG/ML
40 INJECTION SUBCUTANEOUS EVERY 24 HOURS
Refills: 0 | Status: DISCONTINUED | OUTPATIENT
Start: 2023-10-06 | End: 2023-10-23

## 2023-10-06 RX ORDER — HYDROMORPHONE HYDROCHLORIDE 2 MG/ML
1 INJECTION INTRAMUSCULAR; INTRAVENOUS; SUBCUTANEOUS EVERY 4 HOURS
Refills: 0 | Status: DISCONTINUED | OUTPATIENT
Start: 2023-10-06 | End: 2023-10-10

## 2023-10-06 RX ORDER — METOCLOPRAMIDE HCL 10 MG
10 TABLET ORAL ONCE
Refills: 0 | Status: COMPLETED | OUTPATIENT
Start: 2023-10-06 | End: 2023-10-06

## 2023-10-06 RX ADMIN — HYDROMORPHONE HYDROCHLORIDE 0.5 MILLIGRAM(S): 2 INJECTION INTRAMUSCULAR; INTRAVENOUS; SUBCUTANEOUS at 05:18

## 2023-10-06 RX ADMIN — Medication 25 MILLIGRAM(S): at 23:14

## 2023-10-06 RX ADMIN — PANTOPRAZOLE SODIUM 40 MILLIGRAM(S): 20 TABLET, DELAYED RELEASE ORAL at 13:55

## 2023-10-06 RX ADMIN — HYDROMORPHONE HYDROCHLORIDE 0.5 MILLIGRAM(S): 2 INJECTION INTRAMUSCULAR; INTRAVENOUS; SUBCUTANEOUS at 06:15

## 2023-10-06 RX ADMIN — Medication 400 MILLIGRAM(S): at 05:18

## 2023-10-06 RX ADMIN — HYDROMORPHONE HYDROCHLORIDE 0.5 MILLIGRAM(S): 2 INJECTION INTRAMUSCULAR; INTRAVENOUS; SUBCUTANEOUS at 10:16

## 2023-10-06 RX ADMIN — Medication 1000 MILLIGRAM(S): at 21:50

## 2023-10-06 RX ADMIN — HYDROMORPHONE HYDROCHLORIDE 1 MILLIGRAM(S): 2 INJECTION INTRAMUSCULAR; INTRAVENOUS; SUBCUTANEOUS at 17:43

## 2023-10-06 RX ADMIN — POTASSIUM PHOSPHATE, MONOBASIC POTASSIUM PHOSPHATE, DIBASIC 83.33 MILLIMOLE(S): 236; 224 INJECTION, SOLUTION INTRAVENOUS at 18:48

## 2023-10-06 RX ADMIN — Medication 400 MILLIGRAM(S): at 13:56

## 2023-10-06 RX ADMIN — HYDROMORPHONE HYDROCHLORIDE 0.5 MILLIGRAM(S): 2 INJECTION INTRAMUSCULAR; INTRAVENOUS; SUBCUTANEOUS at 15:20

## 2023-10-06 RX ADMIN — HYDROMORPHONE HYDROCHLORIDE 1 MILLIGRAM(S): 2 INJECTION INTRAMUSCULAR; INTRAVENOUS; SUBCUTANEOUS at 21:19

## 2023-10-06 RX ADMIN — CEFTRIAXONE 100 MILLIGRAM(S): 500 INJECTION, POWDER, FOR SOLUTION INTRAMUSCULAR; INTRAVENOUS at 13:57

## 2023-10-06 RX ADMIN — HYDROMORPHONE HYDROCHLORIDE 0.5 MILLIGRAM(S): 2 INJECTION INTRAMUSCULAR; INTRAVENOUS; SUBCUTANEOUS at 11:00

## 2023-10-06 RX ADMIN — ONDANSETRON 4 MILLIGRAM(S): 8 TABLET, FILM COATED ORAL at 13:55

## 2023-10-06 RX ADMIN — Medication 400 MILLIGRAM(S): at 21:18

## 2023-10-06 RX ADMIN — SODIUM CHLORIDE 100 MILLILITER(S): 9 INJECTION INTRAMUSCULAR; INTRAVENOUS; SUBCUTANEOUS at 13:56

## 2023-10-06 RX ADMIN — Medication 10 MILLIGRAM(S): at 17:57

## 2023-10-06 RX ADMIN — HYDROMORPHONE HYDROCHLORIDE 1 MILLIGRAM(S): 2 INJECTION INTRAMUSCULAR; INTRAVENOUS; SUBCUTANEOUS at 18:45

## 2023-10-06 RX ADMIN — Medication 88 MICROGRAM(S): at 05:18

## 2023-10-06 RX ADMIN — ENOXAPARIN SODIUM 40 MILLIGRAM(S): 100 INJECTION SUBCUTANEOUS at 23:14

## 2023-10-06 RX ADMIN — HYDROMORPHONE HYDROCHLORIDE 1 MILLIGRAM(S): 2 INJECTION INTRAMUSCULAR; INTRAVENOUS; SUBCUTANEOUS at 21:45

## 2023-10-06 RX ADMIN — HYDROMORPHONE HYDROCHLORIDE 0.5 MILLIGRAM(S): 2 INJECTION INTRAMUSCULAR; INTRAVENOUS; SUBCUTANEOUS at 14:20

## 2023-10-06 NOTE — PROGRESS NOTE ADULT - SUBJECTIVE AND OBJECTIVE BOX
[facility]  J Luis Almanza PA-C  [address] [place] [address]  [phone]      INTERVAL HPI/OVERNIGHT EVENTS:    vomiting after lunch was advanced     MEDICATIONS  (STANDING):  acetaminophen   IVPB .. 1000 milliGRAM(s) IV Intermittent once  cefTRIAXone   IVPB 1000 milliGRAM(s) IV Intermittent every 24 hours  influenza  Vaccine (HIGH DOSE) 0.7 milliLiter(s) IntraMuscular once  levothyroxine 88 MICROGram(s) Oral daily  mesalamine DR Capsule 400 milliGRAM(s) Oral three times a day  pantoprazole  Injectable 40 milliGRAM(s) IV Push daily  potassium phosphate IVPB 30 milliMole(s) IV Intermittent once  sodium chloride 0.9%. 1000 milliLiter(s) (100 mL/Hr) IV Continuous <Continuous>    MEDICATIONS  (PRN):  diphenhydrAMINE Injectable 25 milliGRAM(s) IV Push every 6 hours PRN Insomnia  HYDROmorphone  Injectable 0.5 milliGRAM(s) IV Push every 4 hours PRN Severe Pain (7 - 10)  ketorolac   Injectable 15 milliGRAM(s) IV Push every 4 hours PRN Moderate Pain (4 - 6)  ondansetron Injectable 4 milliGRAM(s) IV Push every 6 hours PRN Nausea and/or Vomiting      Allergies    tetanus toxoid (Anaphylaxis)    Intolerances        ROS:   General:  No  fevers, chills, night sweats, fatigue,   Eyes:  Good vision, no reported pain  ENT:  No sore throat, pain, runny nose, dysphagia  CV:  No pain, palpitations, hypo/hypertension  Resp:  No dyspnea, cough, tachypnea, wheezing  GI:  No pain, No nausea, No vomiting, No diarrhea, No constipation, No weight loss, No fever, No pruritis, No rectal bleeding, No tarry stools, No dysphagia,  :  No pain, bleeding, incontinence, nocturia  Muscle:  No pain, weakness  Neuro:  No weakness, tingling, memory problems  Psych:  No fatigue, insomnia, mood problems, depression  Endocrine:  No polyuria, polydipsia, cold/heat intolerance  Heme:  No petechiae, ecchymosis, easy bruisability  Skin:  No rash, tattoos, scars, edema      PHYSICAL EXAM:   Vital Signs:  Vital Signs Last 24 Hrs  T(C): 37.1 (06 Oct 2023 11:43), Max: 37.1 (06 Oct 2023 11:43)  T(F): 98.8 (06 Oct 2023 11:43), Max: 98.8 (06 Oct 2023 11:43)  HR: 76 (06 Oct 2023 11:43) (71 - 85)  BP: 119/74 (06 Oct 2023 11:43) (112/74 - 129/82)  BP(mean): --  RR: 19 (06 Oct 2023 11:43) (16 - 19)  SpO2: 94% (06 Oct 2023 11:43) (94% - 98%)    Parameters below as of 06 Oct 2023 11:43  Patient On (Oxygen Delivery Method): room air      Daily     Daily     GENERAL:  Appears stated age,   HEENT:  NC/AT,    CHEST:  Full & symmetric excursion,   HEART:  Regular rhythm,  ABDOMEN:  Soft, non-tender, non-distended,  EXTEREMITIES:  no cyanosis  SKIN:  No rash  NEURO:  Alert,       LABS:                        11.0   7.69  )-----------( 196      ( 06 Oct 2023 10:40 )             34.7     10-06    144  |  109<H>  |  5<L>  ----------------------------<  69<L>  3.4<L>   |  32<H>  |  0.57    Ca    8.3<L>      06 Oct 2023 10:40  Phos  2.3     10-06  Mg     2.5     10-06    TPro  7.6  /  Alb  3.9  /  TBili  1.7<H>  /  DBili  x   /  AST  23  /  ALT  51  /  AlkPhos  107  10-04    PT/INR - ( 04 Oct 2023 20:48 )   PT: 9.9 sec;   INR: 0.84 ratio         PTT - ( 04 Oct 2023 20:48 )  PTT:29.4 sec  Urinalysis Basic - ( 06 Oct 2023 10:40 )    Color: x / Appearance: x / SG: x / pH: x  Gluc: 69 mg/dL / Ketone: x  / Bili: x / Urobili: x   Blood: x / Protein: x / Nitrite: x   Leuk Esterase: x / RBC: x / WBC x   Sq Epi: x / Non Sq Epi: x / Bacteria: x        RADIOLOGY & ADDITIONAL TESTS:

## 2023-10-06 NOTE — SBIRT NOTE ADULT - NSSBIRTALCNOACTINTDET_GEN_A_CORE
Pt reported that she will have 1-2 glasses of wine with dinner socially approx 2/3x weekly. Denies concerns of ETOH use. SW to follow and remain available for any needs.

## 2023-10-06 NOTE — SBIRT NOTE ADULT - NSSBIRTDRGUSEDOTHTHAN_GEN_A_CORE
Problem: Patient Care Overview  Goal: Plan of Care Review  Outcome: Ongoing (interventions implemented as appropriate)  Pt has remained free from injury this shift. Bed in low locked position. Call light and personal belongings within reach. Side rails up x2. Nonskid socks in place. Pt ambulates in room with stand by assistance. Electrolyte replacements given as needed. Pt c/o anxiety throughout shift; prn xanax given. No c/o pain thus far. zofran given for nausea. Pt remains afebrile thus far this shift. Imodium given this shift for diarrhea. Daughter at bedside and involved in care. Pt instructed to call with any needs. Will continue to monitor.         No

## 2023-10-06 NOTE — PROGRESS NOTE ADULT - ASSESSMENT
67 y/o female with hx of multiple bouts of SBO, first surgery July 2022, [facility], pt presents with diffuse abdominal pain, distension started this AM, progressively getting worse, 3 episodes of vomiting, no fever, no chills, no CP, no SOB, + two small BM today, pt requesting Dilaudid for pain, recent facelift on steroids and Levaquin last dose yesterday,  Currently patient states that she has been vomiting x3 episodes.  Notes incomplete bowel emptying.  Has had diarrhea for past few days.  No recent illness that she is aware of.  Also with complaints of urinary symptoms- pt could not elaborate.   Found to have SBO, refusing NGT per notes    Recommendations:  Reporting urinary frequency and burning for a few days  UA not impressive, however given reported sx, would tx ceftriaxone x5 days pending clinical improvement  Can switch to PO vantin 200mg BID to complete course on 10/9/23  Additional management per surgery team    D/w pt  D/w surgery PA    Stable from ID standpoint   D/c planning per primary team    Infectious Diseases will sign off.  Please call with any questions.   Marjorie Curtis M.D.  [facility]ious [facility] [phone]  For after 5 P.M. and weekends, please call [phone]

## 2023-10-06 NOTE — PROGRESS NOTE ADULT - SUBJECTIVE AND OBJECTIVE BOX
SURGERY PA NOTE ON BEHALF OF DR. Yanez / General SURGERY:    S: Patient seen and examined at bedside.     Pain minimal and just achy sensation.   Passing multiple flatus and has BMs  Tolerating clears without n/v.   Denies fever/chills.   Ambulating, voiding.     MEDICATIONS:  cefTRIAXone   IVPB 1000 milliGRAM(s) IV Intermittent every 24 hours  diphenhydrAMINE Injectable 25 milliGRAM(s) IV Push every 6 hours PRN  HYDROmorphone  Injectable 0.5 milliGRAM(s) IV Push every 4 hours PRN  influenza  Vaccine (HIGH DOSE) 0.7 milliLiter(s) IntraMuscular once  ketorolac   Injectable 15 milliGRAM(s) IV Push every 4 hours PRN  levothyroxine 88 MICROGram(s) Oral daily  mesalamine DR Capsule 400 milliGRAM(s) Oral three times a day  ondansetron Injectable 4 milliGRAM(s) IV Push every 6 hours PRN  pantoprazole  Injectable 40 milliGRAM(s) IV Push daily  sodium chloride 0.9%. 1000 milliLiter(s) IV Continuous <Continuous>      O:  Vital Signs Last 24 Hrs  T(C): 36.4 (06 Oct 2023 04:59), Max: 36.9 (05 Oct 2023 19:48)  T(F): 97.6 (06 Oct 2023 04:59), Max: 98.5 (05 Oct 2023 19:48)  HR: 71 (06 Oct 2023 04:59) (71 - 85)  BP: 112/74 (06 Oct 2023 04:59) (102/65 - 129/82)  BP(mean): --  RR: 17 (06 Oct 2023 04:59) (14 - 17)  SpO2: 96% (06 Oct 2023 04:59) (93% - 98%)    Parameters below as of 06 Oct 2023 04:59  Patient On (Oxygen Delivery Method): room air        I&O SUMMARY:    10-05-23 @ 07:01  -  10-06-23 @ 07:00  --------------------------------------------------------  IN: 1300 mL / OUT: 0 mL / NET: 1300 mL        PHYSICAL EXAM:  GENERAL: No acute distress, lying comfortably in bed  HEAD:  Atraumatic, Normocephalic  CHEST/LUNG: Non labored respirations, no accessory muscle use  HEART: Regular rate and rhythm  ABDOMEN: Soft, non-distended, midline surgical incision well healed, non-tender. no guarding or rebound.   EXT: calves non-tender b/l, no edema  NEUROLOGY: A&O x 3, no focal deficits       LABS:                        11.0   7.14  )-----------( 211      ( 05 Oct 2023 10:00 )             34.3     10-05    140  |  107  |  14  ----------------------------<  100<H>  3.9   |  28  |  0.62    Ca    8.5      05 Oct 2023 07:52  Phos  3.6     10-05  Mg     2.4     10-05    TPro  7.6  /  Alb  3.9  /  TBili  1.7<H>  /  DBili  x   /  AST  23  /  ALT  51  /  AlkPhos  107  10-04    PT/INR - ( 04 Oct 2023 20:48 )   PT: 9.9 sec;   INR: 0.84 ratio         PTT - ( 04 Oct 2023 20:48 )  PTT:29.4 sec    < from: CT Abdomen and Pelvis w/ IV Cont (10.04.23 @ 21:59) >  IMPRESSION:  Findings compatible with small bowel obstruction, with transition seen   just distal to a small bowel anastomosis. There is associated wall   thickening and mesenteric edema, which may suggest vascular compromise.    Steatosis.    Mild prominence of the biliary and pancreatic ducts, similar compared to   previous exam and has remained stable dating back to 7/10/2022.    Gastritis.    Thick-walled appearance of the urinary bladder which may represent   cystitis.    Additional findings as above.    Findings were discussed with Dr. MICHELINE CARRILLO [phone] 10/4/2023 10:53   PM by Dr. AYERS with read back confirmation.    --- End of Report ---    < end of copied text >      Assessment and Plan:   · Assessment	  69y/o Female PSHx colon resection w/ rectopexy (2016), SBR with anastomosis x2 (7/10/22, 9/27/22 - open; both with Dr. Yanez), revision of anastomosis 1/10/23 at [facility], now a/w recurrent SBO with transition point distal to the small bowel anastomosis. Pt with VSSAF, now +F, +BM, however with "boring, burning" pain of the LLQ & L CVA tenderness, of note UA with +leukocyte esterase, occasional bacteria, urine culture pending.  started on Rocephin with ID consult.   Leukocytosis resolved on Rocephin.   Resolved SBO with return of bowel functions.      Problem/Plan - 1:  ·  Problem: SBO (small bowel obstruction).   ·  Plan: - VSSAF  - AM labs reassuring.  - Adv diet  - Start Rocephin empirically for cystitis  - Pain control as needed  - Encourage OOB/ambulation & incentive spirometry  - Will consult GI & ID  - DVT ppx with SCD    Discussed with Dr. Yanez    SURGERY PA NOTE ON BEHALF OF DR. Yanez / General SURGERY:    S: Patient seen and examined at bedside.     Pain minimal and just achy sensation.   Passing multiple flatus and has BMs  Tolerating clears without n/v.   Denies fever/chills.   Ambulating, voiding.     MEDICATIONS:  cefTRIAXone   IVPB 1000 milliGRAM(s) IV Intermittent every 24 hours  diphenhydrAMINE Injectable 25 milliGRAM(s) IV Push every 6 hours PRN  HYDROmorphone  Injectable 0.5 milliGRAM(s) IV Push every 4 hours PRN  influenza  Vaccine (HIGH DOSE) 0.7 milliLiter(s) IntraMuscular once  ketorolac   Injectable 15 milliGRAM(s) IV Push every 4 hours PRN  levothyroxine 88 MICROGram(s) Oral daily  mesalamine DR Capsule 400 milliGRAM(s) Oral three times a day  ondansetron Injectable 4 milliGRAM(s) IV Push every 6 hours PRN  pantoprazole  Injectable 40 milliGRAM(s) IV Push daily  sodium chloride 0.9%. 1000 milliLiter(s) IV Continuous <Continuous>      O:  Vital Signs Last 24 Hrs  T(C): 36.4 (06 Oct 2023 04:59), Max: 36.9 (05 Oct 2023 19:48)  T(F): 97.6 (06 Oct 2023 04:59), Max: 98.5 (05 Oct 2023 19:48)  HR: 71 (06 Oct 2023 04:59) (71 - 85)  BP: 112/74 (06 Oct 2023 04:59) (102/65 - 129/82)  BP(mean): --  RR: 17 (06 Oct 2023 04:59) (14 - 17)  SpO2: 96% (06 Oct 2023 04:59) (93% - 98%)    Parameters below as of 06 Oct 2023 04:59  Patient On (Oxygen Delivery Method): room air        I&O SUMMARY:    10-05-23 @ 07:01  -  10-06-23 @ 07:00  --------------------------------------------------------  IN: 1300 mL / OUT: 0 mL / NET: 1300 mL        PHYSICAL EXAM:  GENERAL: No acute distress, lying comfortably in bed  HEAD:  Atraumatic, Normocephalic  CHEST/LUNG: Non labored respirations, no accessory muscle use  HEART: Regular rate and rhythm  ABDOMEN: Soft, non-distended, midline surgical incision well healed, non-tender. no guarding or rebound.   EXT: calves non-tender b/l, no edema  NEUROLOGY: A&O x 3, no focal deficits       LABS:                        11.0   7.14  )-----------( 211      ( 05 Oct 2023 10:00 )             34.3     10-05    140  |  107  |  14  ----------------------------<  100<H>  3.9   |  28  |  0.62    Ca    8.5      05 Oct 2023 07:52  Phos  3.6     10-05  Mg     2.4     10-05    TPro  7.6  /  Alb  3.9  /  TBili  1.7<H>  /  DBili  x   /  AST  23  /  ALT  51  /  AlkPhos  107  10-04    PT/INR - ( 04 Oct 2023 20:48 )   PT: 9.9 sec;   INR: 0.84 ratio         PTT - ( 04 Oct 2023 20:48 )  PTT:29.4 sec    < from: CT Abdomen and Pelvis w/ IV Cont (10.04.23 @ 21:59) >  IMPRESSION:  Findings compatible with small bowel obstruction, with transition seen   just distal to a small bowel anastomosis. There is associated wall   thickening and mesenteric edema, which may suggest vascular compromise.    Steatosis.    Mild prominence of the biliary and pancreatic ducts, similar compared to   previous exam and has remained stable dating back to 7/10/2022.    Gastritis.    Thick-walled appearance of the urinary bladder which may represent   cystitis.    Additional findings as above.    Findings were discussed with Dr. MICHELINE CARRILLO [phone] 10/4/2023 10:53   PM by Dr. AYERS with read back confirmation.    --- End of Report ---    < end of copied text >      Assessment and Plan:   · Assessment	  69y/o Female PSHx colon resection w/ rectopexy (2016), SBR with anastomosis x2 (7/10/22, 9/27/22 - open; both with Dr. Yanez), revision of anastomosis 1/10/23 at [facility], now a/w recurrent SBO with transition point distal to the small bowel anastomosis. Pt with VSSAF, now +F, +BM, minimal achy sensation in LLQ.  of note UA with +leukocyte esterase, occasional bacteria, urine culture pending.  started on Rocephin with ID consult.   Leukocytosis resolved on Rocephin.   Resolved SBO with return of bowel functions.    UTI on Rocephin per ID.      Problem/Plan - 1:  ·  Problem: SBO (small bowel obstruction).   ·  Plan: - VSSAF  - Adv diet to low fiber diet  - continue Rocephin empirically for cystitis x 5 days.    - Pain control as needed  - Encourage OOB/ambulation & incentive spirometry  - GI : start Mesalamine  for ? component of inflammation. Will need CT enteroscopy as outpatient. pt prefers to fu with GI as outpatient.   - ID : Rocephine x 3-5 days for cystitis  - DVT ppx with SCD    Pending discussion with Dr. Yanez    SURGERY PA NOTE ON BEHALF OF DR. Yanez / General SURGERY:    S: Patient seen and examined at bedside.     Pain minimal and just achy sensation.   Passing multiple flatus and has BMs  Tolerating clears without n/v.   Denies fever/chills.   Ambulating, voiding.     MEDICATIONS:  cefTRIAXone   IVPB 1000 milliGRAM(s) IV Intermittent every 24 hours  diphenhydrAMINE Injectable 25 milliGRAM(s) IV Push every 6 hours PRN  HYDROmorphone  Injectable 0.5 milliGRAM(s) IV Push every 4 hours PRN  influenza  Vaccine (HIGH DOSE) 0.7 milliLiter(s) IntraMuscular once  ketorolac   Injectable 15 milliGRAM(s) IV Push every 4 hours PRN  levothyroxine 88 MICROGram(s) Oral daily  mesalamine DR Capsule 400 milliGRAM(s) Oral three times a day  ondansetron Injectable 4 milliGRAM(s) IV Push every 6 hours PRN  pantoprazole  Injectable 40 milliGRAM(s) IV Push daily  sodium chloride 0.9%. 1000 milliLiter(s) IV Continuous <Continuous>      O:  Vital Signs Last 24 Hrs  T(C): 36.4 (06 Oct 2023 04:59), Max: 36.9 (05 Oct 2023 19:48)  T(F): 97.6 (06 Oct 2023 04:59), Max: 98.5 (05 Oct 2023 19:48)  HR: 71 (06 Oct 2023 04:59) (71 - 85)  BP: 112/74 (06 Oct 2023 04:59) (102/65 - 129/82)  BP(mean): --  RR: 17 (06 Oct 2023 04:59) (14 - 17)  SpO2: 96% (06 Oct 2023 04:59) (93% - 98%)    Parameters below as of 06 Oct 2023 04:59  Patient On (Oxygen Delivery Method): room air        I&O SUMMARY:    10-05-23 @ 07:01  -  10-06-23 @ 07:00  --------------------------------------------------------  IN: 1300 mL / OUT: 0 mL / NET: 1300 mL        PHYSICAL EXAM:  GENERAL: No acute distress, lying comfortably in bed  HEAD:  Atraumatic, Normocephalic  CHEST/LUNG: Non labored respirations, no accessory muscle use  HEART: Regular rate and rhythm  ABDOMEN: Soft, non-distended, midline surgical incision well healed, non-tender. no guarding or rebound.   EXT: calves non-tender b/l, no edema  NEUROLOGY: A&O x 3, no focal deficits       LABS:                        11.0   7.14  )-----------( 211      ( 05 Oct 2023 10:00 )             34.3     10-05    140  |  107  |  14  ----------------------------<  100<H>  3.9   |  28  |  0.62    Ca    8.5      05 Oct 2023 07:52  Phos  3.6     10-05  Mg     2.4     10-05    TPro  7.6  /  Alb  3.9  /  TBili  1.7<H>  /  DBili  x   /  AST  23  /  ALT  51  /  AlkPhos  107  10-04    PT/INR - ( 04 Oct 2023 20:48 )   PT: 9.9 sec;   INR: 0.84 ratio         PTT - ( 04 Oct 2023 20:48 )  PTT:29.4 sec    < from: CT Abdomen and Pelvis w/ IV Cont (10.04.23 @ 21:59) >  IMPRESSION:  Findings compatible with small bowel obstruction, with transition seen   just distal to a small bowel anastomosis. There is associated wall   thickening and mesenteric edema, which may suggest vascular compromise.    Steatosis.    Mild prominence of the biliary and pancreatic ducts, similar compared to   previous exam and has remained stable dating back to 7/10/2022.    Gastritis.    Thick-walled appearance of the urinary bladder which may represent   cystitis.    Additional findings as above.    Findings were discussed with Dr. MICHELINE CARRILLO [phone] 10/4/2023 10:53   PM by Dr. AYERS with read back confirmation.    --- End of Report ---    < end of copied text >      Assessment and Plan:   · Assessment	  67y/o Female PSHx colon resection w/ rectopexy (2016), SBR with anastomosis x2 (7/10/22, 9/27/22 - open; both with Dr. Yanez), revision of anastomosis 1/10/23 at [facility], now a/w recurrent SBO with transition point distal to the small bowel anastomosis. Pt with VSSAF, now +F, +BM, minimal achy sensation in LLQ.  of note UA with +leukocyte esterase, occasional bacteria, urine culture pending.  started on Rocephin with ID consult.   Leukocytosis resolved on Rocephin.   Resolved SBO with return of bowel functions.    UTI on Rocephin per ID.      Problem/Plan - 1:  ·  Problem: SBO (small bowel obstruction).   ·  Plan: - VSSAF  - Adv diet to low fiber diet  - continue Rocephin empirically for cystitis x 3 days.    - Pain control as needed  - Encourage OOB/ambulation & incentive spirometry  - GI : start Mesalamine  for ? component of inflammation. Will need CT enteroscopy as outpatient. pt prefers to fu with GI as outpatient.   - ID : Rocephine x 3-5 days for cystitis  - DVT ppx with SCD    Pending discussion with Dr. Yanez

## 2023-10-06 NOTE — PROGRESS NOTE ADULT - ASSESSMENT
bowel obstruction    diet per surgery  chart reviewed  unusual to have obstruction at same site multiple times  she has had it revised twice  ? component of inflammation  start mesalamine  will need CT enterography as outpatient; if negative would consider capsule but may need patency first  she prefers to follow up with our group as outpatient   she is now vomiting, revert to npo  check kub  iv fluid  d/w surgery    I reviewed the overnight course of events on the unit, re-confirming the patient history. I discussed the care with the patient and their family  The plan of care was discussed with the physician assistant and modifications were made to the notation where appropriate.   Differential diagnosis and plan of care discussed with patient after the evaluation  35 minutes spent on total encounter of which more than fifty percent of the encounter was spent counseling and/or coordinating care by the attending physician.  Advanced care planning was discussed with patient and family.  Advanced care planning forms were reviewed and discussed.  Risks, benefits and alternatives of gastroenterologic procedures were discussed in detail and all questions were answered.

## 2023-10-06 NOTE — PROGRESS NOTE ADULT - SUBJECTIVE AND OBJECTIVE BOX
\[facility]\"", Division of Infectious Diseases  ARIEL Epps Y. Patel, S. Shah, [facility]  [phone]    Name: JONNA KANG  Age: 68y  Gender: Female  MRN: [id number]    Interval History:  Patient seen and examined at bedside   No acute overnight events. Afebrile  Still having urinary frequency  Notes reviewed    Antibiotics:  cefTRIAXone   IVPB 1000 milliGRAM(s) IV Intermittent every 24 hours      Medications:  cefTRIAXone   IVPB 1000 milliGRAM(s) IV Intermittent every 24 hours  diphenhydrAMINE Injectable 25 milliGRAM(s) IV Push every 6 hours PRN  HYDROmorphone  Injectable 0.5 milliGRAM(s) IV Push every 4 hours PRN  influenza  Vaccine (HIGH DOSE) 0.7 milliLiter(s) IntraMuscular once  ketorolac   Injectable 15 milliGRAM(s) IV Push every 4 hours PRN  levothyroxine 88 MICROGram(s) Oral daily  mesalamine DR Capsule 400 milliGRAM(s) Oral three times a day  ondansetron Injectable 4 milliGRAM(s) IV Push every 6 hours PRN  pantoprazole  Injectable 40 milliGRAM(s) IV Push daily  sodium chloride 0.9%. 1000 milliLiter(s) IV Continuous <Continuous>      Review of Systems:  A 10-point review of systems was obtained.   Review of systems otherwise negative except as previously noted.    Allergies: tetanus toxoid (Anaphylaxis)    For details regarding the patient's past medical history, social history, family history, and other miscellaneous elements, please refer the initial infectious diseases consultation and/or the admitting history and physical examination for this admission.    Objective:  Vitals:   T(C): 37.1 (10-06-23 @ 11:43), Max: 37.1 (10-06-23 @ 11:43)  HR: 76 (10-06-23 @ 11:43) (71 - 85)  BP: 119/74 (10-06-23 @ 11:43) (112/74 - 129/82)  RR: 19 (10-06-23 @ 11:43) (16 - 19)  SpO2: 94% (10-06-23 @ 11:43) (94% - 98%)    Physical Examination:  General: no acute distress  HEENT: NC/AT, EOMI,  Cardio: S1, S2 heard, RRR, no murmurs  Resp: decreased breath sounds  Abd: soft, NT, ND,  Ext: no edema or cyanosis  Skin: warm, dry, no visible rash      Laboratory Studies:  CBC:                       11.0   7.69  )-----------( 196      ( 06 Oct 2023 10:40 )             34.7     CMP: 10-06    144  |  109<H>  |  5<L>  ----------------------------<  69<L>  3.4<L>   |  32<H>  |  0.57    Ca    8.3<L>      06 Oct 2023 10:40  Phos  2.3     10-06  Mg     2.5     10-06    TPro  7.6  /  Alb  3.9  /  TBili  1.7<H>  /  DBili  x   /  AST  23  /  ALT  51  /  AlkPhos  107  10-04    LIVER FUNCTIONS - ( 04 Oct 2023 20:37 )  Alb: 3.9 g/dL / Pro: 7.6 g/dL / ALK PHOS: 107 U/L / ALT: 51 U/L / AST: 23 U/L / GGT: x           Urinalysis Basic - ( 06 Oct 2023 10:40 )    Color: x / Appearance: x / SG: x / pH: x  Gluc: 69 mg/dL / Ketone: x  / Bili: x / Urobili: x   Blood: x / Protein: x / Nitrite: x   Leuk Esterase: x / RBC: x / WBC x   Sq Epi: x / Non Sq Epi: x / Bacteria: x        Microbiology: reviewed        Radiology: reviewed

## 2023-10-06 NOTE — CASE MANAGEMENT PROGRESS NOTE - NSCMPROGRESSNOTE_GEN_ALL_CORE
Discussed patient in interdisciplinary rounds.  Patient admitted with SBO; advanced to low fiber diet.  Patient continues with IV Rocephin for cystitis for 3-5 day course.  Currently no D/C needs identified.  Anticipate return home when ready.  WIll remain available from a case management perspective.

## 2023-10-07 LAB
ALBUMIN SERPL ELPH-MCNC: 2.7 G/DL — LOW (ref 3.3–5)
ALP SERPL-CCNC: 76 U/L — SIGNIFICANT CHANGE UP (ref 40–120)
ALT FLD-CCNC: 35 U/L — SIGNIFICANT CHANGE UP (ref 12–78)
ANION GAP SERPL CALC-SCNC: 4 MMOL/L — LOW (ref 5–17)
AST SERPL-CCNC: 18 U/L — SIGNIFICANT CHANGE UP (ref 15–37)
BASOPHILS # BLD AUTO: 0.03 K/UL — SIGNIFICANT CHANGE UP (ref 0–0.2)
BASOPHILS NFR BLD AUTO: 0.5 % — SIGNIFICANT CHANGE UP (ref 0–2)
BILIRUB SERPL-MCNC: 0.5 MG/DL — SIGNIFICANT CHANGE UP (ref 0.2–1.2)
BUN SERPL-MCNC: 5 MG/DL — LOW (ref 7–23)
CALCIUM SERPL-MCNC: 8.2 MG/DL — LOW (ref 8.5–10.1)
CHLORIDE SERPL-SCNC: 112 MMOL/L — HIGH (ref 96–108)
CO2 SERPL-SCNC: 30 MMOL/L — SIGNIFICANT CHANGE UP (ref 22–31)
CREAT SERPL-MCNC: 0.51 MG/DL — SIGNIFICANT CHANGE UP (ref 0.5–1.3)
EGFR: 102 ML/MIN/1.73M2 — SIGNIFICANT CHANGE UP
EOSINOPHIL # BLD AUTO: 0.1 K/UL — SIGNIFICANT CHANGE UP (ref 0–0.5)
EOSINOPHIL NFR BLD AUTO: 1.7 % — SIGNIFICANT CHANGE UP (ref 0–6)
GLUCOSE SERPL-MCNC: 86 MG/DL — SIGNIFICANT CHANGE UP (ref 70–99)
HCT VFR BLD CALC: 32.8 % — LOW (ref 34.5–45)
HGB BLD-MCNC: 10.1 G/DL — LOW (ref 11.5–15.5)
IMM GRANULOCYTES NFR BLD AUTO: 0.7 % — SIGNIFICANT CHANGE UP (ref 0–0.9)
LYMPHOCYTES # BLD AUTO: 1.86 K/UL — SIGNIFICANT CHANGE UP (ref 1–3.3)
LYMPHOCYTES # BLD AUTO: 30.7 % — SIGNIFICANT CHANGE UP (ref 13–44)
MAGNESIUM SERPL-MCNC: 2.5 MG/DL — SIGNIFICANT CHANGE UP (ref 1.6–2.6)
MCHC RBC-ENTMCNC: 30.3 PG — SIGNIFICANT CHANGE UP (ref 27–34)
MCHC RBC-ENTMCNC: 30.8 GM/DL — LOW (ref 32–36)
MCV RBC AUTO: 98.5 FL — SIGNIFICANT CHANGE UP (ref 80–100)
MONOCYTES # BLD AUTO: 0.61 K/UL — SIGNIFICANT CHANGE UP (ref 0–0.9)
MONOCYTES NFR BLD AUTO: 10.1 % — SIGNIFICANT CHANGE UP (ref 2–14)
NEUTROPHILS # BLD AUTO: 3.42 K/UL — SIGNIFICANT CHANGE UP (ref 1.8–7.4)
NEUTROPHILS NFR BLD AUTO: 56.3 % — SIGNIFICANT CHANGE UP (ref 43–77)
NRBC # BLD: 0 /100 WBCS — SIGNIFICANT CHANGE UP (ref 0–0)
PHOSPHATE SERPL-MCNC: 3.2 MG/DL — SIGNIFICANT CHANGE UP (ref 2.5–4.5)
PLATELET # BLD AUTO: 189 K/UL — SIGNIFICANT CHANGE UP (ref 150–400)
POTASSIUM SERPL-MCNC: 3.6 MMOL/L — SIGNIFICANT CHANGE UP (ref 3.5–5.3)
POTASSIUM SERPL-SCNC: 3.6 MMOL/L — SIGNIFICANT CHANGE UP (ref 3.5–5.3)
PROT SERPL-MCNC: 5.5 G/DL — LOW (ref 6–8.3)
RBC # BLD: 3.33 M/UL — LOW (ref 3.8–5.2)
RBC # FLD: 13.2 % — SIGNIFICANT CHANGE UP (ref 10.3–14.5)
SODIUM SERPL-SCNC: 146 MMOL/L — HIGH (ref 135–145)
WBC # BLD: 6.06 K/UL — SIGNIFICANT CHANGE UP (ref 3.8–10.5)
WBC # FLD AUTO: 6.06 K/UL — SIGNIFICANT CHANGE UP (ref 3.8–10.5)

## 2023-10-07 PROCEDURE — 74019 RADEX ABDOMEN 2 VIEWS: CPT | Mod: 26

## 2023-10-07 RX ORDER — SODIUM CHLORIDE 9 MG/ML
1000 INJECTION, SOLUTION INTRAVENOUS
Refills: 0 | Status: DISCONTINUED | OUTPATIENT
Start: 2023-10-07 | End: 2023-10-12

## 2023-10-07 RX ADMIN — Medication 400 MILLIGRAM(S): at 02:30

## 2023-10-07 RX ADMIN — HYDROMORPHONE HYDROCHLORIDE 1 MILLIGRAM(S): 2 INJECTION INTRAMUSCULAR; INTRAVENOUS; SUBCUTANEOUS at 18:13

## 2023-10-07 RX ADMIN — Medication 88 MICROGRAM(S): at 06:04

## 2023-10-07 RX ADMIN — SODIUM CHLORIDE 100 MILLILITER(S): 9 INJECTION INTRAMUSCULAR; INTRAVENOUS; SUBCUTANEOUS at 06:05

## 2023-10-07 RX ADMIN — Medication 400 MILLIGRAM(S): at 15:14

## 2023-10-07 RX ADMIN — Medication 25 MILLIGRAM(S): at 22:13

## 2023-10-07 RX ADMIN — CEFTRIAXONE 100 MILLIGRAM(S): 500 INJECTION, POWDER, FOR SOLUTION INTRAMUSCULAR; INTRAVENOUS at 15:36

## 2023-10-07 RX ADMIN — Medication 400 MILLIGRAM(S): at 06:04

## 2023-10-07 RX ADMIN — Medication 400 MILLIGRAM(S): at 16:55

## 2023-10-07 RX ADMIN — HYDROMORPHONE HYDROCHLORIDE 1 MILLIGRAM(S): 2 INJECTION INTRAMUSCULAR; INTRAVENOUS; SUBCUTANEOUS at 23:09

## 2023-10-07 RX ADMIN — HYDROMORPHONE HYDROCHLORIDE 1 MILLIGRAM(S): 2 INJECTION INTRAMUSCULAR; INTRAVENOUS; SUBCUTANEOUS at 09:40

## 2023-10-07 RX ADMIN — Medication 400 MILLIGRAM(S): at 08:11

## 2023-10-07 RX ADMIN — SODIUM CHLORIDE 70 MILLILITER(S): 9 INJECTION, SOLUTION INTRAVENOUS at 22:13

## 2023-10-07 RX ADMIN — HYDROMORPHONE HYDROCHLORIDE 1 MILLIGRAM(S): 2 INJECTION INTRAMUSCULAR; INTRAVENOUS; SUBCUTANEOUS at 10:36

## 2023-10-07 RX ADMIN — SODIUM CHLORIDE 70 MILLILITER(S): 9 INJECTION, SOLUTION INTRAVENOUS at 15:31

## 2023-10-07 RX ADMIN — ENOXAPARIN SODIUM 40 MILLIGRAM(S): 100 INJECTION SUBCUTANEOUS at 23:25

## 2023-10-07 RX ADMIN — HYDROMORPHONE HYDROCHLORIDE 1 MILLIGRAM(S): 2 INJECTION INTRAMUSCULAR; INTRAVENOUS; SUBCUTANEOUS at 15:14

## 2023-10-07 RX ADMIN — HYDROMORPHONE HYDROCHLORIDE 1 MILLIGRAM(S): 2 INJECTION INTRAMUSCULAR; INTRAVENOUS; SUBCUTANEOUS at 20:58

## 2023-10-07 RX ADMIN — Medication 1000 MILLIGRAM(S): at 18:13

## 2023-10-07 RX ADMIN — PANTOPRAZOLE SODIUM 40 MILLIGRAM(S): 20 TABLET, DELAYED RELEASE ORAL at 11:50

## 2023-10-07 RX ADMIN — Medication 400 MILLIGRAM(S): at 22:14

## 2023-10-07 RX ADMIN — Medication 1000 MILLIGRAM(S): at 10:36

## 2023-10-07 RX ADMIN — Medication 1000 MILLIGRAM(S): at 02:41

## 2023-10-07 NOTE — PROGRESS NOTE ADULT - SUBJECTIVE AND OBJECTIVE BOX
SUBJECTIVE:  Patient seen and examined at bedside. Pt endorses n/v after diet advanced for lunch yesterday. Also notes flatus and BM in the evening. At present, pt endorses abdominal pain has improved. Voiding.  Patient denies any fever, chills, chest pain, shortness of breath, nausea, vomiting, or urinary complaints.    VITALS  Vital Signs Last 24 Hrs  T(C): 36.4 (07 Oct 2023 06:50), Max: 37.1 (06 Oct 2023 11:43)  T(F): 97.5 (07 Oct 2023 06:50), Max: 98.8 (06 Oct 2023 11:43)  HR: 65 (07 Oct 2023 06:50) (65 - 76)  BP: 124/77 (07 Oct 2023 06:50) (113/65 - 124/77)  BP(mean): --  RR: 18 (07 Oct 2023 06:50) (18 - 19)  SpO2: 94% (07 Oct 2023 06:50) (94% - 97%)    Parameters below as of 07 Oct 2023 06:50  Patient On (Oxygen Delivery Method): room air    PHYSICAL EXAM  GENERAL:  Well-developed Female lying comfortably in bed in NAD.  HEENT:  NC/AT. Sclera white. Mucous membranes moist.  ABDOMEN: Soft, nondistended, mild bonifacio-umbilical TTP; No rebound tenderness or guarding.  EXTREMITIES: No calf tenderness bilaterally.  SKIN:  No jaundice, pallor, or cyanosis  NEURO:  A&O x 3    MEDICATIONS  MEDICATIONS  (STANDING):  acetaminophen   IVPB .. 1000 milliGRAM(s) IV Intermittent once  cefTRIAXone   IVPB 1000 milliGRAM(s) IV Intermittent every 24 hours  enoxaparin Injectable 40 milliGRAM(s) SubCutaneous every 24 hours  influenza  Vaccine (HIGH DOSE) 0.7 milliLiter(s) IntraMuscular once  levothyroxine 88 MICROGram(s) Oral daily  mesalamine DR Capsule 400 milliGRAM(s) Oral three times a day  pantoprazole  Injectable 40 milliGRAM(s) IV Push daily  sodium chloride 0.9%. 1000 milliLiter(s) (100 mL/Hr) IV Continuous <Continuous>    MEDICATIONS  (PRN):  diphenhydrAMINE Injectable 25 milliGRAM(s) IV Push every 6 hours PRN Insomnia  HYDROmorphone  Injectable 1 milliGRAM(s) IV Push every 4 hours PRN Severe Pain (7 - 10)  ketorolac   Injectable 15 milliGRAM(s) IV Push every 4 hours PRN Moderate Pain (4 - 6)  ondansetron Injectable 4 milliGRAM(s) IV Push every 6 hours PRN Nausea and/or Vomiting    LABS:                      11.0   7.69  )-----------( 196      ( 06 Oct 2023 10:40 )             34.7     10-06    144  |  109<H>  |  5<L>  ----------------------------<  69<L>  3.4<L>   |  32<H>  |  0.57    Ca    8.3<L>      06 Oct 2023 10:40  Phos  2.3     10-06  Mg     2.5     10-06    ASSESSMENT & PLAN  67y/o Female PSHx colon resection w/ rectopexy (2016), SBR with anastomosis x2 (7/10/22, 9/27/22 - open; both with Dr. Yanez), revision of anastomosis 1/10/23 at [facility], now a/w recurrent SBO with transition point distal to the small bowel anastomosis.  Yesterday, w/ n/v after diet was advanced. Pt endorses bowel function throughout the day. Now nausea and pain have improved.  AFVSS, AM labs pending.   Exam somewhat reassuring, abdomen soft, nondistended, w/ mild ttp bonifaico-umbilically.    - Will trial PO fluids for breakfast and consider advancement for lunch  - Serial abdominal exams  - Monitor for continued bowel function  - OOB, pain control  - Follow up AM labs

## 2023-10-07 NOTE — PROGRESS NOTE ADULT - SUBJECTIVE AND OBJECTIVE BOX
[facility]  J Luis Almanza PA-C  [address] [place] [address]  [phone]      INTERVAL HPI/OVERNIGHT EVENTS:  seen and examined  reports abdominal bloating pain and emesis after breakfast today         MEDICATIONS  (STANDING):  acetaminophen   IVPB .. 1000 milliGRAM(s) IV Intermittent once  cefTRIAXone   IVPB 1000 milliGRAM(s) IV Intermittent every 24 hours  enoxaparin Injectable 40 milliGRAM(s) SubCutaneous every 24 hours  influenza  Vaccine (HIGH DOSE) 0.7 milliLiter(s) IntraMuscular once  levothyroxine 88 MICROGram(s) Oral daily  mesalamine DR Capsule 400 milliGRAM(s) Oral three times a day  pantoprazole  Injectable 40 milliGRAM(s) IV Push daily  sodium chloride 0.9%. 1000 milliLiter(s) (100 mL/Hr) IV Continuous <Continuous>    MEDICATIONS  (PRN):  diphenhydrAMINE Injectable 25 milliGRAM(s) IV Push every 6 hours PRN Insomnia  HYDROmorphone  Injectable 1 milliGRAM(s) IV Push every 4 hours PRN Severe Pain (7 - 10)  ketorolac   Injectable 15 milliGRAM(s) IV Push every 4 hours PRN Moderate Pain (4 - 6)  ondansetron Injectable 4 milliGRAM(s) IV Push every 6 hours PRN Nausea and/or Vomiting      Allergies    tetanus toxoid (Anaphylaxis)    Intolerances                ROS:   General:  No  fevers, chills, night sweats, fatigue,   Eyes:  Good vision, no reported pain  ENT:  No sore throat, pain, runny nose, dysphagia  CV:  No pain, palpitations, hypo/hypertension  Resp:  No dyspnea, cough, tachypnea, wheezing  GI:  No pain, No nausea, No vomiting, No diarrhea, No constipation, No weight loss, No fever, No pruritis, No rectal bleeding, No tarry stools, No dysphagia,  :  No pain, bleeding, incontinence, nocturia  Muscle:  No pain, weakness  Neuro:  No weakness, tingling, memory problems  Psych:  No fatigue, insomnia, mood problems, depression  Endocrine:  No polyuria, polydipsia, cold/heat intolerance  Heme:  No petechiae, ecchymosis, easy bruisability  Skin:  No rash, tattoos, scars, edema      PHYSICAL EXAM  Vital Signs Last 24 Hrs  T(C): 36.4 (07 Oct 2023 06:50), Max: 37.1 (06 Oct 2023 11:43)  T(F): 97.5 (07 Oct 2023 06:50), Max: 98.8 (06 Oct 2023 11:43)  HR: 65 (07 Oct 2023 06:50) (65 - 76)  BP: 124/77 (07 Oct 2023 06:50) (113/65 - 124/77)  BP(mean): --  RR: 18 (07 Oct 2023 06:50) (18 - 19)  SpO2: 94% (07 Oct 2023 06:50) (94% - 97%)    Parameters below as of 07 Oct 2023 06:50  Patient On (Oxygen Delivery Method): room air          Constitutional: NAD  HEENT: EOMI, throat clear  Neck: No LAD, supple  Respiratory: CTA and P  Cardiovascular: S1 and S2, RRR, no M  Gastrointestinal: BS+, soft, NT/ND, neg HSM,  Extremities: No peripheral edema, neg clubbing, cyanosis  Vascular: 2+ peripheral pulses  Neurological: A/O, no focal deficits  Psychiatric: Normal mood, normal affect  Skin: No rashes      LABS:                        10.1   6.06  )-----------( 189      ( 07 Oct 2023 07:35 )             32.8     10-07    146<H>  |  112<H>  |  5<L>  ----------------------------<  86  3.6   |  30  |  0.51    Ca    8.2<L>      07 Oct 2023 07:35  Phos  3.2     10-07  Mg     2.5     10-07    TPro  5.5<L>  /  Alb  2.7<L>  /  TBili  0.5  /  DBili  x   /  AST  18  /  ALT  35  /  AlkPhos  76  10-07            RADIOLOGY & ADDITIONAL TESTS:

## 2023-10-08 LAB
ALBUMIN SERPL ELPH-MCNC: 3.2 G/DL — LOW (ref 3.3–5)
ALP SERPL-CCNC: 92 U/L — SIGNIFICANT CHANGE UP (ref 40–120)
ALT FLD-CCNC: 40 U/L — SIGNIFICANT CHANGE UP (ref 12–78)
ANION GAP SERPL CALC-SCNC: 4 MMOL/L — LOW (ref 5–17)
AST SERPL-CCNC: 23 U/L — SIGNIFICANT CHANGE UP (ref 15–37)
BILIRUB SERPL-MCNC: 0.5 MG/DL — SIGNIFICANT CHANGE UP (ref 0.2–1.2)
BUN SERPL-MCNC: 3 MG/DL — LOW (ref 7–23)
CALCIUM SERPL-MCNC: 9.1 MG/DL — SIGNIFICANT CHANGE UP (ref 8.5–10.1)
CHLORIDE SERPL-SCNC: 103 MMOL/L — SIGNIFICANT CHANGE UP (ref 96–108)
CO2 SERPL-SCNC: 33 MMOL/L — HIGH (ref 22–31)
CREAT SERPL-MCNC: 0.67 MG/DL — SIGNIFICANT CHANGE UP (ref 0.5–1.3)
EGFR: 95 ML/MIN/1.73M2 — SIGNIFICANT CHANGE UP
GLUCOSE SERPL-MCNC: 134 MG/DL — HIGH (ref 70–99)
HCT VFR BLD CALC: 34.8 % — SIGNIFICANT CHANGE UP (ref 34.5–45)
HGB BLD-MCNC: 11.1 G/DL — LOW (ref 11.5–15.5)
MAGNESIUM SERPL-MCNC: 2.3 MG/DL — SIGNIFICANT CHANGE UP (ref 1.6–2.6)
MCHC RBC-ENTMCNC: 31.1 PG — SIGNIFICANT CHANGE UP (ref 27–34)
MCHC RBC-ENTMCNC: 31.9 GM/DL — LOW (ref 32–36)
MCV RBC AUTO: 97.5 FL — SIGNIFICANT CHANGE UP (ref 80–100)
NRBC # BLD: 0 /100 WBCS — SIGNIFICANT CHANGE UP (ref 0–0)
PHOSPHATE SERPL-MCNC: 3.9 MG/DL — SIGNIFICANT CHANGE UP (ref 2.5–4.5)
PLATELET # BLD AUTO: 202 K/UL — SIGNIFICANT CHANGE UP (ref 150–400)
POTASSIUM SERPL-MCNC: 3.8 MMOL/L — SIGNIFICANT CHANGE UP (ref 3.5–5.3)
POTASSIUM SERPL-SCNC: 3.8 MMOL/L — SIGNIFICANT CHANGE UP (ref 3.5–5.3)
PROT SERPL-MCNC: 6.3 G/DL — SIGNIFICANT CHANGE UP (ref 6–8.3)
RBC # BLD: 3.57 M/UL — LOW (ref 3.8–5.2)
RBC # FLD: 13 % — SIGNIFICANT CHANGE UP (ref 10.3–14.5)
SODIUM SERPL-SCNC: 140 MMOL/L — SIGNIFICANT CHANGE UP (ref 135–145)
WBC # BLD: 6.17 K/UL — SIGNIFICANT CHANGE UP (ref 3.8–10.5)
WBC # FLD AUTO: 6.17 K/UL — SIGNIFICANT CHANGE UP (ref 3.8–10.5)

## 2023-10-08 PROCEDURE — 99231 SBSQ HOSP IP/OBS SF/LOW 25: CPT

## 2023-10-08 PROCEDURE — 74176 CT ABD & PELVIS W/O CONTRAST: CPT | Mod: 26

## 2023-10-08 RX ORDER — PANTOPRAZOLE SODIUM 20 MG/1
40 TABLET, DELAYED RELEASE ORAL DAILY
Refills: 0 | Status: DISCONTINUED | OUTPATIENT
Start: 2023-10-08 | End: 2023-10-12

## 2023-10-08 RX ORDER — ACETAMINOPHEN 500 MG
1000 TABLET ORAL ONCE
Refills: 0 | Status: COMPLETED | OUTPATIENT
Start: 2023-10-08 | End: 2023-10-08

## 2023-10-08 RX ORDER — FAMOTIDINE 10 MG/ML
20 INJECTION INTRAVENOUS DAILY
Refills: 0 | Status: DISCONTINUED | OUTPATIENT
Start: 2023-10-08 | End: 2023-10-12

## 2023-10-08 RX ORDER — HYDROMORPHONE HYDROCHLORIDE 2 MG/ML
1 INJECTION INTRAMUSCULAR; INTRAVENOUS; SUBCUTANEOUS ONCE
Refills: 0 | Status: DISCONTINUED | OUTPATIENT
Start: 2023-10-08 | End: 2023-10-08

## 2023-10-08 RX ORDER — PANTOPRAZOLE SODIUM 20 MG/1
40 TABLET, DELAYED RELEASE ORAL
Refills: 0 | Status: DISCONTINUED | OUTPATIENT
Start: 2023-10-08 | End: 2023-10-08

## 2023-10-08 RX ORDER — CEFPODOXIME PROXETIL 100 MG
200 TABLET ORAL EVERY 12 HOURS
Refills: 0 | Status: COMPLETED | OUTPATIENT
Start: 2023-10-08 | End: 2023-10-10

## 2023-10-08 RX ADMIN — Medication 400 MILLIGRAM(S): at 17:25

## 2023-10-08 RX ADMIN — HYDROMORPHONE HYDROCHLORIDE 1 MILLIGRAM(S): 2 INJECTION INTRAMUSCULAR; INTRAVENOUS; SUBCUTANEOUS at 01:15

## 2023-10-08 RX ADMIN — Medication 400 MILLIGRAM(S): at 05:37

## 2023-10-08 RX ADMIN — HYDROMORPHONE HYDROCHLORIDE 1 MILLIGRAM(S): 2 INJECTION INTRAMUSCULAR; INTRAVENOUS; SUBCUTANEOUS at 02:33

## 2023-10-08 RX ADMIN — ONDANSETRON 4 MILLIGRAM(S): 8 TABLET, FILM COATED ORAL at 17:50

## 2023-10-08 RX ADMIN — ENOXAPARIN SODIUM 40 MILLIGRAM(S): 100 INJECTION SUBCUTANEOUS at 21:38

## 2023-10-08 RX ADMIN — HYDROMORPHONE HYDROCHLORIDE 1 MILLIGRAM(S): 2 INJECTION INTRAMUSCULAR; INTRAVENOUS; SUBCUTANEOUS at 21:38

## 2023-10-08 RX ADMIN — Medication 1000 MILLIGRAM(S): at 18:16

## 2023-10-08 RX ADMIN — HYDROMORPHONE HYDROCHLORIDE 1 MILLIGRAM(S): 2 INJECTION INTRAMUSCULAR; INTRAVENOUS; SUBCUTANEOUS at 22:00

## 2023-10-08 RX ADMIN — Medication 88 MICROGRAM(S): at 05:37

## 2023-10-08 RX ADMIN — HYDROMORPHONE HYDROCHLORIDE 1 MILLIGRAM(S): 2 INJECTION INTRAMUSCULAR; INTRAVENOUS; SUBCUTANEOUS at 10:08

## 2023-10-08 RX ADMIN — Medication 400 MILLIGRAM(S): at 15:54

## 2023-10-08 RX ADMIN — HYDROMORPHONE HYDROCHLORIDE 1 MILLIGRAM(S): 2 INJECTION INTRAMUSCULAR; INTRAVENOUS; SUBCUTANEOUS at 11:08

## 2023-10-08 RX ADMIN — FAMOTIDINE 20 MILLIGRAM(S): 10 INJECTION INTRAVENOUS at 20:22

## 2023-10-08 RX ADMIN — HYDROMORPHONE HYDROCHLORIDE 1 MILLIGRAM(S): 2 INJECTION INTRAMUSCULAR; INTRAVENOUS; SUBCUTANEOUS at 18:55

## 2023-10-08 RX ADMIN — HYDROMORPHONE HYDROCHLORIDE 1 MILLIGRAM(S): 2 INJECTION INTRAMUSCULAR; INTRAVENOUS; SUBCUTANEOUS at 15:29

## 2023-10-08 RX ADMIN — PANTOPRAZOLE SODIUM 40 MILLIGRAM(S): 20 TABLET, DELAYED RELEASE ORAL at 15:53

## 2023-10-08 RX ADMIN — HYDROMORPHONE HYDROCHLORIDE 1 MILLIGRAM(S): 2 INJECTION INTRAMUSCULAR; INTRAVENOUS; SUBCUTANEOUS at 05:42

## 2023-10-08 RX ADMIN — HYDROMORPHONE HYDROCHLORIDE 1 MILLIGRAM(S): 2 INJECTION INTRAMUSCULAR; INTRAVENOUS; SUBCUTANEOUS at 14:29

## 2023-10-08 RX ADMIN — HYDROMORPHONE HYDROCHLORIDE 1 MILLIGRAM(S): 2 INJECTION INTRAMUSCULAR; INTRAVENOUS; SUBCUTANEOUS at 06:19

## 2023-10-08 RX ADMIN — Medication 25 MILLIGRAM(S): at 22:32

## 2023-10-08 RX ADMIN — SODIUM CHLORIDE 70 MILLILITER(S): 9 INJECTION, SOLUTION INTRAVENOUS at 05:37

## 2023-10-08 NOTE — DIETITIAN INITIAL EVALUATION ADULT - PERTINENT LABORATORY DATA
10-08    140  |  103  |  3<L>  ----------------------------<  134<H>  3.8   |  33<H>  |  0.67    Ca    9.1      08 Oct 2023 09:12  Phos  3.9     10-08  Mg     2.3     10-08    TPro  6.3  /  Alb  3.2<L>  /  TBili  0.5  /  DBili  x   /  AST  23  /  ALT  40  /  AlkPhos  92  10-08

## 2023-10-08 NOTE — DIETITIAN INITIAL EVALUATION ADULT - PROBLEM SELECTOR PLAN 1
Admit to Dr Yanez  NPO with NGT: patient refusing NGT.  Does not want one since she had a recent facelift.   IV fluids  Analgesia prn  Anti emetics prn  Ambulation/OOB  Serial abdominal exams  SCD while in bed.   Home medications  Urine culture as patient is now complaining of urinary symptoms.   Will discuss with Dr. Yanez.

## 2023-10-08 NOTE — DIETITIAN INITIAL EVALUATION ADULT - OTHER INFO
69 y/o female adm with intestinal obstruction. PMH SBO, HLD, hypothyroidism, s/p small bowel resection. Pt visited at bedside this afternoon. N and V subsided. Pt states that she is tolerating clear liquids. Has had recurrent SBO and surgery in the past. No wt change noted. Pt has good appetite in between SBOs. States that she knows she needs to follow a low fiber diet. Declined any additional diet education at this time.

## 2023-10-08 NOTE — CHART NOTE - NSCHARTNOTEFT_GEN_A_CORE
Patient seen and examined for re-evaluation. She again expresses frustration with her overall clinical course. She admits to dull, persistent central and upper abdominal pain that she states feels like reflux. No recent BM, however she admits to passing flatus. She denies nausea at this time. No further vomiting.    Vital Signs Last 24 Hrs  T(C): 36.6 (08 Oct 2023 18:52), Max: 36.8 (08 Oct 2023 17:21)  T(F): 97.9 (08 Oct 2023 18:52), Max: 98.3 (08 Oct 2023 17:21)  HR: 102 (08 Oct 2023 18:52) (75 - 102)  BP: 142/93 (08 Oct 2023 18:52) (112/72 - 142/93)  RR: 18 (08 Oct 2023 18:52) (18 - 18)  SpO2: 95% (08 Oct 2023 18:52) (92% - 99%)    Parameters below as of 08 Oct 2023 18:52  Patient On (Oxygen Delivery Method): room air    PE  GENERAL:  WNWD female sitting in bed in NAD.  HEENT:  NC/AT. Sclera white.  CARDIO:  Tachycardia  RESPIRATORY:  Nonlabored breathing, no accessory muscle use.  ABDOMEN:  Healed midline scar. Abd soft, nondistended, +mild tenderness in upper abdomen.  No rebound tenderness or guarding.  SKIN:  No jaundice, pallor, or cyanosis  NEURO:  A&O x 3    A&P  68 year old female with extensive PSH and hx of multiple SBOs, admitted with an SBO, resolved on CT this AM, now with vomiting.  Borderline tachycardia noted, likely anxiety-related.  - NPO, IV fluids  - D/c mesalamine  - Famotidine added to GERD regimen as patient states she takes it at home. Stat dose to be given now.  - Antiemetics, pain control PRN  - Anxiolytic offered; patient refused  - Follow up further GI input  - Will continue to monitor closely  - Discussed with Dr. Yanez

## 2023-10-08 NOTE — CHART NOTE - NSCHARTNOTEFT_GEN_A_CORE
S: Patient complaint of abdominal discomfort, and bloating, as well per RN patient seen vomiting this afternoon.  Patient seen at beside with empty CLD tray, states she is feels bloated and uncomfortable. Expresses frustration that she is not getting better.  Patient admits flatus at this time, admits 1 episode of emesis this afternoon. Denies nausea.     O:  T(C): 36.6 (10-08-23 @ 18:52), Max: 36.8 (10-08-23 @ 17:21)  HR: 102 (10-08-23 @ 18:52) (75 - 102)  BP: 142/93 (10-08-23 @ 18:52) (112/72 - 142/93)  RR: 18 (10-08-23 @ 18:52) (18 - 18)  SpO2: 95% (10-08-23 @ 18:52) (92% - 99%)    GENERAL: No acute distress, sitting comfortably in bed  HEAD:  Atraumatic, Normocephalic  CHEST/LUNG: Non labored respirations, no accessory muscle use  HEART: Regular rate and rhythm  ABDOMEN: Soft, non-tender, distended  NEUROLOGY: A&O x 3, no focal deficits    Labs:                        11.1   6.17  )-----------( 202      ( 08 Oct 2023 09:12 )             34.8     10-08    140  |  103  |  3<L>  ----------------------------<  134<H>  3.8   |  33<H>  |  0.67    Ca    9.1      08 Oct 2023 09:12  Phos  3.9     10-08  Mg     2.3     10-08    TPro  6.3  /  Alb  3.2<L>  /  TBili  0.5  /  DBili  x   /  AST  23  /  ALT  40  /  AlkPhos  92  10-08    Radiology:  < from: CT Abdomen and Pelvis No Cont (10.08.23 @ 10:36) >    ACC: [id number] EXAM:  CT ABDOMEN AND PELVIS   ORDERED BY: SHELLY PATE     PROCEDURE DATE:  10/08/2023      INTERPRETATION:  CLINICAL INFORMATION: Abdominal pain and distention.   Evaluate for small bowel obstruction.    COMPARISON: October 4, 2023 and June 1, 2023    CONTRAST/COMPLICATIONS:  IV Contrast: NONE  Oral Contrast: NONE  Complications: None reported at time of study completion    PROCEDURE:  CT of the Abdomen and Pelvis was performed.  Sagittal and coronal reformats were performed.    FINDINGS:  LOWER CHEST: Minimal bibasilar subsegmental atelectasis    LIVER: Within normal limits.  BILE DUCTS: Stable mild dilatation of the extrahepatic common duct and   pancreatic duct..  GALLBLADDER: Within normal limits.  SPLEEN: Withinnormal limits.  PANCREAS: Within normal limits.  ADRENALS: Within normal limits.  KIDNEYS/URETERS: No renal stones or hydronephrosis.    BLADDER: Within normal limits.  REPRODUCTIVE ORGANS: 11 mm calcified right uterine body fibroid. Uterus   and adnexa otherwise normal.    Stomach is incompletely distended. Gastric wall thickening and gastritis   cannot be excluded. No focal gastric mass. No gastric outlet obstruction.    There is a moderate amount of stool throughout the colon.    Surgical anastomosis within the small bowel left lower quadrant.   Additional anastomosis within the rectosigmoid portion of the colon.    BOWEL: No bowel obstruction. Appendix is normal.  PERITONEUM: No ascites.  No free air or abscess.  VESSELS: Atherosclerotic changes.  RETROPERITONEUM/LYMPH NODES: No lymphadenopathy.  ABDOMINAL WALL: 2.3 cm fat-containing midline ventral hernia. Small   amounts of gas within the superficial subcutaneous soft tissues of the   right anterior abdominal wall likely reflecting injection therapy.  BONES: Degenerative changes. No lytic or blastic process.    IMPRESSION:  Stomach is incompletely distended. Gastric wall thickening and gastritis   cannot be excluded. No focal gastric mass. No gastric outlet obstruction.   Pleasecorrelate clinically.    No bowel obstruction, free fluid, free air or abscess.    Please refer to detailed findings otherwise described above.    --- End of Report ---     KENNETH LOMELI MD; Attending Radiologist  This document has been electronically signed. Oct  8 2023 10:57AM    < end of copied text >    A/P: Patient is a 68y old Female a/w SBO, previously advanced to CLD, tolerated and advanced to regular diet which was not tolerated and patient had multiple bouts of emesis & diarrhea. Patient was then reverted to NPO, and repeat CT scan was performed today, as patient was with +BF (+F and +BM). CT scan from today with no obstruction, and no GOO. Patient was advanced to CLD for lunch and dinner. Patient states episode of emesis x1 after lunch, and now complaining of abdominal distention after eating dinner tray, denies nausea and vomiting. On exam, abdomen is distended, non-tender.     - VSSAF, Labs reassuring, CT from earlier today without evidence of SBO  - serial abdominal exams  - Encouraged ambulation  - follow up GI function    To discuss with Dr. Yanez.     **Addendum 1805hr**  Called by RN, patient now with vomiting. Patient seen and examined at bedside, pt is actively vomiting yellow liquid. Patient is upset as she feels she is not getting better, stating "I will die from this".     Will revert patient to NPO status  Will place NGT if patient amenable   Will endorse to night team.    Discussed with Dr. Yanez

## 2023-10-08 NOTE — DIETITIAN INITIAL EVALUATION ADULT - PERTINENT MEDS FT
MEDICATIONS  (STANDING):  dextrose 5% + sodium chloride 0.45%. 1000 milliLiter(s) (70 mL/Hr) IV Continuous <Continuous>  enoxaparin Injectable 40 milliGRAM(s) SubCutaneous every 24 hours  influenza  Vaccine (HIGH DOSE) 0.7 milliLiter(s) IntraMuscular once  levothyroxine 88 MICROGram(s) Oral daily  mesalamine DR Capsule 400 milliGRAM(s) Oral three times a day  pantoprazole  Injectable 40 milliGRAM(s) IV Push daily    MEDICATIONS  (PRN):  diphenhydrAMINE Injectable 25 milliGRAM(s) IV Push every 6 hours PRN Insomnia  HYDROmorphone  Injectable 1 milliGRAM(s) IV Push every 4 hours PRN Severe Pain (7 - 10)  ketorolac   Injectable 15 milliGRAM(s) IV Push every 4 hours PRN Moderate Pain (4 - 6)  ondansetron Injectable 4 milliGRAM(s) IV Push every 6 hours PRN Nausea and/or Vomiting

## 2023-10-08 NOTE — PROGRESS NOTE ADULT - ASSESSMENT
bowel obstruction    diet per surgery- now NPO   chart reviewed  unusual to have obstruction at same site multiple times  she has had it revised twice  ? component of inflammation  start mesalamine  will need CT enterography as outpatient; if negative would consider capsule but may need patency first  she prefers to follow up with our group as outpatient   she is now vomiting, revert to npo  check kub  CT abd done- results noted   iv fluid  d/w surgery    I reviewed the overnight course of events on the unit, re-confirming the patient history. I discussed the care with the patient and their family  The plan of care was discussed with the physician assistant and modifications were made to the notation where appropriate.   Differential diagnosis and plan of care discussed with patient after the evaluation  35 minutes spent on total encounter of which more than fifty percent of the encounter was spent counseling and/or coordinating care by the attending physician.  Advanced care planning was discussed with patient and family.  Advanced care planning forms were reviewed and discussed.  Risks, benefits and alternatives of gastroenterologic procedures were discussed in detail and all questions were answered.

## 2023-10-08 NOTE — PROGRESS NOTE ADULT - SUBJECTIVE AND OBJECTIVE BOX
SUBJECTIVE:  Patient seen and examined at bedside this morning, denies any acute complaints but endorses continued lower abdominal pain. Denies any nausea or vomiting overnight and reports that she is passing gas and had two small bowel movements overnight. OOBA. Denies any chest pain, SOB, fever or chills.    MEDICATIONS  (STANDING):  dextrose 5% + sodium chloride 0.45%. 1000 milliLiter(s) (70 mL/Hr) IV Continuous <Continuous>  enoxaparin Injectable 40 milliGRAM(s) SubCutaneous every 24 hours  influenza  Vaccine (HIGH DOSE) 0.7 milliLiter(s) IntraMuscular once  levothyroxine 88 MICROGram(s) Oral daily  mesalamine DR Capsule 400 milliGRAM(s) Oral three times a day  pantoprazole  Injectable 40 milliGRAM(s) IV Push daily    MEDICATIONS  (PRN):  diphenhydrAMINE Injectable 25 milliGRAM(s) IV Push every 6 hours PRN Insomnia  HYDROmorphone  Injectable 1 milliGRAM(s) IV Push every 4 hours PRN Severe Pain (7 - 10)  ketorolac   Injectable 15 milliGRAM(s) IV Push every 4 hours PRN Moderate Pain (4 - 6)  ondansetron Injectable 4 milliGRAM(s) IV Push every 6 hours PRN Nausea and/or Vomiting      Vital Signs Last 24 Hrs  T(C): 36.6 (08 Oct 2023 05:19), Max: 36.6 (07 Oct 2023 20:05)  T(F): 97.9 (08 Oct 2023 05:19), Max: 97.9 (07 Oct 2023 20:05)  HR: 75 (08 Oct 2023 05:19) (67 - 79)  BP: 112/72 (08 Oct 2023 05:19) (112/72 - 135/78)  BP(mean): --  RR: 18 (08 Oct 2023 05:19) (18 - 18)  SpO2: 92% (08 Oct 2023 05:19) (92% - 98%)    Parameters below as of 08 Oct 2023 05:19  Patient On (Oxygen Delivery Method): room air        PHYSICAL EXAM:  Constitutional: AAOx3, no acute distress  HEENT: NCAT, airway patent  Cardiovascular: RRR, pulses present bilaterally  Respiratory: nonlabored breathing  Gastrointestinal: abdomen soft, mild lower abdominal ttp, non distended, no rebound or guarding   Neuro: no focal deficits  Extremities: non edematous, no calf pain bilaterally     I&O's Detail    07 Oct 2023 07:01  -  08 Oct 2023 07:00  --------------------------------------------------------  IN:    dextrose 5% + sodium chloride 0.45%: 300 mL    IV PiggyBack: 200 mL  Total IN: 500 mL    OUT:  Total OUT: 0 mL    Total NET: 500 mL          LABS:  [pending]

## 2023-10-08 NOTE — PROGRESS NOTE ADULT - SUBJECTIVE AND OBJECTIVE BOX
[facility]  J Luis Almanza PA-C  [address] [place], [place] [id number] [phone]      INTERVAL HPI/OVERNIGHT EVENTS:  seen and examined  reports continued abdominal bloating pain and nausea  CT abd pelvis today results noted         MEDICATIONS  (STANDING):  dextrose 5% + sodium chloride 0.45%. 1000 milliLiter(s) (70 mL/Hr) IV Continuous <Continuous>  enoxaparin Injectable 40 milliGRAM(s) SubCutaneous every 24 hours  influenza  Vaccine (HIGH DOSE) 0.7 milliLiter(s) IntraMuscular once  levothyroxine 88 MICROGram(s) Oral daily  mesalamine DR Capsule 400 milliGRAM(s) Oral three times a day  pantoprazole  Injectable 40 milliGRAM(s) IV Push daily    MEDICATIONS  (PRN):  diphenhydrAMINE Injectable 25 milliGRAM(s) IV Push every 6 hours PRN Insomnia  HYDROmorphone  Injectable 1 milliGRAM(s) IV Push every 4 hours PRN Severe Pain (7 - 10)  ketorolac   Injectable 15 milliGRAM(s) IV Push every 4 hours PRN Moderate Pain (4 - 6)  ondansetron Injectable 4 milliGRAM(s) IV Push every 6 hours PRN Nausea and/or Vomiting      Allergies    tetanus toxoid (Anaphylaxis)    Intolerances          ROS:   General:  No  fevers, chills, night sweats, fatigue,   Eyes:  Good vision, no reported pain  ENT:  No sore throat, pain, runny nose, dysphagia  CV:  No pain, palpitations, hypo/hypertension  Resp:  No dyspnea, cough, tachypnea, wheezing  GI: pos bloating, pos nausea, pos vomiting, pos pressure   :  No pain, bleeding, incontinence, nocturia  Muscle:  No pain, weakness  Neuro:  No weakness, tingling, memory problems  Psych:  No fatigue, insomnia, mood problems, depression  Endocrine:  No polyuria, polydipsia, cold/heat intolerance  Heme:  No petechiae, ecchymosis, easy bruisability  Skin:  No rash, tattoos, scars, edema          PHYSICAL EXAM  Vital Signs Last 24 Hrs  T(C): 36.7 (08 Oct 2023 11:40), Max: 36.7 (08 Oct 2023 10:00)  T(F): 98.1 (08 Oct 2023 11:40), Max: 98.1 (08 Oct 2023 10:00)  HR: 82 (08 Oct 2023 11:40) (67 - 84)  BP: 136/83 (08 Oct 2023 11:40) (112/72 - 136/83)  BP(mean): --  RR: 18 (08 Oct 2023 11:40) (18 - 18)  SpO2: 99% (08 Oct 2023 11:40) (92% - 99%)    Parameters below as of 08 Oct 2023 11:40  Patient On (Oxygen Delivery Method): room air          Constitutional: NAD  HEENT: EOMI, throat clear  Neck: No LAD, supple  Respiratory: CTA and P  Cardiovascular: S1 and S2, RRR, no M  Gastrointestinal: BS+, soft, NT/ND, neg HSM,  Extremities: No peripheral edema, neg clubbing, cyanosis  Vascular: 2+ peripheral pulses  Neurological: A/O, no focal deficits  Psychiatric: Normal mood, normal affect  Skin: No rashes      LABS:                        11.1   6.17  )-----------( 202      ( 08 Oct 2023 09:12 )             34.8     10-08    140  |  103  |  3<L>  ----------------------------<  134<H>  3.8   |  33<H>  |  0.67    Ca    9.1      08 Oct 2023 09:12  Phos  3.9     10-08  Mg     2.3     10-08    TPro  6.3  /  Alb  3.2<L>  /  TBili  0.5  /  DBili  x   /  AST  23  /  ALT  40  /  AlkPhos  92  10-08          10-07-23 @ 07:01  -  10-08-23 @ 07:00  --------------------------------------------------------  IN: 500 mL / OUT: 0 mL / NET: 500 mL                  RADIOLOGY & ADDITIONAL TESTS:      ACC: [id number] EXAM:  CT ABDOMEN AND PELVIS   ORDERED BY: SHELLY PATE     PROCEDURE DATE:  10/08/2023          INTERPRETATION:  CLINICAL INFORMATION: Abdominal pain and distention.   Evaluate for small bowel obstruction.    COMPARISON: October 4, 2023 and June 1, 2023    CONTRAST/COMPLICATIONS:  IV Contrast: NONE  Oral Contrast: NONE  Complications: None reported at time of study completion    PROCEDURE:  CT of the Abdomen and Pelvis was performed.  Sagittal and coronal reformats were performed.    FINDINGS:  LOWER CHEST: Minimal bibasilar subsegmental atelectasis    LIVER: Within normal limits.  BILE DUCTS: Stable mild dilatation of the extrahepatic common duct and   pancreatic duct..  GALLBLADDER: Within normal limits.  SPLEEN: Within normal limits.  PANCREAS: Within normal limits.  ADRENALS: Within normal limits.  KIDNEYS/URETERS: No renal stones or hydronephrosis.    BLADDER: Within normal limits.  REPRODUCTIVE ORGANS: 11 mm calcified right uterine body fibroid. Uterus   and adnexa otherwise normal.    Stomach is incompletely distended. Gastric wall thickening and gastritis   cannot be excluded. No focal gastric mass. No gastric outlet obstruction.    There is a moderate amount of stool throughout the colon.    Surgical anastomosis within the small bowel left lower quadrant.   Additional anastomosis within the rectosigmoid portion of the colon.    BOWEL: No bowel obstruction. Appendix is normal.  PERITONEUM: No ascites.  No free air or abscess.  VESSELS: Atherosclerotic changes.  RETROPERITONEUM/LYMPH NODES: No lymphadenopathy.  ABDOMINAL WALL: 2.3 cm fat-containing midline ventral hernia. Small   amounts of gas within the superficial subcutaneous soft tissues of the   right anterior abdominal wall likely reflecting injection therapy.  BONES: Degenerative changes. No lytic or blastic process.    IMPRESSION:  Stomach is incompletely distended. Gastric wall thickening and gastritis   cannot be excluded. No focal gastric mass. No gastric outlet obstruction.   Please correlate clinically.    No bowel obstruction, free fluid, free air or abscess.    Please refer to detailed findings otherwise described above.    --- End of Report ---             KENNETH LOMELI MD; Attending Radiologist  This document has been electronically signed. Oct  8 2023 10:57AM

## 2023-10-08 NOTE — PROGRESS NOTE ADULT - ASSESSMENT
69 yo female, pshx colon resection with rectopexy, SBR with anastomosis x2, revision of anastomosis, presenting with recurrent SBO with TP distal to SB anastomosis     Plan:  - AM labs pending  - HUSSEIN  - f/u bowel function   - OOBA  - pending GI PCR  - continue NPO

## 2023-10-09 LAB
ALBUMIN SERPL ELPH-MCNC: 2.8 G/DL — LOW (ref 3.3–5)
ALP SERPL-CCNC: 90 U/L — SIGNIFICANT CHANGE UP (ref 40–120)
ALT FLD-CCNC: 41 U/L — SIGNIFICANT CHANGE UP (ref 12–78)
ANION GAP SERPL CALC-SCNC: 3 MMOL/L — LOW (ref 5–17)
AST SERPL-CCNC: 25 U/L — SIGNIFICANT CHANGE UP (ref 15–37)
BILIRUB SERPL-MCNC: 0.6 MG/DL — SIGNIFICANT CHANGE UP (ref 0.2–1.2)
BUN SERPL-MCNC: 3 MG/DL — LOW (ref 7–23)
CALCIUM SERPL-MCNC: 8.6 MG/DL — SIGNIFICANT CHANGE UP (ref 8.5–10.1)
CHLORIDE SERPL-SCNC: 108 MMOL/L — SIGNIFICANT CHANGE UP (ref 96–108)
CO2 SERPL-SCNC: 34 MMOL/L — HIGH (ref 22–31)
CREAT SERPL-MCNC: 0.66 MG/DL — SIGNIFICANT CHANGE UP (ref 0.5–1.3)
EGFR: 95 ML/MIN/1.73M2 — SIGNIFICANT CHANGE UP
GLUCOSE SERPL-MCNC: 100 MG/DL — HIGH (ref 70–99)
HCT VFR BLD CALC: 35.4 % — SIGNIFICANT CHANGE UP (ref 34.5–45)
HGB BLD-MCNC: 11 G/DL — LOW (ref 11.5–15.5)
MAGNESIUM SERPL-MCNC: 2.5 MG/DL — SIGNIFICANT CHANGE UP (ref 1.6–2.6)
MCHC RBC-ENTMCNC: 30.6 PG — SIGNIFICANT CHANGE UP (ref 27–34)
MCHC RBC-ENTMCNC: 31.1 GM/DL — LOW (ref 32–36)
MCV RBC AUTO: 98.6 FL — SIGNIFICANT CHANGE UP (ref 80–100)
NRBC # BLD: 0 /100 WBCS — SIGNIFICANT CHANGE UP (ref 0–0)
PHOSPHATE SERPL-MCNC: 3.3 MG/DL — SIGNIFICANT CHANGE UP (ref 2.5–4.5)
PLATELET # BLD AUTO: 201 K/UL — SIGNIFICANT CHANGE UP (ref 150–400)
POTASSIUM SERPL-MCNC: 4.4 MMOL/L — SIGNIFICANT CHANGE UP (ref 3.5–5.3)
POTASSIUM SERPL-SCNC: 4.4 MMOL/L — SIGNIFICANT CHANGE UP (ref 3.5–5.3)
PROT SERPL-MCNC: 6.1 G/DL — SIGNIFICANT CHANGE UP (ref 6–8.3)
RBC # BLD: 3.59 M/UL — LOW (ref 3.8–5.2)
RBC # FLD: 13 % — SIGNIFICANT CHANGE UP (ref 10.3–14.5)
SODIUM SERPL-SCNC: 145 MMOL/L — SIGNIFICANT CHANGE UP (ref 135–145)
WBC # BLD: 5.66 K/UL — SIGNIFICANT CHANGE UP (ref 3.8–10.5)
WBC # FLD AUTO: 5.66 K/UL — SIGNIFICANT CHANGE UP (ref 3.8–10.5)

## 2023-10-09 RX ORDER — ACETAMINOPHEN 500 MG
1000 TABLET ORAL ONCE
Refills: 0 | Status: DISCONTINUED | OUTPATIENT
Start: 2023-10-09 | End: 2023-10-10

## 2023-10-09 RX ADMIN — Medication 88 MICROGRAM(S): at 05:35

## 2023-10-09 RX ADMIN — PANTOPRAZOLE SODIUM 40 MILLIGRAM(S): 20 TABLET, DELAYED RELEASE ORAL at 11:30

## 2023-10-09 RX ADMIN — ONDANSETRON 4 MILLIGRAM(S): 8 TABLET, FILM COATED ORAL at 05:48

## 2023-10-09 RX ADMIN — HYDROMORPHONE HYDROCHLORIDE 1 MILLIGRAM(S): 2 INJECTION INTRAMUSCULAR; INTRAVENOUS; SUBCUTANEOUS at 05:49

## 2023-10-09 RX ADMIN — Medication 202 MILLIGRAM(S): at 10:21

## 2023-10-09 RX ADMIN — Medication 25 MILLIGRAM(S): at 22:17

## 2023-10-09 RX ADMIN — Medication 200 MILLIGRAM(S): at 05:35

## 2023-10-09 RX ADMIN — ENOXAPARIN SODIUM 40 MILLIGRAM(S): 100 INJECTION SUBCUTANEOUS at 22:01

## 2023-10-09 RX ADMIN — HYDROMORPHONE HYDROCHLORIDE 1 MILLIGRAM(S): 2 INJECTION INTRAMUSCULAR; INTRAVENOUS; SUBCUTANEOUS at 22:00

## 2023-10-09 RX ADMIN — HYDROMORPHONE HYDROCHLORIDE 1 MILLIGRAM(S): 2 INJECTION INTRAMUSCULAR; INTRAVENOUS; SUBCUTANEOUS at 10:30

## 2023-10-09 RX ADMIN — HYDROMORPHONE HYDROCHLORIDE 1 MILLIGRAM(S): 2 INJECTION INTRAMUSCULAR; INTRAVENOUS; SUBCUTANEOUS at 19:00

## 2023-10-09 RX ADMIN — HYDROMORPHONE HYDROCHLORIDE 1 MILLIGRAM(S): 2 INJECTION INTRAMUSCULAR; INTRAVENOUS; SUBCUTANEOUS at 06:15

## 2023-10-09 RX ADMIN — HYDROMORPHONE HYDROCHLORIDE 1 MILLIGRAM(S): 2 INJECTION INTRAMUSCULAR; INTRAVENOUS; SUBCUTANEOUS at 11:30

## 2023-10-09 NOTE — PROGRESS NOTE ADULT - ASSESSMENT
bowel obstruction    hx SBO revision twice  ?element of inflammation; attempted mesalamine pt did not tolerate  will need CT enterography as outpatient; if negative would consider capsule but may need patency first  continued nausea/vomiting; on zofran and phenergan  monitor diet tolerance    I reviewed the overnight course of events on the unit, re-confirming the patient history. I discussed the care with the patient and their family  Differential diagnosis and plan of care discussed with patient after the evaluation  40 minutes spent on total encounter of which more than fifty percent of the encounter was spent counseling and/or coordinating care by the attending physician.  Advanced care planning was discussed with patient and family.  Advanced care planning forms were reviewed and discussed.  Risks, benefits and alternatives of gastroenterologic procedures were discussed in detail and all questions were answered.

## 2023-10-09 NOTE — PROGRESS NOTE ADULT - ASSESSMENT
Patient is a 68y old Female a/w SBO with PSHx colon resection w/ rectopexy, SBR w/ primary anastomosis with revision x 2. Previously advanced to CLD, tolerated and advanced to regular diet which was not tolerated and patient had multiple bouts of emesis & diarrhea. Patient was then reverted to NPO, and repeat CT scan was performed 10/7 w/ no SBO. Pt advanced to CLD 10/7 PM followed by emesis and abdominal distention. On exam, abdomen is mildly distended, soft, tenderness at the LLQ and R lateral mid abdomen to deep palpation.

## 2023-10-09 NOTE — PROGRESS NOTE ADULT - SUBJECTIVE AND OBJECTIVE BOX
[facility]  J Luis Almanza PA-C  [address] [place] [address]  [phone]      INTERVAL HPI/OVERNIGHT EVENTS:  Pt s/e  Overnight events noted  Pt still nauseous  Otherwise no new GI events    MEDICATIONS  (STANDING):  cefpodoxime 200 milliGRAM(s) Oral every 12 hours  dextrose 5% + sodium chloride 0.45%. 1000 milliLiter(s) (70 mL/Hr) IV Continuous <Continuous>  enoxaparin Injectable 40 milliGRAM(s) SubCutaneous every 24 hours  influenza  Vaccine (HIGH DOSE) 0.7 milliLiter(s) IntraMuscular once  levothyroxine 88 MICROGram(s) Oral daily  pantoprazole  Injectable 40 milliGRAM(s) IV Push daily    MEDICATIONS  (PRN):  acetaminophen   IVPB .. 1000 milliGRAM(s) IV Intermittent once PRN Temp greater or equal to 38C (100.4F), Mild Pain (1 - 3), Moderate Pain (4 - 6)  diphenhydrAMINE Injectable 25 milliGRAM(s) IV Push every 6 hours PRN Insomnia  famotidine    Tablet 20 milliGRAM(s) Oral daily PRN GERD  HYDROmorphone  Injectable 1 milliGRAM(s) IV Push every 4 hours PRN Severe Pain (7 - 10)  ketorolac   Injectable 15 milliGRAM(s) IV Push every 4 hours PRN Moderate Pain (4 - 6)  ondansetron Injectable 4 milliGRAM(s) IV Push every 6 hours PRN Nausea and/or Vomiting      Allergies    tetanus toxoid (Anaphylaxis)      PHYSICAL EXAM:   Vital Signs:  Vital Signs Last 24 Hrs  T(C): 36.6 (09 Oct 2023 05:47), Max: 36.8 (08 Oct 2023 17:21)  T(F): 97.9 (09 Oct 2023 05:47), Max: 98.3 (08 Oct 2023 17:21)  HR: 73 (09 Oct 2023 05:47) (73 - 102)  BP: 119/79 (09 Oct 2023 05:47) (119/79 - 142/93)  BP(mean): --  RR: 19 (09 Oct 2023 05:47) (18 - 19)  SpO2: 96% (09 Oct 2023 05:47) (95% - 98%)    Parameters below as of 09 Oct 2023 05:47  Patient On (Oxygen Delivery Method): room air    GENERAL:  Appears stated age  HEENT:  NC/AT  CHEST:  Full & symmetric excursion  HEART:  Regular rhythm  ABDOMEN:  Soft, non-tender, non-distended  EXTEREMITIES:  no cyanosis  SKIN:  No rash  NEURO:  Alert      LABS:                        11.0   5.66  )-----------( 201      ( 09 Oct 2023 07:58 )             35.4     10-09    145  |  108  |  3<L>  ----------------------------<  100<H>  4.4   |  34<H>  |  0.66    Ca    8.6      09 Oct 2023 07:58  Phos  3.3     10-09  Mg     2.5     10-09    TPro  6.1  /  Alb  2.8<L>  /  TBili  0.6  /  DBili  x   /  AST  25  /  ALT  41  /  AlkPhos  90  10-09      Urinalysis Basic - ( 09 Oct 2023 07:58 )    Color: x / Appearance: x / SG: x / pH: x  Gluc: 100 mg/dL / Ketone: x  / Bili: x / Urobili: x   Blood: x / Protein: x / Nitrite: x   Leuk Esterase: x / RBC: x / WBC x   Sq Epi: x / Non Sq Epi: x / Bacteria: x

## 2023-10-09 NOTE — PROGRESS NOTE ADULT - SUBJECTIVE AND OBJECTIVE BOX
S: Patient seen and examined at bedside.  Pt with vomiting overnight, reports continued vomiting without nausea. States she had a cup of water with her AM meds and she was unable to keep it down & vomited.  Patient reports no new complaints at this time.  Admits to flatus.  Voiding, ambulating. Patient denies any fever, chills, chest pain, shortness of breath, nausea, or urinary complaints.    MEDICATIONS:  cefpodoxime 200 milliGRAM(s) Oral every 12 hours  dextrose 5% + sodium chloride 0.45%. 1000 milliLiter(s) IV Continuous <Continuous>  diphenhydrAMINE Injectable 25 milliGRAM(s) IV Push every 6 hours PRN  enoxaparin Injectable 40 milliGRAM(s) SubCutaneous every 24 hours  famotidine    Tablet 20 milliGRAM(s) Oral daily PRN  HYDROmorphone  Injectable 1 milliGRAM(s) IV Push every 4 hours PRN  influenza  Vaccine (HIGH DOSE) 0.7 milliLiter(s) IntraMuscular once  ketorolac   Injectable 15 milliGRAM(s) IV Push every 4 hours PRN  levothyroxine 88 MICROGram(s) Oral daily  ondansetron Injectable 4 milliGRAM(s) IV Push every 6 hours PRN  pantoprazole  Injectable 40 milliGRAM(s) IV Push daily  promethazine IVPB 12.5 milliGRAM(s) IV Intermittent once      O:  Vital Signs Last 24 Hrs  T(C): 36.6 (09 Oct 2023 05:47), Max: 36.8 (08 Oct 2023 17:21)  T(F): 97.9 (09 Oct 2023 05:47), Max: 98.3 (08 Oct 2023 17:21)  HR: 73 (09 Oct 2023 05:47) (73 - 102)  BP: 119/79 (09 Oct 2023 05:47) (119/79 - 142/93)  RR: 19 (09 Oct 2023 05:47) (18 - 19)  SpO2: 96% (09 Oct 2023 05:47) (95% - 99%)    Parameters below as of 09 Oct 2023 05:47  Patient On (Oxygen Delivery Method): room air    PHYSICAL EXAM:  GENERAL: No acute distress, lying comfortably in bed  HEAD:  Atraumatic, Normocephalic  CHEST/LUNG: Non labored respirations, no accessory muscle use  HEART: Regular rate and rhythm  ABDOMEN: soft, mildly distended, tenderness to deep palpation at the LLQ and R lateral mid abdomen & suprapubic regions.   EXT: calves non-tender b/l, no edema  NEUROLOGY: A&O x 3, no focal deficits    I&O SUMMARY:    10-08-23 @ 07:01  -  10-09-23 @ 07:00  --------------------------------------------------------  IN:    dextrose 5% + sodium chloride 0.45%: 770 mL  Total IN: 770 mL    OUT:  Total OUT: 0 mL    Total NET: 770 mL    LABS:                        11.0   5.66  )-----------( 201      ( 09 Oct 2023 07:58 )             35.4     10-09    145  |  108  |  3<L>  ----------------------------<  100<H>  4.4   |  34<H>  |  0.66    Ca    8.6      09 Oct 2023 07:58  Phos  3.3     10-09  Mg     2.5     10-09    TPro  6.1  /  Alb  2.8<L>  /  TBili  0.6  /  DBili  x   /  AST  25  /  ALT  41  /  AlkPhos  90  10-09    RADIOLOGY:  < from: CT Abdomen and Pelvis No Cont (10.08.23 @ 10:36) >  ACC: [id number] EXAM:  CT ABDOMEN AND PELVIS   ORDERED BY: SHELLY PATE     PROCEDURE DATE:  10/08/2023          INTERPRETATION:  CLINICAL INFORMATION: Abdominal pain and distention.   Evaluate for small bowel obstruction.    COMPARISON: October 4, 2023 and June 1, 2023    CONTRAST/COMPLICATIONS:  IV Contrast: NONE  Oral Contrast: NONE  Complications: None reported at time of study completion    PROCEDURE:  CT of the Abdomen and Pelvis was performed.  Sagittal and coronal reformats were performed.    FINDINGS:  LOWER CHEST: Minimal bibasilar subsegmental atelectasis    LIVER: Within normal limits.  BILE DUCTS: Stable mild dilatation of the extrahepatic common duct and   pancreatic duct..  GALLBLADDER: Within normal limits.  SPLEEN: Withinnormal limits.  PANCREAS: Within normal limits.  ADRENALS: Within normal limits.  KIDNEYS/URETERS: No renal stones or hydronephrosis.    BLADDER: Within normal limits.  REPRODUCTIVE ORGANS: 11 mm calcified right uterine body fibroid. Uterus   and adnexa otherwise normal.    Stomach is incompletely distended. Gastric wall thickening and gastritis   cannot be excluded. No focal gastric mass. No gastric outlet obstruction.    There is a moderate amount of stool throughout the colon.    Surgical anastomosis within the small bowel left lower quadrant.   Additional anastomosis within the rectosigmoid portion of the colon.    BOWEL: No bowel obstruction. Appendix is normal.  PERITONEUM: No ascites.  No free air or abscess.  VESSELS: Atherosclerotic changes.  RETROPERITONEUM/LYMPH NODES: No lymphadenopathy.  ABDOMINAL WALL: 2.3 cm fat-containing midline ventral hernia. Small   amounts of gas within the superficial subcutaneous soft tissues of the   right anterior abdominal wall likely reflecting injection therapy.  BONES: Degenerative changes. No lytic or blastic process.    IMPRESSION:  Stomach is incompletely distended. Gastric wall thickening and gastritis   cannot be excluded. No focal gastric mass. No gastric outlet obstruction.   Pleasecorrelate clinically.    No bowel obstruction, free fluid, free air or abscess.    Please refer to detailed findings otherwise described above.    --- End of Report ---             KENNETH LOMELI MD; Attending Radiologist  This document has been electronically signed. Oct  8 2023 10:57AM    < end of copied text >

## 2023-10-10 LAB
ANION GAP SERPL CALC-SCNC: 2 MMOL/L — LOW (ref 5–17)
BUN SERPL-MCNC: 4 MG/DL — LOW (ref 7–23)
CALCIUM SERPL-MCNC: 8.9 MG/DL — SIGNIFICANT CHANGE UP (ref 8.5–10.1)
CHLORIDE SERPL-SCNC: 107 MMOL/L — SIGNIFICANT CHANGE UP (ref 96–108)
CO2 SERPL-SCNC: 34 MMOL/L — HIGH (ref 22–31)
CREAT SERPL-MCNC: 0.69 MG/DL — SIGNIFICANT CHANGE UP (ref 0.5–1.3)
EGFR: 94 ML/MIN/1.73M2 — SIGNIFICANT CHANGE UP
GI PCR PANEL: SIGNIFICANT CHANGE UP
GLUCOSE SERPL-MCNC: 103 MG/DL — HIGH (ref 70–99)
HCT VFR BLD CALC: 35.7 % — SIGNIFICANT CHANGE UP (ref 34.5–45)
HGB BLD-MCNC: 11.1 G/DL — LOW (ref 11.5–15.5)
MAGNESIUM SERPL-MCNC: 2.4 MG/DL — SIGNIFICANT CHANGE UP (ref 1.6–2.6)
MCHC RBC-ENTMCNC: 30.5 PG — SIGNIFICANT CHANGE UP (ref 27–34)
MCHC RBC-ENTMCNC: 31.1 GM/DL — LOW (ref 32–36)
MCV RBC AUTO: 98.1 FL — SIGNIFICANT CHANGE UP (ref 80–100)
NRBC # BLD: 0 /100 WBCS — SIGNIFICANT CHANGE UP (ref 0–0)
PHOSPHATE SERPL-MCNC: 4 MG/DL — SIGNIFICANT CHANGE UP (ref 2.5–4.5)
PLATELET # BLD AUTO: 211 K/UL — SIGNIFICANT CHANGE UP (ref 150–400)
POTASSIUM SERPL-MCNC: 3.7 MMOL/L — SIGNIFICANT CHANGE UP (ref 3.5–5.3)
POTASSIUM SERPL-SCNC: 3.7 MMOL/L — SIGNIFICANT CHANGE UP (ref 3.5–5.3)
RBC # BLD: 3.64 M/UL — LOW (ref 3.8–5.2)
RBC # FLD: 13.1 % — SIGNIFICANT CHANGE UP (ref 10.3–14.5)
SODIUM SERPL-SCNC: 143 MMOL/L — SIGNIFICANT CHANGE UP (ref 135–145)
WBC # BLD: 5.99 K/UL — SIGNIFICANT CHANGE UP (ref 3.8–10.5)
WBC # FLD AUTO: 5.99 K/UL — SIGNIFICANT CHANGE UP (ref 3.8–10.5)

## 2023-10-10 PROCEDURE — 74018 RADEX ABDOMEN 1 VIEW: CPT | Mod: 26

## 2023-10-10 RX ORDER — ACETAMINOPHEN 500 MG
1000 TABLET ORAL ONCE
Refills: 0 | Status: COMPLETED | OUTPATIENT
Start: 2023-10-10 | End: 2023-10-10

## 2023-10-10 RX ORDER — ACETAMINOPHEN 500 MG
1000 TABLET ORAL ONCE
Refills: 0 | Status: ACTIVE | OUTPATIENT
Start: 2023-10-10 | End: 2023-10-10

## 2023-10-10 RX ORDER — DIPHENHYDRAMINE HCL 50 MG
50 CAPSULE ORAL ONCE
Refills: 0 | Status: COMPLETED | OUTPATIENT
Start: 2023-10-10 | End: 2023-10-11

## 2023-10-10 RX ORDER — ACETAMINOPHEN 500 MG
1000 TABLET ORAL ONCE
Refills: 0 | Status: ACTIVE | OUTPATIENT
Start: 2023-10-11 | End: 2023-10-11

## 2023-10-10 RX ORDER — HYDROMORPHONE HYDROCHLORIDE 2 MG/ML
1 INJECTION INTRAMUSCULAR; INTRAVENOUS; SUBCUTANEOUS EVERY 4 HOURS
Refills: 0 | Status: DISCONTINUED | OUTPATIENT
Start: 2023-10-10 | End: 2023-10-17

## 2023-10-10 RX ORDER — IOHEXOL 300 MG/ML
30 INJECTION, SOLUTION INTRAVENOUS ONCE
Refills: 0 | Status: COMPLETED | OUTPATIENT
Start: 2023-10-10 | End: 2023-10-11

## 2023-10-10 RX ADMIN — HYDROMORPHONE HYDROCHLORIDE 1 MILLIGRAM(S): 2 INJECTION INTRAMUSCULAR; INTRAVENOUS; SUBCUTANEOUS at 07:42

## 2023-10-10 RX ADMIN — HYDROMORPHONE HYDROCHLORIDE 1 MILLIGRAM(S): 2 INJECTION INTRAMUSCULAR; INTRAVENOUS; SUBCUTANEOUS at 03:34

## 2023-10-10 RX ADMIN — HYDROMORPHONE HYDROCHLORIDE 1 MILLIGRAM(S): 2 INJECTION INTRAMUSCULAR; INTRAVENOUS; SUBCUTANEOUS at 15:49

## 2023-10-10 RX ADMIN — HYDROMORPHONE HYDROCHLORIDE 1 MILLIGRAM(S): 2 INJECTION INTRAMUSCULAR; INTRAVENOUS; SUBCUTANEOUS at 02:23

## 2023-10-10 RX ADMIN — PANTOPRAZOLE SODIUM 40 MILLIGRAM(S): 20 TABLET, DELAYED RELEASE ORAL at 11:04

## 2023-10-10 RX ADMIN — Medication 400 MILLIGRAM(S): at 23:24

## 2023-10-10 RX ADMIN — HYDROMORPHONE HYDROCHLORIDE 1 MILLIGRAM(S): 2 INJECTION INTRAMUSCULAR; INTRAVENOUS; SUBCUTANEOUS at 11:30

## 2023-10-10 RX ADMIN — Medication 400 MILLIGRAM(S): at 14:07

## 2023-10-10 RX ADMIN — ONDANSETRON 4 MILLIGRAM(S): 8 TABLET, FILM COATED ORAL at 10:07

## 2023-10-10 RX ADMIN — HYDROMORPHONE HYDROCHLORIDE 1 MILLIGRAM(S): 2 INJECTION INTRAMUSCULAR; INTRAVENOUS; SUBCUTANEOUS at 06:56

## 2023-10-10 RX ADMIN — HYDROMORPHONE HYDROCHLORIDE 1 MILLIGRAM(S): 2 INJECTION INTRAMUSCULAR; INTRAVENOUS; SUBCUTANEOUS at 11:04

## 2023-10-10 RX ADMIN — Medication 1000 MILLIGRAM(S): at 14:25

## 2023-10-10 RX ADMIN — Medication 1000 MILLIGRAM(S): at 23:39

## 2023-10-10 RX ADMIN — HYDROMORPHONE HYDROCHLORIDE 1 MILLIGRAM(S): 2 INJECTION INTRAMUSCULAR; INTRAVENOUS; SUBCUTANEOUS at 20:55

## 2023-10-10 RX ADMIN — Medication 88 MICROGRAM(S): at 05:36

## 2023-10-10 RX ADMIN — HYDROMORPHONE HYDROCHLORIDE 1 MILLIGRAM(S): 2 INJECTION INTRAMUSCULAR; INTRAVENOUS; SUBCUTANEOUS at 21:10

## 2023-10-10 RX ADMIN — Medication 200 MILLIGRAM(S): at 05:37

## 2023-10-10 RX ADMIN — HYDROMORPHONE HYDROCHLORIDE 1 MILLIGRAM(S): 2 INJECTION INTRAMUSCULAR; INTRAVENOUS; SUBCUTANEOUS at 16:10

## 2023-10-10 NOTE — PROGRESS NOTE ADULT - SUBJECTIVE AND OBJECTIVE BOX
[facility]  J Luis Almanza PA-C  [address] [place] [address]  [phone]      INTERVAL HPI/OVERNIGHT EVENTS:  Pt s/e  Vomited after breakfast this am    MEDICATIONS  (STANDING):  dextrose 5% + sodium chloride 0.45%. 1000 milliLiter(s) (70 mL/Hr) IV Continuous <Continuous>  enoxaparin Injectable 40 milliGRAM(s) SubCutaneous every 24 hours  influenza  Vaccine (HIGH DOSE) 0.7 milliLiter(s) IntraMuscular once  levothyroxine 88 MICROGram(s) Oral daily  pantoprazole  Injectable 40 milliGRAM(s) IV Push daily    MEDICATIONS  (PRN):  acetaminophen   IVPB .. 1000 milliGRAM(s) IV Intermittent once PRN Temp greater or equal to 38C (100.4F), Mild Pain (1 - 3), Moderate Pain (4 - 6)  diphenhydrAMINE Injectable 25 milliGRAM(s) IV Push every 6 hours PRN Insomnia  famotidine    Tablet 20 milliGRAM(s) Oral daily PRN GERD  HYDROmorphone  Injectable 1 milliGRAM(s) IV Push every 4 hours PRN Severe Pain (7 - 10)  ondansetron Injectable 4 milliGRAM(s) IV Push every 6 hours PRN Nausea and/or Vomiting      Allergies    tetanus toxoid (Anaphylaxis)    PHYSICAL EXAM:   Vital Signs:  Vital Signs Last 24 Hrs  T(C): 36.7 (10 Oct 2023 06:13), Max: 36.7 (09 Oct 2023 20:32)  T(F): 98 (10 Oct 2023 06:13), Max: 98 (09 Oct 2023 20:32)  HR: 82 (10 Oct 2023 11:36) (68 - 92)  BP: 115/70 (10 Oct 2023 11:36) (94/57 - 121/77)  BP(mean): --  RR: 19 (10 Oct 2023 11:36) (18 - 20)  SpO2: 92% (10 Oct 2023 11:36) (92% - 94%)    Parameters below as of 10 Oct 2023 11:36  Patient On (Oxygen Delivery Method): room air      Daily     Daily Weight in k (10 Oct 2023 06:13)    GENERAL:  Appears stated age  HEENT:  NC/AT  CHEST:  Full & symmetric excursion  HEART:  Regular rhythm  ABDOMEN:  Soft, non-tender, non-distended  EXTEREMITIES:  no cyanosis  SKIN:  No rash  NEURO:  Alert      LABS:                        11.1   5.99  )-----------( 211      ( 10 Oct 2023 07:16 )             35.7     10-10    143  |  107  |  4<L>  ----------------------------<  103<H>  3.7   |  34<H>  |  0.69    Ca    8.9      10 Oct 2023 07:16  Phos  4.0     10-10  Mg     2.4     10-10    TPro  6.1  /  Alb  2.8<L>  /  TBili  0.6  /  DBili  x   /  AST  25  /  ALT  41  /  AlkPhos  90  10-09      Urinalysis Basic - ( 10 Oct 2023 07:16 )    Color: x / Appearance: x / SG: x / pH: x  Gluc: 103 mg/dL / Ketone: x  / Bili: x / Urobili: x   Blood: x / Protein: x / Nitrite: x   Leuk Esterase: x / RBC: x / WBC x   Sq Epi: x / Non Sq Epi: x / Bacteria: x

## 2023-10-10 NOTE — PROGRESS NOTE ADULT - SUBJECTIVE AND OBJECTIVE BOX
S: Patient seen and examined at bedside.  No acute overnight events.  Patient reports tolerating FLD yesterday evening, with no nausea and vomiting, with +F and +BM formed. Voiding, ambulating. Patient denies any fever, chills, chest pain, shortness of breath, nausea, vomiting, or urinary complaints.    MEDICATIONS:  acetaminophen   IVPB .. 1000 milliGRAM(s) IV Intermittent once PRN  dextrose 5% + sodium chloride 0.45%. 1000 milliLiter(s) IV Continuous <Continuous>  diphenhydrAMINE Injectable 25 milliGRAM(s) IV Push every 6 hours PRN  enoxaparin Injectable 40 milliGRAM(s) SubCutaneous every 24 hours  famotidine    Tablet 20 milliGRAM(s) Oral daily PRN  HYDROmorphone  Injectable 1 milliGRAM(s) IV Push every 4 hours PRN  influenza  Vaccine (HIGH DOSE) 0.7 milliLiter(s) IntraMuscular once  levothyroxine 88 MICROGram(s) Oral daily  ondansetron Injectable 4 milliGRAM(s) IV Push every 6 hours PRN  pantoprazole  Injectable 40 milliGRAM(s) IV Push daily      O:  Vital Signs Last 24 Hrs  T(C): 36.7 (10 Oct 2023 06:13), Max: 36.7 (09 Oct 2023 20:32)  T(F): 98 (10 Oct 2023 06:13), Max: 98 (09 Oct 2023 20:32)  HR: 68 (10 Oct 2023 06:13) (68 - 92)  BP: 94/57 (10 Oct 2023 06:13) (94/57 - 121/77)  RR: 18 (10 Oct 2023 06:13) (18 - 20)  SpO2: 94% (10 Oct 2023 06:13) (94% - 94%)    Parameters below as of 10 Oct 2023 06:13  Patient On (Oxygen Delivery Method): room air    PHYSICAL EXAM:  GENERAL: No acute distress, lying comfortably in bed  HEAD:  Atraumatic, Normocephalic  CHEST/LUNG: Non labored respirations, no accessory muscle use  HEART: Regular rate and rhythm  ABDOMEN: Soft, mildly-distended; ttp LUQ and RUQ  EXT: calves non-tender b/l, no edema  NEUROLOGY: A&O x 3, no focal deficits    LABS:                        11.1   5.99  )-----------( 211      ( 10 Oct 2023 07:16 )             35.7     10-09    145  |  108  |  3<L>  ----------------------------<  100<H>  4.4   |  34<H>  |  0.66    Ca    8.6      09 Oct 2023 07:58  Phos  4.0     10-10  Mg     2.4     10-10    TPro  6.1  /  Alb  2.8<L>  /  TBili  0.6  /  DBili  x   /  AST  25  /  ALT  41  /  AlkPhos  90  10-09    RADIOLOGY:  < from: CT Abdomen and Pelvis No Cont (10.08.23 @ 10:36) >  IMPRESSION:  Stomach is incompletely distended. Gastric wall thickening and gastritis   cannot be excluded. No focal gastric mass. No gastric outlet obstruction.   Pleasecorrelate clinically.    No bowel obstruction, free fluid, free air or abscess.    Please refer to detailed findings otherwise described above.    --- End of Report ---    < end of copied text >

## 2023-10-10 NOTE — PROGRESS NOTE ADULT - NSPROGADDITIONALINFOA_GEN_ALL_CORE
patient s/e with  at bedside  events noted  I do not think this is gastroparesis, nor would I do a GES in setting of resolving sbo as a positive test may not differentiate b/w delayed emptying from gastroparesis vs obstruction  would repeat CT with iv/and oral contrast  remain npo until then  benadryl with premeds  EGD and colon discussed, with limited yield and elevated risk of aspiration given vomiting  still will need CT enterography as outpatient once obstructive issues are resolved  I do not think this is immune mediated as obstruction is localized and patient does not have diffuse small bowel wall thickening  yield of balloon enteroscopy is limited given location of anastomosis,   risk of obstruction with VCE also discussed   suggested to see a small bowel expert (gastroenterologist) as outpatient  patient and  agree with current plan

## 2023-10-10 NOTE — PROGRESS NOTE ADULT - ASSESSMENT
Patient is a 68 year old Female a/w SBO with PSHx colon resection w/ rectopexy, SBR w/ primary anastomosis with revision x 2. Previously advanced to CLD, tolerated and advanced to regular diet which was not tolerated and patient had multiple bouts of emesis & diarrhea. Patient was then reverted to NPO, and repeat CT scan was performed 10/7 w/ no SBO. Pt advanced to CLD 10/7 PM followed by emesis and abdominal distention, again reverted back to NPO. Adv to FLD yesterday evening, which patient tolerated, no n/v/d. VSSAF, exam with mildly distended abdomen, and soft, ttp RUQ and LUQ.

## 2023-10-10 NOTE — CHART NOTE - NSCHARTNOTEFT_GEN_A_CORE
Called by RN, as patient requesting to speak with the team regarding her clinical status and care plan.     Of note, patient a/w SBO s/p multiple abdominal surgeries in the past including colon resection with rectopexy, SBR with anastomosis for SBO with revision x 2 (most recent January 2023). Patient with hx of multiple SBOs in the past 2 years treated with both conservative and surgical managements. After having evidence of bowel function after presenting to the hospital patient was placed on CLD and then regular diet, which she did not tolerate and vomited, during this time she was also started on mesalamine per GI recs. She was reverted to NPO. After repeat CT showing no obstruction , patient was advanced to CLD which again caused vomiting. Patient was then reverted back to NPO, and after showing improvement yesterday with bowel function she was advanced to FLD. Overnight patient tolerated and then this AM she vomited after eating breakfast, placed NPO again, and planned for possible gastric emptying study.    Discussed with patient at length her clinical status, and the plan for diagnostic testing as per Dr. Yanez and GI team including gastric emptying study prior to which she would need to be without narcotic or promotility medications for 24 hours prior. Due to the need to be without narcotics x 24hrs patient is refusing to have gastric emptying study performed. Patient stating "I will not subject myself to sitting here in pain for 24 hours prior to this study that won't tell us anything and won't explain why I keep having obstructions". Discussed with patient the reasoning behind performing a gastric emptying study and why it is applicable in her case. Patient believes she needs an endoscopy/colonoscopy to "see what is going on inside" and to "look at the inflammatory cells that are there". Advised patient that of risks of performing endoscopy/colonoscopy during an acute inflammatory stage, as well advised patient that the gastroenterology team would be best be able to weigh in on her request and decide on a test that she may be agreeable to on this admission for diagnostic purposes. Patient expresses frustration that "nothing" has been done during this hospital visit.     At this time will replace patient's pain regimen including dilaudid and keep NPO as she was vomiting this morning and complaining of pain.     Will follow up.    Discussed with Dr. Yanez.

## 2023-10-10 NOTE — PROGRESS NOTE ADULT - ASSESSMENT
bowel obstruction    hx SBO revision twice  ?element of inflammation; attempted mesalamine pt did not tolerate  will need CT enterography as outpatient; if negative would consider capsule but may need patency first  continued nausea/vomiting; on zofran and phenergan  will d/w surgery team    I reviewed the overnight course of events on the unit, re-confirming the patient history. I discussed the care with the patient and their family  Differential diagnosis and plan of care discussed with patient after the evaluation  40 minutes spent on total encounter of which more than fifty percent of the encounter was spent counseling and/or coordinating care by the attending physician.  Advanced care planning was discussed with patient and family.  Advanced care planning forms were reviewed and discussed.  Risks, benefits and alternatives of gastroenterologic procedures were discussed in detail and all questions were answered.

## 2023-10-11 LAB
ANION GAP SERPL CALC-SCNC: 2 MMOL/L — LOW (ref 5–17)
BUN SERPL-MCNC: 4 MG/DL — LOW (ref 7–23)
CALCIUM SERPL-MCNC: 8.9 MG/DL — SIGNIFICANT CHANGE UP (ref 8.5–10.1)
CHLORIDE SERPL-SCNC: 108 MMOL/L — SIGNIFICANT CHANGE UP (ref 96–108)
CO2 SERPL-SCNC: 34 MMOL/L — HIGH (ref 22–31)
CREAT SERPL-MCNC: 0.68 MG/DL — SIGNIFICANT CHANGE UP (ref 0.5–1.3)
EGFR: 95 ML/MIN/1.73M2 — SIGNIFICANT CHANGE UP
GLUCOSE SERPL-MCNC: 105 MG/DL — HIGH (ref 70–99)
HCT VFR BLD CALC: 33.2 % — LOW (ref 34.5–45)
HGB BLD-MCNC: 10.3 G/DL — LOW (ref 11.5–15.5)
MAGNESIUM SERPL-MCNC: 2.4 MG/DL — SIGNIFICANT CHANGE UP (ref 1.6–2.6)
MCHC RBC-ENTMCNC: 30.4 PG — SIGNIFICANT CHANGE UP (ref 27–34)
MCHC RBC-ENTMCNC: 31 GM/DL — LOW (ref 32–36)
MCV RBC AUTO: 97.9 FL — SIGNIFICANT CHANGE UP (ref 80–100)
NRBC # BLD: 0 /100 WBCS — SIGNIFICANT CHANGE UP (ref 0–0)
PHOSPHATE SERPL-MCNC: 3.7 MG/DL — SIGNIFICANT CHANGE UP (ref 2.5–4.5)
PLATELET # BLD AUTO: 202 K/UL — SIGNIFICANT CHANGE UP (ref 150–400)
POTASSIUM SERPL-MCNC: 4.3 MMOL/L — SIGNIFICANT CHANGE UP (ref 3.5–5.3)
POTASSIUM SERPL-SCNC: 4.3 MMOL/L — SIGNIFICANT CHANGE UP (ref 3.5–5.3)
RBC # BLD: 3.39 M/UL — LOW (ref 3.8–5.2)
RBC # FLD: 12.9 % — SIGNIFICANT CHANGE UP (ref 10.3–14.5)
SODIUM SERPL-SCNC: 144 MMOL/L — SIGNIFICANT CHANGE UP (ref 135–145)
WBC # BLD: 4.66 K/UL — SIGNIFICANT CHANGE UP (ref 3.8–10.5)
WBC # FLD AUTO: 4.66 K/UL — SIGNIFICANT CHANGE UP (ref 3.8–10.5)

## 2023-10-11 PROCEDURE — 74177 CT ABD & PELVIS W/CONTRAST: CPT | Mod: 26

## 2023-10-11 RX ORDER — DIPHENHYDRAMINE HCL 50 MG
25 CAPSULE ORAL EVERY 6 HOURS
Refills: 0 | Status: DISCONTINUED | OUTPATIENT
Start: 2023-10-11 | End: 2023-10-11

## 2023-10-11 RX ORDER — DIPHENHYDRAMINE HCL 50 MG
25 CAPSULE ORAL EVERY 4 HOURS
Refills: 0 | Status: DISCONTINUED | OUTPATIENT
Start: 2023-10-11 | End: 2023-10-12

## 2023-10-11 RX ORDER — ACETAMINOPHEN 500 MG
650 TABLET ORAL EVERY 6 HOURS
Refills: 0 | Status: DISCONTINUED | OUTPATIENT
Start: 2023-10-11 | End: 2023-10-18

## 2023-10-11 RX ADMIN — HYDROMORPHONE HYDROCHLORIDE 1 MILLIGRAM(S): 2 INJECTION INTRAMUSCULAR; INTRAVENOUS; SUBCUTANEOUS at 20:31

## 2023-10-11 RX ADMIN — HYDROMORPHONE HYDROCHLORIDE 1 MILLIGRAM(S): 2 INJECTION INTRAMUSCULAR; INTRAVENOUS; SUBCUTANEOUS at 01:16

## 2023-10-11 RX ADMIN — HYDROMORPHONE HYDROCHLORIDE 1 MILLIGRAM(S): 2 INJECTION INTRAMUSCULAR; INTRAVENOUS; SUBCUTANEOUS at 21:31

## 2023-10-11 RX ADMIN — ONDANSETRON 4 MILLIGRAM(S): 8 TABLET, FILM COATED ORAL at 08:18

## 2023-10-11 RX ADMIN — Medication 25 MILLIGRAM(S): at 21:39

## 2023-10-11 RX ADMIN — ENOXAPARIN SODIUM 40 MILLIGRAM(S): 100 INJECTION SUBCUTANEOUS at 21:39

## 2023-10-11 RX ADMIN — IOHEXOL 30 MILLILITER(S): 300 INJECTION, SOLUTION INTRAVENOUS at 06:20

## 2023-10-11 RX ADMIN — HYDROMORPHONE HYDROCHLORIDE 1 MILLIGRAM(S): 2 INJECTION INTRAMUSCULAR; INTRAVENOUS; SUBCUTANEOUS at 01:01

## 2023-10-11 RX ADMIN — Medication 50 MILLIGRAM(S): at 09:04

## 2023-10-11 RX ADMIN — HYDROMORPHONE HYDROCHLORIDE 1 MILLIGRAM(S): 2 INJECTION INTRAMUSCULAR; INTRAVENOUS; SUBCUTANEOUS at 08:17

## 2023-10-11 RX ADMIN — Medication 25 MILLIGRAM(S): at 04:05

## 2023-10-11 RX ADMIN — ONDANSETRON 4 MILLIGRAM(S): 8 TABLET, FILM COATED ORAL at 19:05

## 2023-10-11 RX ADMIN — HYDROMORPHONE HYDROCHLORIDE 1 MILLIGRAM(S): 2 INJECTION INTRAMUSCULAR; INTRAVENOUS; SUBCUTANEOUS at 08:45

## 2023-10-11 RX ADMIN — HYDROMORPHONE HYDROCHLORIDE 1 MILLIGRAM(S): 2 INJECTION INTRAMUSCULAR; INTRAVENOUS; SUBCUTANEOUS at 16:30

## 2023-10-11 RX ADMIN — Medication 88 MICROGRAM(S): at 05:50

## 2023-10-11 RX ADMIN — PANTOPRAZOLE SODIUM 40 MILLIGRAM(S): 20 TABLET, DELAYED RELEASE ORAL at 13:40

## 2023-10-11 RX ADMIN — HYDROMORPHONE HYDROCHLORIDE 1 MILLIGRAM(S): 2 INJECTION INTRAMUSCULAR; INTRAVENOUS; SUBCUTANEOUS at 16:50

## 2023-10-11 NOTE — PROGRESS NOTE ADULT - SUBJECTIVE AND OBJECTIVE BOX
[facility]  J Luis Almanza PA-C  [address] [place] [address]  [phone]      INTERVAL HPI/OVERNIGHT EVENTS:  Pt s/e  CT noted  tolerating fulls      MEDICATIONS  (STANDING):  dextrose 5% + sodium chloride 0.45%. 1000 milliLiter(s) (70 mL/Hr) IV Continuous <Continuous>  enoxaparin Injectable 40 milliGRAM(s) SubCutaneous every 24 hours  influenza  Vaccine (HIGH DOSE) 0.7 milliLiter(s) IntraMuscular once  levothyroxine 88 MICROGram(s) Oral daily  pantoprazole  Injectable 40 milliGRAM(s) IV Push daily    MEDICATIONS  (PRN):  acetaminophen   IVPB .. 1000 milliGRAM(s) IV Intermittent once PRN Temp greater or equal to 38C (100.4F), Mild Pain (1 - 3), Moderate Pain (4 - 6)  diphenhydrAMINE Injectable 25 milliGRAM(s) IV Push every 6 hours PRN Insomnia  famotidine    Tablet 20 milliGRAM(s) Oral daily PRN GERD  HYDROmorphone  Injectable 1 milliGRAM(s) IV Push every 4 hours PRN Severe Pain (7 - 10)  ondansetron Injectable 4 milliGRAM(s) IV Push every 6 hours PRN Nausea and/or Vomiting      Allergies    tetanus toxoid (Anaphylaxis)    PHYSICAL EXAM:   Vital Signs:  Vital Signs Last 24 Hrs  T(C): 36.7 (10 Oct 2023 06:13), Max: 36.7 (09 Oct 2023 20:32)  T(F): 98 (10 Oct 2023 06:13), Max: 98 (09 Oct 2023 20:32)  HR: 82 (10 Oct 2023 11:36) (68 - 92)  BP: 115/70 (10 Oct 2023 11:36) (94/57 - 121/77)  BP(mean): --  RR: 19 (10 Oct 2023 11:36) (18 - 20)  SpO2: 92% (10 Oct 2023 11:36) (92% - 94%)    Parameters below as of 10 Oct 2023 11:36  Patient On (Oxygen Delivery Method): room air      Daily     Daily Weight in k (10 Oct 2023 06:13)    GENERAL:  Appears stated age  HEENT:  NC/AT  CHEST:  Full & symmetric excursion  HEART:  Regular rhythm  ABDOMEN:  Soft, non-tender, non-distended  EXTEREMITIES:  no cyanosis  SKIN:  No rash  NEURO:  Alert      LABS:                        11.1   5.99  )-----------( 211      ( 10 Oct 2023 07:16 )             35.7     10-10    143  |  107  |  4<L>  ----------------------------<  103<H>  3.7   |  34<H>  |  0.69    Ca    8.9      10 Oct 2023 07:16  Phos  4.0     10-10  Mg     2.4     10-10    TPro  6.1  /  Alb  2.8<L>  /  TBili  0.6  /  DBili  x   /  AST  25  /  ALT  41  /  AlkPhos  90  10-09      Urinalysis Basic - ( 10 Oct 2023 07:16 )    Color: x / Appearance: x / SG: x / pH: x  Gluc: 103 mg/dL / Ketone: x  / Bili: x / Urobili: x   Blood: x / Protein: x / Nitrite: x   Leuk Esterase: x / RBC: x / WBC x   Sq Epi: x / Non Sq Epi: x / Bacteria: x

## 2023-10-11 NOTE — PROGRESS NOTE ADULT - ASSESSMENT
68 year old Female a/w SBO with PSHx colon resection w/ rectopexy, SBR w/ primary anastomosis with revision x 2. On this admission patient with bowel function and repeat CT with no obstruction, however with vomiting with PO intake unable to adv diet. Patient now going for CT A/P IV and OC per GI. Patient with VSSAF, AM labs pending. Exam with continued mild distention of abdomen with TTP RUQ and L abdomen lateral.

## 2023-10-11 NOTE — PROGRESS NOTE ADULT - ASSESSMENT
bowel obstruction    hx SBO revision twice  repeat CT noted  cont fulls  suggest outpatient evaluation for double balloon enteroscopy at [facility]   would favor dc home on fulls for 48 hours  names small bowel experts given to patient  d/w dr. aguirre

## 2023-10-11 NOTE — PROGRESS NOTE ADULT - SUBJECTIVE AND OBJECTIVE BOX
S: Patient seen and examined at bedside. Patient c/o continued abdominal pain, vomiting yesterday. Pt now drinking contrast for repeat CT A/P. Admits to flatus and diarrhea.  Voiding, ambulating. Patient denies any fever, chills, chest pain, shortness of breath, or urinary complaints.    MEDICATIONS:  dextrose 5% + sodium chloride 0.45%. 1000 milliLiter(s) IV Continuous <Continuous>  diphenhydrAMINE Injectable 25 milliGRAM(s) IV Push every 6 hours PRN  diphenhydrAMINE Injectable 50 milliGRAM(s) IV Push once  enoxaparin Injectable 40 milliGRAM(s) SubCutaneous every 24 hours  famotidine    Tablet 20 milliGRAM(s) Oral daily PRN  HYDROmorphone  Injectable 1 milliGRAM(s) IV Push every 4 hours PRN  influenza  Vaccine (HIGH DOSE) 0.7 milliLiter(s) IntraMuscular once  levothyroxine 88 MICROGram(s) Oral daily  ondansetron Injectable 4 milliGRAM(s) IV Push every 6 hours PRN  pantoprazole  Injectable 40 milliGRAM(s) IV Push daily      O:  Vital Signs Last 24 Hrs  T(C): 36.4 (11 Oct 2023 04:24), Max: 36.7 (10 Oct 2023 20:06)  T(F): 97.6 (11 Oct 2023 04:24), Max: 98.1 (10 Oct 2023 20:06)  HR: 70 (11 Oct 2023 04:24) (70 - 83)  BP: 107/67 (11 Oct 2023 04:24) (107/67 - 115/70)  RR: 20 (11 Oct 2023 04:24) (19 - 20)  SpO2: 96% (11 Oct 2023 04:24) (92% - 96%)    Parameters below as of 11 Oct 2023 04:24  Patient On (Oxygen Delivery Method): room air        PHYSICAL EXAM:  GENERAL: No acute distress, lying comfortably in bed  HEAD:  Atraumatic, Normocephalic  CHEST/LUNG: Non labored respirations, no accessory muscle use  HEART: Regular rate and rhythm  ABDOMEN: Soft, mildly-distended; ttp RUQ lateral, and L mid abdomen lateral, surgical scars of abdomen present, well healed.  EXT: calves non-tender b/l, no edema  NEUROLOGY: A&O x 3, no focal deficits    I&O SUMMARY:    10-10-23 @ 07:01  -  10-11-23 @ 07:00  --------------------------------------------------------  IN:    dextrose 5% + sodium chloride 0.45%: 770 mL  Total IN: 770 mL    OUT:  Total OUT: 0 mL    Total NET: 770 mL    LABS:  AM labs pending

## 2023-10-12 DIAGNOSIS — K29.70 GASTRITIS, UNSPECIFIED, WITHOUT BLEEDING: ICD-10-CM

## 2023-10-12 DIAGNOSIS — T78.40XA ALLERGY, UNSPECIFIED, INITIAL ENCOUNTER: ICD-10-CM

## 2023-10-12 DIAGNOSIS — E03.9 HYPOTHYROIDISM, UNSPECIFIED: ICD-10-CM

## 2023-10-12 LAB
ANION GAP SERPL CALC-SCNC: 6 MMOL/L — SIGNIFICANT CHANGE UP (ref 5–17)
BUN SERPL-MCNC: 5 MG/DL — LOW (ref 7–23)
CALCIUM SERPL-MCNC: 8.8 MG/DL — SIGNIFICANT CHANGE UP (ref 8.5–10.1)
CHLORIDE SERPL-SCNC: 108 MMOL/L — SIGNIFICANT CHANGE UP (ref 96–108)
CO2 SERPL-SCNC: 32 MMOL/L — HIGH (ref 22–31)
CREAT SERPL-MCNC: 0.7 MG/DL — SIGNIFICANT CHANGE UP (ref 0.5–1.3)
EGFR: 94 ML/MIN/1.73M2 — SIGNIFICANT CHANGE UP
GLUCOSE SERPL-MCNC: 104 MG/DL — HIGH (ref 70–99)
HCT VFR BLD CALC: 34.4 % — LOW (ref 34.5–45)
HGB BLD-MCNC: 10.6 G/DL — LOW (ref 11.5–15.5)
MAGNESIUM SERPL-MCNC: 2.5 MG/DL — SIGNIFICANT CHANGE UP (ref 1.6–2.6)
MCHC RBC-ENTMCNC: 30.6 PG — SIGNIFICANT CHANGE UP (ref 27–34)
MCHC RBC-ENTMCNC: 30.8 GM/DL — LOW (ref 32–36)
MCV RBC AUTO: 99.4 FL — SIGNIFICANT CHANGE UP (ref 80–100)
NRBC # BLD: 0 /100 WBCS — SIGNIFICANT CHANGE UP (ref 0–0)
PHOSPHATE SERPL-MCNC: 4 MG/DL — SIGNIFICANT CHANGE UP (ref 2.5–4.5)
PLATELET # BLD AUTO: 214 K/UL — SIGNIFICANT CHANGE UP (ref 150–400)
POTASSIUM SERPL-MCNC: 4.1 MMOL/L — SIGNIFICANT CHANGE UP (ref 3.5–5.3)
POTASSIUM SERPL-SCNC: 4.1 MMOL/L — SIGNIFICANT CHANGE UP (ref 3.5–5.3)
RBC # BLD: 3.46 M/UL — LOW (ref 3.8–5.2)
RBC # FLD: 13.2 % — SIGNIFICANT CHANGE UP (ref 10.3–14.5)
SODIUM SERPL-SCNC: 146 MMOL/L — HIGH (ref 135–145)
WBC # BLD: 4.84 K/UL — SIGNIFICANT CHANGE UP (ref 3.8–10.5)
WBC # FLD AUTO: 4.84 K/UL — SIGNIFICANT CHANGE UP (ref 3.8–10.5)

## 2023-10-12 PROCEDURE — 99222 1ST HOSP IP/OBS MODERATE 55: CPT

## 2023-10-12 RX ORDER — FAMOTIDINE 10 MG/ML
20 INJECTION INTRAVENOUS DAILY
Refills: 0 | Status: DISCONTINUED | OUTPATIENT
Start: 2023-10-12 | End: 2023-10-17

## 2023-10-12 RX ORDER — ONDANSETRON 8 MG/1
4 TABLET, FILM COATED ORAL EVERY 6 HOURS
Refills: 0 | Status: DISCONTINUED | OUTPATIENT
Start: 2023-10-12 | End: 2023-10-17

## 2023-10-12 RX ORDER — METOCLOPRAMIDE HCL 10 MG
10 TABLET ORAL EVERY 8 HOURS
Refills: 0 | Status: DISCONTINUED | OUTPATIENT
Start: 2023-10-12 | End: 2023-10-16

## 2023-10-12 RX ORDER — PANTOPRAZOLE SODIUM 20 MG/1
40 TABLET, DELAYED RELEASE ORAL EVERY 12 HOURS
Refills: 0 | Status: DISCONTINUED | OUTPATIENT
Start: 2023-10-12 | End: 2023-10-14

## 2023-10-12 RX ORDER — SODIUM CHLORIDE 9 MG/ML
1000 INJECTION, SOLUTION INTRAVENOUS
Refills: 0 | Status: DISCONTINUED | OUTPATIENT
Start: 2023-10-12 | End: 2023-10-16

## 2023-10-12 RX ORDER — ACETAMINOPHEN 500 MG
1000 TABLET ORAL ONCE
Refills: 0 | Status: COMPLETED | OUTPATIENT
Start: 2023-10-13 | End: 2023-10-13

## 2023-10-12 RX ORDER — DIPHENHYDRAMINE HCL 50 MG
25 CAPSULE ORAL EVERY 6 HOURS
Refills: 0 | Status: COMPLETED | OUTPATIENT
Start: 2023-10-12 | End: 2023-10-13

## 2023-10-12 RX ORDER — ACETAMINOPHEN 500 MG
1000 TABLET ORAL ONCE
Refills: 0 | Status: COMPLETED | OUTPATIENT
Start: 2023-10-12 | End: 2023-10-12

## 2023-10-12 RX ADMIN — Medication 88 MICROGRAM(S): at 04:57

## 2023-10-12 RX ADMIN — PANTOPRAZOLE SODIUM 40 MILLIGRAM(S): 20 TABLET, DELAYED RELEASE ORAL at 17:33

## 2023-10-12 RX ADMIN — Medication 30 MILLILITER(S): at 17:34

## 2023-10-12 RX ADMIN — Medication 25 MILLIGRAM(S): at 08:11

## 2023-10-12 RX ADMIN — HYDROMORPHONE HYDROCHLORIDE 1 MILLIGRAM(S): 2 INJECTION INTRAMUSCULAR; INTRAVENOUS; SUBCUTANEOUS at 10:27

## 2023-10-12 RX ADMIN — HYDROMORPHONE HYDROCHLORIDE 1 MILLIGRAM(S): 2 INJECTION INTRAMUSCULAR; INTRAVENOUS; SUBCUTANEOUS at 16:10

## 2023-10-12 RX ADMIN — Medication 25 MILLIGRAM(S): at 15:20

## 2023-10-12 RX ADMIN — ONDANSETRON 4 MILLIGRAM(S): 8 TABLET, FILM COATED ORAL at 13:43

## 2023-10-12 RX ADMIN — HYDROMORPHONE HYDROCHLORIDE 1 MILLIGRAM(S): 2 INJECTION INTRAMUSCULAR; INTRAVENOUS; SUBCUTANEOUS at 21:16

## 2023-10-12 RX ADMIN — HYDROMORPHONE HYDROCHLORIDE 1 MILLIGRAM(S): 2 INJECTION INTRAMUSCULAR; INTRAVENOUS; SUBCUTANEOUS at 21:01

## 2023-10-12 RX ADMIN — Medication 25 MILLIGRAM(S): at 17:33

## 2023-10-12 RX ADMIN — Medication 40 MILLIGRAM(S): at 15:46

## 2023-10-12 RX ADMIN — HYDROMORPHONE HYDROCHLORIDE 1 MILLIGRAM(S): 2 INJECTION INTRAMUSCULAR; INTRAVENOUS; SUBCUTANEOUS at 04:56

## 2023-10-12 RX ADMIN — Medication 10 MILLIGRAM(S): at 21:01

## 2023-10-12 RX ADMIN — HYDROMORPHONE HYDROCHLORIDE 1 MILLIGRAM(S): 2 INJECTION INTRAMUSCULAR; INTRAVENOUS; SUBCUTANEOUS at 05:56

## 2023-10-12 RX ADMIN — HYDROMORPHONE HYDROCHLORIDE 1 MILLIGRAM(S): 2 INJECTION INTRAMUSCULAR; INTRAVENOUS; SUBCUTANEOUS at 10:45

## 2023-10-12 NOTE — CONSULT NOTE ADULT - PROBLEM SELECTOR RECOMMENDATION 3
Pt with PMHx hypothyroidism  - Pt on levothyroxine 88mcg at home  - Given diarrhea will order TSH with reflex T4 Pt with PMHx hypothyroidism  - Pt on levothyroxine 88mcg at home  - Given diarrhea will order TFTs

## 2023-10-12 NOTE — CHART NOTE - NSCHARTNOTEFT_GEN_A_CORE
Called by nurse to evaluate vomiting from patient.  Arrived at patient room.  Nurse at bedside during interaction.  On asking patient questions, patient became aggressive, telling me to go and search the garbage can to see her vomit.  Again asked if she could explain the vomitus.  states it is  brown and bilious.  No blood.  Pt has been taking in crackers and tea.  States it is the only thing she can tolerate as she feels weak.  Advised patient that due to the normal imaging (no obstruction noted on most recent CT scan), that this could be a motility issue.  Suggested ofirmev around the clock and toradol to which patient refused outright. States it doesn't work and she wants her dilaudid to relieve her pain and discomfort.  Currently states she has discomfort in her face (newly operated on 4 weeks ago).  Per chart has been going to the "pantry" to grab snacks at night despite being on a clear liquid diet.  Understandably upset about her condition.  Discussed further recs by GI, surgery, medicine.  At this time, there is no surgical intervention.  All suggestions patient declined.    Will discuss with Dr. aguirre, possible second opinion from a surgical perspective.      Physical:   69 yo female in obvious discomfort  AAO  Back with mild paraspinal spasms  Chest: CTA   Lower extremities- negative bola sign bilaterally.      Plan:   Will add ofirmev to pain regimen around the clock.   Started D51/2NS at 125mL/hour   May continue fulls, reiterated importance of adhering to diet recs  Ambulation, OOB  Incentive spirometer  anti emetics as prescribed.   Started on a new regimen of Famotidine  Am labs ordered

## 2023-10-12 NOTE — CONSULT NOTE ADULT - PROBLEM SELECTOR RECOMMENDATION 2
Pt with facial swelling and itchiness post IV contrast  - Solumedrol IV 40mg x1 and Benadryl 50mg IV q6 x 4 doses  - Pt with hx of contact dermatitis requiring steroid tx  - Suggest pt f/u with allergist/immunologist outpatient for further workup and management Pt with facial swelling and itchiness post IV contrast  - Solumedrol IV 40mg x1 and Benadryl 50mg IV q6 x 4 doses  - Pt with hx of contact dermatitis requiring steroid tx  - Suggest pt f/u with allergist/immunologist outpatient for further workup and management  - Should reassess in AM, pt may need repeat dose of IV solumedrol if symptoms persist

## 2023-10-12 NOTE — PROGRESS NOTE ADULT - SUBJECTIVE AND OBJECTIVE BOX
[facility]  J Luis Almanza PA-C  [address] [place] [address]  [phone]      INTERVAL HPI/OVERNIGHT EVENTS:  Pt s/e  Tolerated full liquids this morning  Attempting regular diet for lunch    MEDICATIONS  (STANDING):  enoxaparin Injectable 40 milliGRAM(s) SubCutaneous every 24 hours  influenza  Vaccine (HIGH DOSE) 0.7 milliLiter(s) IntraMuscular once  levothyroxine 88 MICROGram(s) Oral daily  pantoprazole  Injectable 40 milliGRAM(s) IV Push daily    MEDICATIONS  (PRN):  acetaminophen     Tablet .. 650 milliGRAM(s) Oral every 6 hours PRN Temp greater or equal to 38C (100.4F), Mild Pain (1 - 3), Moderate Pain (4 - 6)  diphenhydrAMINE 25 milliGRAM(s) Oral every 4 hours PRN Rash and/or Itching  diphenhydrAMINE Injectable 25 milliGRAM(s) IV Push every 6 hours PRN Insomnia  famotidine    Tablet 20 milliGRAM(s) Oral daily PRN GERD  HYDROmorphone  Injectable 1 milliGRAM(s) IV Push every 4 hours PRN Severe Pain (7 - 10)  ondansetron Injectable 4 milliGRAM(s) IV Push every 6 hours PRN Nausea and/or Vomiting      Allergies    tetanus toxoid (Anaphylaxis)      PHYSICAL EXAM:   Vital Signs:  Vital Signs Last 24 Hrs  T(C): 36.6 (12 Oct 2023 05:02), Max: 37.2 (11 Oct 2023 21:00)  T(F): 97.8 (12 Oct 2023 05:02), Max: 99 (11 Oct 2023 21:00)  HR: 61 (12 Oct 2023 05:02) (58 - 93)  BP: 103/67 (12 Oct 2023 05:02) (99/54 - 113/74)  BP(mean): --  RR: 16 (12 Oct 2023 05:02) (15 - 21)  SpO2: 97% (12 Oct 2023 05:02) (94% - 97%)    Parameters below as of 12 Oct 2023 05:02  Patient On (Oxygen Delivery Method): room air    GENERAL:  Appears stated age  HEENT:  NC/AT  CHEST:  Full & symmetric excursion  HEART:  Regular rhythm  ABDOMEN:  Soft, non-tender, non-distended  EXTEREMITIES:  no cyanosis  SKIN:  No rash  NEURO:  Alert      LABS:                        10.6   4.84  )-----------( 214      ( 12 Oct 2023 06:31 )             34.4     10-12    146<H>  |  108  |  5<L>  ----------------------------<  104<H>  4.1   |  32<H>  |  0.70    Ca    8.8      12 Oct 2023 06:45  Phos  4.0     10-12  Mg     2.5     10-12        Urinalysis Basic - ( 12 Oct 2023 06:45 )    Color: x / Appearance: x / SG: x / pH: x  Gluc: 104 mg/dL / Ketone: x  / Bili: x / Urobili: x   Blood: x / Protein: x / Nitrite: x   Leuk Esterase: x / RBC: x / WBC x   Sq Epi: x / Non Sq Epi: x / Bacteria: x

## 2023-10-12 NOTE — PROGRESS NOTE ADULT - NSPROGADDITIONALINFOA_GEN_ALL_CORE
pt states she was throwing up last night  still with epigastric and R sided pain  CT x2 revealing no cause of symptoms or reason for vomiting  PT on narcotics and brought up nausea/vomiting as side effect and patient states needs narcotics and not causing her problems  since tolerating clears, and having diarrhea will advance to fulls  as this point, d/w pt unsure of cause of her pain and unable to tolerate anything without vomiting within minutes  Will d/w team and covering team for weekend

## 2023-10-12 NOTE — PROGRESS NOTE ADULT - ASSESSMENT
68 year old Female a/w SBO with PSHx colon resection w/ rectopexy, SBR w/ primary anastomosis with revision x 2.  Admitted for SBO, clinically improving.  VSS.  Labs unremarkable, abdominal exam benign.

## 2023-10-12 NOTE — CONSULT NOTE ADULT - SUBJECTIVE AND OBJECTIVE BOX
JONNA KANG  68y  Female      Patient is a 68y old Female who presents with a chief complaint of SBO (12 Oct 2023 12:38)    HPI: Pt is a 67y/o F with PMHx         PAST MEDICAL/SURGICAL HISTORY  PAST MEDICAL & SURGICAL HISTORY:  Hypothyroidism      HLD (hyperlipidemia)      SBO (small bowel obstruction)      S/P small bowel resection      History of facelift          REVIEW OF SYSTEMS:  CONSTITUTIONAL: No fever, +fatigue  ENMT:  No sinus or throat pain  RESPIRATORY: No cough, wheezing, chills or hemoptysis; No shortness of breath  CARDIOVASCULAR: No chest pain, palpitations, dizziness, or leg swelling  GASTROINTESTINAL: + mid abdominal pain, +vomiting, +watery diarrhea; No nausea  NEUROLOGICAL: No headaches, numbness or tingling  HEME/LYMPH: + easy bruising  ALLERY AND IMMUNOLOGIC: +itchiness and facial puffiness    T(C): 36.5 (10-12-23 @ 12:58), Max: 37.2 (10-11-23 @ 21:00)  HR: 88 (10-12-23 @ 12:58) (61 - 93)  BP: 104/65 (10-12-23 @ 12:58) (99/54 - 113/74)  RR: 16 (10-12-23 @ 12:58) (15 - 21)  SpO2: 92% (10-12-23 @ 12:58) (92% - 97%)  Wt(kg): --Vital Signs Last 24 Hrs  T(C): 36.5 (12 Oct 2023 12:58), Max: 37.2 (11 Oct 2023 21:00)  T(F): 97.7 (12 Oct 2023 12:58), Max: 99 (11 Oct 2023 21:00)  HR: 88 (12 Oct 2023 12:58) (61 - 93)  BP: 104/65 (12 Oct 2023 12:58) (99/54 - 113/74)  BP(mean): --  RR: 16 (12 Oct 2023 12:58) (15 - 21)  SpO2: 92% (12 Oct 2023 12:58) (92% - 97%)    Parameters below as of 12 Oct 2023 12:58  Patient On (Oxygen Delivery Method): room air        PHYSICAL EXAM:  GENERAL: NAD, well-groomed, well-developed  HEAD: Atraumatic, Normocephalic  EYES: EOMI, conjunctiva and sclera clear, erythema and puffiness around the eyes b/l  ENMT: No tonsillar erythema, Moist mucous membranes  NERVOUS SYSTEM: responds to questioning and commands appropriately  CHEST/LUNG: Clear to percussion bilaterally; No rales, rhonchi, wheezing, or rubs  HEART: Regular rate and rhythm; No murmurs, rubs, or gallops  ABDOMEN: Soft, mildly distended, mild tenderness to palpation in the mid abdominal region, bowel sounds present x4  SKIN: Ecchymoses to the b/l arms, abdomen 2/2 lovenox injections, no visible hives     Consultant(s) Notes Reviewed:  [x ] YES  [ ] NO  Care Discussed with Consultants/Other Providers [ x] YES  [ ] NO    LABS:  CBC   10-12-23 @ 06:31  Hematcorit 34.4  Hemoglobin 10.6  Mean Cell Hemoglobin 30.6  Platelet Count-Automated 214  RBC Count 3.46  Red Cell Distrib Width 13.2  Wbc Count 4.84      BMP  10-12-23 @ 06:45  Anion Gap. Serum 6  Blood Urea Nitrogen,Serm 5  Calcium, Total Serum 8.8  Carbon Dioxide, Serum 32  Chloride, Serum 108  Creatinine, Serum 0.70  eGFR in  --  eGFR in Non Afican American --  Gloucose, serum 104  Potassium, Serum 4.1  Sodium, Serum 146              10-11-23 @ 08:31  Anion Gap. Serum 2  Blood Urea Nitrogen,Serm 4  Calcium, Total Serum 8.9  Carbon Dioxide, Serum 34  Chloride, Serum 108  Creatinine, Serum 0.68  eGFR in  --  eGFR in Non Afican American --  Gloucose, serum 105  Potassium, Serum 4.3  Sodium, Serum 144              10-10-23 @ 07:16  Anion Gap. Serum 2  Blood Urea Nitrogen,Serm 4  Calcium, Total Serum 8.9  Carbon Dioxide, Serum 34  Chloride, Serum 107  Creatinine, Serum 0.69  eGFR in  --  eGFR in Non Afican American --  Gloucose, serum 103  Potassium, Serum 3.7  Sodium, Serum 143                  CMP  10-12-23 @ 06:45  Azra Aminotransferase(ALT/SGPT)--  Albumin, Serum --  Alkaline Phosphatase, Serum --  Anion Gap, Serum 6  Aspartate Aminotransferase (AST/SGOT)--  Bilirubin Total, Serum --  Blood Urea Nitrogen, Serum 5  Calcium,Total Serum 8.8  Carbon Dioxide, Serum 32  Chloride, Serum 108  Creatinine, Serum 0.70  eGFR if  --  eGFR if Non African American --  Glucose, Serum 104  Potassium, Serum 4.1  Protein Total, Serum --  Sodium, Serum 146                      10-11-23 @ 08:31  Azra Aminotransferase(ALT/SGPT)--  Albumin, Serum --  Alkaline Phosphatase, Serum --  Anion Gap, Serum 2  Aspartate Aminotransferase (AST/SGOT)--  Bilirubin Total, Serum --  Blood Urea Nitrogen, Serum 4  Calcium,Total Serum 8.9  Carbon Dioxide, Serum 34  Chloride, Serum 108  Creatinine, Serum 0.68  eGFR if  --  eGFR if Non African American --  Glucose, Serum 105  Potassium, Serum 4.3  Protein Total, Serum --  Sodium, Serum 144                      10-10-23 @ 07:16  Azra Aminotransferase(ALT/SGPT)--  Albumin, Serum --  Alkaline Phosphatase, Serum --  Anion Gap, Serum 2  Aspartate Aminotransferase (AST/SGOT)--  Bilirubin Total, Serum --  Blood Urea Nitrogen, Serum 4  Calcium,Total Serum 8.9  Carbon Dioxide, Serum 34  Chloride, Serum 107  Creatinine, Serum 0.69  eGFR if  --  eGFR if Non African American --  Glucose, Serum 103  Potassium, Serum 3.7  Protein Total, Serum --  Sodium, Serum 143                          PT/INR      Amylase/Lipase            RADIOLOGY & ADDITIONAL TESTS:    Imaging Personally Reviewed:  [ ] YES  [ ] NO JONNA KANG  68y  Female      Patient is a 68y old Female who presents with a chief complaint of SBO (12 Oct 2023 12:38)    HPI: Pt is a 67y/o F with PSHx colon resection w/ rectopexy (2016), SBR with anastomosis x2 (7/10/22, 9/27/22 - open) revision of anastomosis 1/10/23 at [facility], now a/w recurrent SBO with transition point distal to the small bowel anastomosis per chart. Pt also with PMHx hypothyroidism, recurrent sinusitis, HLD. Pt with multiple recurrent SBO admitted to surgical team. SBO resolved, pt with gastritis and continued vomiting and watery diarrhea. Pt with recent 8 day course of levaquin for sinusitis which last dose day prior to admission. Pt also had face lift in september with subsequent contact dermatitis from the dressing around her chin. Pt was given IM steroids and 2 medrol dose packs and contact dermatitis had cleared. Pt now c/o itching and facial puffiness after contrast yesterday. Pt also admits to easy bruising. No sick contacts. Pt denies other complaints at this time.        PAST MEDICAL/SURGICAL HISTORY  PAST MEDICAL & SURGICAL HISTORY:  Hypothyroidism      HLD (hyperlipidemia)      SBO (small bowel obstruction)      S/P small bowel resection      History of facelift          REVIEW OF SYSTEMS:  CONSTITUTIONAL: No fever, +fatigue  ENMT:  No sinus or throat pain  RESPIRATORY: No cough, wheezing, chills or hemoptysis; No shortness of breath  CARDIOVASCULAR: No chest pain, palpitations, dizziness, or leg swelling  GASTROINTESTINAL: + mid abdominal pain, +vomiting, +watery diarrhea; No nausea  NEUROLOGICAL: No headaches, numbness or tingling  HEME/LYMPH: + easy bruising  ALLERY AND IMMUNOLOGIC: +itchiness and facial puffiness    T(C): 36.5 (10-12-23 @ 12:58), Max: 37.2 (10-11-23 @ 21:00)  HR: 88 (10-12-23 @ 12:58) (61 - 93)  BP: 104/65 (10-12-23 @ 12:58) (99/54 - 113/74)  RR: 16 (10-12-23 @ 12:58) (15 - 21)  SpO2: 92% (10-12-23 @ 12:58) (92% - 97%)  Wt(kg): --Vital Signs Last 24 Hrs  T(C): 36.5 (12 Oct 2023 12:58), Max: 37.2 (11 Oct 2023 21:00)  T(F): 97.7 (12 Oct 2023 12:58), Max: 99 (11 Oct 2023 21:00)  HR: 88 (12 Oct 2023 12:58) (61 - 93)  BP: 104/65 (12 Oct 2023 12:58) (99/54 - 113/74)  BP(mean): --  RR: 16 (12 Oct 2023 12:58) (15 - 21)  SpO2: 92% (12 Oct 2023 12:58) (92% - 97%)    Parameters below as of 12 Oct 2023 12:58  Patient On (Oxygen Delivery Method): room air        PHYSICAL EXAM:  GENERAL: NAD, well-groomed, well-developed  HEAD: Atraumatic, Normocephalic  EYES: EOMI, conjunctiva and sclera clear, erythema and puffiness around the eyes b/l  ENMT: No tonsillar erythema, Moist mucous membranes  NERVOUS SYSTEM: responds to questioning and commands appropriately  CHEST/LUNG: Clear to percussion bilaterally; No rales, rhonchi, wheezing, or rubs  HEART: Regular rate and rhythm; No murmurs, rubs, or gallops  ABDOMEN: Soft, mildly distended, mild tenderness to palpation in the mid abdominal region, bowel sounds present x4  SKIN: Ecchymoses to the b/l arms, abdomen 2/2 lovenox injections, no visible hives     Consultant(s) Notes Reviewed:  [x ] YES  [ ] NO  Care Discussed with Consultants/Other Providers [ x] YES  [ ] NO    LABS:  CBC   10-12-23 @ 06:31  Hematcorit 34.4  Hemoglobin 10.6  Mean Cell Hemoglobin 30.6  Platelet Count-Automated 214  RBC Count 3.46  Red Cell Distrib Width 13.2  Wbc Count 4.84      BMP  10-12-23 @ 06:45  Anion Gap. Serum 6  Blood Urea Nitrogen,Serm 5  Calcium, Total Serum 8.8  Carbon Dioxide, Serum 32  Chloride, Serum 108  Creatinine, Serum 0.70  eGFR in  --  eGFR in Non Afican American --  Gloucose, serum 104  Potassium, Serum 4.1  Sodium, Serum 146              10-11-23 @ 08:31  Anion Gap. Serum 2  Blood Urea Nitrogen,Serm 4  Calcium, Total Serum 8.9  Carbon Dioxide, Serum 34  Chloride, Serum 108  Creatinine, Serum 0.68  eGFR in  --  eGFR in Non Afican American --  Gloucose, serum 105  Potassium, Serum 4.3  Sodium, Serum 144              10-10-23 @ 07:16  Anion Gap. Serum 2  Blood Urea Nitrogen,Serm 4  Calcium, Total Serum 8.9  Carbon Dioxide, Serum 34  Chloride, Serum 107  Creatinine, Serum 0.69  eGFR in  --  eGFR in Non Afican American --  Gloucose, serum 103  Potassium, Serum 3.7  Sodium, Serum 143                  CMP  10-12-23 @ 06:45  Azra Aminotransferase(ALT/SGPT)--  Albumin, Serum --  Alkaline Phosphatase, Serum --  Anion Gap, Serum 6  Aspartate Aminotransferase (AST/SGOT)--  Bilirubin Total, Serum --  Blood Urea Nitrogen, Serum 5  Calcium,Total Serum 8.8  Carbon Dioxide, Serum 32  Chloride, Serum 108  Creatinine, Serum 0.70  eGFR if  --  eGFR if Non African American --  Glucose, Serum 104  Potassium, Serum 4.1  Protein Total, Serum --  Sodium, Serum 146                      10-11-23 @ 08:31  Azra Aminotransferase(ALT/SGPT)--  Albumin, Serum --  Alkaline Phosphatase, Serum --  Anion Gap, Serum 2  Aspartate Aminotransferase (AST/SGOT)--  Bilirubin Total, Serum --  Blood Urea Nitrogen, Serum 4  Calcium,Total Serum 8.9  Carbon Dioxide, Serum 34  Chloride, Serum 108  Creatinine, Serum 0.68  eGFR if  --  eGFR if Non African American --  Glucose, Serum 105  Potassium, Serum 4.3  Protein Total, Serum --  Sodium, Serum 144                      10-10-23 @ 07:16  Azra Aminotransferase(ALT/SGPT)--  Albumin, Serum --  Alkaline Phosphatase, Serum --  Anion Gap, Serum 2  Aspartate Aminotransferase (AST/SGOT)--  Bilirubin Total, Serum --  Blood Urea Nitrogen, Serum 4  Calcium,Total Serum 8.9  Carbon Dioxide, Serum 34  Chloride, Serum 107  Creatinine, Serum 0.69  eGFR if  --  eGFR if Non African American --  Glucose, Serum 103  Potassium, Serum 3.7  Protein Total, Serum --  Sodium, Serum 143                          PT/INR      Amylase/Lipase            RADIOLOGY & ADDITIONAL TESTS:    Imaging Personally Reviewed:  [ ] YES  [ ] NO

## 2023-10-12 NOTE — CONSULT NOTE ADULT - ASSESSMENT
68 year old Female a/w SBO with PSHx colon resection w/ rectopexy, SBR w/ primary anastomosis with revision x 2.  Admitted to surgery team for SBO.  68 year old Female a/w SBO with PSHx colon resection w/ rectopexy, SBR w/ primary anastomosis with revision x 2.  Admitted to surgery team for SBO. medicine consulted for continued vomiting/diarrhea, allergic rxn, and home medication management.

## 2023-10-12 NOTE — PROGRESS NOTE ADULT - SUBJECTIVE AND OBJECTIVE BOX
SURGERY PA NOTE ON BEHALF OF DR. CAMARILLO:    S: Patient seen and examined at bedside.   No acute events overnight.  Patient tolerating full liquid diet, reports passing flatus having small BMs.  Denies fevers, chills, chest pain, SOB, palpitations, calf pain.      MEDICATIONS:  acetaminophen     Tablet .. 650 milliGRAM(s) Oral every 6 hours PRN  dextrose 5% + sodium chloride 0.45%. 1000 milliLiter(s) IV Continuous <Continuous>  diphenhydrAMINE 25 milliGRAM(s) Oral every 4 hours PRN  diphenhydrAMINE Injectable 25 milliGRAM(s) IV Push every 6 hours PRN  enoxaparin Injectable 40 milliGRAM(s) SubCutaneous every 24 hours  famotidine    Tablet 20 milliGRAM(s) Oral daily PRN  HYDROmorphone  Injectable 1 milliGRAM(s) IV Push every 4 hours PRN  influenza  Vaccine (HIGH DOSE) 0.7 milliLiter(s) IntraMuscular once  levothyroxine 88 MICROGram(s) Oral daily  ondansetron Injectable 4 milliGRAM(s) IV Push every 6 hours PRN  pantoprazole  Injectable 40 milliGRAM(s) IV Push daily      O:  Vital Signs Last 24 Hrs  T(C): 36.6 (12 Oct 2023 05:02), Max: 37.2 (11 Oct 2023 21:00)  T(F): 97.8 (12 Oct 2023 05:02), Max: 99 (11 Oct 2023 21:00)  HR: 61 (12 Oct 2023 05:02) (58 - 93)  BP: 103/67 (12 Oct 2023 05:02) (99/54 - 113/74)  BP(mean): --  RR: 16 (12 Oct 2023 05:02) (15 - 21)  SpO2: 97% (12 Oct 2023 05:02) (94% - 97%)    Parameters below as of 12 Oct 2023 05:02  Patient On (Oxygen Delivery Method): room air        I&O SUMMARY:    10-11-23 @ 07:01  -  10-12-23 @ 07:00  --------------------------------------------------------  IN: 240 mL / OUT: 0 mL / NET: 240 mL        PHYSICAL EXAM:  Lungs: CTA bilat without W/R/R  Card: S1S2  Abd: Soft, NT, ND.  +BS x 4.  No rebound/guarding.    Ext: Calves soft, NT, without edema bilat    LABS:                        10.3   4.66  )-----------( 202      ( 11 Oct 2023 08:31 )             33.2     10-11    144  |  108  |  4<L>  ----------------------------<  105<H>  4.3   |  34<H>  |  0.68    Ca    8.9      11 Oct 2023 08:31  Phos  3.7     10-11  Mg     2.4     10-11

## 2023-10-12 NOTE — CONSULT NOTE ADULT - PROBLEM SELECTOR RECOMMENDATION 9
Pt with continued vomiting and diarrhea despite resolution of SBO  - Pt with recent 8 day course of levaquin 700mg PO for sinusitis  - Will consider checking c. diff PCR if pt still with watery diarrhea  - Pt also with recent course of steroids 2/2 contact dermatitis  - Will switch zofran to standing and add reglan PRN for breakthrough vomiting  - Pantoprazole IV 40mg BID, pepcid 20mg IV QD, maalox prior to meals ordered for gastritis  - Pt following with GI  - SBO management per surgery Pt with continued vomiting and diarrhea despite resolution of SBO  - Pt with recent 8 day course of levaquin 700mg PO for sinusitis  - Pt c/o continued watery diarrhea, recommend surgery team to confirm with RN, if pt with confirmed watery diarrhea would consider checking c. diff PCR  - Pt also with recent course of steroids 2/2 contact dermatitis  - Will switch zofran to standing and add reglan PRN for breakthrough vomiting  - Pantoprazole IV 40mg BID, pepcid 20mg IV QD, maalox prior to meals ordered for gastritis  - Pt following with GI  - SBO management per surgery

## 2023-10-12 NOTE — PROGRESS NOTE ADULT - PROBLEM SELECTOR PLAN 1
- VSSAF  - AM labs reassuring.  - Adv diet  - Start Rocephin empirically for cystitis  - Pain control as needed  - Encourage OOB/ambulation & incentive spirometry  - Monitor bowel function  - Will consult GI & ID  - DVT ppx with SCD    Discussed with Dr. Yanez    Surgical Team Spectralink: [id number]
- Will hold on Full liquid diet  - Encouraged IS use/OOB/Ambulation  - F/u GI rec's  - analgesia prn  - to discuss w/ Dr. Yanez
- VSSAF, Labs pending, exam with mid distention, ttp RUQ and left lateral abdomen  - Currently NPO, pending repeat CT with IV and OC per GI today  - Pain control/suppportive care  - Encouraged ambulation  - LVX for DVT ppx    To be discussed with Dr. Yanez.
- VSSAF, Labs reassuring, repeat CT without evidence of SBO  - Exam with mild distended, soft, ttp RUQ and LUQ  - Currently tolerating FLD, will adv to reg diet for lunch  - Zofran PRN, phenergan added for breakthru.  - serial abdominal exams  - Encouraged ambulation  - SCDs for DVT ppx  - Dispo planning for today. Patient requesting pain medications once discharged.    Discussed with Dr. Yanez.
- VSSAF, Labs reassuring, repeat CT without evidence of SBO  - Zofran PRN, phenergan added for breakthru.  - Will offer NGT  - serial abdominal exams  - Encouraged ambulation  - SCDs for DVT ppx    Discussed with Dr. Yanez.

## 2023-10-13 LAB
ANION GAP SERPL CALC-SCNC: 5 MMOL/L — SIGNIFICANT CHANGE UP (ref 5–17)
BUN SERPL-MCNC: 5 MG/DL — LOW (ref 7–23)
CALCIUM SERPL-MCNC: 9.1 MG/DL — SIGNIFICANT CHANGE UP (ref 8.5–10.1)
CHLORIDE SERPL-SCNC: 107 MMOL/L — SIGNIFICANT CHANGE UP (ref 96–108)
CO2 SERPL-SCNC: 30 MMOL/L — SIGNIFICANT CHANGE UP (ref 22–31)
CREAT SERPL-MCNC: 0.77 MG/DL — SIGNIFICANT CHANGE UP (ref 0.5–1.3)
EGFR: 84 ML/MIN/1.73M2 — SIGNIFICANT CHANGE UP
GLUCOSE SERPL-MCNC: 167 MG/DL — HIGH (ref 70–99)
MAGNESIUM SERPL-MCNC: 2.3 MG/DL — SIGNIFICANT CHANGE UP (ref 1.6–2.6)
PHOSPHATE SERPL-MCNC: 1.5 MG/DL — LOW (ref 2.5–4.5)
POTASSIUM SERPL-MCNC: 3.6 MMOL/L — SIGNIFICANT CHANGE UP (ref 3.5–5.3)
POTASSIUM SERPL-SCNC: 3.6 MMOL/L — SIGNIFICANT CHANGE UP (ref 3.5–5.3)
SODIUM SERPL-SCNC: 142 MMOL/L — SIGNIFICANT CHANGE UP (ref 135–145)

## 2023-10-13 PROCEDURE — 99233 SBSQ HOSP IP/OBS HIGH 50: CPT

## 2023-10-13 RX ORDER — DIPHENHYDRAMINE HCL 50 MG
25 CAPSULE ORAL EVERY 6 HOURS
Refills: 0 | Status: COMPLETED | OUTPATIENT
Start: 2023-10-13 | End: 2023-10-14

## 2023-10-13 RX ORDER — DIPHENHYDRAMINE HCL 50 MG
25 CAPSULE ORAL ONCE
Refills: 0 | Status: COMPLETED | OUTPATIENT
Start: 2023-10-13 | End: 2023-10-13

## 2023-10-13 RX ADMIN — HYDROMORPHONE HYDROCHLORIDE 1 MILLIGRAM(S): 2 INJECTION INTRAMUSCULAR; INTRAVENOUS; SUBCUTANEOUS at 13:31

## 2023-10-13 RX ADMIN — Medication 25 MILLIGRAM(S): at 12:12

## 2023-10-13 RX ADMIN — ONDANSETRON 4 MILLIGRAM(S): 8 TABLET, FILM COATED ORAL at 23:12

## 2023-10-13 RX ADMIN — SODIUM CHLORIDE 125 MILLILITER(S): 9 INJECTION, SOLUTION INTRAVENOUS at 00:23

## 2023-10-13 RX ADMIN — HYDROMORPHONE HYDROCHLORIDE 1 MILLIGRAM(S): 2 INJECTION INTRAMUSCULAR; INTRAVENOUS; SUBCUTANEOUS at 02:44

## 2023-10-13 RX ADMIN — Medication 25 MILLIGRAM(S): at 00:23

## 2023-10-13 RX ADMIN — PANTOPRAZOLE SODIUM 40 MILLIGRAM(S): 20 TABLET, DELAYED RELEASE ORAL at 06:25

## 2023-10-13 RX ADMIN — ONDANSETRON 4 MILLIGRAM(S): 8 TABLET, FILM COATED ORAL at 06:24

## 2023-10-13 RX ADMIN — Medication 25 MILLIGRAM(S): at 23:35

## 2023-10-13 RX ADMIN — ENOXAPARIN SODIUM 40 MILLIGRAM(S): 100 INJECTION SUBCUTANEOUS at 00:08

## 2023-10-13 RX ADMIN — FAMOTIDINE 20 MILLIGRAM(S): 10 INJECTION INTRAVENOUS at 12:12

## 2023-10-13 RX ADMIN — HYDROMORPHONE HYDROCHLORIDE 1 MILLIGRAM(S): 2 INJECTION INTRAMUSCULAR; INTRAVENOUS; SUBCUTANEOUS at 23:40

## 2023-10-13 RX ADMIN — Medication 88 MICROGRAM(S): at 07:58

## 2023-10-13 RX ADMIN — HYDROMORPHONE HYDROCHLORIDE 1 MILLIGRAM(S): 2 INJECTION INTRAMUSCULAR; INTRAVENOUS; SUBCUTANEOUS at 13:13

## 2023-10-13 RX ADMIN — ONDANSETRON 4 MILLIGRAM(S): 8 TABLET, FILM COATED ORAL at 00:07

## 2023-10-13 RX ADMIN — Medication 30 MILLILITER(S): at 06:22

## 2023-10-13 RX ADMIN — HYDROMORPHONE HYDROCHLORIDE 1 MILLIGRAM(S): 2 INJECTION INTRAMUSCULAR; INTRAVENOUS; SUBCUTANEOUS at 18:51

## 2023-10-13 RX ADMIN — PANTOPRAZOLE SODIUM 40 MILLIGRAM(S): 20 TABLET, DELAYED RELEASE ORAL at 17:14

## 2023-10-13 RX ADMIN — HYDROMORPHONE HYDROCHLORIDE 1 MILLIGRAM(S): 2 INJECTION INTRAMUSCULAR; INTRAVENOUS; SUBCUTANEOUS at 08:00

## 2023-10-13 RX ADMIN — ONDANSETRON 4 MILLIGRAM(S): 8 TABLET, FILM COATED ORAL at 17:14

## 2023-10-13 RX ADMIN — HYDROMORPHONE HYDROCHLORIDE 1 MILLIGRAM(S): 2 INJECTION INTRAMUSCULAR; INTRAVENOUS; SUBCUTANEOUS at 02:59

## 2023-10-13 RX ADMIN — Medication 30 MILLILITER(S): at 23:01

## 2023-10-13 RX ADMIN — Medication 30 MILLILITER(S): at 17:14

## 2023-10-13 RX ADMIN — Medication 400 MILLIGRAM(S): at 06:24

## 2023-10-13 RX ADMIN — ENOXAPARIN SODIUM 40 MILLIGRAM(S): 100 INJECTION SUBCUTANEOUS at 22:58

## 2023-10-13 RX ADMIN — HYDROMORPHONE HYDROCHLORIDE 1 MILLIGRAM(S): 2 INJECTION INTRAMUSCULAR; INTRAVENOUS; SUBCUTANEOUS at 23:00

## 2023-10-13 RX ADMIN — Medication 30 MILLILITER(S): at 12:13

## 2023-10-13 RX ADMIN — Medication 1000 MILLIGRAM(S): at 00:59

## 2023-10-13 RX ADMIN — Medication 400 MILLIGRAM(S): at 12:24

## 2023-10-13 RX ADMIN — ONDANSETRON 4 MILLIGRAM(S): 8 TABLET, FILM COATED ORAL at 12:13

## 2023-10-13 RX ADMIN — Medication 1000 MILLIGRAM(S): at 06:54

## 2023-10-13 RX ADMIN — Medication 25 MILLIGRAM(S): at 06:22

## 2023-10-13 RX ADMIN — Medication 400 MILLIGRAM(S): at 00:22

## 2023-10-13 RX ADMIN — HYDROMORPHONE HYDROCHLORIDE 1 MILLIGRAM(S): 2 INJECTION INTRAMUSCULAR; INTRAVENOUS; SUBCUTANEOUS at 07:43

## 2023-10-13 NOTE — PROGRESS NOTE ADULT - SUBJECTIVE AND OBJECTIVE BOX
patient seen and examined  continues to report abdominal pain  on clears  no nausea/vomiting today    Review of Systems:  General:denies fever chills, headache, weakness  HEENT: denies blurry vision,diffculty swallowing, difficulty hearing, tinnitus  Cardiovascular: denies chest pain  ,palpitations  Pulmonary:denies shortness of breath, cough, wheezing, hemoptysis  Gastrointestinal: +abdominal pain, constipation, diarrhea,nausea , vomiting, hematochezia  : denies hematuria, dysuria, or incontinence  Neurological: denies weakness, numbness , tingling, dizziness, tremors  MSK: denies muscle pain, difficulty ambulating, swelling, back pain  skin: denies skin rash, itching, burning, or  skin lesions  Psychiatrical: denies mood disturbances, anxierty, feeling depressed, depression , or difficulty sleeping    Objective:  Vitals  T(C): 36.9 (10-13-23 @ 12:47), Max: 36.9 (10-13-23 @ 12:47)  HR: 88 (10-13-23 @ 12:47) (63 - 88)  BP: 129/78 (10-13-23 @ 12:47) (99/63 - 129/78)  RR: 18 (10-13-23 @ 12:47) (17 - 18)  SpO2: 96% (10-13-23 @ 12:47) (93% - 98%)    Physical Exam:  General: comfortable, no acute distress  HEENT: Atraumatic, no LAD, trachea midline, PERRLA  Cardiovascular: normal s1s2, no murmurs, gallops or fricition rubs  Pulmonary: clear to ausculation Bilaterally, no wheezing , rhonchi  Gastrointestinal: soft non tender non distended, no masses felt, no organomegally  Muscloskeletal: no lower extremity edema, intact bilateral lower extremity pulses  Neurological: CN II-12 intact. No focal weakness  Psychiatrical: normal mood, cooperative  SKIN: no rash, lesions or ulcers    Labs:                          10.6   4.84  )-----------( 214      ( 12 Oct 2023 06:31 )             34.4     10-13    142  |  107  |  5<L>  ----------------------------<  167<H>  3.6   |  30  |  0.77    Ca    9.1      13 Oct 2023 06:25  Phos  1.5     10-13  Mg     2.3     10-13              Active Medications  MEDICATIONS  (STANDING):  aluminum hydroxide/magnesium hydroxide/simethicone Suspension 30 milliLiter(s) Oral every 6 hours  dextrose 5% + sodium chloride 0.45%. 1000 milliLiter(s) (125 mL/Hr) IV Continuous <Continuous>  enoxaparin Injectable 40 milliGRAM(s) SubCutaneous every 24 hours  famotidine Injectable 20 milliGRAM(s) IV Push daily  influenza  Vaccine (HIGH DOSE) 0.7 milliLiter(s) IntraMuscular once  levothyroxine 88 MICROGram(s) Oral daily  ondansetron Injectable 4 milliGRAM(s) IV Push every 6 hours  pantoprazole  Injectable 40 milliGRAM(s) IV Push every 12 hours    MEDICATIONS  (PRN):  acetaminophen     Tablet .. 650 milliGRAM(s) Oral every 6 hours PRN Temp greater or equal to 38C (100.4F), Mild Pain (1 - 3), Moderate Pain (4 - 6)  HYDROmorphone  Injectable 1 milliGRAM(s) IV Push every 4 hours PRN Severe Pain (7 - 10)  metoclopramide Injectable 10 milliGRAM(s) IV Push every 8 hours PRN breakthrough nausea     patient seen and examined  continues to report abdominal pain  coudnt tolerate meals --> back on clears -- for egd monday  no nausea/vomiting today    Review of Systems:  General:denies fever chills, headache, weakness  HEENT: denies blurry vision,diffculty swallowing, difficulty hearing, tinnitus  Cardiovascular: denies chest pain  ,palpitations  Pulmonary:denies shortness of breath, cough, wheezing, hemoptysis  Gastrointestinal: +abdominal pain, constipation, diarrhea,nausea , vomiting, hematochezia  : denies hematuria, dysuria, or incontinence  Neurological: denies weakness, numbness , tingling, dizziness, tremors  MSK: denies muscle pain, difficulty ambulating, swelling, back pain  skin: denies skin rash, itching, burning, or  skin lesions  Psychiatrical: denies mood disturbances, anxierty, feeling depressed, depression , or difficulty sleeping    Objective:  Vitals  T(C): 36.9 (10-13-23 @ 12:47), Max: 36.9 (10-13-23 @ 12:47)  HR: 88 (10-13-23 @ 12:47) (63 - 88)  BP: 129/78 (10-13-23 @ 12:47) (99/63 - 129/78)  RR: 18 (10-13-23 @ 12:47) (17 - 18)  SpO2: 96% (10-13-23 @ 12:47) (93% - 98%)    Physical Exam:  General: comfortable, no acute distress  HEENT: Atraumatic, no LAD, trachea midline, PERRLA  Cardiovascular: normal s1s2, no murmurs, gallops or fricition rubs  Pulmonary: clear to ausculation Bilaterally, no wheezing , rhonchi  Gastrointestinal: soft non tender non distended, no masses felt, no organomegally  Muscloskeletal: no lower extremity edema, intact bilateral lower extremity pulses  Neurological: CN II-12 intact. No focal weakness  Psychiatrical: normal mood, cooperative  SKIN: no rash, lesions or ulcers    Labs:                          10.6   4.84  )-----------( 214      ( 12 Oct 2023 06:31 )             34.4     10-13    142  |  107  |  5<L>  ----------------------------<  167<H>  3.6   |  30  |  0.77    Ca    9.1      13 Oct 2023 06:25  Phos  1.5     10-13  Mg     2.3     10-13              Active Medications  MEDICATIONS  (STANDING):  aluminum hydroxide/magnesium hydroxide/simethicone Suspension 30 milliLiter(s) Oral every 6 hours  dextrose 5% + sodium chloride 0.45%. 1000 milliLiter(s) (125 mL/Hr) IV Continuous <Continuous>  enoxaparin Injectable 40 milliGRAM(s) SubCutaneous every 24 hours  famotidine Injectable 20 milliGRAM(s) IV Push daily  influenza  Vaccine (HIGH DOSE) 0.7 milliLiter(s) IntraMuscular once  levothyroxine 88 MICROGram(s) Oral daily  ondansetron Injectable 4 milliGRAM(s) IV Push every 6 hours  pantoprazole  Injectable 40 milliGRAM(s) IV Push every 12 hours    MEDICATIONS  (PRN):  acetaminophen     Tablet .. 650 milliGRAM(s) Oral every 6 hours PRN Temp greater or equal to 38C (100.4F), Mild Pain (1 - 3), Moderate Pain (4 - 6)  HYDROmorphone  Injectable 1 milliGRAM(s) IV Push every 4 hours PRN Severe Pain (7 - 10)  metoclopramide Injectable 10 milliGRAM(s) IV Push every 8 hours PRN breakthrough nausea

## 2023-10-13 NOTE — CASE MANAGEMENT PROGRESS NOTE - NSCMPROGRESSNOTE_GEN_ALL_CORE
SBO medical management. Was not tolerating low fiber diet back to full liquid diet. For endoscopy on Monday. Pt is independent and no skilled needs anticipated upon discharge.

## 2023-10-13 NOTE — PROGRESS NOTE ADULT - ASSESSMENT
bowel obstruction    hx SBO revision twice  repeat CT noted  planned for egd Monday  needs to be on clear liquid diet this weekend  discussed with patient

## 2023-10-13 NOTE — PROGRESS NOTE ADULT - SUBJECTIVE AND OBJECTIVE BOX
[facility]  J Luis Almanza PA-C  [address] [place] [address]  [phone]      INTERVAL HPI/OVERNIGHT EVENTS:  Pt s/e  Vomited overnight  Currently still having abdominal pain    MEDICATIONS  (STANDING):  acetaminophen   IVPB .. 1000 milliGRAM(s) IV Intermittent once  aluminum hydroxide/magnesium hydroxide/simethicone Suspension 30 milliLiter(s) Oral every 6 hours  dextrose 5% + sodium chloride 0.45%. 1000 milliLiter(s) (125 mL/Hr) IV Continuous <Continuous>  diphenhydrAMINE Injectable 25 milliGRAM(s) IV Push every 6 hours  enoxaparin Injectable 40 milliGRAM(s) SubCutaneous every 24 hours  famotidine Injectable 20 milliGRAM(s) IV Push daily  influenza  Vaccine (HIGH DOSE) 0.7 milliLiter(s) IntraMuscular once  levothyroxine 88 MICROGram(s) Oral daily  ondansetron Injectable 4 milliGRAM(s) IV Push every 6 hours  pantoprazole  Injectable 40 milliGRAM(s) IV Push every 12 hours    MEDICATIONS  (PRN):  acetaminophen     Tablet .. 650 milliGRAM(s) Oral every 6 hours PRN Temp greater or equal to 38C (100.4F), Mild Pain (1 - 3), Moderate Pain (4 - 6)  HYDROmorphone  Injectable 1 milliGRAM(s) IV Push every 4 hours PRN Severe Pain (7 - 10)  metoclopramide Injectable 10 milliGRAM(s) IV Push every 8 hours PRN breakthrough nausea      Allergies    tetanus toxoid (Anaphylaxis)      PHYSICAL EXAM:   Vital Signs:  Vital Signs Last 24 Hrs  T(C): 36.7 (13 Oct 2023 05:19), Max: 36.7 (13 Oct 2023 05:19)  T(F): 98 (13 Oct 2023 05:19), Max: 98 (13 Oct 2023 05:19)  HR: 73 (13 Oct 2023 05:19) (63 - 88)  BP: 99/63 (13 Oct 2023 05:19) (99/63 - 129/62)  BP(mean): --  RR: 18 (13 Oct 2023 05:19) (16 - 18)  SpO2: 93% (13 Oct 2023 05:19) (92% - 98%)    Parameters below as of 12 Oct 2023 20:16  Patient On (Oxygen Delivery Method): room air      Daily     Daily Weight in k.9 (13 Oct 2023 05:19)    GENERAL:  Appears stated age  HEENT:  NC/AT  CHEST:  Full & symmetric excursion  HEART:  Regular rhythm  ABDOMEN:  Soft, non-tender, non-distended  EXTEREMITIES:  no cyanosis  SKIN:  No rash  NEURO:  Alert      LABS:                        10.6   4.84  )-----------( 214      ( 12 Oct 2023 06:31 )             34.4     10-13    142  |  107  |  5<L>  ----------------------------<  167<H>  3.6   |  30  |  0.77    Ca    9.1      13 Oct 2023 06:25  Phos  1.5     10-13  Mg     2.3     10-13        Urinalysis Basic - ( 13 Oct 2023 06:25 )    Color: x / Appearance: x / SG: x / pH: x  Gluc: 167 mg/dL / Ketone: x  / Bili: x / Urobili: x   Blood: x / Protein: x / Nitrite: x   Leuk Esterase: x / RBC: x / WBC x   Sq Epi: x / Non Sq Epi: x / Bacteria: x

## 2023-10-13 NOTE — PROGRESS NOTE ADULT - ASSESSMENT
68 year old Female a/w SBO with PSHx colon resection w/ rectopexy, SBR w/ primary anastomosis with revision x 2.  Admitted to surgery team for SBO. medicine consulted for continued vomiting/diarrhea, allergic rxn, and home medication management.       Gastritis.   Tt with continued vomiting and diarrhea despite resolution of SBO: now tolerating clears  - Pt with recent 8 day course of levaquin 700mg PO for sinusitis  - no further diarrhea today  - Pt also with recent course of steroids 2/2 contact dermatitis  -  zofran to standing and add reglan PRN for breakthrough vomiting  - Pantoprazole IV 40mg BID, pepcid 20mg IV QD, maalox prior to meals ordered for gastritis  - Pt following with GI  - SBO management per surgery.    Allergic reaction.   ·  Recommendation: Pt with facial swelling and itchiness post IV contrast  - Solumedrol IV 40mg x1 and Benadryl 50mg IV q6 x 4 doses  - Pt with hx of contact dermatitis requiring steroid tx  - Suggest pt f/u with allergist/immunologist outpatient for further workup and management  - Should reassess in AM, pt may need repeat dose of IV solumedrol if symptoms persist.    Hypothyroidism.   ·  Recommendation: Pt with PMHx hypothyroidism  - Pt on levothyroxine 88mcg at home 68 year old Female a/w SBO with PSHx colon resection w/ rectopexy, SBR w/ primary anastomosis with revision x 2.  Admitted to surgery team for SBO. medicine consulted for continued vomiting/diarrhea, allergic rxn, and home medication management.       Gastritis.   Tt with continued vomiting and diarrhea despite resolution of SBO: now back to and tolerating clears  - Pt with recent 8 day course of levaquin 700mg PO for sinusitis  - no further diarrhea today  - Pt also with recent course of steroids 2/2 contact dermatitis  -  zofran to standing and add reglan PRN for breakthrough vomiting  - Pantoprazole IV 40mg BID, pepcid 20mg IV QD, maalox prior to meals ordered for gastritis  - Pt following with GI  - SBO management per surgery.  -for EGD monday    Allergic reaction.   ·  Recommendation: Pt with facial swelling and itchiness post IV contrast  - Solumedrol IV 40mg x1 and Benadryl 50mg IV q6 x 4 doses  - Pt with hx of contact dermatitis requiring steroid tx  - Suggest pt f/u with allergist/immunologist outpatient for further workup and management  - Should reassess in AM, pt may need repeat dose of IV solumedrol if symptoms persist.    Hypothyroidism.   ·  Recommendation: Pt with PMHx hypothyroidism  - Pt on levothyroxine 88mcg at home

## 2023-10-13 NOTE — PROGRESS NOTE ADULT - PROBLEM SELECTOR PROBLEM 1
SBO (small bowel obstruction)
Gastritis
SBO (small bowel obstruction)
SBO (small bowel obstruction)

## 2023-10-14 LAB
ANION GAP SERPL CALC-SCNC: 2 MMOL/L — LOW (ref 5–17)
BUN SERPL-MCNC: 5 MG/DL — LOW (ref 7–23)
CALCIUM SERPL-MCNC: 8.3 MG/DL — LOW (ref 8.5–10.1)
CHLORIDE SERPL-SCNC: 108 MMOL/L — SIGNIFICANT CHANGE UP (ref 96–108)
CO2 SERPL-SCNC: 32 MMOL/L — HIGH (ref 22–31)
CREAT SERPL-MCNC: 0.72 MG/DL — SIGNIFICANT CHANGE UP (ref 0.5–1.3)
EGFR: 91 ML/MIN/1.73M2 — SIGNIFICANT CHANGE UP
GLUCOSE SERPL-MCNC: 98 MG/DL — SIGNIFICANT CHANGE UP (ref 70–99)
HCT VFR BLD CALC: 31.9 % — LOW (ref 34.5–45)
HGB BLD-MCNC: 10 G/DL — LOW (ref 11.5–15.5)
MCHC RBC-ENTMCNC: 30.6 PG — SIGNIFICANT CHANGE UP (ref 27–34)
MCHC RBC-ENTMCNC: 31.3 GM/DL — LOW (ref 32–36)
MCV RBC AUTO: 97.6 FL — SIGNIFICANT CHANGE UP (ref 80–100)
NRBC # BLD: 0 /100 WBCS — SIGNIFICANT CHANGE UP (ref 0–0)
PLATELET # BLD AUTO: 227 K/UL — SIGNIFICANT CHANGE UP (ref 150–400)
POTASSIUM SERPL-MCNC: 3.5 MMOL/L — SIGNIFICANT CHANGE UP (ref 3.5–5.3)
POTASSIUM SERPL-SCNC: 3.5 MMOL/L — SIGNIFICANT CHANGE UP (ref 3.5–5.3)
RBC # BLD: 3.27 M/UL — LOW (ref 3.8–5.2)
RBC # FLD: 13.2 % — SIGNIFICANT CHANGE UP (ref 10.3–14.5)
SODIUM SERPL-SCNC: 142 MMOL/L — SIGNIFICANT CHANGE UP (ref 135–145)
WBC # BLD: 7.07 K/UL — SIGNIFICANT CHANGE UP (ref 3.8–10.5)
WBC # FLD AUTO: 7.07 K/UL — SIGNIFICANT CHANGE UP (ref 3.8–10.5)

## 2023-10-14 PROCEDURE — 99233 SBSQ HOSP IP/OBS HIGH 50: CPT

## 2023-10-14 PROCEDURE — 99232 SBSQ HOSP IP/OBS MODERATE 35: CPT

## 2023-10-14 RX ORDER — DIPHENHYDRAMINE HCL 50 MG
25 CAPSULE ORAL EVERY 6 HOURS
Refills: 0 | Status: DISCONTINUED | OUTPATIENT
Start: 2023-10-14 | End: 2023-10-17

## 2023-10-14 RX ORDER — PANTOPRAZOLE SODIUM 20 MG/1
40 TABLET, DELAYED RELEASE ORAL
Refills: 0 | Status: DISCONTINUED | OUTPATIENT
Start: 2023-10-14 | End: 2023-10-17

## 2023-10-14 RX ORDER — DIPHENHYDRAMINE HCL 50 MG
25 CAPSULE ORAL EVERY 6 HOURS
Refills: 0 | Status: DISCONTINUED | OUTPATIENT
Start: 2023-10-14 | End: 2023-10-14

## 2023-10-14 RX ADMIN — SODIUM CHLORIDE 125 MILLILITER(S): 9 INJECTION, SOLUTION INTRAVENOUS at 23:33

## 2023-10-14 RX ADMIN — HYDROMORPHONE HYDROCHLORIDE 1 MILLIGRAM(S): 2 INJECTION INTRAMUSCULAR; INTRAVENOUS; SUBCUTANEOUS at 04:31

## 2023-10-14 RX ADMIN — HYDROMORPHONE HYDROCHLORIDE 1 MILLIGRAM(S): 2 INJECTION INTRAMUSCULAR; INTRAVENOUS; SUBCUTANEOUS at 20:52

## 2023-10-14 RX ADMIN — ONDANSETRON 4 MILLIGRAM(S): 8 TABLET, FILM COATED ORAL at 05:37

## 2023-10-14 RX ADMIN — Medication 30 MILLILITER(S): at 11:55

## 2023-10-14 RX ADMIN — Medication 30 MILLILITER(S): at 17:41

## 2023-10-14 RX ADMIN — ONDANSETRON 4 MILLIGRAM(S): 8 TABLET, FILM COATED ORAL at 11:55

## 2023-10-14 RX ADMIN — ENOXAPARIN SODIUM 40 MILLIGRAM(S): 100 INJECTION SUBCUTANEOUS at 23:33

## 2023-10-14 RX ADMIN — PANTOPRAZOLE SODIUM 40 MILLIGRAM(S): 20 TABLET, DELAYED RELEASE ORAL at 17:41

## 2023-10-14 RX ADMIN — ONDANSETRON 4 MILLIGRAM(S): 8 TABLET, FILM COATED ORAL at 23:33

## 2023-10-14 RX ADMIN — Medication 10 MILLIGRAM(S): at 14:20

## 2023-10-14 RX ADMIN — Medication 25 MILLIGRAM(S): at 05:37

## 2023-10-14 RX ADMIN — Medication 25 MILLIGRAM(S): at 22:20

## 2023-10-14 RX ADMIN — Medication 10 MILLIGRAM(S): at 04:38

## 2023-10-14 RX ADMIN — HYDROMORPHONE HYDROCHLORIDE 1 MILLIGRAM(S): 2 INJECTION INTRAMUSCULAR; INTRAVENOUS; SUBCUTANEOUS at 15:50

## 2023-10-14 RX ADMIN — PANTOPRAZOLE SODIUM 40 MILLIGRAM(S): 20 TABLET, DELAYED RELEASE ORAL at 05:36

## 2023-10-14 RX ADMIN — Medication 88 MICROGRAM(S): at 05:38

## 2023-10-14 RX ADMIN — HYDROMORPHONE HYDROCHLORIDE 1 MILLIGRAM(S): 2 INJECTION INTRAMUSCULAR; INTRAVENOUS; SUBCUTANEOUS at 11:50

## 2023-10-14 RX ADMIN — HYDROMORPHONE HYDROCHLORIDE 1 MILLIGRAM(S): 2 INJECTION INTRAMUSCULAR; INTRAVENOUS; SUBCUTANEOUS at 14:47

## 2023-10-14 RX ADMIN — ONDANSETRON 4 MILLIGRAM(S): 8 TABLET, FILM COATED ORAL at 17:41

## 2023-10-14 RX ADMIN — Medication 30 MILLILITER(S): at 23:33

## 2023-10-14 RX ADMIN — HYDROMORPHONE HYDROCHLORIDE 1 MILLIGRAM(S): 2 INJECTION INTRAMUSCULAR; INTRAVENOUS; SUBCUTANEOUS at 05:01

## 2023-10-14 RX ADMIN — FAMOTIDINE 20 MILLIGRAM(S): 10 INJECTION INTRAVENOUS at 11:55

## 2023-10-14 RX ADMIN — HYDROMORPHONE HYDROCHLORIDE 1 MILLIGRAM(S): 2 INJECTION INTRAMUSCULAR; INTRAVENOUS; SUBCUTANEOUS at 10:50

## 2023-10-14 NOTE — PROGRESS NOTE ADULT - SUBJECTIVE AND OBJECTIVE BOX
[facility]  J Luis Almanza PA-C  [address] [place] [address]  [phone]      INTERVAL HPI/OVERNIGHT EVENTS:  Pt s/e  Tolerating liquid diet, no vomiting overnight  Still having intermittent abdominal pain and nausea  Otherwise no new GI events    MEDICATIONS  (STANDING):  aluminum hydroxide/magnesium hydroxide/simethicone Suspension 30 milliLiter(s) Oral every 6 hours  dextrose 5% + sodium chloride 0.45%. 1000 milliLiter(s) (125 mL/Hr) IV Continuous <Continuous>  enoxaparin Injectable 40 milliGRAM(s) SubCutaneous every 24 hours  famotidine Injectable 20 milliGRAM(s) IV Push daily  influenza  Vaccine (HIGH DOSE) 0.7 milliLiter(s) IntraMuscular once  levothyroxine 88 MICROGram(s) Oral daily  ondansetron Injectable 4 milliGRAM(s) IV Push every 6 hours  pantoprazole  Injectable 40 milliGRAM(s) IV Push every 12 hours    MEDICATIONS  (PRN):  acetaminophen     Tablet .. 650 milliGRAM(s) Oral every 6 hours PRN Temp greater or equal to 38C (100.4F), Mild Pain (1 - 3), Moderate Pain (4 - 6)  HYDROmorphone  Injectable 1 milliGRAM(s) IV Push every 4 hours PRN Severe Pain (7 - 10)  metoclopramide Injectable 10 milliGRAM(s) IV Push every 8 hours PRN breakthrough nausea      Allergies    tetanus toxoid (Anaphylaxis)      PHYSICAL EXAM:   Vital Signs:  Vital Signs Last 24 Hrs  T(C): 36.7 (14 Oct 2023 04:23), Max: 36.9 (13 Oct 2023 12:47)  T(F): 98.1 (14 Oct 2023 04:23), Max: 98.4 (13 Oct 2023 12:47)  HR: 71 (14 Oct 2023 04:23) (71 - 88)  BP: 115/71 (14 Oct 2023 04:23) (114/64 - 129/78)  BP(mean): --  RR: 18 (14 Oct 2023 04:23) (18 - 18)  SpO2: 97% (14 Oct 2023 04:23) (95% - 97%)    Parameters below as of 14 Oct 2023 04:23  Patient On (Oxygen Delivery Method): room air      Daily     Daily Weight in k.6 (14 Oct 2023 04:23)    GENERAL:  Appears stated age  HEENT:  NC/AT  CHEST:  Full & symmetric excursion  HEART:  Regular rhythm  ABDOMEN:  Soft, non-tender, non-distended  EXTEREMITIES:  no cyanosis  SKIN:  No rash  NEURO:  Alert      LABS:                        10.0   7.07  )-----------( 227      ( 14 Oct 2023 06:50 )             31.9     10-14    142  |  108  |  5<L>  ----------------------------<  98  3.5   |  32<H>  |  0.72    Ca    8.3<L>      14 Oct 2023 06:50  Phos  1.5     10-13  Mg     2.3     10-13        Urinalysis Basic - ( 14 Oct 2023 06:50 )    Color: x / Appearance: x / SG: x / pH: x  Gluc: 98 mg/dL / Ketone: x  / Bili: x / Urobili: x   Blood: x / Protein: x / Nitrite: x   Leuk Esterase: x / RBC: x / WBC x   Sq Epi: x / Non Sq Epi: x / Bacteria: x

## 2023-10-14 NOTE — PROGRESS NOTE ADULT - ASSESSMENT
bowel obstruction    hx SBO revision twice  repeat CT noted  planned for egd Monday  needs to be on clear liquid diet this weekend  discussed with patient and primary team

## 2023-10-14 NOTE — PROGRESS NOTE ADULT - ASSESSMENT
Pt. is comfortable. Tolerates food. Remains frustrated because of recurring problem.  Will discuss with surg/GI team possibilities of bacterial overgrowth.  She will be evaluated by motility at [place]. If all fails she may be considered for a placement of neurostimulator.

## 2023-10-14 NOTE — PROGRESS NOTE ADULT - SUBJECTIVE AND OBJECTIVE BOX
Subjective    Patient seen and examined at bedside. Currently in no acute distress. Denies any chest pain, shortness of breath, n/v/d or any other acute complaints.   Discussed with GI, plan for EGD monday    ROS reviewed and is otherwise negative    MEDICATIONS  (STANDING):  aluminum hydroxide/magnesium hydroxide/simethicone Suspension 30 milliLiter(s) Oral every 6 hours  dextrose 5% + sodium chloride 0.45%. 1000 milliLiter(s) (125 mL/Hr) IV Continuous <Continuous>  enoxaparin Injectable 40 milliGRAM(s) SubCutaneous every 24 hours  famotidine Injectable 20 milliGRAM(s) IV Push daily  influenza  Vaccine (HIGH DOSE) 0.7 milliLiter(s) IntraMuscular once  levothyroxine 88 MICROGram(s) Oral daily  ondansetron Injectable 4 milliGRAM(s) IV Push every 6 hours  pantoprazole  Injectable 40 milliGRAM(s) IV Push two times a day    MEDICATIONS  (PRN):  acetaminophen     Tablet .. 650 milliGRAM(s) Oral every 6 hours PRN Temp greater or equal to 38C (100.4F), Mild Pain (1 - 3), Moderate Pain (4 - 6)  HYDROmorphone  Injectable 1 milliGRAM(s) IV Push every 4 hours PRN Severe Pain (7 - 10)  metoclopramide Injectable 10 milliGRAM(s) IV Push every 8 hours PRN breakthrough nausea      Allergies    tetanus toxoid (Anaphylaxis)    Intolerances        Vital Signs Last 24 Hrs  T(C): 37 (14 Oct 2023 12:24), Max: 37 (14 Oct 2023 12:24)  T(F): 98.6 (14 Oct 2023 12:24), Max: 98.6 (14 Oct 2023 12:24)  HR: 80 (14 Oct 2023 12:24) (71 - 87)  BP: 105/68 (14 Oct 2023 12:24) (105/68 - 115/71)  BP(mean): --  RR: 17 (14 Oct 2023 12:24) (17 - 18)  SpO2: 98% (14 Oct 2023 12:24) (95% - 98%)    Parameters below as of 14 Oct 2023 12:24  Patient On (Oxygen Delivery Method): room air        PHYSICAL EXAM:  GENERAL: NAD, well-groomed, well-developed  HEAD:  Atraumatic, Normocephalic  ENMT: Moist mucous membranes,   NECK: Supple, No JVD, Normal thyroid  NERVOUS SYSTEM:  All 4 extremities mobile, no gross sensory deficits.   CHEST/LUNG: Clear to auscultation bilaterally; No rales, rhonchi, wheezing, or rubs  HEART: Regular rate and rhythm; No murmurs, rubs, or gallops  ABDOMEN: Soft, Nontender, Nondistended; Bowel sounds present  EXTREMITIES:  2+ Peripheral Pulses, No clubbing, cyanosis, or edema      LABS:                        10.0   7.07  )-----------( 227      ( 14 Oct 2023 06:50 )             31.9     14 Oct 2023 06:50    142    |  108    |  5      ----------------------------<  98     3.5     |  32     |  0.72     Ca    8.3        14 Oct 2023 06:50        Urinalysis Basic - ( 14 Oct 2023 06:50 )    Color: x / Appearance: x / SG: x / pH: x  Gluc: 98 mg/dL / Ketone: x  / Bili: x / Urobili: x   Blood: x / Protein: x / Nitrite: x   Leuk Esterase: x / RBC: x / WBC x   Sq Epi: x / Non Sq Epi: x / Bacteria: x      CAPILLARY BLOOD GLUCOSE          RADIOLOGY & ADDITIONAL TESTS:    Imaging Personally Reviewed:  [ ] YES     Consultant(s) Notes Reviewed:      Care Discussed with Consultants/Other Providers:    Advanced Directives: [ ] DNR  [ ] No feeding tube  [ ] MOLST in chart  [ ] MOLST completed today  [ ] Unknown

## 2023-10-14 NOTE — CHART NOTE - NSCHARTNOTEFT_GEN_A_CORE
Assessment: patient seen for follow up   68y old  Female who presents with a chief complaint of SBO   patient for EGD Monday per GI clear liquids  intermittent abdominal pain and nausea per MD note  patient reports wants 2X of clear liquids will provide  IV D5 NS 125ml hr  10/12 BM      Factors impacting intake: [ ] none [x ] nausea  [ ] vomiting [ ] diarrhea [ ] constipation  [ ]chewing problems [ ] swallowing issues  [ ] other:     Diet Prescription: Diet, Clear Liquid (10-14-23 @ 09:11)    Intake: doing well with clear liquids     Current Weight: 10/14 142.4#  no edema     Pertinent Medications: MEDICATIONS  (STANDING):  aluminum hydroxide/magnesium hydroxide/simethicone Suspension 30 milliLiter(s) Oral every 6 hours  dextrose 5% + sodium chloride 0.45%. 1000 milliLiter(s) (125 mL/Hr) IV Continuous <Continuous>  enoxaparin Injectable 40 milliGRAM(s) SubCutaneous every 24 hours  famotidine Injectable 20 milliGRAM(s) IV Push daily  influenza  Vaccine (HIGH DOSE) 0.7 milliLiter(s) IntraMuscular once  levothyroxine 88 MICROGram(s) Oral daily  ondansetron Injectable 4 milliGRAM(s) IV Push every 6 hours  pantoprazole  Injectable 40 milliGRAM(s) IV Push two times a day    MEDICATIONS  (PRN):  acetaminophen     Tablet .. 650 milliGRAM(s) Oral every 6 hours PRN Temp greater or equal to 38C (100.4F), Mild Pain (1 - 3), Moderate Pain (4 - 6)  HYDROmorphone  Injectable 1 milliGRAM(s) IV Push every 4 hours PRN Severe Pain (7 - 10)  metoclopramide Injectable 10 milliGRAM(s) IV Push every 8 hours PRN breakthrough nausea      Skin: no pressure injury    Estimated Needs:   [x ] no change since previous assessment based on current wt admit 142# 64.4kg 25-30kcals/kg 1610-1932kcals and 1-1.2gms protein 64-77 gms protein  [ ] recalculated:     Previous Nutrition Diagnosis:   [ ] Inadequate Energy Intake [ ]Inadequate Oral Intake [ ] Excessive Energy Intake   [ ] Underweight [ ] Increased Nutrient Needs [ ] Overweight/Obesity   [x ] Altered GI Function [ ] Unintended Weight Loss [ ] Food & Nutrition Related Knowledge Deficit [ ] Malnutrition     Nutrition Diagnosis is [x ] ongoing  [ ] resolved [ ] not applicable     New Nutrition Diagnosis: [x ] not applicable       Interventions:   Recommend  [ ] Change Diet To:  [ ] Nutrition Supplement  [ ] Nutrition Support  [x ] Other: follow tolerance to clear liquids continue ensure clear BID per protocol . follow for EGD results. advance to full fluids to low fiber as appropriate     Monitoring and Evaluation:   [x ] PO intake [ x ] Tolerance to diet prescription [ x ] weights [ x ] labs[ x ] follow up per protocol  [ ] other:

## 2023-10-14 NOTE — PROGRESS NOTE ADULT - ASSESSMENT
· Assessment	  68 year old Female a/w SBO with PSHx colon resection w/ rectopexy, SBR w/ primary anastomosis with revision x 2.  Admitted to surgery team for SBO. medicine consulted for continued vomiting/diarrhea, allergic rxn, and home medication management.       Gastritis.   Tt with continued vomiting and diarrhea despite resolution of SBO: now back to and tolerating clears  - Pt with recent 8 day course of levaquin 700mg PO for sinusitis  - no further diarrhea today  - Pt also with recent course of steroids 2/2 contact dermatitis  -  zofran to standing and add reglan PRN for breakthrough vomiting  - Pantoprazole IV 40mg BID, pepcid 20mg IV QD, maalox prior to meals ordered for gastritis  - Pt following with GI  - SBO management per surgery.  -Discussed with GI, plan for EGD monday    Allergic reaction.   ·  Recommendation: Pt with facial swelling and itchiness post IV contrast  - Solumedrol IV 40mg x1 and Benadryl 50mg IV q6 x 4 doses  - Pt with hx of contact dermatitis requiring steroid tx  - Suggest pt f/u with allergist/immunologist outpatient for further workup and management  - Should reassess in AM, pt may need repeat dose of IV solumedrol if symptoms persist.    Hypothyroidism.   ·  Recommendation: Pt with PMHx hypothyroidism  - Pt on levothyroxine 88mcg at home

## 2023-10-15 ENCOUNTER — TRANSCRIPTION ENCOUNTER (OUTPATIENT)
Age: 68
End: 2023-10-15

## 2023-10-15 LAB
ANION GAP SERPL CALC-SCNC: 1 MMOL/L — LOW (ref 5–17)
BUN SERPL-MCNC: 6 MG/DL — LOW (ref 7–23)
CALCIUM SERPL-MCNC: 8.6 MG/DL — SIGNIFICANT CHANGE UP (ref 8.5–10.1)
CHLORIDE SERPL-SCNC: 107 MMOL/L — SIGNIFICANT CHANGE UP (ref 96–108)
CO2 SERPL-SCNC: 35 MMOL/L — HIGH (ref 22–31)
CREAT SERPL-MCNC: 0.7 MG/DL — SIGNIFICANT CHANGE UP (ref 0.5–1.3)
EGFR: 94 ML/MIN/1.73M2 — SIGNIFICANT CHANGE UP
GLUCOSE SERPL-MCNC: 86 MG/DL — SIGNIFICANT CHANGE UP (ref 70–99)
HCT VFR BLD CALC: 33.2 % — LOW (ref 34.5–45)
HGB BLD-MCNC: 10.2 G/DL — LOW (ref 11.5–15.5)
MCHC RBC-ENTMCNC: 30.4 PG — SIGNIFICANT CHANGE UP (ref 27–34)
MCHC RBC-ENTMCNC: 30.7 GM/DL — LOW (ref 32–36)
MCV RBC AUTO: 99.1 FL — SIGNIFICANT CHANGE UP (ref 80–100)
NRBC # BLD: 0 /100 WBCS — SIGNIFICANT CHANGE UP (ref 0–0)
PLATELET # BLD AUTO: 225 K/UL — SIGNIFICANT CHANGE UP (ref 150–400)
POTASSIUM SERPL-MCNC: 3.8 MMOL/L — SIGNIFICANT CHANGE UP (ref 3.5–5.3)
POTASSIUM SERPL-SCNC: 3.8 MMOL/L — SIGNIFICANT CHANGE UP (ref 3.5–5.3)
RBC # BLD: 3.35 M/UL — LOW (ref 3.8–5.2)
RBC # FLD: 13.2 % — SIGNIFICANT CHANGE UP (ref 10.3–14.5)
SODIUM SERPL-SCNC: 143 MMOL/L — SIGNIFICANT CHANGE UP (ref 135–145)
WBC # BLD: 5.73 K/UL — SIGNIFICANT CHANGE UP (ref 3.8–10.5)
WBC # FLD AUTO: 5.73 K/UL — SIGNIFICANT CHANGE UP (ref 3.8–10.5)

## 2023-10-15 PROCEDURE — 99232 SBSQ HOSP IP/OBS MODERATE 35: CPT

## 2023-10-15 PROCEDURE — 99231 SBSQ HOSP IP/OBS SF/LOW 25: CPT

## 2023-10-15 RX ORDER — SODIUM CHLORIDE 9 MG/ML
500 INJECTION INTRAMUSCULAR; INTRAVENOUS; SUBCUTANEOUS ONCE
Refills: 0 | Status: COMPLETED | OUTPATIENT
Start: 2023-10-15 | End: 2023-10-15

## 2023-10-15 RX ADMIN — HYDROMORPHONE HYDROCHLORIDE 1 MILLIGRAM(S): 2 INJECTION INTRAMUSCULAR; INTRAVENOUS; SUBCUTANEOUS at 10:30

## 2023-10-15 RX ADMIN — SODIUM CHLORIDE 1000 MILLILITER(S): 9 INJECTION INTRAMUSCULAR; INTRAVENOUS; SUBCUTANEOUS at 15:48

## 2023-10-15 RX ADMIN — FAMOTIDINE 20 MILLIGRAM(S): 10 INJECTION INTRAVENOUS at 13:41

## 2023-10-15 RX ADMIN — HYDROMORPHONE HYDROCHLORIDE 1 MILLIGRAM(S): 2 INJECTION INTRAMUSCULAR; INTRAVENOUS; SUBCUTANEOUS at 22:49

## 2023-10-15 RX ADMIN — HYDROMORPHONE HYDROCHLORIDE 1 MILLIGRAM(S): 2 INJECTION INTRAMUSCULAR; INTRAVENOUS; SUBCUTANEOUS at 14:00

## 2023-10-15 RX ADMIN — ENOXAPARIN SODIUM 40 MILLIGRAM(S): 100 INJECTION SUBCUTANEOUS at 23:18

## 2023-10-15 RX ADMIN — HYDROMORPHONE HYDROCHLORIDE 1 MILLIGRAM(S): 2 INJECTION INTRAMUSCULAR; INTRAVENOUS; SUBCUTANEOUS at 01:58

## 2023-10-15 RX ADMIN — Medication 25 MILLIGRAM(S): at 05:17

## 2023-10-15 RX ADMIN — ONDANSETRON 4 MILLIGRAM(S): 8 TABLET, FILM COATED ORAL at 18:21

## 2023-10-15 RX ADMIN — HYDROMORPHONE HYDROCHLORIDE 1 MILLIGRAM(S): 2 INJECTION INTRAMUSCULAR; INTRAVENOUS; SUBCUTANEOUS at 19:25

## 2023-10-15 RX ADMIN — ONDANSETRON 4 MILLIGRAM(S): 8 TABLET, FILM COATED ORAL at 07:08

## 2023-10-15 RX ADMIN — Medication 30 MILLILITER(S): at 18:20

## 2023-10-15 RX ADMIN — HYDROMORPHONE HYDROCHLORIDE 1 MILLIGRAM(S): 2 INJECTION INTRAMUSCULAR; INTRAVENOUS; SUBCUTANEOUS at 13:38

## 2023-10-15 RX ADMIN — HYDROMORPHONE HYDROCHLORIDE 1 MILLIGRAM(S): 2 INJECTION INTRAMUSCULAR; INTRAVENOUS; SUBCUTANEOUS at 02:45

## 2023-10-15 RX ADMIN — Medication 88 MICROGRAM(S): at 05:13

## 2023-10-15 RX ADMIN — ONDANSETRON 4 MILLIGRAM(S): 8 TABLET, FILM COATED ORAL at 13:41

## 2023-10-15 RX ADMIN — Medication 30 MILLILITER(S): at 07:08

## 2023-10-15 RX ADMIN — Medication 30 MILLILITER(S): at 13:41

## 2023-10-15 RX ADMIN — HYDROMORPHONE HYDROCHLORIDE 1 MILLIGRAM(S): 2 INJECTION INTRAMUSCULAR; INTRAVENOUS; SUBCUTANEOUS at 09:25

## 2023-10-15 RX ADMIN — PANTOPRAZOLE SODIUM 40 MILLIGRAM(S): 20 TABLET, DELAYED RELEASE ORAL at 07:08

## 2023-10-15 RX ADMIN — Medication 30 MILLILITER(S): at 23:19

## 2023-10-15 RX ADMIN — Medication 25 MILLIGRAM(S): at 21:05

## 2023-10-15 RX ADMIN — SODIUM CHLORIDE 125 MILLILITER(S): 9 INJECTION, SOLUTION INTRAVENOUS at 08:06

## 2023-10-15 RX ADMIN — PANTOPRAZOLE SODIUM 40 MILLIGRAM(S): 20 TABLET, DELAYED RELEASE ORAL at 18:21

## 2023-10-15 RX ADMIN — HYDROMORPHONE HYDROCHLORIDE 1 MILLIGRAM(S): 2 INJECTION INTRAMUSCULAR; INTRAVENOUS; SUBCUTANEOUS at 18:21

## 2023-10-15 RX ADMIN — Medication 10 MILLIGRAM(S): at 09:24

## 2023-10-15 RX ADMIN — ONDANSETRON 4 MILLIGRAM(S): 8 TABLET, FILM COATED ORAL at 23:19

## 2023-10-15 RX ADMIN — SODIUM CHLORIDE 125 MILLILITER(S): 9 INJECTION, SOLUTION INTRAVENOUS at 21:03

## 2023-10-15 NOTE — PROGRESS NOTE ADULT - SUBJECTIVE AND OBJECTIVE BOX
Subjective    Patient seen and examined at bedside. Currently in no acute distress. Complains of frequent BM. Seen by GI, surgery. No new intervention.   Plan for EGD tomorrow.   Denies any chest pain, shortness of breath, abdominal pain or any other acute complaints.     ROS reviewed and is otherwise negative    MEDICATIONS  (STANDING):  aluminum hydroxide/magnesium hydroxide/simethicone Suspension 30 milliLiter(s) Oral every 6 hours  dextrose 5% + sodium chloride 0.45%. 1000 milliLiter(s) (125 mL/Hr) IV Continuous <Continuous>  enoxaparin Injectable 40 milliGRAM(s) SubCutaneous every 24 hours  famotidine Injectable 20 milliGRAM(s) IV Push daily  influenza  Vaccine (HIGH DOSE) 0.7 milliLiter(s) IntraMuscular once  levothyroxine 88 MICROGram(s) Oral daily  ondansetron Injectable 4 milliGRAM(s) IV Push every 6 hours  pantoprazole  Injectable 40 milliGRAM(s) IV Push two times a day    MEDICATIONS  (PRN):  acetaminophen     Tablet .. 650 milliGRAM(s) Oral every 6 hours PRN Temp greater or equal to 38C (100.4F), Mild Pain (1 - 3), Moderate Pain (4 - 6)  diphenhydrAMINE Injectable 25 milliGRAM(s) IV Push every 6 hours PRN Rash and/or Itching  HYDROmorphone  Injectable 1 milliGRAM(s) IV Push every 4 hours PRN Severe Pain (7 - 10)  metoclopramide Injectable 10 milliGRAM(s) IV Push every 8 hours PRN breakthrough nausea      Allergies    tetanus toxoid (Anaphylaxis)    Intolerances        Vital Signs Last 24 Hrs  T(C): 37.1 (15 Oct 2023 12:03), Max: 37.1 (15 Oct 2023 12:03)  T(F): 98.8 (15 Oct 2023 12:03), Max: 98.8 (15 Oct 2023 12:03)  HR: 76 (15 Oct 2023 12:03) (71 - 82)  BP: 111/66 (15 Oct 2023 12:03) (111/64 - 131/84)  BP(mean): --  RR: 16 (15 Oct 2023 12:03) (16 - 17)  SpO2: 93% (15 Oct 2023 12:03) (93% - 96%)    Parameters below as of 15 Oct 2023 12:03  Patient On (Oxygen Delivery Method): room air        PHYSICAL EXAM:  GENERAL: NAD, well-groomed, well-developed  HEAD:  Atraumatic, Normocephalic  ENMT: Moist mucous membranes,   NECK: Supple, No JVD, Normal thyroid  NERVOUS SYSTEM:  All 4 extremities mobile, no gross sensory deficits.   CHEST/LUNG: Clear to auscultation bilaterally; No rales, rhonchi, wheezing, or rubs  HEART: Regular rate and rhythm; No murmurs, rubs, or gallops  ABDOMEN: Soft, Nontender, Nondistended; Bowel sounds present  EXTREMITIES:  2+ Peripheral Pulses, No clubbing, cyanosis, or edema      LABS:                        10.2   5.73  )-----------( 225      ( 15 Oct 2023 07:08 )             33.2     15 Oct 2023 07:08    143    |  107    |  6      ----------------------------<  86     3.8     |  35     |  0.70     Ca    8.6        15 Oct 2023 07:08        Urinalysis Basic - ( 15 Oct 2023 07:08 )    Color: x / Appearance: x / SG: x / pH: x  Gluc: 86 mg/dL / Ketone: x  / Bili: x / Urobili: x   Blood: x / Protein: x / Nitrite: x   Leuk Esterase: x / RBC: x / WBC x   Sq Epi: x / Non Sq Epi: x / Bacteria: x      CAPILLARY BLOOD GLUCOSE          RADIOLOGY & ADDITIONAL TESTS:    Imaging Personally Reviewed:  [ ] YES     Consultant(s) Notes Reviewed:      Care Discussed with Consultants/Other Providers:    Advanced Directives: [ ] DNR  [ ] No feeding tube  [ ] MOLST in chart  [ ] MOLST completed today  [ ] Unknown

## 2023-10-15 NOTE — PROGRESS NOTE ADULT - ASSESSMENT
68 year old Female a/w SBO with PSHx colon resection w/ rectopexy, SBR w/ primary anastomosis with revision x 2.  Admitted for SBO,VSS.  Labs unremarkable, abdominal exam benign.    Plan:  - To remain on CLD  - pain/nausea control PRN  - to be evaluated by [place] [facility]   - Analgesia PRN 68 year old Female a/w SBO with PSHx colon resection w/ rectopexy, SBR w/ primary anastomosis with revision x 2.  Admitted for SBO,VSS.  Labs unremarkable, abdominal exam benign.    Plan:  - To remain on CLD  - pain/nausea control PRN  - to be evaluated by [place] [facility]   - Analgesia PRN  Surg. Att.  Pt resting this Am. No new developments.  Plan as per primary team.

## 2023-10-15 NOTE — PROGRESS NOTE ADULT - SUBJECTIVE AND OBJECTIVE BOX
Overnight Events: c/o a "very bad night," reports she vomited, AVSS    SUBJECTIVE:  Patient seen and examined at bedside this morning, denies any current complaints, she would like to go home. Currently having CLD breakfast, denies any nausea, vomiting, CP, SOB, fevers or chills.     MEDICATIONS  (STANDING):  aluminum hydroxide/magnesium hydroxide/simethicone Suspension 30 milliLiter(s) Oral every 6 hours  dextrose 5% + sodium chloride 0.45%. 1000 milliLiter(s) (125 mL/Hr) IV Continuous <Continuous>  enoxaparin Injectable 40 milliGRAM(s) SubCutaneous every 24 hours  famotidine Injectable 20 milliGRAM(s) IV Push daily  influenza  Vaccine (HIGH DOSE) 0.7 milliLiter(s) IntraMuscular once  levothyroxine 88 MICROGram(s) Oral daily  ondansetron Injectable 4 milliGRAM(s) IV Push every 6 hours  pantoprazole  Injectable 40 milliGRAM(s) IV Push two times a day    MEDICATIONS  (PRN):  acetaminophen     Tablet .. 650 milliGRAM(s) Oral every 6 hours PRN Temp greater or equal to 38C (100.4F), Mild Pain (1 - 3), Moderate Pain (4 - 6)  diphenhydrAMINE Injectable 25 milliGRAM(s) IV Push every 6 hours PRN Rash and/or Itching  HYDROmorphone  Injectable 1 milliGRAM(s) IV Push every 4 hours PRN Severe Pain (7 - 10)  metoclopramide Injectable 10 milliGRAM(s) IV Push every 8 hours PRN breakthrough nausea      Vital Signs Last 24 Hrs  T(C): 37 (15 Oct 2023 05:19), Max: 37 (14 Oct 2023 12:24)  T(F): 98.6 (15 Oct 2023 05:19), Max: 98.6 (14 Oct 2023 12:24)  HR: 71 (15 Oct 2023 05:19) (71 - 82)  BP: 131/84 (15 Oct 2023 05:19) (105/68 - 131/84)  BP(mean): --  RR: 17 (15 Oct 2023 05:19) (17 - 17)  SpO2: 96% (15 Oct 2023 05:19) (93% - 98%)    Parameters below as of 15 Oct 2023 05:19  Patient On (Oxygen Delivery Method): room air        PHYSICAL EXAM:  Constitutional: AAOx3, no acute distress  HEENT: NCAT, airway patent  Cardiovascular: RRR, pulses present bilaterally  Respiratory: nonlabored breathing  Gastrointestinal: abdomen soft, nontender, non distended, no rebound or guarding  Neuro: no focal deficits  Extremities: non edematous, no calf pain bilaterally     I&O's Detail    14 Oct 2023 07:01  -  15 Oct 2023 07:00  --------------------------------------------------------  IN:    dextrose 5% + sodium chloride 0.45%: 125 mL    Oral Fluid: 1440 mL  Total IN: 1565 mL    OUT:    Emesis (mL): 50 mL  Total OUT: 50 mL    Total NET: 1515 mL          LABS:                        10.2   5.73  )-----------( 225      ( 15 Oct 2023 07:08 )             33.2     10-14    142  |  108  |  5<L>  ----------------------------<  98  3.5   |  32<H>  |  0.72    Ca    8.3<L>      14 Oct 2023 06:50        Urinalysis Basic - ( 14 Oct 2023 06:50 )    Color: x / Appearance: x / SG: x / pH: x  Gluc: 98 mg/dL / Ketone: x  / Bili: x / Urobili: x   Blood: x / Protein: x / Nitrite: x   Leuk Esterase: x / RBC: x / WBC x   Sq Epi: x / Non Sq Epi: x / Bacteria: x

## 2023-10-15 NOTE — PROGRESS NOTE ADULT - SUBJECTIVE AND OBJECTIVE BOX
[facility]  J Luis Almanza PA-C  [address] [place] [address]  [phone]      INTERVAL HPI/OVERNIGHT EVENTS:  Pt s/e  Having frequent BM  Otherwise no new GI events    MEDICATIONS  (STANDING):  aluminum hydroxide/magnesium hydroxide/simethicone Suspension 30 milliLiter(s) Oral every 6 hours  dextrose 5% + sodium chloride 0.45%. 1000 milliLiter(s) (125 mL/Hr) IV Continuous <Continuous>  enoxaparin Injectable 40 milliGRAM(s) SubCutaneous every 24 hours  famotidine Injectable 20 milliGRAM(s) IV Push daily  influenza  Vaccine (HIGH DOSE) 0.7 milliLiter(s) IntraMuscular once  levothyroxine 88 MICROGram(s) Oral daily  ondansetron Injectable 4 milliGRAM(s) IV Push every 6 hours  pantoprazole  Injectable 40 milliGRAM(s) IV Push two times a day    MEDICATIONS  (PRN):  acetaminophen     Tablet .. 650 milliGRAM(s) Oral every 6 hours PRN Temp greater or equal to 38C (100.4F), Mild Pain (1 - 3), Moderate Pain (4 - 6)  diphenhydrAMINE Injectable 25 milliGRAM(s) IV Push every 6 hours PRN Rash and/or Itching  HYDROmorphone  Injectable 1 milliGRAM(s) IV Push every 4 hours PRN Severe Pain (7 - 10)  metoclopramide Injectable 10 milliGRAM(s) IV Push every 8 hours PRN breakthrough nausea      Allergies    tetanus toxoid (Anaphylaxis)        PHYSICAL EXAM:   Vital Signs:  Vital Signs Last 24 Hrs  T(C): 37 (15 Oct 2023 05:19), Max: 37 (14 Oct 2023 12:24)  T(F): 98.6 (15 Oct 2023 05:19), Max: 98.6 (14 Oct 2023 12:24)  HR: 71 (15 Oct 2023 05:19) (71 - 82)  BP: 131/84 (15 Oct 2023 05:19) (105/68 - 131/84)  BP(mean): --  RR: 17 (15 Oct 2023 05:19) (17 - 17)  SpO2: 96% (15 Oct 2023 05:19) (93% - 98%)    Parameters below as of 15 Oct 2023 05:19  Patient On (Oxygen Delivery Method): room air      GENERAL:  Appears stated age  HEENT:  NC/AT  CHEST:  Full & symmetric excursion  HEART:  Regular rhythm  ABDOMEN:  Soft, non-tender, non-distended  EXTEREMITIES:  no cyanosis  SKIN:  No rash  NEURO:  Alert      LABS:                        10.2   5.73  )-----------( 225      ( 15 Oct 2023 07:08 )             33.2     10-15    143  |  107  |  6<L>  ----------------------------<  86  3.8   |  35<H>  |  0.70    Ca    8.6      15 Oct 2023 07:08        Urinalysis Basic - ( 15 Oct 2023 07:08 )    Color: x / Appearance: x / SG: x / pH: x  Gluc: 86 mg/dL / Ketone: x  / Bili: x / Urobili: x   Blood: x / Protein: x / Nitrite: x   Leuk Esterase: x / RBC: x / WBC x   Sq Epi: x / Non Sq Epi: x / Bacteria: x

## 2023-10-15 NOTE — PROGRESS NOTE ADULT - ASSESSMENT
Assessment	  68 year old Female a/w SBO with PSHx colon resection w/ rectopexy, SBR w/ primary anastomosis with revision x 2.  Admitted to surgery team for SBO. medicine consulted for continued vomiting/diarrhea, allergic rxn, and home medication management.       Gastritis.   Tt with continued vomiting and diarrhea despite resolution of SBO: now back to and tolerating clears  - Pt with recent 8 day course of levaquin 700mg PO for sinusitis  - no further diarrhea today  - Pt also with recent course of steroids 2/2 contact dermatitis  -  zofran to standing and add reglan PRN for breakthrough vomiting  - Pantoprazole IV 40mg BID, pepcid 20mg IV QD, maalox prior to meals ordered for gastritis  - Pt following with GI  - SBO management per surgery.  -Discussed with GI, plan for EGD monday    Allergic reaction.   ·  Recommendation: Pt with facial swelling and itchiness post IV contrast  - Solumedrol IV 40mg x1 and Benadryl 50mg IV q6 x 4 doses  - Pt with hx of contact dermatitis requiring steroid tx  - Suggest pt f/u with allergist/immunologist outpatient for further workup and management  - Should reassess in AM, pt may need repeat dose of IV solumedrol if symptoms persist.    Hypothyroidism.   ·  Recommendation: Pt with PMHx hypothyroidism  - Pt on levothyroxine 88mcg at home

## 2023-10-15 NOTE — CHART NOTE - NSCHARTNOTEFT_GEN_A_CORE
Patient reporting diarrhea and vomiting simultaneously. Reports that the feeling comes on urgently and she feels she is unable to make it to the bathroom, passing nothing but water. She reports three episodes of emesis and diarrhea thrice.    ICU Vital Signs Last 24 Hrs  T(C): 37.1 (15 Oct 2023 12:03), Max: 37.1 (15 Oct 2023 12:03)  T(F): 98.8 (15 Oct 2023 12:03), Max: 98.8 (15 Oct 2023 12:03)  HR: 76 (15 Oct 2023 12:03) (71 - 82)  BP: 111/66 (15 Oct 2023 12:03) (111/64 - 131/84)  BP(mean): --  ABP: --  ABP(mean): --  RR: 16 (15 Oct 2023 12:03) (16 - 17)  SpO2: 93% (15 Oct 2023 12:03) (93% - 96%)    O2 Parameters below as of 15 Oct 2023 12:03  Patient On (Oxygen Delivery Method): room air        Plan:  - will give patient 500cc bolus for fluid losses  - continue to monitor

## 2023-10-16 ENCOUNTER — RESULT REVIEW (OUTPATIENT)
Age: 68
End: 2023-10-16

## 2023-10-16 LAB
ANION GAP SERPL CALC-SCNC: 2 MMOL/L — LOW (ref 5–17)
BUN SERPL-MCNC: 5 MG/DL — LOW (ref 7–23)
CALCIUM SERPL-MCNC: 8.5 MG/DL — SIGNIFICANT CHANGE UP (ref 8.5–10.1)
CHLORIDE SERPL-SCNC: 106 MMOL/L — SIGNIFICANT CHANGE UP (ref 96–108)
CO2 SERPL-SCNC: 34 MMOL/L — HIGH (ref 22–31)
CREAT SERPL-MCNC: 0.73 MG/DL — SIGNIFICANT CHANGE UP (ref 0.5–1.3)
EGFR: 90 ML/MIN/1.73M2 — SIGNIFICANT CHANGE UP
GLUCOSE SERPL-MCNC: 91 MG/DL — SIGNIFICANT CHANGE UP (ref 70–99)
HCT VFR BLD CALC: 32.3 % — LOW (ref 34.5–45)
HGB BLD-MCNC: 10.1 G/DL — LOW (ref 11.5–15.5)
MCHC RBC-ENTMCNC: 30.5 PG — SIGNIFICANT CHANGE UP (ref 27–34)
MCHC RBC-ENTMCNC: 31.3 GM/DL — LOW (ref 32–36)
MCV RBC AUTO: 97.6 FL — SIGNIFICANT CHANGE UP (ref 80–100)
NRBC # BLD: 0 /100 WBCS — SIGNIFICANT CHANGE UP (ref 0–0)
PLATELET # BLD AUTO: 244 K/UL — SIGNIFICANT CHANGE UP (ref 150–400)
POTASSIUM SERPL-MCNC: 4.2 MMOL/L — SIGNIFICANT CHANGE UP (ref 3.5–5.3)
POTASSIUM SERPL-SCNC: 4.2 MMOL/L — SIGNIFICANT CHANGE UP (ref 3.5–5.3)
RBC # BLD: 3.31 M/UL — LOW (ref 3.8–5.2)
RBC # FLD: 12.9 % — SIGNIFICANT CHANGE UP (ref 10.3–14.5)
SODIUM SERPL-SCNC: 142 MMOL/L — SIGNIFICANT CHANGE UP (ref 135–145)
WBC # BLD: 6.46 K/UL — SIGNIFICANT CHANGE UP (ref 3.8–10.5)
WBC # FLD AUTO: 6.46 K/UL — SIGNIFICANT CHANGE UP (ref 3.8–10.5)

## 2023-10-16 PROCEDURE — 88312 SPECIAL STAINS GROUP 1: CPT | Mod: 26

## 2023-10-16 PROCEDURE — 99233 SBSQ HOSP IP/OBS HIGH 50: CPT

## 2023-10-16 PROCEDURE — 88305 TISSUE EXAM BY PATHOLOGIST: CPT | Mod: 26

## 2023-10-16 PROCEDURE — 88313 SPECIAL STAINS GROUP 2: CPT | Mod: 26

## 2023-10-16 DEVICE — HEMOSPRAY HEMOSTAT ENDO 7F: Type: IMPLANTABLE DEVICE | Status: FUNCTIONAL

## 2023-10-16 RX ORDER — POLYETHYLENE GLYCOL 3350 17 G/17G
17 POWDER, FOR SOLUTION ORAL
Refills: 0 | Status: DISCONTINUED | OUTPATIENT
Start: 2023-10-16 | End: 2023-10-16

## 2023-10-16 RX ORDER — SENNA PLUS 8.6 MG/1
2 TABLET ORAL AT BEDTIME
Refills: 0 | Status: DISCONTINUED | OUTPATIENT
Start: 2023-10-16 | End: 2023-10-16

## 2023-10-16 RX ORDER — DIPHENHYDRAMINE HCL 50 MG
25 CAPSULE ORAL ONCE
Refills: 0 | Status: DISCONTINUED | OUTPATIENT
Start: 2023-10-16 | End: 2023-10-16

## 2023-10-16 RX ORDER — TRAMADOL HYDROCHLORIDE 50 MG/1
25 TABLET ORAL EVERY 6 HOURS
Refills: 0 | Status: DISCONTINUED | OUTPATIENT
Start: 2023-10-16 | End: 2023-10-17

## 2023-10-16 RX ORDER — DIPHENHYDRAMINE HCL 50 MG
25 CAPSULE ORAL ONCE
Refills: 0 | Status: COMPLETED | OUTPATIENT
Start: 2023-10-16 | End: 2023-10-16

## 2023-10-16 RX ADMIN — PANTOPRAZOLE SODIUM 40 MILLIGRAM(S): 20 TABLET, DELAYED RELEASE ORAL at 18:21

## 2023-10-16 RX ADMIN — FAMOTIDINE 20 MILLIGRAM(S): 10 INJECTION INTRAVENOUS at 13:22

## 2023-10-16 RX ADMIN — HYDROMORPHONE HYDROCHLORIDE 1 MILLIGRAM(S): 2 INJECTION INTRAMUSCULAR; INTRAVENOUS; SUBCUTANEOUS at 18:21

## 2023-10-16 RX ADMIN — ONDANSETRON 4 MILLIGRAM(S): 8 TABLET, FILM COATED ORAL at 23:13

## 2023-10-16 RX ADMIN — Medication 88 MICROGRAM(S): at 05:46

## 2023-10-16 RX ADMIN — HYDROMORPHONE HYDROCHLORIDE 1 MILLIGRAM(S): 2 INJECTION INTRAMUSCULAR; INTRAVENOUS; SUBCUTANEOUS at 23:13

## 2023-10-16 RX ADMIN — HYDROMORPHONE HYDROCHLORIDE 1 MILLIGRAM(S): 2 INJECTION INTRAMUSCULAR; INTRAVENOUS; SUBCUTANEOUS at 18:39

## 2023-10-16 RX ADMIN — Medication 10 MILLIGRAM(S): at 01:30

## 2023-10-16 RX ADMIN — SODIUM CHLORIDE 125 MILLILITER(S): 9 INJECTION, SOLUTION INTRAVENOUS at 05:46

## 2023-10-16 RX ADMIN — Medication 30 MILLILITER(S): at 05:45

## 2023-10-16 RX ADMIN — HYDROMORPHONE HYDROCHLORIDE 1 MILLIGRAM(S): 2 INJECTION INTRAMUSCULAR; INTRAVENOUS; SUBCUTANEOUS at 10:16

## 2023-10-16 RX ADMIN — PANTOPRAZOLE SODIUM 40 MILLIGRAM(S): 20 TABLET, DELAYED RELEASE ORAL at 05:46

## 2023-10-16 RX ADMIN — HYDROMORPHONE HYDROCHLORIDE 1 MILLIGRAM(S): 2 INJECTION INTRAMUSCULAR; INTRAVENOUS; SUBCUTANEOUS at 03:51

## 2023-10-16 RX ADMIN — Medication 25 MILLIGRAM(S): at 21:27

## 2023-10-16 RX ADMIN — ONDANSETRON 4 MILLIGRAM(S): 8 TABLET, FILM COATED ORAL at 18:21

## 2023-10-16 RX ADMIN — Medication 25 MILLIGRAM(S): at 01:30

## 2023-10-16 RX ADMIN — ENOXAPARIN SODIUM 40 MILLIGRAM(S): 100 INJECTION SUBCUTANEOUS at 23:13

## 2023-10-16 RX ADMIN — ONDANSETRON 4 MILLIGRAM(S): 8 TABLET, FILM COATED ORAL at 13:22

## 2023-10-16 RX ADMIN — HYDROMORPHONE HYDROCHLORIDE 1 MILLIGRAM(S): 2 INJECTION INTRAMUSCULAR; INTRAVENOUS; SUBCUTANEOUS at 02:56

## 2023-10-16 RX ADMIN — ONDANSETRON 4 MILLIGRAM(S): 8 TABLET, FILM COATED ORAL at 05:46

## 2023-10-16 RX ADMIN — Medication 30 MILLILITER(S): at 18:21

## 2023-10-16 NOTE — PROGRESS NOTE ADULT - SUBJECTIVE AND OBJECTIVE BOX
s/p  upper gastrointestinal endoscopy     normal esophagus, biopsied r/o eosinophilic esophagitis  mild gastritis, biopsied both antrum and body  normal duodenum, biopsied    rec:   advance diet  f/u path

## 2023-10-16 NOTE — PROGRESS NOTE ADULT - SUBJECTIVE AND OBJECTIVE BOX
SURGERY PA NOTE ON BEHALF OF DR. Yanez /General SURGERY:    S: Patient seen and examined at bedside.     Endorses minimal abdominal pain this am.   Vomited once overngiht- clear fluid, small amount.   Diarrhea x 1 - 2 overnight, brown color.   States her nausea is not related with oral intake, it just comes apruptly.   Passing flatus.   Ambulating, voiding.       MEDICATIONS:  acetaminophen     Tablet .. 650 milliGRAM(s) Oral every 6 hours PRN  aluminum hydroxide/magnesium hydroxide/simethicone Suspension 30 milliLiter(s) Oral every 6 hours  dextrose 5% + sodium chloride 0.45%. 1000 milliLiter(s) IV Continuous <Continuous>  diphenhydrAMINE Injectable 25 milliGRAM(s) IV Push every 6 hours PRN  enoxaparin Injectable 40 milliGRAM(s) SubCutaneous every 24 hours  famotidine Injectable 20 milliGRAM(s) IV Push daily  HYDROmorphone  Injectable 1 milliGRAM(s) IV Push every 4 hours PRN  influenza  Vaccine (HIGH DOSE) 0.7 milliLiter(s) IntraMuscular once  levothyroxine 88 MICROGram(s) Oral daily  metoclopramide Injectable 10 milliGRAM(s) IV Push every 8 hours PRN  ondansetron Injectable 4 milliGRAM(s) IV Push every 6 hours  pantoprazole  Injectable 40 milliGRAM(s) IV Push two times a day      O:  Vital Signs Last 24 Hrs  T(C): 36.5 (16 Oct 2023 05:53), Max: 37.1 (15 Oct 2023 12:03)  T(F): 97.7 (16 Oct 2023 05:53), Max: 98.8 (15 Oct 2023 12:03)  HR: 73 (16 Oct 2023 05:53) (73 - 93)  BP: 115/67 (16 Oct 2023 05:53) (111/66 - 127/71)  BP(mean): --  RR: 17 (16 Oct 2023 05:53) (16 - 17)  SpO2: 94% (16 Oct 2023 05:53) (93% - 96%)    Parameters below as of 16 Oct 2023 05:53  Patient On (Oxygen Delivery Method): room air        I&O SUMMARY:    10-15-23 @ 07:01  -  10-16-23 @ 07:00  --------------------------------------------------------  IN: 2390 mL / OUT: 1000 mL / NET: 1390 mL        PHYSICAL EXAM:  Constitutional: AAOx3, no acute distress  HEENT: NCAT, airway patent  Cardiovascular: RRR, pulses present bilaterally  Respiratory: nonlabored breathing  Gastrointestinal: abdomen soft, nontender, non distended, no rebound or guarding  Neuro: no focal deficits  Extremities: non edematous, no calf pain bilaterally      LABS:                        10.1   6.46  )-----------( 244      ( 16 Oct 2023 07:00 )             32.3     10-15    143  |  107  |  6<L>  ----------------------------<  86  3.8   |  35<H>  |  0.70    Ca    8.6      15 Oct 2023 07:08        Assessment and Plan:   · Assessment	  68 year old Female a/w SBO with PSHx colon resection w/ rectopexy, SBR w/ primary anastomosis with revision x 2.  Admitted for SBO,VSS.  Labs unremarkable, abdominal exam benign.    Plan:  - NPO for EGD with GI today for persistent n/v/d.   - pain/nausea control PRN  - to be evaluated by [place] [facility]   - Analgesia PRN  - Discussed with Dr. Yanez

## 2023-10-16 NOTE — PROGRESS NOTE ADULT - ASSESSMENT
68F with PMH colon resection, SBR with primary anastomosis with revision x 2.  Admitted to surgery for SBO. Medicine consulted for continued vomiting/diarrhea, allergic rxn, and home medication management.     #SBO  -Continue CLD - advance per surgery  -To be assessed by [facility]    #Gastritis  -Continued vomiting and diarrhea despite resolution of SBO  -s/p 500cc bolus overnight 10/15   -Plan for EGD today with GI  -Continue Zofran standing and Reglan PRN for breakthrough vomiting  -Continue pantoprazole IV 40mg BID, pepcid 20mg IV QD, maalox prior to meals ordered for gastritis    #Allergic reaction - resolved  -Pt with facial swelling and itchiness post IV contrast  -Solumedrol IV 40mg x1 and Benadryl 50mg IV q6 x 4 doses  -hx of contact dermatitis requiring steroid tx  -Suggest pt f/u with allergist/immunologist outpatient for further workup and management    #Hypothyroidism  -Continue Levothyroxine     #Prophylactic measure  -DVT ppx: Lovenox 68F with PMH colon resection, SBR with primary anastomosis with revision x 2.  Admitted to surgery for SBO. Medicine consulted for continued vomiting/diarrhea, allergic rxn, and home medication management.     #Gastritis  -Continued vomiting and diarrhea despite resolution of SBO  -s/p 500cc bolus overnight 10/15   -Plan for EGD today with GI - will advance diet pending results  -Continue Zofran standing and Reglan PRN for breakthrough vomiting  -Continue pantoprazole IV 40mg BID, pepcid 20mg IV QD, maalox prior to meals ordered for gastritis  -Constipation - large stool burden noted on CT - start bowel regimen    #SBO  -Continue CLD - advance per surgery  -To be assessed by [place] [facility]  -Start Ultram q 6 hours standing - try to avoid IV Dilaudid     #Allergic reaction - resolved  -Pt with facial swelling and itchiness post IV contrast  -Solumedrol IV 40mg x1 and Benadryl 50mg IV q6 x 4 doses  -hx of contact dermatitis requiring steroid tx  -Suggest pt f/u with allergist/immunologist outpatient for further workup and management    #Hypothyroidism  -Continue Levothyroxine     #Prophylactic measure  -DVT ppx: Lovenox

## 2023-10-16 NOTE — PROGRESS NOTE ADULT - SUBJECTIVE AND OBJECTIVE BOX
Patient is a 68y old  Female who presents with a chief complaint of SBO (16 Oct 2023 08:21)      INTERVAL HPI/OVERNIGHT EVENTS: Patient seen and examined at bedside. No overnight events.  Upset about pain not well controlled on IV Dilaudid.   Plan for EGD today. Would like to follow up outpatient for further treatment of anastomosis causing frequent SBOs.     MEDICATIONS  (STANDING):  aluminum hydroxide/magnesium hydroxide/simethicone Suspension 30 milliLiter(s) Oral every 6 hours  bisacodyl Suppository 10 milliGRAM(s) Rectal once  enoxaparin Injectable 40 milliGRAM(s) SubCutaneous every 24 hours  famotidine Injectable 20 milliGRAM(s) IV Push daily  influenza  Vaccine (HIGH DOSE) 0.7 milliLiter(s) IntraMuscular once  levothyroxine 88 MICROGram(s) Oral daily  ondansetron Injectable 4 milliGRAM(s) IV Push every 6 hours  pantoprazole  Injectable 40 milliGRAM(s) IV Push two times a day  polyethylene glycol 3350 17 Gram(s) Oral two times a day  senna 2 Tablet(s) Oral at bedtime  traMADol 25 milliGRAM(s) Oral every 6 hours    MEDICATIONS  (PRN):  acetaminophen     Tablet .. 650 milliGRAM(s) Oral every 6 hours PRN Temp greater or equal to 38C (100.4F), Mild Pain (1 - 3), Moderate Pain (4 - 6)  diphenhydrAMINE Injectable 25 milliGRAM(s) IV Push every 6 hours PRN Rash and/or Itching  HYDROmorphone  Injectable 1 milliGRAM(s) IV Push every 4 hours PRN Severe Pain (7 - 10)  metoclopramide Injectable 10 milliGRAM(s) IV Push every 8 hours PRN breakthrough nausea      Allergies    tetanus toxoid (Anaphylaxis)    Intolerances        REVIEW OF SYSTEMS:  CONSTITUTIONAL: No fever or chills  CARDIOVASCULAR: No chest pain, palpitations  GASTROINTESTINAL: positive abd pain, positive nausea, positive vomiting, positive diarrhea      Vital Signs Last 24 Hrs  T(C): 37.1 (16 Oct 2023 14:25), Max: 37.1 (16 Oct 2023 14:25)  T(F): 98.8 (16 Oct 2023 14:25), Max: 98.8 (16 Oct 2023 14:25)  HR: 80 (16 Oct 2023 14:25) (73 - 93)  BP: 128/73 (16 Oct 2023 14:25) (115/67 - 128/73)  BP(mean): --  RR: 14 (16 Oct 2023 14:25) (14 - 18)  SpO2: 97% (16 Oct 2023 14:25) (92% - 97%)    Parameters below as of 16 Oct 2023 12:45  Patient On (Oxygen Delivery Method): room air      I&O's Summary    15 Oct 2023 07:01  -  16 Oct 2023 07:00  --------------------------------------------------------  IN: 3765 mL / OUT: 1000 mL / NET: 2765 mL    16 Oct 2023 07:01  -  16 Oct 2023 15:16  --------------------------------------------------------  IN: 0 mL / OUT: 800 mL / NET: -800 mL          PHYSICAL EXAM:  GENERAL: NAD  HEENT:  AT/NC, anicteric, moist mucous membranes, EOMI, PERRL, no lid-lag, conjunctiva and sclera clear  CHEST/LUNG:  CTA b/l, no rales, wheezes, or rhonchi,  normal respiratory effort, no intercostal retractions  HEART:  RRR, S1, S2, no murmurs; no pitting edema  ABDOMEN:  BS+, soft, nontender, nondistended  MSK/EXTREMITIES: palpable peripheral pulses, no clubbing or cyanosis  NERVOUS SYSTEM: answers questions and follows commands appropriately, A&Ox3 grossly moves all extremities   PSYCH: Appropriate affect, Alert & Awake; Good judgement      LABS: Personally reviewed  CBC                        10.1   6.46  )-----------( 244      ( 16 Oct 2023 07:00 )             32.3     CMP  10-16    142  |  106  |  5   ----------------------------<  91  4.2   |  34  |  0.73    Ca    8.5      16 Oct 2023 07:00                                        Urinalysis Basic - ( 16 Oct 2023 07:00 )    Color: x / Appearance: x / SG: x / pH: x  Gluc: 91 mg/dL / Ketone: x  / Bili: x / Urobili: x   Blood: x / Protein: x / Nitrite: x   Leuk Esterase: x / RBC: x / WBC x   Sq Epi: x / Non Sq Epi: x / Bacteria: x                RADIOLOGY & ADDITIONAL TESTS: Personally reviewed.     Consultant(s) Notes Reviewed:  [x] YES  [ ] NO   Discussed with SW/ISAIAS, RN

## 2023-10-17 LAB
ALBUMIN SERPL ELPH-MCNC: 2.9 G/DL — LOW (ref 3.3–5)
ALBUMIN SERPL ELPH-MCNC: 2.9 G/DL — LOW (ref 3.3–5)
ALP SERPL-CCNC: 117 U/L — SIGNIFICANT CHANGE UP (ref 40–120)
ALP SERPL-CCNC: 117 U/L — SIGNIFICANT CHANGE UP (ref 40–120)
ALT FLD-CCNC: 36 U/L — SIGNIFICANT CHANGE UP (ref 12–78)
ALT FLD-CCNC: 36 U/L — SIGNIFICANT CHANGE UP (ref 12–78)
ANION GAP SERPL CALC-SCNC: 4 MMOL/L — LOW (ref 5–17)
ANION GAP SERPL CALC-SCNC: 4 MMOL/L — LOW (ref 5–17)
AST SERPL-CCNC: 19 U/L — SIGNIFICANT CHANGE UP (ref 15–37)
AST SERPL-CCNC: 19 U/L — SIGNIFICANT CHANGE UP (ref 15–37)
BILIRUB SERPL-MCNC: 0.8 MG/DL — SIGNIFICANT CHANGE UP (ref 0.2–1.2)
BILIRUB SERPL-MCNC: 0.8 MG/DL — SIGNIFICANT CHANGE UP (ref 0.2–1.2)
BUN SERPL-MCNC: 9 MG/DL — SIGNIFICANT CHANGE UP (ref 7–23)
BUN SERPL-MCNC: 9 MG/DL — SIGNIFICANT CHANGE UP (ref 7–23)
CALCIUM SERPL-MCNC: 9.1 MG/DL — SIGNIFICANT CHANGE UP (ref 8.5–10.1)
CALCIUM SERPL-MCNC: 9.1 MG/DL — SIGNIFICANT CHANGE UP (ref 8.5–10.1)
CHLORIDE SERPL-SCNC: 104 MMOL/L — SIGNIFICANT CHANGE UP (ref 96–108)
CHLORIDE SERPL-SCNC: 104 MMOL/L — SIGNIFICANT CHANGE UP (ref 96–108)
CO2 SERPL-SCNC: 33 MMOL/L — HIGH (ref 22–31)
CO2 SERPL-SCNC: 33 MMOL/L — HIGH (ref 22–31)
CREAT SERPL-MCNC: 0.94 MG/DL — SIGNIFICANT CHANGE UP (ref 0.5–1.3)
CREAT SERPL-MCNC: 0.94 MG/DL — SIGNIFICANT CHANGE UP (ref 0.5–1.3)
EGFR: 66 ML/MIN/1.73M2 — SIGNIFICANT CHANGE UP
EGFR: 66 ML/MIN/1.73M2 — SIGNIFICANT CHANGE UP
GLUCOSE SERPL-MCNC: 100 MG/DL — HIGH (ref 70–99)
GLUCOSE SERPL-MCNC: 100 MG/DL — HIGH (ref 70–99)
HCT VFR BLD CALC: 34.4 % — LOW (ref 34.5–45)
HCT VFR BLD CALC: 34.4 % — LOW (ref 34.5–45)
HGB BLD-MCNC: 10.7 G/DL — LOW (ref 11.5–15.5)
HGB BLD-MCNC: 10.7 G/DL — LOW (ref 11.5–15.5)
MCHC RBC-ENTMCNC: 30 PG — SIGNIFICANT CHANGE UP (ref 27–34)
MCHC RBC-ENTMCNC: 30 PG — SIGNIFICANT CHANGE UP (ref 27–34)
MCHC RBC-ENTMCNC: 31.1 GM/DL — LOW (ref 32–36)
MCHC RBC-ENTMCNC: 31.1 GM/DL — LOW (ref 32–36)
MCV RBC AUTO: 96.4 FL — SIGNIFICANT CHANGE UP (ref 80–100)
MCV RBC AUTO: 96.4 FL — SIGNIFICANT CHANGE UP (ref 80–100)
NRBC # BLD: 0 /100 WBCS — SIGNIFICANT CHANGE UP (ref 0–0)
NRBC # BLD: 0 /100 WBCS — SIGNIFICANT CHANGE UP (ref 0–0)
PLATELET # BLD AUTO: 281 K/UL — SIGNIFICANT CHANGE UP (ref 150–400)
PLATELET # BLD AUTO: 281 K/UL — SIGNIFICANT CHANGE UP (ref 150–400)
POTASSIUM SERPL-MCNC: 4.7 MMOL/L — SIGNIFICANT CHANGE UP (ref 3.5–5.3)
POTASSIUM SERPL-MCNC: 4.7 MMOL/L — SIGNIFICANT CHANGE UP (ref 3.5–5.3)
POTASSIUM SERPL-SCNC: 4.7 MMOL/L — SIGNIFICANT CHANGE UP (ref 3.5–5.3)
POTASSIUM SERPL-SCNC: 4.7 MMOL/L — SIGNIFICANT CHANGE UP (ref 3.5–5.3)
PROT SERPL-MCNC: 6.2 G/DL — SIGNIFICANT CHANGE UP (ref 6–8.3)
PROT SERPL-MCNC: 6.2 G/DL — SIGNIFICANT CHANGE UP (ref 6–8.3)
RBC # BLD: 3.57 M/UL — LOW (ref 3.8–5.2)
RBC # BLD: 3.57 M/UL — LOW (ref 3.8–5.2)
RBC # FLD: 12.8 % — SIGNIFICANT CHANGE UP (ref 10.3–14.5)
RBC # FLD: 12.8 % — SIGNIFICANT CHANGE UP (ref 10.3–14.5)
SODIUM SERPL-SCNC: 141 MMOL/L — SIGNIFICANT CHANGE UP (ref 135–145)
SODIUM SERPL-SCNC: 141 MMOL/L — SIGNIFICANT CHANGE UP (ref 135–145)
WBC # BLD: 7.19 K/UL — SIGNIFICANT CHANGE UP (ref 3.8–10.5)
WBC # BLD: 7.19 K/UL — SIGNIFICANT CHANGE UP (ref 3.8–10.5)
WBC # FLD AUTO: 7.19 K/UL — SIGNIFICANT CHANGE UP (ref 3.8–10.5)
WBC # FLD AUTO: 7.19 K/UL — SIGNIFICANT CHANGE UP (ref 3.8–10.5)

## 2023-10-17 PROCEDURE — 99233 SBSQ HOSP IP/OBS HIGH 50: CPT

## 2023-10-17 PROCEDURE — 74018 RADEX ABDOMEN 1 VIEW: CPT | Mod: 26

## 2023-10-17 RX ORDER — METOCLOPRAMIDE HCL 10 MG
10 TABLET ORAL EVERY 6 HOURS
Refills: 0 | Status: DISCONTINUED | OUTPATIENT
Start: 2023-10-17 | End: 2023-10-18

## 2023-10-17 RX ORDER — HYDROMORPHONE HYDROCHLORIDE 2 MG/ML
0.5 INJECTION INTRAMUSCULAR; INTRAVENOUS; SUBCUTANEOUS EVERY 8 HOURS
Refills: 0 | Status: DISCONTINUED | OUTPATIENT
Start: 2023-10-17 | End: 2023-10-17

## 2023-10-17 RX ORDER — HYDROMORPHONE HYDROCHLORIDE 2 MG/ML
0.5 INJECTION INTRAMUSCULAR; INTRAVENOUS; SUBCUTANEOUS EVERY 12 HOURS
Refills: 0 | Status: DISCONTINUED | OUTPATIENT
Start: 2023-10-17 | End: 2023-10-18

## 2023-10-17 RX ORDER — FAMOTIDINE 10 MG/ML
20 INJECTION INTRAVENOUS DAILY
Refills: 0 | Status: DISCONTINUED | OUTPATIENT
Start: 2023-10-17 | End: 2023-10-23

## 2023-10-17 RX ORDER — POLYETHYLENE GLYCOL 3350 17 G/17G
17 POWDER, FOR SOLUTION ORAL ONCE
Refills: 0 | Status: DISCONTINUED | OUTPATIENT
Start: 2023-10-17 | End: 2023-10-17

## 2023-10-17 RX ORDER — SODIUM CHLORIDE 9 MG/ML
1000 INJECTION INTRAMUSCULAR; INTRAVENOUS; SUBCUTANEOUS
Refills: 0 | Status: DISCONTINUED | OUTPATIENT
Start: 2023-10-17 | End: 2023-10-18

## 2023-10-17 RX ORDER — METOCLOPRAMIDE HCL 10 MG
10 TABLET ORAL EVERY 6 HOURS
Refills: 0 | Status: DISCONTINUED | OUTPATIENT
Start: 2023-10-17 | End: 2023-10-17

## 2023-10-17 RX ORDER — PANTOPRAZOLE SODIUM 20 MG/1
40 TABLET, DELAYED RELEASE ORAL
Refills: 0 | Status: DISCONTINUED | OUTPATIENT
Start: 2023-10-17 | End: 2023-10-23

## 2023-10-17 RX ORDER — METOCLOPRAMIDE HCL 10 MG
10 TABLET ORAL ONCE
Refills: 0 | Status: COMPLETED | OUTPATIENT
Start: 2023-10-17 | End: 2023-10-17

## 2023-10-17 RX ORDER — ONDANSETRON 8 MG/1
4 TABLET, FILM COATED ORAL EVERY 6 HOURS
Refills: 0 | Status: DISCONTINUED | OUTPATIENT
Start: 2023-10-17 | End: 2023-10-23

## 2023-10-17 RX ORDER — HYDROMORPHONE HYDROCHLORIDE 2 MG/ML
4 INJECTION INTRAMUSCULAR; INTRAVENOUS; SUBCUTANEOUS EVERY 4 HOURS
Refills: 0 | Status: DISCONTINUED | OUTPATIENT
Start: 2023-10-17 | End: 2023-10-18

## 2023-10-17 RX ORDER — HYDROMORPHONE HYDROCHLORIDE 2 MG/ML
0.5 INJECTION INTRAMUSCULAR; INTRAVENOUS; SUBCUTANEOUS EVERY 12 HOURS
Refills: 0 | Status: DISCONTINUED | OUTPATIENT
Start: 2023-10-17 | End: 2023-10-17

## 2023-10-17 RX ORDER — DIPHENHYDRAMINE HCL 50 MG
25 CAPSULE ORAL ONCE
Refills: 0 | Status: COMPLETED | OUTPATIENT
Start: 2023-10-17 | End: 2023-10-17

## 2023-10-17 RX ADMIN — HYDROMORPHONE HYDROCHLORIDE 4 MILLIGRAM(S): 2 INJECTION INTRAMUSCULAR; INTRAVENOUS; SUBCUTANEOUS at 17:07

## 2023-10-17 RX ADMIN — Medication 10 MILLIGRAM(S): at 01:30

## 2023-10-17 RX ADMIN — HYDROMORPHONE HYDROCHLORIDE 0.5 MILLIGRAM(S): 2 INJECTION INTRAMUSCULAR; INTRAVENOUS; SUBCUTANEOUS at 23:25

## 2023-10-17 RX ADMIN — Medication 88 MICROGRAM(S): at 06:50

## 2023-10-17 RX ADMIN — HYDROMORPHONE HYDROCHLORIDE 0.5 MILLIGRAM(S): 2 INJECTION INTRAMUSCULAR; INTRAVENOUS; SUBCUTANEOUS at 15:00

## 2023-10-17 RX ADMIN — ENOXAPARIN SODIUM 40 MILLIGRAM(S): 100 INJECTION SUBCUTANEOUS at 23:00

## 2023-10-17 RX ADMIN — HYDROMORPHONE HYDROCHLORIDE 4 MILLIGRAM(S): 2 INJECTION INTRAMUSCULAR; INTRAVENOUS; SUBCUTANEOUS at 12:50

## 2023-10-17 RX ADMIN — Medication 30 MILLILITER(S): at 11:50

## 2023-10-17 RX ADMIN — PANTOPRAZOLE SODIUM 40 MILLIGRAM(S): 20 TABLET, DELAYED RELEASE ORAL at 18:18

## 2023-10-17 RX ADMIN — HYDROMORPHONE HYDROCHLORIDE 4 MILLIGRAM(S): 2 INJECTION INTRAMUSCULAR; INTRAVENOUS; SUBCUTANEOUS at 11:50

## 2023-10-17 RX ADMIN — HYDROMORPHONE HYDROCHLORIDE 4 MILLIGRAM(S): 2 INJECTION INTRAMUSCULAR; INTRAVENOUS; SUBCUTANEOUS at 20:57

## 2023-10-17 RX ADMIN — FAMOTIDINE 20 MILLIGRAM(S): 10 INJECTION INTRAVENOUS at 11:50

## 2023-10-17 RX ADMIN — PANTOPRAZOLE SODIUM 40 MILLIGRAM(S): 20 TABLET, DELAYED RELEASE ORAL at 06:50

## 2023-10-17 RX ADMIN — HYDROMORPHONE HYDROCHLORIDE 0.5 MILLIGRAM(S): 2 INJECTION INTRAMUSCULAR; INTRAVENOUS; SUBCUTANEOUS at 14:49

## 2023-10-17 RX ADMIN — HYDROMORPHONE HYDROCHLORIDE 1 MILLIGRAM(S): 2 INJECTION INTRAMUSCULAR; INTRAVENOUS; SUBCUTANEOUS at 06:58

## 2023-10-17 RX ADMIN — HYDROMORPHONE HYDROCHLORIDE 1 MILLIGRAM(S): 2 INJECTION INTRAMUSCULAR; INTRAVENOUS; SUBCUTANEOUS at 04:28

## 2023-10-17 RX ADMIN — Medication 10 MILLIGRAM(S): at 23:08

## 2023-10-17 RX ADMIN — HYDROMORPHONE HYDROCHLORIDE 4 MILLIGRAM(S): 2 INJECTION INTRAMUSCULAR; INTRAVENOUS; SUBCUTANEOUS at 16:07

## 2023-10-17 RX ADMIN — ONDANSETRON 4 MILLIGRAM(S): 8 TABLET, FILM COATED ORAL at 20:56

## 2023-10-17 RX ADMIN — Medication 30 MILLILITER(S): at 06:50

## 2023-10-17 RX ADMIN — Medication 30 MILLILITER(S): at 18:18

## 2023-10-17 RX ADMIN — ONDANSETRON 4 MILLIGRAM(S): 8 TABLET, FILM COATED ORAL at 14:49

## 2023-10-17 RX ADMIN — Medication 25 MILLIGRAM(S): at 23:00

## 2023-10-17 RX ADMIN — Medication 25 MILLIGRAM(S): at 03:15

## 2023-10-17 RX ADMIN — ONDANSETRON 4 MILLIGRAM(S): 8 TABLET, FILM COATED ORAL at 06:50

## 2023-10-17 NOTE — PROGRESS NOTE ADULT - ASSESSMENT
68F with PMH colon resection, SBR with primary anastomosis with revision x 2.  Admitted to surgery for SBO. Medicine consulted for continued vomiting/diarrhea, allergic rxn, and home medication management.     #Gastritis  -Continued vomiting and diarrhea despite resolution of SBO  -s/p 500cc bolus overnight 10/15   -s/p EGD 10/16   -Switch IV Zofran to PRN   -Switch to PO Pantoprazole BID, Pepcid PO daily  -Continue maalox prior to meals ordered for gastritis  -Constipation - large stool burden noted on CT - start bowel regimen    #SBO  -Tolerating regular diet   -To follow up outpatient with [facility]  -Start Ultram q 6 hours standing - try to avoid IV Dilaudid. Decrease to 0.5mg IV q 8 hours PRN    #Allergic reaction - resolved  -Pt with facial swelling and itchiness post IV contrast  -Solumedrol IV 40mg x1 and Benadryl 50mg IV q6 x 4 doses  -hx of contact dermatitis requiring steroid tx  -Suggest pt f/u with allergist/immunologist outpatient for further workup and management    #Hypothyroidism  -Continue Levothyroxine     #Prophylactic measure  -DVT ppx: Lovenox    Dispo: DC planning  68F with PMH colon resection, SBR with primary anastomosis with revision x 2.  Admitted to surgery for SBO. Medicine consulted for continued vomiting/diarrhea, allergic rxn, and home medication management.     Continued vomiting and diarrhea despite resolution of SBO. Patient reports vomiting profusely and but staff is not present in the episodes. We will continue to monitor. Also reports multiple episodes of diarrhea - on the floor and all over the bed - which states she cleaned herself and an aide did help briefly, but no staff reported significant diarrhea. I will continue to monitor. I recommended closer monitoring with close observation for safety and to further provide support with her persistent symptoms but she declined observation. She is declining a psychiatrist.   Patient states persistent symptoms due to the anastomosis although clinical exam and imagining (resolution of SBO) do not support this.   Patient having tea and crackers but reports she is not eating. Encouraged patient to continue to eat as tolerated, offered nutrition evaluation and supplemental feeds such as Ensure but she declines.   Her pain is only controlled with IV Dilaudid every 4 hours. Declining to try other modalities at this time - we became up with a plan together to try PO Dilaudid q 4 hours PRN and IV Dilaudid 0.5mg BID PRN for breakthrough pain. Patient states she is concerned about continued vomiting and diarrhea. Discussed risks and adverse effects of opioids - declining bowel regimen.   Although patient with n/v - there are no electrolyte abnormalities and no evidence witnessed by staff. Pain management consulted. Will start to plan for DC.     #Gastritis  -s/p EGD 10/16   -Switch IV Zofran to PRN   -Switch to PO Pantoprazole BID, Pepcid PO daily  -Continue maalox prior to meals ordered for gastritis  -Declining bowel regimen despite stool burden on CT    #SBO  -Tolerating regular diet   -To follow up outpatient with [facility]. I called [facility] with patient present to attempt to obtain appt with Dr Chang for a double bubble enteroscopy - clinicals and patient information were sent to office. Dr Chang will review on Friday and discuss possible appointment. Imaging not available until Dec/Jan per staff at [facility]. Patient later told me she was able to make an appointment for next week. \  -Start PO Dilaudid PRN q 4 hours and IV BID for breakthrough per discussion with patient  -Abd x-ray ordered    #Allergic reaction - resolved  -Pt with facial swelling and itchiness post IV contrast  -Solumedrol IV 40mg x1 and Benadryl 50mg IV q6 x 4 doses  -hx of contact dermatitis requiring steroid tx  -Suggest pt f/u with allergist/immunologist outpatient for further workup and management    #Hypothyroidism  -Continue Levothyroxine     #Prophylactic measure  -DVT ppx: Lovenox    Dispo: better pain control and DC planning      Discussed extensively with patient at bedside, RN staff, Dr Sheikh SANTIAGO, surgeon Dr Yanez. No evidence of SBO at this time - multiple CTs, having BMs, tolerating diet. Will plan for adjustment in pain control (as above and pain management consult) and start dc planning with close outpatient follow up.

## 2023-10-17 NOTE — PROGRESS NOTE ADULT - SUBJECTIVE AND OBJECTIVE BOX
SURGERY PA NOTE ON BEHALF OF DR. YANEZ:    S: Patient seen and examined at bedside.   No acute events overnight.  Patient reports tolerating clear liquids, passing flatus and having BMs.  Denies fevers, chills, chest pain, SOB, palpitations, calf pain.      MEDICATIONS:  acetaminophen     Tablet .. 650 milliGRAM(s) Oral every 6 hours PRN  aluminum hydroxide/magnesium hydroxide/simethicone Suspension 30 milliLiter(s) Oral every 6 hours  diphenhydrAMINE Injectable 25 milliGRAM(s) IV Push once  enoxaparin Injectable 40 milliGRAM(s) SubCutaneous every 24 hours  famotidine    Tablet 20 milliGRAM(s) Oral daily  HYDROmorphone   Tablet 4 milliGRAM(s) Oral every 4 hours PRN  HYDROmorphone  Injectable 0.5 milliGRAM(s) IV Push every 12 hours PRN  influenza  Vaccine (HIGH DOSE) 0.7 milliLiter(s) IntraMuscular once  levothyroxine 88 MICROGram(s) Oral daily  ondansetron Injectable 4 milliGRAM(s) IV Push every 6 hours PRN  pantoprazole    Tablet 40 milliGRAM(s) Oral two times a day      O:  Vital Signs Last 24 Hrs  T(C): 36.7 (17 Oct 2023 11:46), Max: 36.8 (17 Oct 2023 04:24)  T(F): 98 (17 Oct 2023 11:46), Max: 98.2 (17 Oct 2023 04:24)  HR: 93 (17 Oct 2023 11:46) (75 - 95)  BP: 118/66 (17 Oct 2023 11:46) (106/74 - 129/76)  BP(mean): --  RR: 18 (17 Oct 2023 11:46) (16 - 18)  SpO2: 97% (17 Oct 2023 11:46) (93% - 97%)    Parameters below as of 17 Oct 2023 11:46  Patient On (Oxygen Delivery Method): room air        I&O SUMMARY:    10-16-23 @ 07:01  -  10-17-23 @ 07:00  --------------------------------------------------------  IN: 1000 mL / OUT: 800 mL / NET: 200 mL        PHYSICAL EXAM:  Lungs: CTA bilat without W/R/R  Card: S1S2  Abd: Soft, diffuse mild tenderness, ND.  +BS x 4.  No rebound/guarding.    Ext: Calves soft, NT, without edema bilat    LABS:                        10.7   7.19  )-----------( 281      ( 17 Oct 2023 06:15 )             34.4     10-17    141  |  104  |  9   ----------------------------<  100<H>  4.7   |  33<H>  |  0.94    Ca    9.1      17 Oct 2023 06:15    TPro  6.2  /  Alb  2.9<L>  /  TBili  0.8  /  DBili  x   /  AST  19  /  ALT  36  /  AlkPhos  117  10-17          Assessment:  68 year old Female a/w SBO with PSHx colon resection w/ rectopexy, SBR w/ primary anastomosis with revision x 2.  Admitted for SBO,VSS.  Labs unremarkable, abdominal exam benign.  Patient tolerating clear liquid diet without obstructive symptomatology        Plan:  - Continue motility issue evaluation per primary team  - patient remains surgically stable  - TO follow up with [facility] team  - Surgery team to follow  - Diet as tolerated  - Discussed w/ Dr. Yanez

## 2023-10-17 NOTE — PROGRESS NOTE ADULT - NSPROGADDITIONALINFOA_GEN_ALL_CORE
patient s/e  events noted  patient vomited with very little fluid in the emesis bag  also c/o liquid diarrhea, requesting repeat stool studies  path is still pending  I recommended to revisit the idea of doing the gastric emptying study to r/o gastroparesis, she is unwilling/unable to come off the opioids   her indication for opioids is unclear at this point given recent normal imaging  I also brought up the point of considering this could be "narcotic bowel syndrome" and explained the treatment would be to wean off opiods  I will print out literature from the [facility] (for the patient to read) to re-inforce this as a possible diagnosis to consider patient s/e  events noted  patient vomited with very little fluid in the emesis bag  also c/o liquid diarrhea, requesting repeat stool studies  path is still pending  I recommended to revisit the idea of doing the gastric emptying study to r/o gastroparesis, she is unwilling/unable to come off the opioids   her indication for opioids is unclear at this point given recent normal imaging  I also brought up the point of considering this could be "narcotic bowel syndrome" and explained the treatment would be to wean off opioids  I will print out literature from the [facility] (for the patient to read) to re-enforce this as a possible diagnosis to consider  I will also order diagnostic testing to rule out acute intermittent porphyria, hereditary angioedema and lead poising to exclude these as possibilities

## 2023-10-17 NOTE — CONSULT NOTE ADULT - ASSESSMENT
69 y/o female with hx of multiple bouts of SBO, first surgery July 2022, [facility], pt presents with diffuse abdominal pain, distension    sbo  atelectasis  acute - chr pain   multiple bouts SBO  HLD  hx of small bowel resection  gastritis  thyroid disease    s/p EGD - grossly normal  GI f/u noted  dilaudid was stopped - ultram was offered - pt is upset and is with anxiety and emotional angst   the medicine team and GI feels the pt is nearing the time of DC home  monitor for signs and symptoms of pain - serial ABD exam  cont to monitor off Dilaudid

## 2023-10-17 NOTE — PROGRESS NOTE ADULT - SUBJECTIVE AND OBJECTIVE BOX
Patient is a 68y old  Female who presents with a chief complaint of SBO (17 Oct 2023 10:32)      INTERVAL HPI/OVERNIGHT EVENTS: Patient seen and examined at bedside, walking down the argueta with tea and crackers in the her hand. Reports overnight she had diffuse vomiting and diarrhea - all over the floor and bed.   Reports continued abd pain at site of anastomosis and lower abd - resolved with IV Dilaudid   Upset about hospitalization and that pain has not been addressed and continued n/v for weeks without resolution.     MEDICATIONS  (STANDING):  aluminum hydroxide/magnesium hydroxide/simethicone Suspension 30 milliLiter(s) Oral every 6 hours  diphenhydrAMINE Injectable 25 milliGRAM(s) IV Push once  enoxaparin Injectable 40 milliGRAM(s) SubCutaneous every 24 hours  famotidine    Tablet 20 milliGRAM(s) Oral daily  influenza  Vaccine (HIGH DOSE) 0.7 milliLiter(s) IntraMuscular once  levothyroxine 88 MICROGram(s) Oral daily  pantoprazole    Tablet 40 milliGRAM(s) Oral two times a day    MEDICATIONS  (PRN):  acetaminophen     Tablet .. 650 milliGRAM(s) Oral every 6 hours PRN Temp greater or equal to 38C (100.4F), Mild Pain (1 - 3), Moderate Pain (4 - 6)  HYDROmorphone   Tablet 4 milliGRAM(s) Oral every 4 hours PRN Severe Pain (7 - 10)  HYDROmorphone  Injectable 0.5 milliGRAM(s) IV Push every 12 hours PRN breakthrough Severe Pain (7 - 10)  ondansetron Injectable 4 milliGRAM(s) IV Push every 6 hours PRN Nausea and/or Vomiting      Allergies    tetanus toxoid (Anaphylaxis)    Intolerances        REVIEW OF SYSTEMS:  CONSTITUTIONAL: No fever or chills  CARDIOVASCULAR: No chest pain, palpitations    Vital Signs Last 24 Hrs  T(C): 36.7 (17 Oct 2023 11:46), Max: 37.1 (16 Oct 2023 14:25)  T(F): 98 (17 Oct 2023 11:46), Max: 98.8 (16 Oct 2023 14:25)  HR: 93 (17 Oct 2023 11:46) (75 - 95)  BP: 118/66 (17 Oct 2023 11:46) (106/74 - 129/76)  BP(mean): --  RR: 18 (17 Oct 2023 11:46) (14 - 18)  SpO2: 97% (17 Oct 2023 11:46) (92% - 97%)    Parameters below as of 17 Oct 2023 11:46  Patient On (Oxygen Delivery Method): room air      I&O's Summary    16 Oct 2023 07:01  -  17 Oct 2023 07:00  --------------------------------------------------------  IN: 1000 mL / OUT: 800 mL / NET: 200 mL        PHYSICAL EXAM:  GENERAL: NAD  HEENT:  AT/NC, anicteric, moist mucous membranes, EOMI, PERRL, no lid-lag, conjunctiva and sclera clear  CHEST/LUNG:  CTA b/l, no rales, wheezes, or rhonchi,  normal respiratory effort, no intercostal retractions  HEART:  RRR, S1, S2, no murmurs; no pitting edema  ABDOMEN:  BS+, soft, nontender, nondistended  MSK/EXTREMITIES: palpable peripheral pulses, no clubbing or cyanosis  NERVOUS SYSTEM: answers questions and follows commands appropriately, A&Ox3 grossly moves all extremities   PSYCH: Appropriate affect, Alert & Awake; fair judgement      LABS: Personally reviewed  CBC                        10.7   7.19  )-----------( 281      ( 17 Oct 2023 06:15 )             34.4     CMP  10-17    141  |  104  |  9   ----------------------------<  100  4.7   |  33  |  0.94    Ca    9.1      17 Oct 2023 06:15    TPro  6.2  /  Alb  2.9  /  TBili  0.8  /  DBili  x   /  AST  19  /  ALT  36  /  AlkPhos  117  10-17            Urinalysis Basic - ( 17 Oct 2023 06:15 )    Color: x / Appearance: x / SG: x / pH: x  Gluc: 100 mg/dL / Ketone: x  / Bili: x / Urobili: x   Blood: x / Protein: x / Nitrite: x   Leuk Esterase: x / RBC: x / WBC x   Sq Epi: x / Non Sq Epi: x / Bacteria: x                RADIOLOGY & ADDITIONAL TESTS: Personally reviewed.     Consultant(s) Notes Reviewed:  [x] YES  [ ] NO   Discussed with SW/CM, RN

## 2023-10-17 NOTE — CHART NOTE - NSCHARTNOTEFT_GEN_A_CORE
Called by RN, pt c/o: unable to tolerate diet, pain  Pt seen and examined at bedside. Pt explained history of SBO issues. Patient is very upset that she was on regular diet, states that she has been nauseas and vomiting. Patient was requesting to be on IV fluids and to be placed on Reglan. Pt is hoping to be transferred to [facility] for further SBO/motility workup.     T(F): 98.5 (10-17-23 @ 21:02), Max: 98.5 (10-17-23 @ 21:02)  HR: 94 (10-17-23 @ 21:02) (93 - 94)  BP: 114/80 (10-17-23 @ 21:02) (114/80 - 118/66)  RR: 18 (10-17-23 @ 21:02) (18 - 18)  SpO2: 97% (10-17-23 @ 21:02) (97% - 97%)    Physical Exam:  Gen: acute distress  HEENT: PERRLA, moist mucous membranes  Cardio: +S1, +S2, RRR  Lungs: CTA B/L, No w/r/r  Abd: soft, NT/ND, +BS x 4 quadrants  Neuro: AAOx3, answers questions appropriately     68F with PMH colon resection, SBR with primary anastomosis with revision x 2. Admitted to surgery for SBO. Medicine consulted for continued vomiting/diarrhea, allergic rxn, and home medication management.     #SBO  - Reglan started for motility   - IV fluids  - Patient placed back on clear liquid diet  - Q12 Pain medication for breakthrough pain was moved back 2 hours so patient could receive dose before she sleeps  - Patient will continue with current pain regimen as per primary team  - RN to call if any changes

## 2023-10-17 NOTE — PROGRESS NOTE ADULT - SUBJECTIVE AND OBJECTIVE BOX
[facility]  J Luis Almanza PA-C  [address] [place] [address]  [phone]      INTERVAL HPI/OVERNIGHT EVENTS:  Pt seen this am  S/p egd  Pt had vomiting and diarrhea overnight    MEDICATIONS  (STANDING):  aluminum hydroxide/magnesium hydroxide/simethicone Suspension 30 milliLiter(s) Oral every 6 hours  diphenhydrAMINE Injectable 25 milliGRAM(s) IV Push once  enoxaparin Injectable 40 milliGRAM(s) SubCutaneous every 24 hours  famotidine    Tablet 20 milliGRAM(s) Oral daily  influenza  Vaccine (HIGH DOSE) 0.7 milliLiter(s) IntraMuscular once  levothyroxine 88 MICROGram(s) Oral daily  pantoprazole    Tablet 40 milliGRAM(s) Oral two times a day    MEDICATIONS  (PRN):  acetaminophen     Tablet .. 650 milliGRAM(s) Oral every 6 hours PRN Temp greater or equal to 38C (100.4F), Mild Pain (1 - 3), Moderate Pain (4 - 6)  HYDROmorphone   Tablet 4 milliGRAM(s) Oral every 4 hours PRN Severe Pain (7 - 10)  HYDROmorphone  Injectable 0.5 milliGRAM(s) IV Push every 12 hours PRN breakthrough Severe Pain (7 - 10)  ondansetron Injectable 4 milliGRAM(s) IV Push every 6 hours PRN Nausea and/or Vomiting      Allergies    tetanus toxoid (Anaphylaxis)      PHYSICAL EXAM:   Vital Signs:  Vital Signs Last 24 Hrs  T(C): 36.7 (17 Oct 2023 11:46), Max: 36.8 (17 Oct 2023 04:24)  T(F): 98 (17 Oct 2023 11:46), Max: 98.2 (17 Oct 2023 04:24)  HR: 93 (17 Oct 2023 11:46) (75 - 95)  BP: 118/66 (17 Oct 2023 11:46) (106/74 - 129/76)  BP(mean): --  RR: 18 (17 Oct 2023 11:46) (16 - 18)  SpO2: 97% (17 Oct 2023 11:46) (93% - 97%)    Parameters below as of 17 Oct 2023 11:46  Patient On (Oxygen Delivery Method): room air    Unable to perform physical exam      LABS:                        10.7   7.19  )-----------( 281      ( 17 Oct 2023 06:15 )             34.4     10-17    141  |  104  |  9   ----------------------------<  100<H>  4.7   |  33<H>  |  0.94    Ca    9.1      17 Oct 2023 06:15    TPro  6.2  /  Alb  2.9<L>  /  TBili  0.8  /  DBili  x   /  AST  19  /  ALT  36  /  AlkPhos  117  10-17      Urinalysis Basic - ( 17 Oct 2023 06:15 )    Color: x / Appearance: x / SG: x / pH: x  Gluc: 100 mg/dL / Ketone: x  / Bili: x / Urobili: x   Blood: x / Protein: x / Nitrite: x   Leuk Esterase: x / RBC: x / WBC x   Sq Epi: x / Non Sq Epi: x / Bacteria: x

## 2023-10-17 NOTE — CONSULT NOTE ADULT - SUBJECTIVE AND OBJECTIVE BOX
Date/Time Patient Seen:  		  Referring MD:   Data Reviewed	       Patient is a 68y old  Female who presents with a chief complaint of SBO (17 Oct 2023 07:41)      Subjective/HPI   67 y/o female with hx of multiple bouts of SBO, first surgery July 2022, [facility], pt presents with diffuse abdominal pain, distension started this AM, progressively getting worse, 3 episodes of vomiting, no fever, no chills, no CP, no SOB, + two small BM today, pt requesting Dilaudid for pain, recent facelift on steroids and Levaquin last dose yesterday,  Currently patient states that she has been vomiting x3 episodes.  Notes incomplete bowel emptying.  Has had diarrhea for past few days.  No recent illness that she is aware of.  Also with complaints of urinary symptoms- pt could not elaborate.   PAST MEDICAL & SURGICAL HISTORY:  Hypothyroidism    HLD (hyperlipidemia)    SBO (small bowel obstruction)    History of colon resection    S/P small bowel resection    S/P small bowel resection    History of facelift    PAST SURGICAL HISTORY:  History of facelift     S/P small bowel resection.    Social History   · Substance use	No  · Social History (marital status, living situation, occupation, and sexual history)	Never smoker    Tobacco Screening   · Core Measure Site	Yes  · Has the patient used tobacco in the past 30 days?	No    Risk Assessment:   Present on Admission   Deep Venous Thrombosis	no  Pulmonary Embolus	no  Urinary Catheter	no  Central Venous Catheter/PICC Line	no  Surgical Site Incision	no  Pressure Ulcer(s)	no    HIV Screening   · In accordance with [place] State law, we offer every patient who comes to our ED an HIV test. Would you like to be tested today?	Opt out        Medication list         MEDICATIONS  (STANDING):  aluminum hydroxide/magnesium hydroxide/simethicone Suspension 30 milliLiter(s) Oral every 6 hours  enoxaparin Injectable 40 milliGRAM(s) SubCutaneous every 24 hours  famotidine    Tablet 20 milliGRAM(s) Oral daily  influenza  Vaccine (HIGH DOSE) 0.7 milliLiter(s) IntraMuscular once  levothyroxine 88 MICROGram(s) Oral daily  pantoprazole    Tablet 40 milliGRAM(s) Oral two times a day  traMADol 25 milliGRAM(s) Oral every 6 hours    MEDICATIONS  (PRN):  acetaminophen     Tablet .. 650 milliGRAM(s) Oral every 6 hours PRN Temp greater or equal to 38C (100.4F), Mild Pain (1 - 3), Moderate Pain (4 - 6)  ondansetron Injectable 4 milliGRAM(s) IV Push every 6 hours PRN Nausea and/or Vomiting         Vitals log        ICU Vital Signs Last 24 Hrs  T(C): 36.8 (17 Oct 2023 04:24), Max: 37.1 (16 Oct 2023 14:25)  T(F): 98.2 (17 Oct 2023 04:24), Max: 98.8 (16 Oct 2023 14:25)  HR: 75 (17 Oct 2023 04:24) (75 - 95)  BP: 121/77 (17 Oct 2023 04:24) (106/74 - 129/76)  BP(mean): --  ABP: --  ABP(mean): --  RR: 16 (17 Oct 2023 04:24) (14 - 18)  SpO2: 93% (17 Oct 2023 04:24) (92% - 97%)    O2 Parameters below as of 17 Oct 2023 04:24  Patient On (Oxygen Delivery Method): room air                 Input and Output:  I&O's Detail    16 Oct 2023 07:01  -  17 Oct 2023 07:00  --------------------------------------------------------  IN:    dextrose 5% + sodium chloride 0.45%: 1000 mL  Total IN: 1000 mL    OUT:    Voided (mL): 800 mL  Total OUT: 800 mL    Total NET: 200 mL          Lab Data                        10.7   7.19  )-----------( 281      ( 17 Oct 2023 06:15 )             34.4     10-17    141  |  104  |  9   ----------------------------<  100<H>  4.7   |  33<H>  |  0.94    Ca    9.1      17 Oct 2023 06:15    TPro  6.2  /  Alb  2.9<L>  /  TBili  0.8  /  DBili  x   /  AST  19  /  ALT  36  /  AlkPhos  117  10-17            Review of Systems	      Objective     Physical Examination        Pertinent Lab findings & Imaging      Saurav:  NO   Adequate UO     I&O's Detail    16 Oct 2023 07:01  -  17 Oct 2023 07:00  --------------------------------------------------------  IN:    dextrose 5% + sodium chloride 0.45%: 1000 mL  Total IN: 1000 mL    OUT:    Voided (mL): 800 mL  Total OUT: 800 mL    Total NET: 200 mL               Discussed with:     Cultures:	        Radiology        ACC: [id number] EXAM:  CT ABDOMEN AND PELVIS OC IC   ORDERED BY: SHELLY PATE     PROCEDURE DATE:  10/11/2023          INTERPRETATION:  CLINICAL INFORMATION: Abdominal pain and vomiting.   Evaluate small bowel obstruction.    COMPARISON: CT scan abdomen pelvis 10/8/2023.    CONTRAST/COMPLICATIONS:  IV Contrast: Omnipaque 350  90 cc administered   10 cc discarded  Oral Contrast: Omnipaque 300  Complications: None reported at time of study completion    PROCEDURE:  CT of the Abdomen and Pelvis was performed.  Sagittal and coronal reformats were performed.    FINDINGS:    LOWER CHEST:  Minimal bibasilar subsegmental atelectasis.    LIVER: Within normal limits.  BILE DUCTS: Mild prominence of common bile duct measuring 7 mm.  GALLBLADDER: Within normal limits.  SPLEEN: Within normal limits.  PANCREAS: Mild prominence of the main pancreatic duct at the level of the   pancreatic head, measuring 4 mm, stable.  ADRENALS: Within normal limits.  KIDNEYS/URETERS: Within normal limits.    BLADDER: Within normal limits.  REPRODUCTIVE ORGANS:  Calcified fibroid uterus.    BOWEL:  Gastric wall thickening involving the fundus and proximal body.  Small bowel surgical anastomosis left lower quadrant.  Rectosigmoid stump surgical anastomosis.  There is transit of enteric contrast to the colon, no small bowel   obstruction.  Colonic fecal retention.  No extraluminal gas or drainable fluid collection.  PERITONEUM: No ascites.    VESSELS: Within normal limits.  RETROPERITONEUM/LYMPH NODES: No lymphadenopathy.    ABDOMINAL WALL:  Postsurgical changes.  Small fat-containing umbilical hernia.    BONES: Degenerative changes.    IMPRESSION:    Gastric wall thickening, correlate for gastritis or peptic ulcer disease.    No bowel obstruction.    Other findings as discussed above.    --- End of Report ---            GILA RIZZO MD; Attending Radiologist  This document has been electronically signed. Oct 11 2023  3:02PM

## 2023-10-18 LAB
C4 SERPL-MCNC: 60 MG/DL — HIGH (ref 13–39)
C4 SERPL-MCNC: 60 MG/DL — HIGH (ref 13–39)
GI PCR PANEL: SIGNIFICANT CHANGE UP
GI PCR PANEL: SIGNIFICANT CHANGE UP
LEAD BLD-MCNC: <1 UG/DL — SIGNIFICANT CHANGE UP (ref 0–3.4)
LEAD BLD-MCNC: <1 UG/DL — SIGNIFICANT CHANGE UP (ref 0–3.4)
SURGICAL PATHOLOGY STUDY: SIGNIFICANT CHANGE UP
SURGICAL PATHOLOGY STUDY: SIGNIFICANT CHANGE UP

## 2023-10-18 PROCEDURE — 99233 SBSQ HOSP IP/OBS HIGH 50: CPT

## 2023-10-18 PROCEDURE — 99231 SBSQ HOSP IP/OBS SF/LOW 25: CPT

## 2023-10-18 RX ORDER — SODIUM CHLORIDE 9 MG/ML
1000 INJECTION, SOLUTION INTRAVENOUS
Refills: 0 | Status: DISCONTINUED | OUTPATIENT
Start: 2023-10-18 | End: 2023-10-19

## 2023-10-18 RX ORDER — ACETAMINOPHEN 500 MG
1000 TABLET ORAL EVERY 6 HOURS
Refills: 0 | Status: DISCONTINUED | OUTPATIENT
Start: 2023-10-18 | End: 2023-10-19

## 2023-10-18 RX ORDER — HYDROMORPHONE HYDROCHLORIDE 2 MG/ML
0.5 INJECTION INTRAMUSCULAR; INTRAVENOUS; SUBCUTANEOUS EVERY 4 HOURS
Refills: 0 | Status: DISCONTINUED | OUTPATIENT
Start: 2023-10-18 | End: 2023-10-19

## 2023-10-18 RX ORDER — DIPHENHYDRAMINE HCL 50 MG
25 CAPSULE ORAL AT BEDTIME
Refills: 0 | Status: DISCONTINUED | OUTPATIENT
Start: 2023-10-18 | End: 2023-10-19

## 2023-10-18 RX ORDER — METOCLOPRAMIDE HCL 10 MG
10 TABLET ORAL EVERY 6 HOURS
Refills: 0 | Status: DISCONTINUED | OUTPATIENT
Start: 2023-10-18 | End: 2023-10-18

## 2023-10-18 RX ADMIN — FAMOTIDINE 20 MILLIGRAM(S): 10 INJECTION INTRAVENOUS at 11:21

## 2023-10-18 RX ADMIN — HYDROMORPHONE HYDROCHLORIDE 4 MILLIGRAM(S): 2 INJECTION INTRAMUSCULAR; INTRAVENOUS; SUBCUTANEOUS at 07:28

## 2023-10-18 RX ADMIN — Medication 400 MILLIGRAM(S): at 18:47

## 2023-10-18 RX ADMIN — Medication 30 MILLILITER(S): at 11:21

## 2023-10-18 RX ADMIN — Medication 88 MICROGRAM(S): at 06:36

## 2023-10-18 RX ADMIN — Medication 1000 MILLIGRAM(S): at 19:23

## 2023-10-18 RX ADMIN — ONDANSETRON 4 MILLIGRAM(S): 8 TABLET, FILM COATED ORAL at 09:12

## 2023-10-18 RX ADMIN — HYDROMORPHONE HYDROCHLORIDE 0.5 MILLIGRAM(S): 2 INJECTION INTRAMUSCULAR; INTRAVENOUS; SUBCUTANEOUS at 21:13

## 2023-10-18 RX ADMIN — HYDROMORPHONE HYDROCHLORIDE 4 MILLIGRAM(S): 2 INJECTION INTRAMUSCULAR; INTRAVENOUS; SUBCUTANEOUS at 08:28

## 2023-10-18 RX ADMIN — Medication 10 MILLIGRAM(S): at 14:57

## 2023-10-18 RX ADMIN — PANTOPRAZOLE SODIUM 40 MILLIGRAM(S): 20 TABLET, DELAYED RELEASE ORAL at 07:17

## 2023-10-18 RX ADMIN — HYDROMORPHONE HYDROCHLORIDE 0.5 MILLIGRAM(S): 2 INJECTION INTRAMUSCULAR; INTRAVENOUS; SUBCUTANEOUS at 11:32

## 2023-10-18 RX ADMIN — SODIUM CHLORIDE 100 MILLILITER(S): 9 INJECTION, SOLUTION INTRAVENOUS at 18:47

## 2023-10-18 RX ADMIN — HYDROMORPHONE HYDROCHLORIDE 0.5 MILLIGRAM(S): 2 INJECTION INTRAMUSCULAR; INTRAVENOUS; SUBCUTANEOUS at 21:28

## 2023-10-18 RX ADMIN — PANTOPRAZOLE SODIUM 40 MILLIGRAM(S): 20 TABLET, DELAYED RELEASE ORAL at 17:10

## 2023-10-18 RX ADMIN — Medication 10 MILLIGRAM(S): at 06:36

## 2023-10-18 RX ADMIN — HYDROMORPHONE HYDROCHLORIDE 0.5 MILLIGRAM(S): 2 INJECTION INTRAMUSCULAR; INTRAVENOUS; SUBCUTANEOUS at 00:00

## 2023-10-18 RX ADMIN — HYDROMORPHONE HYDROCHLORIDE 0.5 MILLIGRAM(S): 2 INJECTION INTRAMUSCULAR; INTRAVENOUS; SUBCUTANEOUS at 12:02

## 2023-10-18 RX ADMIN — Medication 25 MILLIGRAM(S): at 21:13

## 2023-10-18 RX ADMIN — ONDANSETRON 4 MILLIGRAM(S): 8 TABLET, FILM COATED ORAL at 21:13

## 2023-10-18 NOTE — PROGRESS NOTE ADULT - SUBJECTIVE AND OBJECTIVE BOX
Date/Time Patient Seen:  		  Referring MD:   Data Reviewed	       Patient is a 68y old  Female who presents with a chief complaint of SBO (17 Oct 2023 14:41)      Subjective/HPI     PAST MEDICAL & SURGICAL HISTORY:  Hypothyroidism    HLD (hyperlipidemia)    SBO (small bowel obstruction)    History of colon resection    S/P small bowel resection    S/P small bowel resection    History of facelift          Medication list         MEDICATIONS  (STANDING):  aluminum hydroxide/magnesium hydroxide/simethicone Suspension 30 milliLiter(s) Oral every 6 hours  enoxaparin Injectable 40 milliGRAM(s) SubCutaneous every 24 hours  famotidine    Tablet 20 milliGRAM(s) Oral daily  influenza  Vaccine (HIGH DOSE) 0.7 milliLiter(s) IntraMuscular once  levothyroxine 88 MICROGram(s) Oral daily  metoclopramide Injectable 10 milliGRAM(s) IV Push every 6 hours  pantoprazole    Tablet 40 milliGRAM(s) Oral two times a day  sodium chloride 0.9%. 1000 milliLiter(s) (50 mL/Hr) IV Continuous <Continuous>    MEDICATIONS  (PRN):  acetaminophen     Tablet .. 650 milliGRAM(s) Oral every 6 hours PRN Temp greater or equal to 38C (100.4F), Mild Pain (1 - 3), Moderate Pain (4 - 6)  HYDROmorphone   Tablet 4 milliGRAM(s) Oral every 4 hours PRN Severe Pain (7 - 10)  HYDROmorphone  Injectable 0.5 milliGRAM(s) IV Push every 12 hours PRN breakthrough Severe Pain (7 - 10)  ondansetron Injectable 4 milliGRAM(s) IV Push every 6 hours PRN Nausea and/or Vomiting         Vitals log        ICU Vital Signs Last 24 Hrs  T(C): 36.9 (17 Oct 2023 21:02), Max: 36.9 (17 Oct 2023 21:02)  T(F): 98.5 (17 Oct 2023 21:02), Max: 98.5 (17 Oct 2023 21:02)  HR: 94 (17 Oct 2023 21:02) (93 - 94)  BP: 114/80 (17 Oct 2023 21:02) (114/80 - 118/66)  BP(mean): --  ABP: --  ABP(mean): --  RR: 18 (17 Oct 2023 21:02) (18 - 18)  SpO2: 97% (17 Oct 2023 21:02) (97% - 97%)    O2 Parameters below as of 17 Oct 2023 21:02  Patient On (Oxygen Delivery Method): room air                 Input and Output:  I&O's Detail    16 Oct 2023 07:01  -  17 Oct 2023 07:00  --------------------------------------------------------  IN:    dextrose 5% + sodium chloride 0.45%: 1000 mL  Total IN: 1000 mL    OUT:    Voided (mL): 800 mL  Total OUT: 800 mL    Total NET: 200 mL      17 Oct 2023 07:01  -  18 Oct 2023 05:15  --------------------------------------------------------  IN:    sodium chloride 0.9%: 300 mL  Total IN: 300 mL    OUT:    Voided (mL): 600 mL  Total OUT: 600 mL    Total NET: -300 mL          Lab Data                        10.7   7.19  )-----------( 281      ( 17 Oct 2023 06:15 )             34.4     10-17    141  |  104  |  9   ----------------------------<  100<H>  4.7   |  33<H>  |  0.94    Ca    9.1      17 Oct 2023 06:15    TPro  6.2  /  Alb  2.9<L>  /  TBili  0.8  /  DBili  x   /  AST  19  /  ALT  36  /  AlkPhos  117  10-17            Review of Systems	      Objective     Physical Examination    heart s1s2  lung dc BS      Pertinent Lab findings & Imaging      Saurav:  NO   Adequate UO     I&O's Detail    16 Oct 2023 07:01  -  17 Oct 2023 07:00  --------------------------------------------------------  IN:    dextrose 5% + sodium chloride 0.45%: 1000 mL  Total IN: 1000 mL    OUT:    Voided (mL): 800 mL  Total OUT: 800 mL    Total NET: 200 mL      17 Oct 2023 07:01  -  18 Oct 2023 05:15  --------------------------------------------------------  IN:    sodium chloride 0.9%: 300 mL  Total IN: 300 mL    OUT:    Voided (mL): 600 mL  Total OUT: 600 mL    Total NET: -300 mL               Discussed with:     Cultures:	        Radiology

## 2023-10-18 NOTE — PROGRESS NOTE ADULT - ASSESSMENT
67 yo with resolved sbo on CT x2 with vomiting, pain and diarrhea at this time.  no acute surgical issues  GI following

## 2023-10-18 NOTE — PROGRESS NOTE ADULT - SUBJECTIVE AND OBJECTIVE BOX
pt seen during family meeting  pt still with pain, diarrhea and vomiting.    ICU Vital Signs Last 24 Hrs  T(C): 36.8 (18 Oct 2023 13:02), Max: 37.3 (18 Oct 2023 11:25)  T(F): 98.2 (18 Oct 2023 13:02), Max: 99.1 (18 Oct 2023 11:25)  HR: 85 (18 Oct 2023 13:02) (72 - 94)  BP: 112/66 (18 Oct 2023 13:02) (96/62 - 122/71)  BP(mean): --  ABP: --  ABP(mean): --  RR: 18 (18 Oct 2023 13:02) (17 - 18)  SpO2: 94% (18 Oct 2023 13:02) (91% - 97%)    O2 Parameters below as of 18 Oct 2023 13:02  Patient On (Oxygen Delivery Method): room air        NAD                              10.7   7.19  )-----------( 281      ( 17 Oct 2023 06:15 )             34.4   10-17    141  |  104  |  9   ----------------------------<  100<H>  4.7   |  33<H>  |  0.94    Ca    9.1      17 Oct 2023 06:15    TPro  6.2  /  Alb  2.9<L>  /  TBili  0.8  /  DBili  x   /  AST  19  /  ALT  36  /  AlkPhos  117  10-17

## 2023-10-18 NOTE — PROGRESS NOTE ADULT - ASSESSMENT
SBO    s/p egd  follow up pathology  planning for follow up with small bowel specialist  order diagnostic testing to rule out acute intermittent porphyria, hereditary angioedema and lead poising to exclude these as possibilities

## 2023-10-18 NOTE — PROGRESS NOTE ADULT - ASSESSMENT
69 y/o female with hx of multiple bouts of SBO, first surgery July 2022, [facility], pt presents with diffuse abdominal pain, distension    sbo  atelectasis  acute - chr pain   multiple bouts SBO  HLD  hx of small bowel resection  gastritis  thyroid disease    s/p EGD - grossly normal  GI f/u noted  surgery f/u noted  GI following  pt is back on Opioids - Dilaudid  pt remains upset about Diet and Care

## 2023-10-18 NOTE — CHART NOTE - NSCHARTNOTEFT_GEN_A_CORE
from Dr. Ferraro stating she would like to discuss mutual patient  called at [phone], no answer, msg left with call back number

## 2023-10-18 NOTE — PROGRESS NOTE ADULT - SUBJECTIVE AND OBJECTIVE BOX
Patient is a 68y old  Female who presents with a chief complaint of SBO (18 Oct 2023 11:37)      INTERVAL HPI/OVERNIGHT EVENTS: Patient seen and examined at bedside. Reports vomiting and diarrhea overnight    MEDICATIONS  (STANDING):  aluminum hydroxide/magnesium hydroxide/simethicone Suspension 30 milliLiter(s) Oral every 6 hours  enoxaparin Injectable 40 milliGRAM(s) SubCutaneous every 24 hours  famotidine    Tablet 20 milliGRAM(s) Oral daily  influenza  Vaccine (HIGH DOSE) 0.7 milliLiter(s) IntraMuscular once  levothyroxine 88 MICROGram(s) Oral daily  pantoprazole    Tablet 40 milliGRAM(s) Oral two times a day  sodium chloride 0.9%. 1000 milliLiter(s) (50 mL/Hr) IV Continuous <Continuous>    MEDICATIONS  (PRN):  acetaminophen     Tablet .. 650 milliGRAM(s) Oral every 6 hours PRN Temp greater or equal to 38C (100.4F), Mild Pain (1 - 3), Moderate Pain (4 - 6)  HYDROmorphone   Tablet 4 milliGRAM(s) Oral every 4 hours PRN Severe Pain (7 - 10)  HYDROmorphone  Injectable 0.5 milliGRAM(s) IV Push every 12 hours PRN breakthrough Severe Pain (7 - 10)  metoclopramide Injectable 10 milliGRAM(s) IV Push every 6 hours PRN observed vomiting  ondansetron Injectable 4 milliGRAM(s) IV Push every 6 hours PRN Nausea and/or Vomiting      Allergies    tetanus toxoid (Anaphylaxis)    Intolerances        REVIEW OF SYSTEMS:  CONSTITUTIONAL: No fever or chills  CARDIOVASCULAR: No chest pain, palpitations  GASTROINTESTINAL: positive abd pain, positive nausea, positive vomiting, positive diarrhea    Vital Signs Last 24 Hrs  T(C): 36.8 (18 Oct 2023 13:02), Max: 37.3 (18 Oct 2023 11:25)  T(F): 98.2 (18 Oct 2023 13:02), Max: 99.1 (18 Oct 2023 11:25)  HR: 85 (18 Oct 2023 13:02) (72 - 94)  BP: 112/66 (18 Oct 2023 13:02) (96/62 - 122/71)  BP(mean): --  RR: 18 (18 Oct 2023 13:02) (17 - 18)  SpO2: 94% (18 Oct 2023 13:02) (91% - 97%)    Parameters below as of 18 Oct 2023 13:02  Patient On (Oxygen Delivery Method): room air      I&O's Summary    17 Oct 2023 07:01  -  18 Oct 2023 07:00  --------------------------------------------------------  IN: 450 mL / OUT: 600 mL / NET: -150 mL    18 Oct 2023 07:01  -  18 Oct 2023 16:06  --------------------------------------------------------  IN: 300 mL / OUT: 0 mL / NET: 300 mL          PHYSICAL EXAM:  GENERAL: NAD  HEENT:  AT/NC, anicteric, moist mucous membranes, EOMI, PERRL, no lid-lag, conjunctiva and sclera clear  CHEST/LUNG:  CTA b/l, no rales, wheezes, or rhonchi,  normal respiratory effort, no intercostal retractions  HEART:  RRR, S1, S2, no murmurs; no pitting edema  ABDOMEN:  BS+, soft, nontender, nondistended  MSK/EXTREMITIES: palpable peripheral pulses, no clubbing or cyanosis  NERVOUS SYSTEM: answers questions and follows commands appropriately, A&Ox3 grossly moves all extremities   PSYCH: Appropriate affect, Alert & Awake; fair judgement      LABS: Personally reviewed  CBC                        10.7   7.19  )-----------( 281      ( 17 Oct 2023 06:15 )             34.4     CMP  10-17    141  |  104  |  9   ----------------------------<  100  4.7   |  33  |  0.94    Ca    9.1      17 Oct 2023 06:15    TPro  6.2  /  Alb  2.9  /  TBili  0.8  /  DBili  x   /  AST  19  /  ALT  36  /  AlkPhos  117  10-17                                      Urinalysis Basic - ( 17 Oct 2023 06:15 )    Color: x / Appearance: x / SG: x / pH: x  Gluc: 100 mg/dL / Ketone: x  / Bili: x / Urobili: x   Blood: x / Protein: x / Nitrite: x   Leuk Esterase: x / RBC: x / WBC x   Sq Epi: x / Non Sq Epi: x / Bacteria: x                RADIOLOGY & ADDITIONAL TESTS: Personally reviewed.     Consultant(s) Notes Reviewed:  [x] YES  [ ] NO   Discussed with SERGE/ISAIAS, RN

## 2023-10-18 NOTE — CHART NOTE - NSCHARTNOTEFT_GEN_A_CORE
This writer attended an interdisciplinary meeting at bedside with the pt/family. This note serves to briefly summarize the issues discussed during the meeting and to supplement the medical notes from earlier today.   The representatives from the hospital staff included: surgery/medical/nursing leadership, gastroenterology attending, primary surgery attending, patient experience team, primary hospitalist attending and the bedside RN was called in during parts of the meeting.     #patient/family request access to medical record - our team offered guidance on how the pt can access her record including through medical records office or through patient portal    #symptom management - symptoms include: intractable pain, nausea, po intolerance (amongst others)- definitive etiology of pt's symptoms remains unclear. we reviewed the workup including but not limited to CT imaging, endoscopic findings including bx results, serologic workup, vital sign monitoring, x-rays, etc. symptom management is a primary concern from pt/family. our team offered extensive guidance on further assessing her symptoms. we discussed gastric emptying study. reviewed narcotic use and how this will affect any motility studies. as a group, we ultimately agreed (including with support from family) that attempting to wean off opiates should be unified directive. Everyone at the meeting agreed that it is feasible that opiates are contributing to the pt's symptoms negatively and all parties agree to tapering off narcotics in next 24-48hrs while closely monitoring symptoms. This writer spoke w/ pharmacy staff to ensure a safe and gradual taper. Orders in EMR reflect our recommended regimen. Medications reviewed with nuclear medicine team to minimize any delays.     #access to tertiary care facility to further assess the pt's symptoms - our team agrees that the pt would benefit from assessment from gastrointestinal motility specialist. This service is not available at [facility]. Our team comprehensively reviewed the options, most of which are available to her as outpatient. Pt/family and staff agree that in setting of significant po intolerance, the pt is not currently suitable for outpatient management. We reviewed the means through which the pt can seek access to this level of care as inpatient. We spoke w/ [facility] today and requested transfer from [id number]. They are recommending that the patient seek MR enterography and if she follows with them outpatient, they can arrange. [facility] declines inpatient to inpatient transfer at this time. They have suggested we speak with the teams at [place] or [place] as both facilities can potentially offer a double balloon enterography assessment. We will speak w/ those facilities as well. The pt/family request that [facility] staff find an accepting tertiary care center for inpatient transfer. Our interdisciplinary team will continue to minimize any barriers to accessing GI motility specialist and will assist with all transitions of care.    #patient care - the pt expresses concerns about receiving timely and attentive care from our staff. nursing leadership was present at the meeting today. our team will attempt to accommodate the pt's needs to the best of our ability.     At the conclusion of the meeting. I reviewed the above information with the patient directly. We discussed symptom management, IVF administration, diet orders, etc. I communicated that ethics committee consult was placed and she is in agreement with the consult. Pt expressed appreciation to this writer for the emotional support provided and for the time spent addressing her concerns.    The meeting took place from 3pm to 4:10pm at the pt's bedside and then care provided after the meeting took an additional 40 minutes. This writer attended an interdisciplinary meeting at bedside with the pt/family. This note serves to briefly summarize the issues discussed during the meeting and to supplement the medical notes from earlier today.   The representatives from the hospital staff included: surgery/medical/nursing leadership, gastroenterology attending, primary surgery attending, patient experience team, primary hospitalist attending and the bedside RN was called in during parts of the meeting.     #patient/family request access to medical record - our team offered guidance on how the pt can access her record including through medical records office or through patient portal    #symptom management - symptoms include: intractable pain, nausea, po intolerance (amongst others)- definitive etiology of pt's symptoms remains unclear. we reviewed the workup including but not limited to CT imaging, endoscopic findings including bx results, serologic workup, vital sign monitoring, x-rays, etc. symptom management is a primary concern from pt/family. our team offered extensive guidance on further assessing her symptoms. we discussed gastric emptying study. reviewed narcotic use and how this will affect any motility studies. as a group, we ultimately agreed (including with support from family) that attempting to wean off opiates should be unified directive. Everyone at the meeting agreed that it is feasible that opiates are contributing to the pt's symptoms negatively and all parties agree to tapering off narcotics in next 24-48hrs while closely monitoring symptoms. This writer spoke w/ pharmacy staff to ensure a safe and gradual taper. Orders in EMR reflect our recommended regimen. Medications reviewed with nuclear medicine team to minimize any delays.     #access to tertiary care facility to further assess the pt's symptoms - our team agrees that the pt would benefit from assessment from gastrointestinal motility specialist. This service is not available at [facility]. Our team comprehensively reviewed the options, most of which are available to her as outpatient. Pt/family and staff agree that in setting of significant po intolerance, the pt is not currently suitable for outpatient management. We reviewed the means through which the pt can seek access to this level of care as inpatient. We spoke w/ [facility] today and requested transfer [id number] They are recommending that the patient seek MR enterography and if she follows with them outpatient, they can arrange. [facility] declines inpatient to inpatient transfer at this time. They have suggested we speak with the teams at [place] or [place] as both facilities can potentially offer a double balloon enterography assessment. We will speak w/ those facilities as well. The pt/family request that [facility] staff find an accepting tertiary care center for inpatient transfer. Our interdisciplinary team will continue to minimize any barriers to accessing GI motility specialist and will assist with all transitions of care. The family is aware that our team will attempt to find placement but cannot guarantee that we will find an accepting facility.     #patient care - the pt expresses concerns about receiving timely and attentive care from our staff. nursing leadership was present at the meeting today. our team will attempt to accommodate the pt's needs to the best of our ability.     At the conclusion of the meeting. I reviewed the above information with the patient directly. We discussed symptom management, IVF administration, diet orders, etc. I communicated that ethics committee consult was placed and she is in agreement with the consult. Pt expressed appreciation to this writer for the emotional support provided and for the time spent addressing her concerns.    The meeting took place from 3pm to 4:10pm at the pt's bedside and then care provided after the meeting took an additional 40 minutes.

## 2023-10-18 NOTE — PROGRESS NOTE ADULT - ASSESSMENT
68F with PMH colon resection, SBR with primary anastomosis with revision x 2.  Admitted to surgery for SBO. Medicine consulted for continued vomiting/diarrhea, allergic rxn, and home medication management.     Continued vomiting and diarrhea despite resolution of SBO. Patient reports vomiting profusely and but staff is not present in the episodes. We will continue to monitor. Also reports multiple moderate episodes of diarrhea - on the floor and all over the bed - which states she cleaned herself and an aide did help briefly, but no staff reported significant diarrhea. I will continue to monitor. I recommended closer monitoring with constant observation for safety and to further provide support with her persistent symptoms but she declined observation.   Patient having episodes of small bouts of emesis - noted in the bag.  Patient states persistent symptoms due to the anastomosis but recent imagining do not support this - resolution of SBO.   Patient having tea and crackers but reports she is not eating. Encouraged patient to continue to eat as tolerated, offered nutrition evaluation and supplemental feeds such as Ensure but she declines.   Her pain is only controlled with IV Dilaudid every 4 hours. Declining to try other modalities at this time - we became up with a plan together to try PO Dilaudid q 4 hours PRN and IV Dilaudid 0.5mg BID PRN for breakthrough pain. Patient states she is concerned about continued vomiting and diarrhea. Discussed risks and adverse effects of opioids - declining bowel regimen.   Although patient with n/v - there are no electrolyte abnormalities and no evidence witnessed by staff. Pain management consulted - recommending off opioids    10/18 - Family meeting with , myself, Dr Yañez, Dr Pruett, Dr Mckinney, Dr Yanez, Dr Carpenter, patient advocacy - family would like patient to be transferred to a tertiary care center. Call placed to [facility] per family agreement, spoke to transfer center - they would like patient clinicals faxed to [phone] and a medical provider will determine transfer eligibility.   Upset about discussion regarding decreasing narcotics due to strong possibility of narcotic bowel but she declines stopping pain medication.       #Gastritis  -s/p EGD 10/16   -Switch IV Zofran to PRN   -Switch to PO Pantoprazole BID, Pepcid PO daily  -Continue maalox prior to meals ordered for gastritis    #SBO  -Advance diet as tolerated  -To follow up [facility] motility. Outpatient verse inpatient transfer - I called [facility] with patient present to attempt to obtain appt with Dr Chang for a double bubble enteroscopy - clinicals and patient information were sent to office. Dr Chang will review on Friday and discuss possible appointment. Imaging not available until Dec/[place] per staff at [facility]. Patient later told me she was able to make an appointment for next week.   -Continue PO Dilaudid PRN q 4 hours and IV BID for breakthrough  -Abd x-ray ordered - no obstruction   -Pain management consulted, recommendations appreciated    #Allergic reaction - resolved  -Pt with facial swelling and itchiness post IV contrast  -Solumedrol IV 40mg x1 and Benadryl 50mg IV q6 x 4 doses  -hx of contact dermatitis requiring steroid tx  -Suggest pt f/u with allergist/immunologist outpatient for further workup and management    #Hypothyroidism  -Continue Levothyroxine     #Prophylactic measure  -DVT ppx: Lovenox      Discussed extensively with patient at bedside, RN staff, Dr Sheikh SANTIAGO, surgeon Dr Yanez. No evidence of SBO at this time - multiple CTs, having BMs, tolerating diet. Will plan for adjustment in pain control (as above and pain management consult) and start dc planning with close outpatient follow up v transfer to tertiary care facility   68F with PMH colon resection, SBR with primary anastomosis with revision x 2.  Admitted to surgery for SBO. Medicine consulted for continued vomiting/diarrhea, allergic rxn, and home medication management.     Continued vomiting and diarrhea despite resolution of SBO. Patient reports vomiting profusely and but staff is not present in the episodes. We will continue to monitor. Also reports multiple moderate episodes of diarrhea - on the floor and all over the bed - which states she cleaned herself and an aide did help briefly, but no staff reported significant diarrhea. we will continue to monitor. I recommended closer monitoring with constant observation for safety and to further provide support with her persistent symptoms but she declined observation.   Patient having episodes of small bouts of emesis - noted in the bag.  Patient states persistent symptoms due to the anastomosis but recent imagining do not support this - resolution of SBO. GI and surgery actively and closely following.  Patient having tea and crackers but reports she is not eating. Encouraged patient to continue to eat as tolerated, offered nutrition evaluation and supplemental feeds such as Ensure but she declines.   Her pain is only controlled with IV Dilaudid every 4 hours. Declining to try other modalities at this time - we initially came up with a plan together to try PO Dilaudid q 4 hours PRN and IV Dilaudid 0.5mg BID PRN for breakthrough pain. This did not work as patient still with vomiting, nausea, abd pain, and diarrhea. Discussed risks and adverse effects of opioids.     Although patient with n/v - there are no electrolyte abnormalities. Pain management consulted - recommending off opioids  Patient persistent symptoms (vomiting and diarrhea) are subjectively more severe and quantity larger than our staff have been able to assess.    10/18 - interdisciplinary family meeting with , myself, Dr Yañez, Dr Pruett, Dr Mckinney, Dr Yanez, Dr Carpenter, patient advocacy, son via phone and Dr Ferraro (family member) - family would like patient to be transferred to a tertiary care center. Call placed to [facility] per family agreement, spoke to transfer center - clinicals faxed [phone]. Discussed with GI Dr Chang at [facility] - states patient is not a candidate for inpatient transfer and recommending outpatient follow up with her team for further imaging.   Upset about discussion regarding decreasing narcotics due to strong possibility of narcotic bowel but she declines stopping pain medication. We have decided that we will taper steroids and plan for a gastric emptying study.       #Gastritis  -s/p EGD 10/16   -Switch IV Zofran to PRN   -Switch to PO Pantoprazole BID, Pepcid PO daily  -Continue maalox prior to meals ordered for gastritis    #SBO  -Advance diet as tolerated - downgraded to full liquids  -To follow up [facility] motility. Outpatient verse inpatient transfer - I called [facility] with patient present to attempt to obtain appt with Dr Chang for a double bubble enteroscopy - clinicals and patient information were sent to office. Dr Chang will review on Friday and discuss possible appointment. Imaging not available until Dec/Jan per staff at [facility]. Patient later told me she was able to make an appointment for next week.   -Pain medication adjusted   -Abd x-ray ordered - no obstruction   -Pain management consulted, recommendations appreciated    #Allergic reaction - resolved  -Pt with facial swelling and itchiness post IV contrast  -Solumedrol IV 40mg x1 and Benadryl 50mg IV q6 x 4 doses  -hx of contact dermatitis requiring steroid tx  -Suggest pt f/u with allergist/immunologist outpatient for further workup and management    #Hypothyroidism  -Continue Levothyroxine     #Prophylactic measure  -DVT ppx: Lovenox      Discussed extensively with patient at bedside, RN staff, Dr Carpenter GI, surgeon Dr Yanez. No evidence of SBO at this time - multiple CTs, abd xray. Will plan for adjustment in pain control (as above and pain management consult)

## 2023-10-18 NOTE — PROGRESS NOTE ADULT - SUBJECTIVE AND OBJECTIVE BOX
[facility]  J Luis Almanza PA-C  [address] [place] [address]  [phone]      INTERVAL HPI/OVERNIGHT EVENTS:  Pt s/e  +vomiting and diarrhea    MEDICATIONS  (STANDING):  aluminum hydroxide/magnesium hydroxide/simethicone Suspension 30 milliLiter(s) Oral every 6 hours  enoxaparin Injectable 40 milliGRAM(s) SubCutaneous every 24 hours  famotidine    Tablet 20 milliGRAM(s) Oral daily  influenza  Vaccine (HIGH DOSE) 0.7 milliLiter(s) IntraMuscular once  levothyroxine 88 MICROGram(s) Oral daily  pantoprazole    Tablet 40 milliGRAM(s) Oral two times a day  sodium chloride 0.9%. 1000 milliLiter(s) (50 mL/Hr) IV Continuous <Continuous>    MEDICATIONS  (PRN):  acetaminophen     Tablet .. 650 milliGRAM(s) Oral every 6 hours PRN Temp greater or equal to 38C (100.4F), Mild Pain (1 - 3), Moderate Pain (4 - 6)  HYDROmorphone   Tablet 4 milliGRAM(s) Oral every 4 hours PRN Severe Pain (7 - 10)  HYDROmorphone  Injectable 0.5 milliGRAM(s) IV Push every 12 hours PRN breakthrough Severe Pain (7 - 10)  metoclopramide Injectable 10 milliGRAM(s) IV Push every 6 hours PRN observed vomiting  ondansetron Injectable 4 milliGRAM(s) IV Push every 6 hours PRN Nausea and/or Vomiting      Allergies  tetanus toxoid (Anaphylaxis)        PHYSICAL EXAM:   Vital Signs:  Vital Signs Last 24 Hrs  T(C): 37.3 (18 Oct 2023 11:25), Max: 37.3 (18 Oct 2023 11:25)  T(F): 99.1 (18 Oct 2023 11:25), Max: 99.1 (18 Oct 2023 11:25)  HR: 90 (18 Oct 2023 11:25) (72 - 94)  BP: 122/71 (18 Oct 2023 11:25) (96/62 - 122/71)  BP(mean): --  RR: 17 (18 Oct 2023 11:25) (17 - 18)  SpO2: 91% (18 Oct 2023 11:25) (91% - 97%)    Parameters below as of 18 Oct 2023 11:25  Patient On (Oxygen Delivery Method): room air    GENERAL:  Appears stated age  HEENT:  NC/AT  CHEST:  Full & symmetric excursion  HEART:  Regular rhythm  ABDOMEN:  Soft, non-tender, non-distended  EXTEREMITIES:  no cyanosis  SKIN:  No rash  NEURO:  Alert      LABS:                        10.7   7.19  )-----------( 281      ( 17 Oct 2023 06:15 )             34.4     10-17    141  |  104  |  9   ----------------------------<  100<H>  4.7   |  33<H>  |  0.94    Ca    9.1      17 Oct 2023 06:15    TPro  6.2  /  Alb  2.9<L>  /  TBili  0.8  /  DBili  x   /  AST  19  /  ALT  36  /  AlkPhos  117  10-17      Urinalysis Basic - ( 17 Oct 2023 06:15 )    Color: x / Appearance: x / SG: x / pH: x  Gluc: 100 mg/dL / Ketone: x  / Bili: x / Urobili: x   Blood: x / Protein: x / Nitrite: x   Leuk Esterase: x / RBC: x / WBC x   Sq Epi: x / Non Sq Epi: x / Bacteria: x

## 2023-10-18 NOTE — PROGRESS NOTE ADULT - NSPROGADDITIONALINFOA_GEN_ALL_CORE
patient s/e, part of multi-disciplinary meeting attending  pathology from  upper gastrointestinal endoscopy negative, print out handed to patient  literature re: NBS handed to patient  suggestion of tapering her pain medicine cause improvement of her symptoms discussed  next step of workup discussed which would be gastric emptying study  will need to be off opioids and promotility and antispasmodic agents for 48H  I continue to recommend DBE as the test which would be highest yield to endoscopically evaluate her anastomosis, however its not urgent and given most recent Ct from 10/11 and 10/8 do not show obstruction, (nor does her axr from yesterday) I do not think it is the issue at hand causing her symptoms.   no objection to downgrading her diet   her repeat GI pcr is negative  her C4 is high, essentially excluding HAE   lead levels and porphyria w/u is pending  my service will re-evaluate after these results and GES are available

## 2023-10-19 LAB
ANION GAP SERPL CALC-SCNC: 5 MMOL/L — SIGNIFICANT CHANGE UP (ref 5–17)
ANION GAP SERPL CALC-SCNC: 5 MMOL/L — SIGNIFICANT CHANGE UP (ref 5–17)
BUN SERPL-MCNC: 5 MG/DL — LOW (ref 7–23)
BUN SERPL-MCNC: 5 MG/DL — LOW (ref 7–23)
C1INH FUNCTIONAL/C1INH TOTAL MFR SERPL: 41 MG/DL — HIGH (ref 21–39)
C1INH FUNCTIONAL/C1INH TOTAL MFR SERPL: 41 MG/DL — HIGH (ref 21–39)
CALCIUM SERPL-MCNC: 9.3 MG/DL — SIGNIFICANT CHANGE UP (ref 8.5–10.1)
CALCIUM SERPL-MCNC: 9.3 MG/DL — SIGNIFICANT CHANGE UP (ref 8.5–10.1)
CHLORIDE SERPL-SCNC: 109 MMOL/L — HIGH (ref 96–108)
CHLORIDE SERPL-SCNC: 109 MMOL/L — HIGH (ref 96–108)
CO2 SERPL-SCNC: 31 MMOL/L — SIGNIFICANT CHANGE UP (ref 22–31)
CO2 SERPL-SCNC: 31 MMOL/L — SIGNIFICANT CHANGE UP (ref 22–31)
CREAT SERPL-MCNC: 0.74 MG/DL — SIGNIFICANT CHANGE UP (ref 0.5–1.3)
CREAT SERPL-MCNC: 0.74 MG/DL — SIGNIFICANT CHANGE UP (ref 0.5–1.3)
EGFR: 88 ML/MIN/1.73M2 — SIGNIFICANT CHANGE UP
EGFR: 88 ML/MIN/1.73M2 — SIGNIFICANT CHANGE UP
GLUCOSE SERPL-MCNC: 99 MG/DL — SIGNIFICANT CHANGE UP (ref 70–99)
GLUCOSE SERPL-MCNC: 99 MG/DL — SIGNIFICANT CHANGE UP (ref 70–99)
HCT VFR BLD CALC: 38.1 % — SIGNIFICANT CHANGE UP (ref 34.5–45)
HCT VFR BLD CALC: 38.1 % — SIGNIFICANT CHANGE UP (ref 34.5–45)
HGB BLD-MCNC: 11.7 G/DL — SIGNIFICANT CHANGE UP (ref 11.5–15.5)
HGB BLD-MCNC: 11.7 G/DL — SIGNIFICANT CHANGE UP (ref 11.5–15.5)
MCHC RBC-ENTMCNC: 30.2 PG — SIGNIFICANT CHANGE UP (ref 27–34)
MCHC RBC-ENTMCNC: 30.2 PG — SIGNIFICANT CHANGE UP (ref 27–34)
MCHC RBC-ENTMCNC: 30.7 GM/DL — LOW (ref 32–36)
MCHC RBC-ENTMCNC: 30.7 GM/DL — LOW (ref 32–36)
MCV RBC AUTO: 98.2 FL — SIGNIFICANT CHANGE UP (ref 80–100)
MCV RBC AUTO: 98.2 FL — SIGNIFICANT CHANGE UP (ref 80–100)
NRBC # BLD: 0 /100 WBCS — SIGNIFICANT CHANGE UP (ref 0–0)
NRBC # BLD: 0 /100 WBCS — SIGNIFICANT CHANGE UP (ref 0–0)
PLATELET # BLD AUTO: 366 K/UL — SIGNIFICANT CHANGE UP (ref 150–400)
PLATELET # BLD AUTO: 366 K/UL — SIGNIFICANT CHANGE UP (ref 150–400)
POTASSIUM SERPL-MCNC: 3.3 MMOL/L — LOW (ref 3.5–5.3)
POTASSIUM SERPL-MCNC: 3.3 MMOL/L — LOW (ref 3.5–5.3)
POTASSIUM SERPL-SCNC: 3.3 MMOL/L — LOW (ref 3.5–5.3)
POTASSIUM SERPL-SCNC: 3.3 MMOL/L — LOW (ref 3.5–5.3)
RBC # BLD: 3.88 M/UL — SIGNIFICANT CHANGE UP (ref 3.8–5.2)
RBC # BLD: 3.88 M/UL — SIGNIFICANT CHANGE UP (ref 3.8–5.2)
RBC # FLD: 12.8 % — SIGNIFICANT CHANGE UP (ref 10.3–14.5)
RBC # FLD: 12.8 % — SIGNIFICANT CHANGE UP (ref 10.3–14.5)
SODIUM SERPL-SCNC: 145 MMOL/L — SIGNIFICANT CHANGE UP (ref 135–145)
SODIUM SERPL-SCNC: 145 MMOL/L — SIGNIFICANT CHANGE UP (ref 135–145)
WBC # BLD: 7.02 K/UL — SIGNIFICANT CHANGE UP (ref 3.8–10.5)
WBC # BLD: 7.02 K/UL — SIGNIFICANT CHANGE UP (ref 3.8–10.5)
WBC # FLD AUTO: 7.02 K/UL — SIGNIFICANT CHANGE UP (ref 3.8–10.5)
WBC # FLD AUTO: 7.02 K/UL — SIGNIFICANT CHANGE UP (ref 3.8–10.5)

## 2023-10-19 PROCEDURE — 93010 ELECTROCARDIOGRAM REPORT: CPT

## 2023-10-19 PROCEDURE — 99231 SBSQ HOSP IP/OBS SF/LOW 25: CPT

## 2023-10-19 PROCEDURE — 99233 SBSQ HOSP IP/OBS HIGH 50: CPT

## 2023-10-19 RX ORDER — HYDROMORPHONE HYDROCHLORIDE 2 MG/ML
0.25 INJECTION INTRAMUSCULAR; INTRAVENOUS; SUBCUTANEOUS EVERY 4 HOURS
Refills: 0 | Status: DISCONTINUED | OUTPATIENT
Start: 2023-10-19 | End: 2023-10-21

## 2023-10-19 RX ORDER — HYDROMORPHONE HYDROCHLORIDE 2 MG/ML
0.5 INJECTION INTRAMUSCULAR; INTRAVENOUS; SUBCUTANEOUS ONCE
Refills: 0 | Status: DISCONTINUED | OUTPATIENT
Start: 2023-10-19 | End: 2023-10-20

## 2023-10-19 RX ORDER — ONDANSETRON 8 MG/1
4 TABLET, FILM COATED ORAL ONCE
Refills: 0 | Status: COMPLETED | OUTPATIENT
Start: 2023-10-19 | End: 2023-10-19

## 2023-10-19 RX ORDER — ACETAMINOPHEN 500 MG
1000 TABLET ORAL EVERY 6 HOURS
Refills: 0 | Status: COMPLETED | OUTPATIENT
Start: 2023-10-19 | End: 2023-10-20

## 2023-10-19 RX ORDER — DEXTROSE MONOHYDRATE, SODIUM CHLORIDE, AND POTASSIUM CHLORIDE 50; .745; 4.5 G/1000ML; G/1000ML; G/1000ML
1000 INJECTION, SOLUTION INTRAVENOUS
Refills: 0 | Status: DISCONTINUED | OUTPATIENT
Start: 2023-10-19 | End: 2023-10-20

## 2023-10-19 RX ORDER — DIPHENHYDRAMINE HCL 50 MG
25 CAPSULE ORAL
Refills: 0 | Status: DISCONTINUED | OUTPATIENT
Start: 2023-10-19 | End: 2023-10-20

## 2023-10-19 RX ADMIN — ONDANSETRON 4 MILLIGRAM(S): 8 TABLET, FILM COATED ORAL at 05:51

## 2023-10-19 RX ADMIN — Medication 1000 MILLIGRAM(S): at 13:30

## 2023-10-19 RX ADMIN — DEXTROSE MONOHYDRATE, SODIUM CHLORIDE, AND POTASSIUM CHLORIDE 75 MILLILITER(S): 50; .745; 4.5 INJECTION, SOLUTION INTRAVENOUS at 19:50

## 2023-10-19 RX ADMIN — PANTOPRAZOLE SODIUM 40 MILLIGRAM(S): 20 TABLET, DELAYED RELEASE ORAL at 18:01

## 2023-10-19 RX ADMIN — Medication 25 MILLIGRAM(S): at 22:01

## 2023-10-19 RX ADMIN — HYDROMORPHONE HYDROCHLORIDE 0.5 MILLIGRAM(S): 2 INJECTION INTRAMUSCULAR; INTRAVENOUS; SUBCUTANEOUS at 05:20

## 2023-10-19 RX ADMIN — DEXTROSE MONOHYDRATE, SODIUM CHLORIDE, AND POTASSIUM CHLORIDE 75 MILLILITER(S): 50; .745; 4.5 INJECTION, SOLUTION INTRAVENOUS at 18:49

## 2023-10-19 RX ADMIN — Medication 400 MILLIGRAM(S): at 05:06

## 2023-10-19 RX ADMIN — ENOXAPARIN SODIUM 40 MILLIGRAM(S): 100 INJECTION SUBCUTANEOUS at 22:08

## 2023-10-19 RX ADMIN — PANTOPRAZOLE SODIUM 40 MILLIGRAM(S): 20 TABLET, DELAYED RELEASE ORAL at 05:32

## 2023-10-19 RX ADMIN — FAMOTIDINE 20 MILLIGRAM(S): 10 INJECTION INTRAVENOUS at 12:48

## 2023-10-19 RX ADMIN — HYDROMORPHONE HYDROCHLORIDE 0.25 MILLIGRAM(S): 2 INJECTION INTRAMUSCULAR; INTRAVENOUS; SUBCUTANEOUS at 22:01

## 2023-10-19 RX ADMIN — Medication 1000 MILLIGRAM(S): at 18:04

## 2023-10-19 RX ADMIN — Medication 88 MICROGRAM(S): at 06:40

## 2023-10-19 RX ADMIN — Medication 400 MILLIGRAM(S): at 12:48

## 2023-10-19 RX ADMIN — HYDROMORPHONE HYDROCHLORIDE 0.5 MILLIGRAM(S): 2 INJECTION INTRAMUSCULAR; INTRAVENOUS; SUBCUTANEOUS at 10:38

## 2023-10-19 RX ADMIN — HYDROMORPHONE HYDROCHLORIDE 0.25 MILLIGRAM(S): 2 INJECTION INTRAMUSCULAR; INTRAVENOUS; SUBCUTANEOUS at 22:31

## 2023-10-19 RX ADMIN — ONDANSETRON 4 MILLIGRAM(S): 8 TABLET, FILM COATED ORAL at 11:06

## 2023-10-19 RX ADMIN — Medication 1000 MILLIGRAM(S): at 05:26

## 2023-10-19 RX ADMIN — ONDANSETRON 4 MILLIGRAM(S): 8 TABLET, FILM COATED ORAL at 22:08

## 2023-10-19 RX ADMIN — HYDROMORPHONE HYDROCHLORIDE 0.5 MILLIGRAM(S): 2 INJECTION INTRAMUSCULAR; INTRAVENOUS; SUBCUTANEOUS at 16:51

## 2023-10-19 RX ADMIN — ENOXAPARIN SODIUM 40 MILLIGRAM(S): 100 INJECTION SUBCUTANEOUS at 00:14

## 2023-10-19 RX ADMIN — HYDROMORPHONE HYDROCHLORIDE 0.5 MILLIGRAM(S): 2 INJECTION INTRAMUSCULAR; INTRAVENOUS; SUBCUTANEOUS at 05:05

## 2023-10-19 RX ADMIN — HYDROMORPHONE HYDROCHLORIDE 0.5 MILLIGRAM(S): 2 INJECTION INTRAMUSCULAR; INTRAVENOUS; SUBCUTANEOUS at 10:55

## 2023-10-19 RX ADMIN — HYDROMORPHONE HYDROCHLORIDE 0.5 MILLIGRAM(S): 2 INJECTION INTRAMUSCULAR; INTRAVENOUS; SUBCUTANEOUS at 17:45

## 2023-10-19 RX ADMIN — Medication 400 MILLIGRAM(S): at 18:01

## 2023-10-19 NOTE — PROGRESS NOTE ADULT - ASSESSMENT
69 yo with presentation of sbo, now resolved. Now tih vomiting and diarrhea.  ct x2 negative.   for gastric emptying study when ready

## 2023-10-19 NOTE — PROGRESS NOTE ADULT - SUBJECTIVE AND OBJECTIVE BOX
pt seen  doing better  1 episode of vomiting and diarrhea this morning  pain stable  ICU Vital Signs Last 24 Hrs  T(C): 36.7 (19 Oct 2023 12:12), Max: 36.7 (18 Oct 2023 20:34)  T(F): 98.1 (19 Oct 2023 12:12), Max: 98.1 (19 Oct 2023 12:12)  HR: 82 (19 Oct 2023 12:12) (73 - 91)  BP: 105/68 (19 Oct 2023 12:12) (105/68 - 116/69)  BP(mean): --  ABP: --  ABP(mean): --  RR: 17 (19 Oct 2023 12:12) (17 - 18)  SpO2: 92% (19 Oct 2023 12:12) (92% - 96%)    O2 Parameters below as of 19 Oct 2023 12:12  Patient On (Oxygen Delivery Method): room air        NAD  soft, nondistended,  tender on palpation

## 2023-10-19 NOTE — PROGRESS NOTE ADULT - SUBJECTIVE AND OBJECTIVE BOX
Date/Time Patient Seen:  		  Referring MD:   Data Reviewed	       Patient is a 68y old  Female who presents with a chief complaint of SBO (18 Oct 2023 16:08)      Subjective/HPI     PAST MEDICAL & SURGICAL HISTORY:  Hypothyroidism    HLD (hyperlipidemia)    SBO (small bowel obstruction)    History of colon resection    S/P small bowel resection    S/P small bowel resection    History of facelift          Medication list         MEDICATIONS  (STANDING):  acetaminophen   IVPB .. 1000 milliGRAM(s) IV Intermittent every 6 hours  enoxaparin Injectable 40 milliGRAM(s) SubCutaneous every 24 hours  famotidine    Tablet 20 milliGRAM(s) Oral daily  influenza  Vaccine (HIGH DOSE) 0.7 milliLiter(s) IntraMuscular once  levothyroxine 88 MICROGram(s) Oral daily  pantoprazole    Tablet 40 milliGRAM(s) Oral two times a day  sodium chloride 0.45%. 1000 milliLiter(s) (100 mL/Hr) IV Continuous <Continuous>    MEDICATIONS  (PRN):  diphenhydrAMINE Injectable 25 milliGRAM(s) IV Push at bedtime PRN Insomnia or pruritis  HYDROmorphone  Injectable 0.5 milliGRAM(s) IV Push every 4 hours PRN Severe Pain (7 - 10)  ondansetron Injectable 4 milliGRAM(s) IV Push every 6 hours PRN Nausea and/or Vomiting         Vitals log        ICU Vital Signs Last 24 Hrs  T(C): 36.7 (18 Oct 2023 20:34), Max: 37.3 (18 Oct 2023 11:25)  T(F): 98 (18 Oct 2023 20:34), Max: 99.1 (18 Oct 2023 11:25)  HR: 91 (18 Oct 2023 20:34) (72 - 91)  BP: 116/69 (18 Oct 2023 20:34) (96/62 - 122/71)  BP(mean): --  ABP: --  ABP(mean): --  RR: 17 (18 Oct 2023 20:34) (17 - 18)  SpO2: 95% (18 Oct 2023 20:34) (91% - 95%)    O2 Parameters below as of 18 Oct 2023 20:34  Patient On (Oxygen Delivery Method): room air                 Input and Output:  I&O's Detail    17 Oct 2023 07:01  -  18 Oct 2023 07:00  --------------------------------------------------------  IN:    sodium chloride 0.9%: 450 mL  Total IN: 450 mL    OUT:    Voided (mL): 600 mL  Total OUT: 600 mL    Total NET: -150 mL      18 Oct 2023 07:01  -  19 Oct 2023 05:15  --------------------------------------------------------  IN:    Oral Fluid: 300 mL    sodium chloride 0.45%: 600 mL  Total IN: 900 mL    OUT:  Total OUT: 0 mL    Total NET: 900 mL          Lab Data                        10.7   7.19  )-----------( 281      ( 17 Oct 2023 06:15 )             34.4     10-17    141  |  104  |  9   ----------------------------<  100<H>  4.7   |  33<H>  |  0.94    Ca    9.1      17 Oct 2023 06:15    TPro  6.2  /  Alb  2.9<L>  /  TBili  0.8  /  DBili  x   /  AST  19  /  ALT  36  /  AlkPhos  117  10-17            Review of Systems	      Objective     Physical Examination    heart s1s2  lung dc BS      Pertinent Lab findings & Imaging      Saurav:  NO   Adequate UO     I&O's Detail    17 Oct 2023 07:01  -  18 Oct 2023 07:00  --------------------------------------------------------  IN:    sodium chloride 0.9%: 450 mL  Total IN: 450 mL    OUT:    Voided (mL): 600 mL  Total OUT: 600 mL    Total NET: -150 mL      18 Oct 2023 07:01  -  19 Oct 2023 05:15  --------------------------------------------------------  IN:    Oral Fluid: 300 mL    sodium chloride 0.45%: 600 mL  Total IN: 900 mL    OUT:  Total OUT: 0 mL    Total NET: 900 mL               Discussed with:     Cultures:	        Radiology

## 2023-10-19 NOTE — PROGRESS NOTE ADULT - ASSESSMENT
68F with PMH colon resection, SBR with primary anastomosis with revision x 2.  Admitted to surgery for SBO. Medicine consulted for continued vomiting/diarrhea, allergic rxn, and home medication management.     #recurrent SBO, resolved  #intractable nausea and vomiting, persistent  - gastroparesis but other motility d/o remains in differential  - planning for gastric emptying study Monday 10/23/23 if symptoms do not improve before then  - tapering off opiates by early AM on 10/21/23  - cont standing doses of tylenol IV for sustained analgesia  - avoid all drugs that affect bowel motility  - reviewed med list from [facility] with the patient  - mostly tolerating FLD per pt report today    #hypokalemia  - added K to IVF    #pruritis / insomnia  - can cont benadryl bid for now    #hypothyroidism  - cont levothyroxine    #GERD  - pepcid/ppi    #VTE ppx  - lovenox    reviewed with interdisciplinary team  supportive care  emotional support  pt expresses appreciation for the time spent and care provided  pt assessed early this morning and then reassessed this afternoon to dicsuss her concerns  prolonged hospitalization - surgery and GI following but prn 68F with PMH colon resection, SBR with primary anastomosis with revision x 2.  Admitted to surgery for SBO. Medicine consulted for continued vomiting/diarrhea, allergic rxn, and home medication management.     #recurrent SBO, resolved  #intractable nausea and vomiting, persistent  - gastroparesis vs other motility d/o remain in differential  - planning for gastric emptying study Monday 10/23/23 if symptoms do not improve before then  - tapering off opiates by early AM on 10/21/23  - cont standing doses of tylenol IV for sustained analgesia  - avoid all drugs that affect bowel motility  - reviewed med list from [facility] with the patient  - mostly tolerating FLD per pt report today    #hypokalemia  - added K to IVF    #pruritis / insomnia  - can cont benadryl bid for now    #hypothyroidism  - cont levothyroxine    #GERD  - pepcid/ppi    #VTE ppx  - lovenox    reviewed with interdisciplinary team  supportive care  emotional support  pt expresses appreciation for the time spent and care provided  pt assessed early this morning and then reassessed this afternoon to dicsuss her concerns  prolonged hospitalization - surgery and GI following but prn

## 2023-10-19 NOTE — CHART NOTE - NSCHARTNOTEFT_GEN_A_CORE
Called by RN supervisor regarding patient pain regimen. Patient nervious that she will wake up at night in between pain medication dosages and not be able to receive pain medications. Chart reviewed, patient in the process of tapering off opioids to be able to undergo gastric emptying study. She is currently ordered for Dilaudid 0.25mg IV q4 prn for severe pain, benadryl 25mg IVP BID prn for insomnia/pruritis, Ofirmev 1000mg q6 standing. Patient discussed with Dr. Yañez this evening with RN supervisor present and at that time it was decided that patient would be able to have dose of Dilaudid IV x1 for breakthrough pain. Patient currently refusing to take her 9pm dose of Dilaudid and benadryl until order for breakthrough pain medication is in the emr. Discussed case with Dr. Yañez, will place order of Dilaudid 0.5mg IV x1 to be given in the event patient has breakthrough pain overnight. Discussed with RN supervisor. RN to call with any changes. Called by RN supervisor regarding patient pain regimen. Patient concerned that she will wake up at night in between pain medication dosages and not be able to receive pain medications. Chart reviewed, patient in the process of tapering off opioids to be able to undergo gastric emptying study. She is currently ordered for Dilaudid 0.25mg IV q4 prn for severe pain, benadryl 25mg IVP BID prn for insomnia/pruritis, Ofirmev 1000mg q6 standing. Patient discussed with Dr. Yañez this evening with RN supervisor present and at that time it was decided that patient would be able to have dose of Dilaudid IV x1 for breakthrough pain. Patient currently refusing to take her 9pm dose of Dilaudid and benadryl until order for breakthrough pain medication is in the emr. Discussed case with Dr. Yañez, will place order of Dilaudid 0.5mg IV x1 to be given in the event patient has breakthrough pain overnight. Discussed with RN supervisor. RN to call with any changes.

## 2023-10-19 NOTE — PROGRESS NOTE ADULT - SUBJECTIVE AND OBJECTIVE BOX
Pt seen/assessed.  Had 1 episode of vomiting at 0200 this AM, scant amount of liquids.   No diarrhea reported but she feels as if she may need to have a BM now.   Tolerated FLD for breakfast.   Received IV benadryl and IV dilaudid overnight w/ good reported relief of symptoms. She was able to sleep until around 0500.   No other active complaints.  Reviewed the plan with staff.   I informed the patient that I will review all medicaitons with nuclear medicine today so we can ensure no delays with coordination of gastric emptying study which is tentatively planned for Monday.  Full note to follow Pt seen/assessed.  Had 1 episode of vomiting at 0200 this AM, scant amount of liquids.   No diarrhea reported but she feels as if she may need to have a BM now.   Tolerated FLD for breakfast.   Received IV benadryl and IV dilaudid overnight w/ good reported relief of symptoms. She was able to sleep until around 0500.   No other active complaints.  Reviewed the plan with staff.   I informed the patient that I will review all medications with nuclear medicine today so we can ensure no delays with coordination of gastric emptying study which is tentatively planned for Monday.  Full note to follow      ---  Name: JONNA KANG  MRN: [id number]  LOCATION: [address]    ----  Patient is a 68y old  Female who presents with a chief complaint of SBO (19 Oct 2023 09:51)      FROM ADMISSION H+P:   HPI:   67 y/o female with hx of multiple bouts of SBO, first surgery July 2022, [facility]ata, pt presents with diffuse abdominal pain, distension started this AM, progressively getting worse, 3 episodes of vomiting, no fever, no chills, no CP, no SOB, + two small BM today, pt requesting Dilaudid for pain, recent facelift on steroids     ----  INTERVAL HPI/OVERNIGHT EVENTS: Pt seen and evaluated at the bedside. No acute overnight events occurred. Pt w/ 1 episode of NBNB vomiting, scant amount at 0200. No diarrhea reported overnight. Pt tolerating FLD today. She was nauseated around 1100 today but never vomited and had good relief with zofran IVP.   Reviewed all the meds with NM today and provided the patient with the printout with the plan.   Reviewed the opioid taper plan with the pt at length. All in agreement. Plan to dc opiates early AM on Saturday morning.     ----  PAST MEDICAL & SURGICAL HISTORY:  Hypothyroidism      HLD (hyperlipidemia)      SBO (small bowel obstruction)      S/P small bowel resection      History of facelift          FAMILY HISTORY:      Allergies    tetanus toxoid (Anaphylaxis)    Intolerances        ----  REVIEW OF SYSTEMS:  CONSTITUTIONAL: denies fever, chills    CARDIOVASCULAR: denies chest pain, palpitations  RESPIRATORY: denies shortness of breath, sputum production  GASTROINTESTINAL: as per HPI  NEUROLOGICAL: denies numbness, focal weakness  MUSCULOSKELETAL: denies new joint pain, muscle aches  INTEGUMENT: denies skin itching or rashes    ----  PHYSICAL EXAM:  GENERAL: patient appears generally well, readily interactive, lying in bed  ENMT: oropharynx clear without erythema, moist mucous membranes  LUNGS: reduced air entry bilaterally, grossly CTA b/l  HEART: soft S1/S2, distant heart sounds  GASTROINTESTINAL: abdomen is mildly tender throughout but most notably in LLQ, active bowel sounds, distended but mild, abd soft, no rebound tenderness  MUSCULOSKELETAL: no clubbing or cyanosis, no obvious deformity  NEUROLOGIC: awake, alert, readily interactive, good muscle tone in 4 extremities    T(C): 36.7 (10-19-23 @ 12:12), Max: 36.7 (10-18-23 @ 20:34)  HR: 82 (10-19-23 @ 12:12) (73 - 91)  BP: 105/68 (10-19-23 @ 12:12) (105/68 - 116/69)  RR: 17 (10-19-23 @ 12:12) (17 - 18)  SpO2: 92% (10-19-23 @ 12:12) (92% - 96%)  Wt(kg): --    ----  INTAKE & OUTPUT:  I&O's Summary    18 Oct 2023 07:01  -  19 Oct 2023 07:00  --------------------------------------------------------  IN: 1400 mL / OUT: 800 mL / NET: 600 mL    19 Oct 2023 07:01  -  19 Oct 2023 17:17  --------------------------------------------------------  IN: 0 mL / OUT: 100 mL / NET: -100 mL        LABS:                        11.7   7.02  )-----------( 366      ( 19 Oct 2023 09:11 )             38.1     10-19    145  |  109<H>  |  5<L>  ----------------------------<  99  3.3<L>   |  31  |  0.74    Ca    9.3      19 Oct 2023 09:11        Urinalysis Basic - ( 19 Oct 2023 09:11 )    Color: x / Appearance: x / SG: x / pH: x  Gluc: 99 mg/dL / Ketone: x  / Bili: x / Urobili: x   Blood: x / Protein: x / Nitrite: x   Leuk Esterase: x / RBC: x / WBC x   Sq Epi: x / Non Sq Epi: x / Bacteria: x      CAPILLARY BLOOD GLUCOSE              ----  DATA REVIEW:  Personally reviewed:  Vital sign trends: [ x ] yes    [  ] no     [  ] n/a  Laboratory results: [ x ] yes    [  ] no     [  ] n/a - hypokalemia - K added to iVF  Radiology results: [ x ] yes    [  ] no     [  ] n/a  Microbio results: [  ] yes    [  ] no     [ x ] n/a  Consultant recommendations: [ x ] yes    [  ] no     [  ] n/a    ----  ACTIVE MEDICATIONS:  - ALLERGY - diphenhydrAMINE Injectable 25 milliGRAM(s) IV Push two times a day PRN  - ENDO - levothyroxine 88 MICROGram(s) Oral daily  - GI - famotidine    Tablet 20 milliGRAM(s) Oral daily  - GI - ondansetron Injectable 4 milliGRAM(s) IV Push every 6 hours PRN  - GI - pantoprazole    Tablet 40 milliGRAM(s) Oral two times a day  - IVF - sodium chloride 0.9% with potassium chloride 40 mEq/L 1000 milliLiter(s) IV Continuous <Continuous>  - MSK - acetaminophen   IVPB .. 1000 milliGRAM(s) IV Intermittent every 6 hours  - PAIN - HYDROmorphone  Injectable 0.25 milliGRAM(s) IV Push every 4 hours PRN  - VTE PPX - enoxaparin Injectable 40 milliGRAM(s) SubCutaneous every 24 hours

## 2023-10-19 NOTE — PROGRESS NOTE ADULT - ASSESSMENT
67 y/o female with hx of multiple bouts of SBO, first surgery July 2022, Renata, pt presents with diffuse abdominal pain, distension    sbo  atelectasis  acute - chr pain   multiple bouts SBO  HLD  hx of small bowel resection  gastritis  thyroid disease    s/p EGD - grossly normal  GI f/u noted  surgery f/u noted  GI PCR noted  Chart note reviewed - opioid titration in progress -

## 2023-10-20 LAB
ANION GAP SERPL CALC-SCNC: 10 MMOL/L — SIGNIFICANT CHANGE UP (ref 5–17)
ANION GAP SERPL CALC-SCNC: 10 MMOL/L — SIGNIFICANT CHANGE UP (ref 5–17)
BUN SERPL-MCNC: 4 MG/DL — LOW (ref 7–23)
BUN SERPL-MCNC: 4 MG/DL — LOW (ref 7–23)
C1INH FUNCTIONAL FLD-MCNC: >93 — SIGNIFICANT CHANGE UP
C1INH FUNCTIONAL FLD-MCNC: >93 — SIGNIFICANT CHANGE UP
CALCIUM SERPL-MCNC: 9.7 MG/DL — SIGNIFICANT CHANGE UP (ref 8.5–10.1)
CALCIUM SERPL-MCNC: 9.7 MG/DL — SIGNIFICANT CHANGE UP (ref 8.5–10.1)
CHLORIDE SERPL-SCNC: 108 MMOL/L — SIGNIFICANT CHANGE UP (ref 96–108)
CHLORIDE SERPL-SCNC: 108 MMOL/L — SIGNIFICANT CHANGE UP (ref 96–108)
CO2 SERPL-SCNC: 24 MMOL/L — SIGNIFICANT CHANGE UP (ref 22–31)
CO2 SERPL-SCNC: 24 MMOL/L — SIGNIFICANT CHANGE UP (ref 22–31)
CREAT SERPL-MCNC: 0.9 MG/DL — SIGNIFICANT CHANGE UP (ref 0.5–1.3)
CREAT SERPL-MCNC: 0.9 MG/DL — SIGNIFICANT CHANGE UP (ref 0.5–1.3)
EGFR: 70 ML/MIN/1.73M2 — SIGNIFICANT CHANGE UP
EGFR: 70 ML/MIN/1.73M2 — SIGNIFICANT CHANGE UP
GLUCOSE BLDC GLUCOMTR-MCNC: 85 MG/DL — SIGNIFICANT CHANGE UP (ref 70–99)
GLUCOSE BLDC GLUCOMTR-MCNC: 85 MG/DL — SIGNIFICANT CHANGE UP (ref 70–99)
GLUCOSE SERPL-MCNC: 108 MG/DL — HIGH (ref 70–99)
GLUCOSE SERPL-MCNC: 108 MG/DL — HIGH (ref 70–99)
HCT VFR BLD CALC: 37.2 % — SIGNIFICANT CHANGE UP (ref 34.5–45)
HCT VFR BLD CALC: 37.2 % — SIGNIFICANT CHANGE UP (ref 34.5–45)
HGB BLD-MCNC: 11.6 G/DL — SIGNIFICANT CHANGE UP (ref 11.5–15.5)
HGB BLD-MCNC: 11.6 G/DL — SIGNIFICANT CHANGE UP (ref 11.5–15.5)
MAGNESIUM SERPL-MCNC: 2.3 MG/DL — SIGNIFICANT CHANGE UP (ref 1.6–2.6)
MAGNESIUM SERPL-MCNC: 2.3 MG/DL — SIGNIFICANT CHANGE UP (ref 1.6–2.6)
MCHC RBC-ENTMCNC: 30.1 PG — SIGNIFICANT CHANGE UP (ref 27–34)
MCHC RBC-ENTMCNC: 30.1 PG — SIGNIFICANT CHANGE UP (ref 27–34)
MCHC RBC-ENTMCNC: 31.2 GM/DL — LOW (ref 32–36)
MCHC RBC-ENTMCNC: 31.2 GM/DL — LOW (ref 32–36)
MCV RBC AUTO: 96.4 FL — SIGNIFICANT CHANGE UP (ref 80–100)
MCV RBC AUTO: 96.4 FL — SIGNIFICANT CHANGE UP (ref 80–100)
MRSA PCR RESULT.: SIGNIFICANT CHANGE UP
MRSA PCR RESULT.: SIGNIFICANT CHANGE UP
NRBC # BLD: 0 /100 WBCS — SIGNIFICANT CHANGE UP (ref 0–0)
NRBC # BLD: 0 /100 WBCS — SIGNIFICANT CHANGE UP (ref 0–0)
PLATELET # BLD AUTO: 418 K/UL — HIGH (ref 150–400)
PLATELET # BLD AUTO: 418 K/UL — HIGH (ref 150–400)
POTASSIUM SERPL-MCNC: 3.9 MMOL/L — SIGNIFICANT CHANGE UP (ref 3.5–5.3)
POTASSIUM SERPL-MCNC: 3.9 MMOL/L — SIGNIFICANT CHANGE UP (ref 3.5–5.3)
POTASSIUM SERPL-SCNC: 3.9 MMOL/L — SIGNIFICANT CHANGE UP (ref 3.5–5.3)
POTASSIUM SERPL-SCNC: 3.9 MMOL/L — SIGNIFICANT CHANGE UP (ref 3.5–5.3)
RBC # BLD: 3.86 M/UL — SIGNIFICANT CHANGE UP (ref 3.8–5.2)
RBC # BLD: 3.86 M/UL — SIGNIFICANT CHANGE UP (ref 3.8–5.2)
RBC # FLD: 12.8 % — SIGNIFICANT CHANGE UP (ref 10.3–14.5)
RBC # FLD: 12.8 % — SIGNIFICANT CHANGE UP (ref 10.3–14.5)
S AUREUS DNA NOSE QL NAA+PROBE: SIGNIFICANT CHANGE UP
S AUREUS DNA NOSE QL NAA+PROBE: SIGNIFICANT CHANGE UP
SODIUM SERPL-SCNC: 142 MMOL/L — SIGNIFICANT CHANGE UP (ref 135–145)
SODIUM SERPL-SCNC: 142 MMOL/L — SIGNIFICANT CHANGE UP (ref 135–145)
WBC # BLD: 7.87 K/UL — SIGNIFICANT CHANGE UP (ref 3.8–10.5)
WBC # BLD: 7.87 K/UL — SIGNIFICANT CHANGE UP (ref 3.8–10.5)
WBC # FLD AUTO: 7.87 K/UL — SIGNIFICANT CHANGE UP (ref 3.8–10.5)
WBC # FLD AUTO: 7.87 K/UL — SIGNIFICANT CHANGE UP (ref 3.8–10.5)

## 2023-10-20 PROCEDURE — 99233 SBSQ HOSP IP/OBS HIGH 50: CPT

## 2023-10-20 PROCEDURE — 71045 X-RAY EXAM CHEST 1 VIEW: CPT | Mod: 26

## 2023-10-20 PROCEDURE — 99291 CRITICAL CARE FIRST HOUR: CPT

## 2023-10-20 PROCEDURE — 99231 SBSQ HOSP IP/OBS SF/LOW 25: CPT

## 2023-10-20 RX ORDER — DIPHENHYDRAMINE HCL 50 MG
50 CAPSULE ORAL ONCE
Refills: 0 | Status: COMPLETED | OUTPATIENT
Start: 2023-10-20 | End: 2023-10-20

## 2023-10-20 RX ORDER — HYDROMORPHONE HYDROCHLORIDE 2 MG/ML
0.25 INJECTION INTRAMUSCULAR; INTRAVENOUS; SUBCUTANEOUS ONCE
Refills: 0 | Status: DISCONTINUED | OUTPATIENT
Start: 2023-10-20 | End: 2023-10-20

## 2023-10-20 RX ORDER — HYDROCORTISONE 20 MG
125 TABLET ORAL ONCE
Refills: 0 | Status: DISCONTINUED | OUTPATIENT
Start: 2023-10-20 | End: 2023-10-20

## 2023-10-20 RX ORDER — DEXTROSE MONOHYDRATE, SODIUM CHLORIDE, AND POTASSIUM CHLORIDE 50; .745; 4.5 G/1000ML; G/1000ML; G/1000ML
1000 INJECTION, SOLUTION INTRAVENOUS
Refills: 0 | Status: DISCONTINUED | OUTPATIENT
Start: 2023-10-20 | End: 2023-10-21

## 2023-10-20 RX ORDER — IPRATROPIUM BROMIDE 0.2 MG/ML
500 SOLUTION, NON-ORAL INHALATION EVERY 6 HOURS
Refills: 0 | Status: DISCONTINUED | OUTPATIENT
Start: 2023-10-20 | End: 2023-10-21

## 2023-10-20 RX ORDER — DIPHENHYDRAMINE HCL 50 MG
25 CAPSULE ORAL ONCE
Refills: 0 | Status: COMPLETED | OUTPATIENT
Start: 2023-10-20 | End: 2023-10-20

## 2023-10-20 RX ORDER — DIPHENHYDRAMINE HCL 50 MG
25 CAPSULE ORAL EVERY 6 HOURS
Refills: 0 | Status: DISCONTINUED | OUTPATIENT
Start: 2023-10-20 | End: 2023-10-23

## 2023-10-20 RX ORDER — ALBUTEROL 90 UG/1
2.5 AEROSOL, METERED ORAL EVERY 4 HOURS
Refills: 0 | Status: DISCONTINUED | OUTPATIENT
Start: 2023-10-20 | End: 2023-10-20

## 2023-10-20 RX ORDER — ALBUTEROL 90 UG/1
2 AEROSOL, METERED ORAL EVERY 6 HOURS
Refills: 0 | Status: DISCONTINUED | OUTPATIENT
Start: 2023-10-20 | End: 2023-10-21

## 2023-10-20 RX ADMIN — Medication 400 MILLIGRAM(S): at 05:46

## 2023-10-20 RX ADMIN — FAMOTIDINE 20 MILLIGRAM(S): 10 INJECTION INTRAVENOUS at 11:16

## 2023-10-20 RX ADMIN — DEXTROSE MONOHYDRATE, SODIUM CHLORIDE, AND POTASSIUM CHLORIDE 75 MILLILITER(S): 50; .745; 4.5 INJECTION, SOLUTION INTRAVENOUS at 17:48

## 2023-10-20 RX ADMIN — HYDROMORPHONE HYDROCHLORIDE 0.25 MILLIGRAM(S): 2 INJECTION INTRAMUSCULAR; INTRAVENOUS; SUBCUTANEOUS at 17:49

## 2023-10-20 RX ADMIN — HYDROMORPHONE HYDROCHLORIDE 0.25 MILLIGRAM(S): 2 INJECTION INTRAMUSCULAR; INTRAVENOUS; SUBCUTANEOUS at 04:28

## 2023-10-20 RX ADMIN — HYDROMORPHONE HYDROCHLORIDE 0.25 MILLIGRAM(S): 2 INJECTION INTRAMUSCULAR; INTRAVENOUS; SUBCUTANEOUS at 04:50

## 2023-10-20 RX ADMIN — Medication 1000 MILLIGRAM(S): at 06:16

## 2023-10-20 RX ADMIN — HYDROMORPHONE HYDROCHLORIDE 0.25 MILLIGRAM(S): 2 INJECTION INTRAMUSCULAR; INTRAVENOUS; SUBCUTANEOUS at 04:51

## 2023-10-20 RX ADMIN — Medication 25 MILLIGRAM(S): at 11:26

## 2023-10-20 RX ADMIN — Medication 50 MILLIGRAM(S): at 07:42

## 2023-10-20 RX ADMIN — ALBUTEROL 2.5 MILLIGRAM(S): 90 AEROSOL, METERED ORAL at 11:28

## 2023-10-20 RX ADMIN — HYDROMORPHONE HYDROCHLORIDE 0.25 MILLIGRAM(S): 2 INJECTION INTRAMUSCULAR; INTRAVENOUS; SUBCUTANEOUS at 03:58

## 2023-10-20 RX ADMIN — Medication 400 MILLIGRAM(S): at 17:13

## 2023-10-20 RX ADMIN — PANTOPRAZOLE SODIUM 40 MILLIGRAM(S): 20 TABLET, DELAYED RELEASE ORAL at 05:45

## 2023-10-20 RX ADMIN — Medication 400 MILLIGRAM(S): at 11:15

## 2023-10-20 RX ADMIN — PANTOPRAZOLE SODIUM 40 MILLIGRAM(S): 20 TABLET, DELAYED RELEASE ORAL at 17:13

## 2023-10-20 RX ADMIN — Medication 1000 MILLIGRAM(S): at 00:37

## 2023-10-20 RX ADMIN — ALBUTEROL 2.5 MILLIGRAM(S): 90 AEROSOL, METERED ORAL at 15:17

## 2023-10-20 RX ADMIN — HYDROMORPHONE HYDROCHLORIDE 0.25 MILLIGRAM(S): 2 INJECTION INTRAMUSCULAR; INTRAVENOUS; SUBCUTANEOUS at 21:19

## 2023-10-20 RX ADMIN — HYDROMORPHONE HYDROCHLORIDE 0.25 MILLIGRAM(S): 2 INJECTION INTRAMUSCULAR; INTRAVENOUS; SUBCUTANEOUS at 13:00

## 2023-10-20 RX ADMIN — Medication 88 MICROGRAM(S): at 05:45

## 2023-10-20 RX ADMIN — HYDROMORPHONE HYDROCHLORIDE 0.25 MILLIGRAM(S): 2 INJECTION INTRAMUSCULAR; INTRAVENOUS; SUBCUTANEOUS at 17:23

## 2023-10-20 RX ADMIN — Medication 1000 MILLIGRAM(S): at 12:06

## 2023-10-20 RX ADMIN — DEXTROSE MONOHYDRATE, SODIUM CHLORIDE, AND POTASSIUM CHLORIDE 75 MILLILITER(S): 50; .745; 4.5 INJECTION, SOLUTION INTRAVENOUS at 05:48

## 2023-10-20 RX ADMIN — Medication 25 MILLIGRAM(S): at 21:20

## 2023-10-20 RX ADMIN — DEXTROSE MONOHYDRATE, SODIUM CHLORIDE, AND POTASSIUM CHLORIDE 75 MILLILITER(S): 50; .745; 4.5 INJECTION, SOLUTION INTRAVENOUS at 11:50

## 2023-10-20 RX ADMIN — ALBUTEROL 2.5 MILLIGRAM(S): 90 AEROSOL, METERED ORAL at 20:10

## 2023-10-20 RX ADMIN — Medication 25 MILLIGRAM(S): at 15:57

## 2023-10-20 RX ADMIN — Medication 0.5 MILLIGRAM(S): at 07:51

## 2023-10-20 RX ADMIN — ENOXAPARIN SODIUM 40 MILLIGRAM(S): 100 INJECTION SUBCUTANEOUS at 22:20

## 2023-10-20 RX ADMIN — Medication 1000 MILLIGRAM(S): at 17:49

## 2023-10-20 RX ADMIN — HYDROMORPHONE HYDROCHLORIDE 0.25 MILLIGRAM(S): 2 INJECTION INTRAMUSCULAR; INTRAVENOUS; SUBCUTANEOUS at 12:39

## 2023-10-20 RX ADMIN — Medication 100 MILLIGRAM(S): at 09:18

## 2023-10-20 RX ADMIN — Medication 400 MILLIGRAM(S): at 00:07

## 2023-10-20 RX ADMIN — ONDANSETRON 4 MILLIGRAM(S): 8 TABLET, FILM COATED ORAL at 14:28

## 2023-10-20 RX ADMIN — ONDANSETRON 4 MILLIGRAM(S): 8 TABLET, FILM COATED ORAL at 04:01

## 2023-10-20 RX ADMIN — HYDROMORPHONE HYDROCHLORIDE 0.25 MILLIGRAM(S): 2 INJECTION INTRAMUSCULAR; INTRAVENOUS; SUBCUTANEOUS at 21:49

## 2023-10-20 NOTE — PROGRESS NOTE ADULT - ASSESSMENT
68F with PMH colon resection, SBR with primary anastomosis with revision x 2.  Admitted to surgery for SBO. Medicine consulted for continued vomiting/diarrhea, allergic rxn, and home medication management.       #stridor, wheezing, respiratory distress - unclear definitive etiology: possible anaphylaxis  - s/p epi, solu-medrol, benadryl, ativan IV - symptoms somewhat improved  - cont bipap but plan to wean O2 requirements quickly pending course  - upgraded to MICU for now    #recurrent SBO, resolved  #intractable nausea and vomiting, persistent  - gastroparesis vs other motility d/o remain in differential  - planning for gastric emptying study Monday 10/23/23 - but interval events likely will delay the assessment  - on standing doses of acetaminophen  - avoid all drugs that affect bowel motility  - reviewed med list from NM with the patient on 10/19/23  - mostly tolerating FLD until this morning    #hypokalemia  - added K to IVF    #insomnia  - was on benadryl    #hypothyroidism  - cont levothyroxine    #GERD  - pepcid/ppi    #VTE ppx  - lovenox      further updates to follow pending course

## 2023-10-20 NOTE — CHART NOTE - NSCHARTNOTEFT_GEN_A_CORE
Assessment: patient now transferred to ICU this AM with SOB. seen for referral.    68y old  Female who presents with a chief complaint of SBO (20 Oct 2023 08:48)  per RN patient asking for tea and ices this morning full fluids not taken   patient asking RD not to interview at this time noted new to ICU now  10/19 diarrhea per flow sheet      Factors impacting intake: [ ] none [ ] nausea  [ ] vomiting [x ] diarrhea [ ] constipation  [ ]chewing problems [ ] swallowing issues  [ ] other:     Diet Prescription: Diet, Full Liquid (10-18-23 @ 17:02)    Intake: up to 75% per flow sheet    Current Weight: Weight (kg): 68 (10-20 @ 09:12)      Pertinent Medications: MEDICATIONS  (STANDING):  acetaminophen   IVPB .. 1000 milliGRAM(s) IV Intermittent every 6 hours  albuterol   0.5% 2.5 milliGRAM(s) Nebulizer every 4 hours  enoxaparin Injectable 40 milliGRAM(s) SubCutaneous every 24 hours  famotidine    Tablet 20 milliGRAM(s) Oral daily  influenza  Vaccine (HIGH DOSE) 0.7 milliLiter(s) IntraMuscular once  levothyroxine 88 MICROGram(s) Oral daily  methylPREDNISolone sodium succinate Injectable 40 milliGRAM(s) IV Push daily  pantoprazole    Tablet 40 milliGRAM(s) Oral two times a day  sodium chloride 0.9% with potassium chloride 40 mEq/L 1000 milliLiter(s) (75 mL/Hr) IV Continuous <Continuous>    MEDICATIONS  (PRN):  diphenhydrAMINE Injectable 25 milliGRAM(s) IV Push two times a day PRN Insomnia or pruritis  guaiFENesin Oral Liquid (Sugar-Free) 100 milliGRAM(s) Oral every 6 hours PRN Cough  HYDROmorphone  Injectable 0.25 milliGRAM(s) IV Push every 4 hours PRN Severe Pain (7 - 10)  ondansetron Injectable 4 milliGRAM(s) IV Push every 6 hours PRN Nausea and/or Vomiting  Labs K 3.3 on IV supplement   Skin: no pressure injury     Estimated Needs:   [x ] no change since previous assessment based on 142# 64.4kg 25-30kcals/kg 1610-1932kcals and 1-1.2gms protein/kg 64-77gms protein  [ ] recalculated:     Previous Nutrition Diagnosis:   [x ] Altered GI Function     Nutrition Diagnosis is [ x] ongoing  [ ] resolved [ ] not applicable     New Nutrition Diagnosis: [ x] not applicable       Interventions:   Recommend  [ ] Change Diet To:  [ ] Nutrition Supplement  [ ] Nutrition Support  [ x] Other: continue diet as ordered advance to low fiber as appropriate, consider banatrol if diarrhea continues (BID), recommend ensure clear BID      Monitoring and Evaluation:   [x ] PO intake [ x ] Tolerance to diet prescription [ x ] weights [ x ] labs[ x ] follow up per protocol  [ ] other:

## 2023-10-20 NOTE — CHART NOTE - NSCHARTNOTEFT_GEN_A_CORE
68F with colon and small bowel resection with primary anastomosis with revision x2 with recurrent SBO who initially presented for management of SBO, which is now resolved. Pt was being managed on the floor for continued vomiting, diarrhea, and concern for allergic reaction. This AM RRT was called for shortness of breath. Upon arrival to the room the patient had expiratory stridor with increased work of breathing. Oxygen saturation was 95-98%, HR 120s, systolic BP in 160s. Pt denied nausea, skin itching. No hives noted on exam and no wheezing noted on lung sounds. Pt continued to complain of throat choking sensation so pt was given 0.3mg if epi IM, started on CPAP, and given albuterol, racemic epinephrine and IV solumedrol. Pt continued to have sensation of throat tightness, and was given 0.5mg of ativan for management of vocal cord dysfunction, which did improve symptoms. CXR was clear without evidence of pneumothorax, pulmonary edema, or pneumomediastinum. During RRT vital signs remained stable, no evidence of hypotension, hives, or nausea/vomiting. Pt was transferred to ICU where CPAP was weaned off. CBC and BMP reviewed, stable from previous    Neuro: Continue weaning dilaudid per previous conversations.   Pulm: Shortness of breath with expiratory wheezing during RRT. Though there was initial concern for anaphylaxis, there was no hypotension, hives, or GI distress. Had not received any medications. Treated empirically with epi, solumedrol, however, given recent intubation for EGD, inpatient stressors and improvement with ativan I suspect there is a component of vocal cord dysfunction that was contributing to symptoms. We will consult ENT for evaluation of laryngeal edema and continue respiratory monitoring.   CV: HD stable, mild tachycardia after epi administration, continue tele monitoring.  Skin/Lines: PIV  GI: Pt being worked up for abdominal pain and GI dysmotility. Continue to titrate off of opiates. GI workup per primary team  /Renal: No active issues.  Heme/DVT PPX: lovenox for DVT ppx  Endo: Will dose steroids for 5 days, trend glucose.   ID: Observe off of abx  Ethics: Full code    Plan d/w patient and  at bedside. We will monitor respiratory status for 24 hours and if stable, will plan to transfer back to the floor tomorrow.    cc time: 45 min

## 2023-10-20 NOTE — SOCIAL WORK PROGRESS NOTE - NSSWPROGRESSNOTE_GEN_ALL_CORE
Chart reviewed, pt transferred to ICU, per MD PN pt being monitored (respiratory status) at this time. SW will remain available and continue to follow up as needed.

## 2023-10-20 NOTE — PROGRESS NOTE ADULT - ASSESSMENT
67 y/o female with hx of multiple bouts of SBO, first surgery July 2022, Renata, pt presents with diffuse abdominal pain, distension    sbo  atelectasis  acute - chr pain   multiple bouts SBO  HLD  hx of small bowel resection  gastritis  thyroid disease    s/p EGD - grossly normal  GI f/u noted  surgery f/u noted  GI PCR noted  Chart note reviewed - opioid titration in progress - taper in progress -

## 2023-10-20 NOTE — PROVIDER CONTACT NOTE (CHANGE IN STATUS NOTIFICATION) - ACTION/TREATMENT ORDERED:
Team responded Albuterol, Racepinephrine, Chest x-ray, Epinephrine, Solumedrol, Benadryl, Ativan. Pt transferred to ICU @ 8:05a.m

## 2023-10-20 NOTE — PROGRESS NOTE ADULT - SUBJECTIVE AND OBJECTIVE BOX
pt seen and examined with   pt in ICU after rapid this morning for SOB.  Workup pending at this time for cause but not evident.    pt last vomit 2 am and still diarrhea  pt still with pain  While d/w pt patient became very irritated and started yelling  unable to answer her questions due to outside my field of medicine and not surgically issues  at this time, pt's obstruction is resolved with diarrhea.     No surgical issues at this time  will not follow pt, but available when a surgical issues arises.

## 2023-10-20 NOTE — PROGRESS NOTE ADULT - SUBJECTIVE AND OBJECTIVE BOX
Name: JONNA KANG  MRN: [id number]  LOCATION: [facility] 03A    ----  Patient is a 68y old  Female who presents with a chief complaint of SBO (20 Oct 2023 05:28)      FROM ADMISSION H+P:   HPI:   69 y/o female with hx of multiple bouts of SBO, first surgery July 2022, Renata, pt presents with diffuse abdominal pain, distension     ----  INTERVAL HPI/OVERNIGHT EVENTS:   Pt was assessed during RRT and then again down in ICU3.   Interval events include:   - uncontrolled pain overnight requiring extra doses of hydromorphone. Despite the counseling which I detailed with the patient last evening (the pt took notes and we reviewed the orders), she reported that the instructions were unclear. She reported that she was unaware that an opiate taper was occurring last night and she presumed the taper would occur on Friday 10/20/23. She also reported to me that I was not clear when I reviewed the diphenhydramine orders. This writer spoke w/ the overnight hospitalist and the evening nursing supervisor multiple times in an attempt to establish clear communication with the pt.   - this morning, RRT was called for resp distress. See RRT notes for details. 0.3 epinephrine IM administered with improvement in wheezing. s/p 125mg solu-cortef IV x1, 50mg benadryl IVP x1, 0.5mg Ativan IV x1 - symptoms improving but not resolved. Started on BiPAP and moved to ICU bed3.     The pt is currently on BiPAP. The pt's respiratory rate was in high 20's and O2 sat high 90's when we entered the room. The pt is anxious and is not able to report a comprehensive or reliable ROS at this time. The ICU attending and I approached bedside together. Pt's symptoms appeared to be exacerbated by the conversation. RR increased above 30. She reports inability to cough. Pt too anxious to discuss further interventions. Will attempt again. We stepped away from bedside.     ----  PAST MEDICAL & SURGICAL HISTORY:  Hypothyroidism      HLD (hyperlipidemia)      SBO (small bowel obstruction)      S/P small bowel resection      History of facelift          FAMILY HISTORY:      Allergies    tetanus toxoid (Anaphylaxis)    Intolerances        ----  REVIEW OF SYSTEMS:  not able to obtain reliable ROS at the present time due to BiPAP and significant anxiety    ----  PHYSICAL EXAM:  GENERAL: patient appears anxious, alert, makes eye contact, sitting up in bed on BiPAP mask  ENMT: oropharynx clear without erythema, moist mucous membranes  LUNGS: reduced air entry, trace inspiratory and expiratory wheezing but also referred upper airway vocalized sound, accessory muscle use  HEART: soft S1/S2, tachy, distant heart sounds  GASTROINTESTINAL: abdomen is softly distended, hypoactive bowel sounds  MUSCULOSKELETAL: no clubbing or cyanosis, no obvious deformity  NEUROLOGIC: awake, alert, makes eye contact, follows simple commands    T(C): 36.4 (10-20-23 @ 04:09), Max: 36.7 (10-19-23 @ 12:12)  HR: 67 (10-20-23 @ 04:09) (67 - 91)  BP: 128/76 (10-20-23 @ 04:09) (105/68 - 128/76)  RR: 18 (10-20-23 @ 04:09) (17 - 18)  SpO2: 95% (10-20-23 @ 04:09) (92% - 96%)  Wt(kg): --    ----  INTAKE & OUTPUT:  I&O's Summary    19 Oct 2023 07:01  -  20 Oct 2023 07:00  --------------------------------------------------------  IN: 1640 mL / OUT: 100 mL / NET: 1540 mL        LABS:                        11.6   7.87  )-----------( 418      ( 20 Oct 2023 08:05 )             37.2     10-19    145  |  109<H>  |  5<L>  ----------------------------<  99  3.3<L>   |  31  |  0.74    Ca    9.3      19 Oct 2023 09:11        Urinalysis Basic - ( 19 Oct 2023 09:11 )    Color: x / Appearance: x / SG: x / pH: x  Gluc: 99 mg/dL / Ketone: x  / Bili: x / Urobili: x   Blood: x / Protein: x / Nitrite: x   Leuk Esterase: x / RBC: x / WBC x   Sq Epi: x / Non Sq Epi: x / Bacteria: x      CAPILLARY BLOOD GLUCOSE      POCT Blood Glucose.: 85 mg/dL (20 Oct 2023 07:29)          ----  DATA REVIEW:  Personally reviewed:  Vital sign trends: [ x ] yes    [  ] no     [  ] n/a  Laboratory results: [ x ] yes    [  ] no     [  ] n/a  Radiology results: [ x ] yes    [  ] no     [  ] n/a - sharp costophrenic angles, no consolidations, no pulm vasc congestion  Microbio results: [  ] yes    [  ] no     [ x ] n/a  Consultant recommendations: [  ] yes    [  ] no     [ x ] n/a    ----  ACTIVE MEDICATIONS:         Name: JONNA KANG  MRN: [id number]  LOCATION: [facility] ICU1 03A    ----  Patient is a 68y old  Female who presents with a chief complaint of SBO (20 Oct 2023 05:28)      FROM ADMISSION H+P:   HPI:   69 y/o female with hx of multiple bouts of SBO, first surgery July 2022, Renata, pt presents with diffuse abdominal pain, distension     ----  INTERVAL HPI/OVERNIGHT EVENTS:   Pt was assessed during RRT and then again down in ICU3.   Interval events include:   - uncontrolled pain overnight requiring extra doses of hydromorphone. Despite the counseling which I detailed with the patient last evening (the pt took notes and we reviewed the orders), she reported that the instructions were unclear. She reported that she was unaware that an opiate taper was occurring last night and she presumed the taper would occur on Friday 10/20/23. She also reported to me that I was not clear when I reviewed the diphenhydramine orders. This writer spoke w/ the overnight hospitalist and the evening nursing supervisor multiple times in an attempt to establish clear communication with the pt.   - this morning, RRT was called for resp distress. See RRT notes for details. 0.3 epinephrine IM administered with improvement in wheezing. s/p 125mg solu-cortef IV x1, 50mg benadryl IVP x1, 0.5mg Ativan IV x1 - symptoms improving but not resolved. Started on BiPAP and moved to ICU bed3.     The pt is currently on BiPAP. The pt's respiratory rate was in high 20's and O2 sat high 90's when we entered the room. The pt is anxious and is not able to report a comprehensive or reliable ROS at this time. The ICU attending and I approached bedside together. Pt's symptoms appeared to be exacerbated by the conversation. RR increased above 30. She reports inability to cough. Pt too anxious to discuss further interventions. Will attempt again. We stepped away from bedside.     ----  PAST MEDICAL & SURGICAL HISTORY:  Hypothyroidism      HLD (hyperlipidemia)      SBO (small bowel obstruction)      S/P small bowel resection      History of facelift          FAMILY HISTORY:      Allergies    tetanus toxoid (Anaphylaxis)    Intolerances        ----  REVIEW OF SYSTEMS:  not able to obtain reliable ROS at the present time due to BiPAP and significant anxiety    ----  PHYSICAL EXAM:  GENERAL: patient appears anxious, alert, makes eye contact, sitting up in bed on BiPAP mask  ENMT: oropharynx clear without erythema, moist mucous membranes  LUNGS: reduced air entry, trace inspiratory and expiratory wheezing but also referred upper airway vocalized sound, accessory muscle use  HEART: soft S1/S2, tachy, distant heart sounds  GASTROINTESTINAL: abdomen is softly distended, hypoactive bowel sounds  MUSCULOSKELETAL: no clubbing or cyanosis, no obvious deformity  NEUROLOGIC: awake, alert, makes eye contact, follows simple commands    T(C): 36.4 (10-20-23 @ 04:09), Max: 36.7 (10-19-23 @ 12:12)  HR: 67 (10-20-23 @ 04:09) (67 - 91)  BP: 128/76 (10-20-23 @ 04:09) (105/68 - 128/76)  RR: 18 (10-20-23 @ 04:09) (17 - 18)  SpO2: 95% (10-20-23 @ 04:09) (92% - 96%)  Wt(kg): --    ----  INTAKE & OUTPUT:  I&O's Summary    19 Oct 2023 07:01  -  20 Oct 2023 07:00  --------------------------------------------------------  IN: 1640 mL / OUT: 100 mL / NET: 1540 mL        LABS:                        11.6   7.87  )-----------( 418      ( 20 Oct 2023 08:05 )             37.2     10-19    145  |  109<H>  |  5<L>  ----------------------------<  99  3.3<L>   |  31  |  0.74    Ca    9.3      19 Oct 2023 09:11        Urinalysis Basic - ( 19 Oct 2023 09:11 )    Color: x / Appearance: x / SG: x / pH: x  Gluc: 99 mg/dL / Ketone: x  / Bili: x / Urobili: x   Blood: x / Protein: x / Nitrite: x   Leuk Esterase: x / RBC: x / WBC x   Sq Epi: x / Non Sq Epi: x / Bacteria: x      CAPILLARY BLOOD GLUCOSE      POCT Blood Glucose.: 85 mg/dL (20 Oct 2023 07:29)          ----  DATA REVIEW:  Personally reviewed:  Vital sign trends: [ x ] yes    [  ] no     [  ] n/a  Laboratory results: [ x ] yes    [  ] no     [  ] n/a  Radiology results: [ x ] yes    [  ] no     [  ] n/a - sharp costophrenic angles, no consolidations, no pulm vasc congestion  Microbio results: [  ] yes    [  ] no     [ x ] n/a  Consultant recommendations: [  ] yes    [  ] no     [ x ] n/a    ----  ACTIVE MEDICATIONS:  - ALLERGY - diphenhydrAMINE Injectable 50 milliGRAM(s) IV Push once  - ALLERGY - diphenhydrAMINE Injectable 25 milliGRAM(s) IV Push two times a day PRN  - ENDO - levothyroxine 88 MICROGram(s) Oral daily  - GI - famotidine    Tablet 20 milliGRAM(s) Oral daily  - GI - ondansetron Injectable 4 milliGRAM(s) IV Push every 6 hours PRN  - GI - pantoprazole    Tablet 40 milliGRAM(s) Oral two times a day  - IVF - sodium chloride 0.9% with potassium chloride 40 mEq/L 1000 milliLiter(s) IV Continuous <Continuous>  - PAIN - acetaminophen   IVPB .. 1000 milliGRAM(s) IV Intermittent every 6 hours  - PAIN - HYDROmorphone  Injectable 0.25 milliGRAM(s) IV Push every 4 hours PRN  - PSYCH - LORazepam   Injectable 0.5 milliGRAM(s) IV Push once  - VTE PPX - enoxaparin Injectable 40 milliGRAM(s) SubCutaneous every 24 hours

## 2023-10-20 NOTE — PROGRESS NOTE ADULT - SUBJECTIVE AND OBJECTIVE BOX
Date/Time Patient Seen:  		  Referring MD:   Data Reviewed	       Patient is a 68y old  Female who presents with a chief complaint of SBO (19 Oct 2023 09:51)      Subjective/HPI     PAST MEDICAL & SURGICAL HISTORY:  Hypothyroidism    HLD (hyperlipidemia)    SBO (small bowel obstruction)    History of colon resection    S/P small bowel resection    S/P small bowel resection    History of facelift          Medication list         MEDICATIONS  (STANDING):  acetaminophen   IVPB .. 1000 milliGRAM(s) IV Intermittent every 6 hours  enoxaparin Injectable 40 milliGRAM(s) SubCutaneous every 24 hours  famotidine    Tablet 20 milliGRAM(s) Oral daily  influenza  Vaccine (HIGH DOSE) 0.7 milliLiter(s) IntraMuscular once  levothyroxine 88 MICROGram(s) Oral daily  pantoprazole    Tablet 40 milliGRAM(s) Oral two times a day  sodium chloride 0.9% with potassium chloride 40 mEq/L 1000 milliLiter(s) (75 mL/Hr) IV Continuous <Continuous>    MEDICATIONS  (PRN):  diphenhydrAMINE Injectable 25 milliGRAM(s) IV Push two times a day PRN Insomnia or pruritis  HYDROmorphone  Injectable 0.25 milliGRAM(s) IV Push every 4 hours PRN Severe Pain (7 - 10)  ondansetron Injectable 4 milliGRAM(s) IV Push every 6 hours PRN Nausea and/or Vomiting         Vitals log        ICU Vital Signs Last 24 Hrs  T(C): 36.4 (20 Oct 2023 04:09), Max: 36.7 (19 Oct 2023 12:12)  T(F): 97.6 (20 Oct 2023 04:09), Max: 98.1 (19 Oct 2023 12:12)  HR: 67 (20 Oct 2023 04:09) (67 - 91)  BP: 128/76 (20 Oct 2023 04:09) (105/68 - 128/76)  BP(mean): --  ABP: --  ABP(mean): --  RR: 18 (20 Oct 2023 04:09) (17 - 18)  SpO2: 95% (20 Oct 2023 04:09) (92% - 96%)    O2 Parameters below as of 20 Oct 2023 04:09  Patient On (Oxygen Delivery Method): room air                 Input and Output:  I&O's Detail    18 Oct 2023 07:01  -  19 Oct 2023 07:00  --------------------------------------------------------  IN:    Oral Fluid: 300 mL    sodium chloride 0.45%: 1100 mL  Total IN: 1400 mL    OUT:    Voided (mL): 800 mL  Total OUT: 800 mL    Total NET: 600 mL      19 Oct 2023 07:01  -  20 Oct 2023 05:28  --------------------------------------------------------  IN:    IV PiggyBack: 100 mL    Oral Fluid: 540 mL  Total IN: 640 mL    OUT:    Emesis (mL): 100 mL  Total OUT: 100 mL    Total NET: 540 mL          Lab Data                        11.7   7.02  )-----------( 366      ( 19 Oct 2023 09:11 )             38.1     10-19    145  |  109<H>  |  5<L>  ----------------------------<  99  3.3<L>   |  31  |  0.74    Ca    9.3      19 Oct 2023 09:11              Review of Systems	      Objective     Physical Examination    heart s1s2  lung dec BS  head nc      Pertinent Lab findings & Imaging      Saurav:  NO   Adequate UO     I&O's Detail    18 Oct 2023 07:01  -  19 Oct 2023 07:00  --------------------------------------------------------  IN:    Oral Fluid: 300 mL    sodium chloride 0.45%: 1100 mL  Total IN: 1400 mL    OUT:    Voided (mL): 800 mL  Total OUT: 800 mL    Total NET: 600 mL      19 Oct 2023 07:01  -  20 Oct 2023 05:28  --------------------------------------------------------  IN:    IV PiggyBack: 100 mL    Oral Fluid: 540 mL  Total IN: 640 mL    OUT:    Emesis (mL): 100 mL  Total OUT: 100 mL    Total NET: 540 mL               Discussed with:     Cultures:	        Radiology

## 2023-10-20 NOTE — CHART NOTE - NSCHARTNOTEFT_GEN_A_CORE
Rapid response was called at 0722 for SOB.    Events leading up to Rapid Response:  Patient was seen and examined at the bedside by the rapid response team.  ___ () / ICU PA at bedside.    Rapid Response Vital Signs:    BP: 161/73  HR: 115  RR: 22  SpO2: 93% on RA  Temp:   FS: 85    Physical Exam:  Gen: respiratory distress  HEENT: NCAT, EOMI b/l  Cardio: tachycardic with regular rhythm, +s1s2, no murmurs, rubs, or gallops  Pulm: upper airway wheezing present on exam improved after epinephrine injection  Abdomen: soft, nontender, nondistended, +BS x4 quadrants, no guarding  Extremities: no cyanosis or edema, +2 pedal pulses  Neuro: AAOx3, CNII-XII intact, 5/5 strength all extremities, sensation intact  Skin: warm and dry      Assessment/Plan:   68 year old F with PMHx SBO. Rapid response called for SOB.  - Pt with SOB difficulty breathing and upper airway wheezing present on exam  - satting well on RA throughout RRT  - s/p 0.3 epinephrine IM administered with improvement in wheezing  - s/p 50mg benadryl IVP x1  - s/p 0.5mg Ativan IV x1  - pt placed on BIPAP  - transfer to ICU    - ______ likely 2/2 ______    -Discussed with Dr. Yañez, agrees with above plan  -  called x2 without response, will cont to attempt to update family  -RN to call if any changes Rapid response was called at 0722 for SOB.    Events leading up to Rapid Response:  Patient was seen and examined at the bedside by the rapid response team. Pt with complaint that she can't breathe. Pt on duohneb, Dr. Gibbs at bedside.    Rapid Response Vital Signs:    BP: 161/73  HR: 115  RR: 22  SpO2: 93% on RA  FS: 85    Physical Exam:  Gen: respiratory distress, hyperventilation, on duoneb  HEENT: NCAT, EOMI b/l  Cardio: tachycardic with regular rhythm, +s1s2, no murmurs, rubs, or gallops  Pulm: upper airway wheezing present on exam  Abdomen: soft, nontender, nondistended, no guarding  Extremities: no cyanosis or edema, +2 pedal pulses  Neuro: AAOx3, CNII-XII intact, 5/5 strength all extremities, sensation intact  Skin: warm and dry      Assessment/Plan:   68 year old F with PMHx recurrent SBO. Rapid response called for SOB.  - Pt with SOB, difficulty breathing and upper airway wheezing present on exam, pt hyperventilating  - sating well on RA  - poss anaphylactic rxn vs anxiety  - 0.3 epinephrine IM administered with improvement in wheezing  - now s/p 125mg solu-cortef IV x1, 50mg benadryl IVP x1, 0.5mg Ativan IV x1  - CXR performed, clear b/l on wet read  - pt placed on BIPAP  - pt to be transferred to ICU for further monitoring  -Discussed with Dr. Yañez, agrees with above plan  -  updated by Dr. Yañez  -RN to call if any changes Rapid response was called at 0722 for SOB.    Events leading up to Rapid Response:  Patient was seen and examined at the bedside by the rapid response team. Pt with complaint that she can't breathe. Pt on duohneb, Dr. Gibbs at bedside.    Rapid Response Vital Signs:    BP: 161/73  HR: 115  RR: 22  SpO2: 93% on RA  FS: 85    Physical Exam:  Gen: respiratory distress, hyperventilation, on duoneb  HEENT: NCAT, EOMI b/l  Cardio: tachycardic with regular rhythm, +s1s2, no murmurs, rubs, or gallops  Pulm: upper airway wheezing present on exam  Abdomen: soft, nontender, nondistended, no guarding  Extremities: no cyanosis or edema, +2 pedal pulses  Neuro: AAOx3, CNII-XII intact, 5/5 strength all extremities, sensation intact  Skin: warm and dry      Assessment/Plan:   68 year old F with PMHx recurrent SBO. Rapid response called for SOB.  - Pt with SOB, difficulty breathing and upper airway wheezing present on exam, pt hyperventilating  - sating well on RA  - poss anaphylactic rxn vs anxiety  - 0.3 epinephrine IM administered with improvement in wheezing  - now s/p 125mg solu-cortef IV x1, 50mg benadryl IVP x1, 0.5mg Ativan IV x1  - CXR performed, clear b/l on wet read  - pt placed on BIPAP  - pt to be transferred to ICU for further monitoring  -Discussed with Dr. Yañez, agrees with above plan  -  updated by Dr. Yañez  -RN to call if any changes      2hr f/u  Pt being monitored in the ICU with symptomatic improvement  - albuterol q4 standing  - solumedrol 40mg IV daily  - plan per ICU

## 2023-10-21 LAB
ALBUMIN SERPL ELPH-MCNC: 3.1 G/DL — LOW (ref 3.3–5)
ALBUMIN SERPL ELPH-MCNC: 3.1 G/DL — LOW (ref 3.3–5)
ALP SERPL-CCNC: 138 U/L — HIGH (ref 40–120)
ALP SERPL-CCNC: 138 U/L — HIGH (ref 40–120)
ALT FLD-CCNC: 56 U/L — SIGNIFICANT CHANGE UP (ref 12–78)
ALT FLD-CCNC: 56 U/L — SIGNIFICANT CHANGE UP (ref 12–78)
ANION GAP SERPL CALC-SCNC: 7 MMOL/L — SIGNIFICANT CHANGE UP (ref 5–17)
ANION GAP SERPL CALC-SCNC: 7 MMOL/L — SIGNIFICANT CHANGE UP (ref 5–17)
AST SERPL-CCNC: 32 U/L — SIGNIFICANT CHANGE UP (ref 15–37)
AST SERPL-CCNC: 32 U/L — SIGNIFICANT CHANGE UP (ref 15–37)
BASOPHILS # BLD AUTO: 0.02 K/UL — SIGNIFICANT CHANGE UP (ref 0–0.2)
BASOPHILS # BLD AUTO: 0.02 K/UL — SIGNIFICANT CHANGE UP (ref 0–0.2)
BASOPHILS NFR BLD AUTO: 0.2 % — SIGNIFICANT CHANGE UP (ref 0–2)
BASOPHILS NFR BLD AUTO: 0.2 % — SIGNIFICANT CHANGE UP (ref 0–2)
BILIRUB SERPL-MCNC: 0.4 MG/DL — SIGNIFICANT CHANGE UP (ref 0.2–1.2)
BILIRUB SERPL-MCNC: 0.4 MG/DL — SIGNIFICANT CHANGE UP (ref 0.2–1.2)
BUN SERPL-MCNC: 6 MG/DL — LOW (ref 7–23)
BUN SERPL-MCNC: 6 MG/DL — LOW (ref 7–23)
CALCIUM SERPL-MCNC: 9.5 MG/DL — SIGNIFICANT CHANGE UP (ref 8.5–10.1)
CALCIUM SERPL-MCNC: 9.5 MG/DL — SIGNIFICANT CHANGE UP (ref 8.5–10.1)
CHLORIDE SERPL-SCNC: 107 MMOL/L — SIGNIFICANT CHANGE UP (ref 96–108)
CHLORIDE SERPL-SCNC: 107 MMOL/L — SIGNIFICANT CHANGE UP (ref 96–108)
CO2 SERPL-SCNC: 29 MMOL/L — SIGNIFICANT CHANGE UP (ref 22–31)
CO2 SERPL-SCNC: 29 MMOL/L — SIGNIFICANT CHANGE UP (ref 22–31)
CREAT SERPL-MCNC: 0.69 MG/DL — SIGNIFICANT CHANGE UP (ref 0.5–1.3)
CREAT SERPL-MCNC: 0.69 MG/DL — SIGNIFICANT CHANGE UP (ref 0.5–1.3)
EGFR: 94 ML/MIN/1.73M2 — SIGNIFICANT CHANGE UP
EGFR: 94 ML/MIN/1.73M2 — SIGNIFICANT CHANGE UP
EOSINOPHIL # BLD AUTO: 0.01 K/UL — SIGNIFICANT CHANGE UP (ref 0–0.5)
EOSINOPHIL # BLD AUTO: 0.01 K/UL — SIGNIFICANT CHANGE UP (ref 0–0.5)
EOSINOPHIL NFR BLD AUTO: 0.1 % — SIGNIFICANT CHANGE UP (ref 0–6)
EOSINOPHIL NFR BLD AUTO: 0.1 % — SIGNIFICANT CHANGE UP (ref 0–6)
GLUCOSE SERPL-MCNC: 84 MG/DL — SIGNIFICANT CHANGE UP (ref 70–99)
GLUCOSE SERPL-MCNC: 84 MG/DL — SIGNIFICANT CHANGE UP (ref 70–99)
HCT VFR BLD CALC: 32.8 % — LOW (ref 34.5–45)
HCT VFR BLD CALC: 32.8 % — LOW (ref 34.5–45)
HGB BLD-MCNC: 10.2 G/DL — LOW (ref 11.5–15.5)
HGB BLD-MCNC: 10.2 G/DL — LOW (ref 11.5–15.5)
IMM GRANULOCYTES NFR BLD AUTO: 1.3 % — HIGH (ref 0–0.9)
IMM GRANULOCYTES NFR BLD AUTO: 1.3 % — HIGH (ref 0–0.9)
LYMPHOCYTES # BLD AUTO: 2.15 K/UL — SIGNIFICANT CHANGE UP (ref 1–3.3)
LYMPHOCYTES # BLD AUTO: 2.15 K/UL — SIGNIFICANT CHANGE UP (ref 1–3.3)
LYMPHOCYTES # BLD AUTO: 22.4 % — SIGNIFICANT CHANGE UP (ref 13–44)
LYMPHOCYTES # BLD AUTO: 22.4 % — SIGNIFICANT CHANGE UP (ref 13–44)
MAGNESIUM SERPL-MCNC: 2.2 MG/DL — SIGNIFICANT CHANGE UP (ref 1.6–2.6)
MAGNESIUM SERPL-MCNC: 2.2 MG/DL — SIGNIFICANT CHANGE UP (ref 1.6–2.6)
MCHC RBC-ENTMCNC: 30.2 PG — SIGNIFICANT CHANGE UP (ref 27–34)
MCHC RBC-ENTMCNC: 30.2 PG — SIGNIFICANT CHANGE UP (ref 27–34)
MCHC RBC-ENTMCNC: 31.1 GM/DL — LOW (ref 32–36)
MCHC RBC-ENTMCNC: 31.1 GM/DL — LOW (ref 32–36)
MCV RBC AUTO: 97 FL — SIGNIFICANT CHANGE UP (ref 80–100)
MCV RBC AUTO: 97 FL — SIGNIFICANT CHANGE UP (ref 80–100)
MONOCYTES # BLD AUTO: 0.97 K/UL — HIGH (ref 0–0.9)
MONOCYTES # BLD AUTO: 0.97 K/UL — HIGH (ref 0–0.9)
MONOCYTES NFR BLD AUTO: 10.1 % — SIGNIFICANT CHANGE UP (ref 2–14)
MONOCYTES NFR BLD AUTO: 10.1 % — SIGNIFICANT CHANGE UP (ref 2–14)
NEUTROPHILS # BLD AUTO: 6.32 K/UL — SIGNIFICANT CHANGE UP (ref 1.8–7.4)
NEUTROPHILS # BLD AUTO: 6.32 K/UL — SIGNIFICANT CHANGE UP (ref 1.8–7.4)
NEUTROPHILS NFR BLD AUTO: 65.9 % — SIGNIFICANT CHANGE UP (ref 43–77)
NEUTROPHILS NFR BLD AUTO: 65.9 % — SIGNIFICANT CHANGE UP (ref 43–77)
NRBC # BLD: 0 /100 WBCS — SIGNIFICANT CHANGE UP (ref 0–0)
NRBC # BLD: 0 /100 WBCS — SIGNIFICANT CHANGE UP (ref 0–0)
PHOSPHATE SERPL-MCNC: 3.8 MG/DL — SIGNIFICANT CHANGE UP (ref 2.5–4.5)
PHOSPHATE SERPL-MCNC: 3.8 MG/DL — SIGNIFICANT CHANGE UP (ref 2.5–4.5)
PLATELET # BLD AUTO: 380 K/UL — SIGNIFICANT CHANGE UP (ref 150–400)
PLATELET # BLD AUTO: 380 K/UL — SIGNIFICANT CHANGE UP (ref 150–400)
POTASSIUM SERPL-MCNC: 4.3 MMOL/L — SIGNIFICANT CHANGE UP (ref 3.5–5.3)
POTASSIUM SERPL-MCNC: 4.3 MMOL/L — SIGNIFICANT CHANGE UP (ref 3.5–5.3)
POTASSIUM SERPL-SCNC: 4.3 MMOL/L — SIGNIFICANT CHANGE UP (ref 3.5–5.3)
POTASSIUM SERPL-SCNC: 4.3 MMOL/L — SIGNIFICANT CHANGE UP (ref 3.5–5.3)
PROT SERPL-MCNC: 6.6 G/DL — SIGNIFICANT CHANGE UP (ref 6–8.3)
PROT SERPL-MCNC: 6.6 G/DL — SIGNIFICANT CHANGE UP (ref 6–8.3)
RBC # BLD: 3.38 M/UL — LOW (ref 3.8–5.2)
RBC # BLD: 3.38 M/UL — LOW (ref 3.8–5.2)
RBC # FLD: 13.2 % — SIGNIFICANT CHANGE UP (ref 10.3–14.5)
RBC # FLD: 13.2 % — SIGNIFICANT CHANGE UP (ref 10.3–14.5)
SODIUM SERPL-SCNC: 143 MMOL/L — SIGNIFICANT CHANGE UP (ref 135–145)
SODIUM SERPL-SCNC: 143 MMOL/L — SIGNIFICANT CHANGE UP (ref 135–145)
WBC # BLD: 9.59 K/UL — SIGNIFICANT CHANGE UP (ref 3.8–10.5)
WBC # BLD: 9.59 K/UL — SIGNIFICANT CHANGE UP (ref 3.8–10.5)
WBC # FLD AUTO: 9.59 K/UL — SIGNIFICANT CHANGE UP (ref 3.8–10.5)
WBC # FLD AUTO: 9.59 K/UL — SIGNIFICANT CHANGE UP (ref 3.8–10.5)

## 2023-10-21 PROCEDURE — 99233 SBSQ HOSP IP/OBS HIGH 50: CPT | Mod: GC

## 2023-10-21 RX ORDER — SODIUM CHLORIDE 9 MG/ML
1000 INJECTION INTRAMUSCULAR; INTRAVENOUS; SUBCUTANEOUS
Refills: 0 | Status: DISCONTINUED | OUTPATIENT
Start: 2023-10-21 | End: 2023-10-23

## 2023-10-21 RX ORDER — IPRATROPIUM/ALBUTEROL SULFATE 18-103MCG
3 AEROSOL WITH ADAPTER (GRAM) INHALATION
Refills: 0 | Status: DISCONTINUED | OUTPATIENT
Start: 2023-10-21 | End: 2023-10-23

## 2023-10-21 RX ORDER — DIAZEPAM 5 MG
5 TABLET ORAL DAILY
Refills: 0 | Status: DISCONTINUED | OUTPATIENT
Start: 2023-10-22 | End: 2023-10-23

## 2023-10-21 RX ORDER — SODIUM CHLORIDE 9 MG/ML
500 INJECTION, SOLUTION INTRAVENOUS ONCE
Refills: 0 | Status: COMPLETED | OUTPATIENT
Start: 2023-10-21 | End: 2023-10-21

## 2023-10-21 RX ORDER — IPRATROPIUM/ALBUTEROL SULFATE 18-103MCG
3 AEROSOL WITH ADAPTER (GRAM) INHALATION EVERY 6 HOURS
Refills: 0 | Status: DISCONTINUED | OUTPATIENT
Start: 2023-10-21 | End: 2023-10-21

## 2023-10-21 RX ORDER — ACETAMINOPHEN 500 MG
1000 TABLET ORAL ONCE
Refills: 0 | Status: COMPLETED | OUTPATIENT
Start: 2023-10-21 | End: 2023-10-21

## 2023-10-21 RX ORDER — IPRATROPIUM/ALBUTEROL SULFATE 18-103MCG
3 AEROSOL WITH ADAPTER (GRAM) INHALATION EVERY 4 HOURS
Refills: 0 | Status: DISCONTINUED | OUTPATIENT
Start: 2023-10-21 | End: 2023-10-23

## 2023-10-21 RX ORDER — IPRATROPIUM BROMIDE 0.2 MG/ML
500 SOLUTION, NON-ORAL INHALATION EVERY 6 HOURS
Refills: 0 | Status: DISCONTINUED | OUTPATIENT
Start: 2023-10-21 | End: 2023-10-21

## 2023-10-21 RX ORDER — DIAZEPAM 5 MG
5 TABLET ORAL ONCE
Refills: 0 | Status: DISCONTINUED | OUTPATIENT
Start: 2023-10-21 | End: 2023-10-21

## 2023-10-21 RX ORDER — ACETAMINOPHEN 500 MG
1000 TABLET ORAL EVERY 6 HOURS
Refills: 0 | Status: COMPLETED | OUTPATIENT
Start: 2023-10-22 | End: 2023-10-22

## 2023-10-21 RX ORDER — ALBUTEROL 90 UG/1
2 AEROSOL, METERED ORAL EVERY 6 HOURS
Refills: 0 | Status: DISCONTINUED | OUTPATIENT
Start: 2023-10-21 | End: 2023-10-21

## 2023-10-21 RX ADMIN — DEXTROSE MONOHYDRATE, SODIUM CHLORIDE, AND POTASSIUM CHLORIDE 75 MILLILITER(S): 50; .745; 4.5 INJECTION, SOLUTION INTRAVENOUS at 05:38

## 2023-10-21 RX ADMIN — Medication 400 MILLIGRAM(S): at 23:14

## 2023-10-21 RX ADMIN — FAMOTIDINE 20 MILLIGRAM(S): 10 INJECTION INTRAVENOUS at 11:32

## 2023-10-21 RX ADMIN — HYDROMORPHONE HYDROCHLORIDE 0.25 MILLIGRAM(S): 2 INJECTION INTRAMUSCULAR; INTRAVENOUS; SUBCUTANEOUS at 02:19

## 2023-10-21 RX ADMIN — Medication 100 MILLIGRAM(S): at 06:15

## 2023-10-21 RX ADMIN — Medication 500 MICROGRAM(S): at 06:15

## 2023-10-21 RX ADMIN — Medication 3 MILLILITER(S): at 19:11

## 2023-10-21 RX ADMIN — PANTOPRAZOLE SODIUM 40 MILLIGRAM(S): 20 TABLET, DELAYED RELEASE ORAL at 16:12

## 2023-10-21 RX ADMIN — Medication 3 MILLILITER(S): at 23:07

## 2023-10-21 RX ADMIN — PANTOPRAZOLE SODIUM 40 MILLIGRAM(S): 20 TABLET, DELAYED RELEASE ORAL at 05:39

## 2023-10-21 RX ADMIN — Medication 5 MILLIGRAM(S): at 10:03

## 2023-10-21 RX ADMIN — Medication 25 MILLIGRAM(S): at 10:03

## 2023-10-21 RX ADMIN — Medication 400 MILLIGRAM(S): at 17:56

## 2023-10-21 RX ADMIN — Medication 40 MILLIGRAM(S): at 05:40

## 2023-10-21 RX ADMIN — ALBUTEROL 2 PUFF(S): 90 AEROSOL, METERED ORAL at 14:19

## 2023-10-21 RX ADMIN — DEXTROSE MONOHYDRATE, SODIUM CHLORIDE, AND POTASSIUM CHLORIDE 75 MILLILITER(S): 50; .745; 4.5 INJECTION, SOLUTION INTRAVENOUS at 23:20

## 2023-10-21 RX ADMIN — Medication 25 MILLIGRAM(S): at 22:52

## 2023-10-21 RX ADMIN — ENOXAPARIN SODIUM 40 MILLIGRAM(S): 100 INJECTION SUBCUTANEOUS at 22:57

## 2023-10-21 RX ADMIN — SODIUM CHLORIDE 500 MILLILITER(S): 9 INJECTION, SOLUTION INTRAVENOUS at 01:00

## 2023-10-21 RX ADMIN — Medication 3 MILLILITER(S): at 14:43

## 2023-10-21 RX ADMIN — HYDROMORPHONE HYDROCHLORIDE 0.25 MILLIGRAM(S): 2 INJECTION INTRAMUSCULAR; INTRAVENOUS; SUBCUTANEOUS at 01:49

## 2023-10-21 RX ADMIN — Medication 88 MICROGRAM(S): at 05:39

## 2023-10-21 RX ADMIN — Medication 25 MILLIGRAM(S): at 16:12

## 2023-10-21 NOTE — PROGRESS NOTE ADULT - SUBJECTIVE AND OBJECTIVE BOX
INTERVAL HPI/OVERNIGHT EVENTS: No acute overnight events occurred.    SUBJECTIVE: Seen and examined pt at bedside. Patient appears mildly uncomfortable.  Patient reports that her cough is causing her to have chest pain.  Patient endorses mild sputum production.  Patient states she has moderate abdominal pain.  Patient reports taking valium at home when she feels she is being obstructed.  Patient states it is important that she keeps her current respiratory treatment maintained prior to being transferred to floors.  Patient is amendable and agreeable with plan and is understanding of goal to receive test on Monday.    Review of Systems:  Constitutional: No fever, chills, fatigue  Neuro: No headache, numbness, weakness  Resp: endorses cough, no wheezing, shortness of breath  CVS: endorses chest pain, no palpitations, leg swelling  GI: endorses abdominal pain, no nausea, vomiting, diarrhea   : No dysuria, frequency, incontinence  Skin: No itching, burning, rashes, or lesions   Msk: No joint pain or swelling  Psych: No depression, anxiety, mood swings    ICU Vital Signs Last 24 Hrs  T(C): 37.1 (21 Oct 2023 07:51), Max: 37.1 (21 Oct 2023 07:51)  T(F): 98.8 (21 Oct 2023 07:51), Max: 98.8 (21 Oct 2023 07:51)  HR: 94 (21 Oct 2023 09:00) (68 - 120)  BP: 138/78 (21 Oct 2023 09:00) (87/49 - 140/82)  BP(mean): 101 (21 Oct 2023 09:00) (64 - 106)  ABP: --  ABP(mean): --  RR: 18 (21 Oct 2023 09:00) (15 - 30)  SpO2: 96% (21 Oct 2023 09:00) (93% - 100%)    O2 Parameters below as of 21 Oct 2023 06:17  Patient On (Oxygen Delivery Method): room air              10-20-23 @ 07:01  -  10-21-23 @ 07:00  --------------------------------------------------------  IN: 2135 mL / OUT: 0 mL / NET: 2135 mL    10-21-23 @ 07:01  -  10-21-23 @ 09:46  --------------------------------------------------------  IN: 250 mL / OUT: 0 mL / NET: 250 mL        CAPILLARY BLOOD GLUCOSE      POCT Blood Glucose.: 85 mg/dL (20 Oct 2023 07:29)      I&O's Summary    20 Oct 2023 07:01  -  21 Oct 2023 07:00  --------------------------------------------------------  IN: 2135 mL / OUT: 0 mL / NET: 2135 mL    21 Oct 2023 07:01  -  21 Oct 2023 09:46  --------------------------------------------------------  IN: 250 mL / OUT: 0 mL / NET: 250 mL        PHYSICAL EXAM:  Gen: mildly uncomfortable  Neuro: AOx3  HEENT: atraumatic  Resp: lungs CTA  Cardio: chest wall tender to palpation, normal S1/S2  Abd: soft, tender to palpation  Ext: no gross deformities  Skin: good skin turgor    Meds:      levothyroxine Oral    albuterol    90 MICROgram(s) HFA Inhaler Inhalation  diphenhydrAMINE Injectable IV Push PRN  guaiFENesin Oral Liquid (Sugar-Free) Oral PRN    diazepam    Tablet Oral  ondansetron Injectable IV Push PRN      enoxaparin Injectable SubCutaneous    famotidine    Tablet Oral  pantoprazole    Tablet Oral      sodium chloride 0.9% with potassium chloride 40 mEq/L IV Continuous    influenza  Vaccine (HIGH DOSE) IntraMuscular                                10.2   9.59  )-----------( 380      ( 21 Oct 2023 05:45 )             32.8       10-21    143  |  107  |  6<L>  ----------------------------<  84  4.3   |  29  |  0.69    Ca    9.5      21 Oct 2023 05:45  Phos  3.8     10-21  Mg     2.2     10-21    TPro  6.6  /  Alb  3.1<L>  /  TBili  0.4  /  DBili  x   /  AST  32  /  ALT  56  /  AlkPhos  138<H>  10-21            Urinalysis Basic - ( 21 Oct 2023 05:45 )    Color: x / Appearance: x / SG: x / pH: x  Gluc: 84 mg/dL / Ketone: x  / Bili: x / Urobili: x   Blood: x / Protein: x / Nitrite: x   Leuk Esterase: x / RBC: x / WBC x   Sq Epi: x / Non Sq Epi: x / Bacteria: x                  Radiology: CXR: No active parenchymal disease in the chest.    Bedside Ultrasound: N/A    Tubes/Lines: PIV      GLOBAL ISSUE/BEST PRACTICE:  Analgesia: N  Sedation:N  HOB elevation: Y  Stress ulcer prophylaxis:N  VTE prophylaxis:Y  Glycemic control: N  Nutrition:Y    CODE STATUS:   Full code

## 2023-10-21 NOTE — PROGRESS NOTE ADULT - ASSESSMENT
68F with colon and small bowel resection with primary anastomosis with revision x2 with recurrent SBO who initially presented for management of SBO, admitted to ICU for monitoring of airway.    # Reactive airway disease vs. vocal cord dysfunction     Neuro:   Continue weaning dilaudid per previous conversations.     Pulm: Shortness of breath with expiratory wheezing during RRT. Though there was initial concern for anaphylaxis, there was no hypotension, hives, or GI distress. Had not received any medications. Treated empirically with epi, solumedrol, however, given recent intubation for EGD, inpatient stressors and improvement with ativan I suspect there is a component of vocal cord dysfunction that was contributing to symptoms. We will consult ENT for evaluation of laryngeal edema and continue respiratory monitoring.     CV: HD stable, mild tachycardia after epi administration, continue tele monitoring.      GI: Pt being worked up for abdominal pain and GI dysmotility. Continue to titrate off of opiates. GI workup per primary team    /Renal: No active issues.    Heme/DVT PPX: lovenox for DVT ppx    Endo: Will dose steroids for 5 days, trend glucose.     ID: Observe off of abx    Ethics: Full code 68F with colon and small bowel resection with primary anastomosis with revision x2 with recurrent SBO who initially presented for management of SBO, admitted to ICU for monitoring of airway.    # Reactive airway disease vs. vocal cord dysfunction     Neuro:   AOx3  Weaned off dilaudid  s/p Valium 5mg x1, takes medication at home for abdominal pain  Valium 5 mg PRN ordered  Benadryl PRN ordered      Pulm:   Shortness of breath with expiratory wheezing during RRT, unlikely anaphylaxis  ENT consulted to r/o laryngeal edema  Chest Xray shows no acute process  Albuterol standing q6h  Ipratropium standing q6h  Maintain SpO2>92%    CV:   HD stable,   continue monitoring  Maintain MAP>65      GI:  Titrated off opioids  Full Liquid Diet  As per medicine team, plan for gastric emptying study Monday    /Renal:   No active issues.    Heme/DVT PPX:   lovenox for DVT ppx    Endo:   Will dose steroids for 5 days, trend glucose.   Levothyroxine ordered    ID:   Observe off of abx    Ethics: Full code

## 2023-10-21 NOTE — PROGRESS NOTE ADULT - SUBJECTIVE AND OBJECTIVE BOX
Patient is a 68y old  Female who presents with a chief complaint of SBO (20 Oct 2023 10:49)      INTERVAL HPI/OVERNIGHT EVENTS:     T(C): 37.1 (10-21-23 @ 07:51), Max: 37.1 (10-21-23 @ 07:51)  HR: 94 (10-21-23 @ 09:00) (68 - 120)  BP: 138/78 (10-21-23 @ 09:00) (87/49 - 140/82)  RR: 18 (10-21-23 @ 09:00) (15 - 30)  SpO2: 96% (10-21-23 @ 09:00) (93% - 100%)  Wt(kg): --  I&O's Summary    20 Oct 2023 07:01  -  21 Oct 2023 07:00  --------------------------------------------------------  IN: 2135 mL / OUT: 0 mL / NET: 2135 mL    21 Oct 2023 07:01  -  21 Oct 2023 09:43  --------------------------------------------------------  IN: 250 mL / OUT: 0 mL / NET: 250 mL        REVIEW OF SYSTEMS:  CONSTITUTIONAL: No fever, weight loss, or fatigue  EYES: No eye pain, visual disturbances, or discharge  ENMT:  No difficulty hearing, tinnitus, vertigo; No sinus or throat pain  NECK: No pain or stiffness  BREASTS: No pain, no masses  RESPIRATORY: No cough, wheezing, chills or hemoptysis; No shortness of breath  CARDIOVASCULAR: No chest pain, palpitations, dizziness, or leg swelling  GASTROINTESTINAL: No abdominal or epigastric pain. No nausea, vomiting, or hematemesis; No diarrhea or constipation. No melena or hematochezia.  GENITOURINARY: No dysuria, frequency, hematuria, or incontinence  NEUROLOGICAL: No headaches, memory loss, loss of strength, numbness, or tremors  SKIN: No itching, burning, rashes, or lesions   MUSCULOSKELETAL: No joint pain or swelling; No muscle, back, or extremity pain    PHYSICAL EXAM:  GENERAL: NAD, well-groomed, well-developed  HEAD:  Atraumatic, Normocephalic  EYES: EOMI, PERRLA, conjunctiva and sclera clear  ENMT: No tonsillar erythema, exudates, or enlargement; Moist mucous membranes  NECK: Supple, No JVD  NERVOUS SYSTEM:  Alert & Oriented X3; Motor Strength 5/5 B/L upper and lower extremities; DTRs 2+ intact and symmetric  CHEST/LUNG: Clear to percussion bilaterally; No rales, rhonchi, wheezing, or rubs  HEART: Regular rate and rhythm; No murmurs, rubs, or gallops  ABDOMEN: Soft, Nontender, Nondistended; Bowel sounds present  EXTREMITIES:  2+ Peripheral Pulses, No clubbing, cyanosis, or edema  SKIN: No rashes or lesions    MEDICATIONS  (STANDING):  albuterol    90 MICROgram(s) HFA Inhaler 2 Puff(s) Inhalation every 6 hours  diazepam    Tablet 5 milliGRAM(s) Oral once  enoxaparin Injectable 40 milliGRAM(s) SubCutaneous every 24 hours  famotidine    Tablet 20 milliGRAM(s) Oral daily  influenza  Vaccine (HIGH DOSE) 0.7 milliLiter(s) IntraMuscular once  levothyroxine 88 MICROGram(s) Oral daily  pantoprazole    Tablet 40 milliGRAM(s) Oral two times a day  sodium chloride 0.9% with potassium chloride 40 mEq/L 1000 milliLiter(s) (75 mL/Hr) IV Continuous <Continuous>    MEDICATIONS  (PRN):  diphenhydrAMINE Injectable 25 milliGRAM(s) IV Push every 6 hours PRN Insomnia or pruritis  guaiFENesin Oral Liquid (Sugar-Free) 100 milliGRAM(s) Oral every 6 hours PRN Cough  ondansetron Injectable 4 milliGRAM(s) IV Push every 6 hours PRN Nausea and/or Vomiting      LABS:                        10.2   9.59  )-----------( 380      ( 21 Oct 2023 05:45 )             32.8     10-21    143  |  107  |  6<L>  ----------------------------<  84  4.3   |  29  |  0.69    Ca    9.5      21 Oct 2023 05:45  Phos  3.8     10-21  Mg     2.2     10-21    TPro  6.6  /  Alb  3.1<L>  /  TBili  0.4  /  DBili  x   /  AST  32  /  ALT  56  /  AlkPhos  138<H>  10-21      Urinalysis Basic - ( 21 Oct 2023 05:45 )    Color: x / Appearance: x / SG: x / pH: x  Gluc: 84 mg/dL / Ketone: x  / Bili: x / Urobili: x   Blood: x / Protein: x / Nitrite: x   Leuk Esterase: x / RBC: x / WBC x   Sq Epi: x / Non Sq Epi: x / Bacteria: x      CAPILLARY BLOOD GLUCOSE                  RADIOLOGY & ADDITIONAL TESTS:    Imaging Personally Reviewed:       Advance Directives:      Palliative Care:  Appropriate     Patient is a 68y old  Female who presents with a chief complaint of SBO (20 Oct 2023 10:49)    pt seen and examine today alert awake , no fever , on full liquid diet , no fever .    pulse ox 97  INTERVAL HPI/OVERNIGHT EVENTS:     T(C): 37.1 (10-21-23 @ 07:51), Max: 37.1 (10-21-23 @ 07:51)  HR: 94 (10-21-23 @ 09:00) (68 - 120)  BP: 138/78 (10-21-23 @ 09:00) (87/49 - 140/82)  RR: 18 (10-21-23 @ 09:00) (15 - 30)  SpO2: 96% (10-21-23 @ 09:00) (93% - 100%)  Wt(kg): --  I&O's Summary    20 Oct 2023 07:01  -  21 Oct 2023 07:00  --------------------------------------------------------  IN: 2135 mL / OUT: 0 mL / NET: 2135 mL    21 Oct 2023 07:01  -  21 Oct 2023 09:43  --------------------------------------------------------  IN: 250 mL / OUT: 0 mL / NET: 250 mL        REVIEW OF SYSTEMS:  CONSTITUTIONAL: No fever, weight loss, or fatigue  EYES: No eye pain, visual disturbances, or discharge  ENMT:  No difficulty hearing, tinnitus, vertigo; No sinus or throat pain  NECK: No pain or stiffness  BREASTS: No pain, no masses  RESPIRATORY: No cough, wheezing, chills or hemoptysis; No shortness of breath  CARDIOVASCULAR: No chest pain, palpitations, dizziness, or leg swelling  GASTROINTESTINAL: No abdominal or epigastric pain. No nausea, vomiting,  No diarrhea or constipation.  GENITOURINARY: No dysuria, frequency, hematuria, or incontinence  NEUROLOGICAL: No headaches, memory loss, loss of strength, numbness, or tremors  SKIN: No itching, burning, rashes, or lesions   MUSCULOSKELETAL: No joint pain or swelling; No muscle, back, or extremity pain    PHYSICAL EXAM:  GENERAL: NAD,   HEAD:  Atraumatic, Normocephalic  EYES: EOMI, PERRLA, conjunctiva and sclera clear  ENMT: No tonsillar erythema, exudates, or enlargement; Moist mucous membranes  NECK: Supple, No JVD  NERVOUS SYSTEM:  Alert & Oriented X3; Motor Strength 5/5 B/L upper and lower extremities; DTRs 2+ intact and symmetric  CHEST/LUNG percussion bilaterally; No rales, rhonchi, wheezing,   HEART: Regular rate and rhythm; No murmurs, no tachy   ABDOMEN: Soft, Nontender, Nondistended; Bowel sounds present  EXTREMITIES:  2+ Peripheral Pulses, No clubbing, cyanosis, or edema  SKIN: No rashes or lesions    MEDICATIONS  (STANDING):  albuterol    90 MICROgram(s) HFA Inhaler 2 Puff(s) Inhalation every 6 hours  diazepam    Tablet 5 milliGRAM(s) Oral once  enoxaparin Injectable 40 milliGRAM(s) SubCutaneous every 24 hours  famotidine    Tablet 20 milliGRAM(s) Oral daily  influenza  Vaccine (HIGH DOSE) 0.7 milliLiter(s) IntraMuscular once  levothyroxine 88 MICROGram(s) Oral daily  pantoprazole    Tablet 40 milliGRAM(s) Oral two times a day  sodium chloride 0.9% with potassium chloride 40 mEq/L 1000 milliLiter(s) (75 mL/Hr) IV Continuous <Continuous>    MEDICATIONS  (PRN):  diphenhydrAMINE Injectable 25 milliGRAM(s) IV Push every 6 hours PRN Insomnia or pruritis  guaiFENesin Oral Liquid (Sugar-Free) 100 milliGRAM(s) Oral every 6 hours PRN Cough  ondansetron Injectable 4 milliGRAM(s) IV Push every 6 hours PRN Nausea and/or Vomiting      LABS:                        10.2   9.59  )-----------( 380      ( 21 Oct 2023 05:45 )             32.8     10-21    143  |  107  |  6<L>  ----------------------------<  84  4.3   |  29  |  0.69    Ca    9.5      21 Oct 2023 05:45  Phos  3.8     10-21  Mg     2.2     10-21    TPro  6.6  /  Alb  3.1<L>  /  TBili  0.4  /  DBili  x   /  AST  32  /  ALT  56  /  AlkPhos  138<H>  10-21      Urinalysis Basic - ( 21 Oct 2023 05:45 )    Color: x / Appearance: x / SG: x / pH: x  Gluc: 84 mg/dL / Ketone: x  / Bili: x / Urobili: x   Blood: x / Protein: x / Nitrite: x   Leuk Esterase: x / RBC: x / WBC x   Sq Epi: x / Non Sq Epi: x / Bacteria: x                      RADIOLOGY & ADDITIONAL TESTS:    Imaging Personally Reviewed:     no new test

## 2023-10-21 NOTE — CHART NOTE - NSCHARTNOTEFT_GEN_A_CORE
Search Terms: Kia Curry, 1955Search Date: 10/21/2023 07:37:00 AM  The Drug Utilization Report below displays all of the controlled substance prescriptions, if any, that your patient has filled in the last twelve months. The information displayed on this report is compiled from pharmacy submissions to the Department, and accurately reflects the information as submitted by the pharmacies.    This report was requested by: Yuliya Escobar | Reference #: [id number]    Practitioner Count: 2  Pharmacy Count: 1  Current Opioid Prescriptions: 0  Current Benzodiazepine Prescriptions: 1  Current Stimulant Prescriptions: 0      Patient Demographic Information (PDI)       PDI	First Name	Last Name	Birth Date	Gender	Street Address	[facility]	Zip Code  A	Kia Ferraro	1955	Female	[address]	[place]	[address]  B	Kia Herrmann	1955	Female	[address] [place]	[address]    Prescription Information      PDI Filter:    PDI	My Rx	Current Rx	Drug Type	Rx Written	Rx Dispensed	Drug	Quantity	Days Supply	Prescriber Name	Prescriber JANET #	Payment Method	Dispenser  A	N	N	S	06/15/2023	06/16/2023	phentermine hcl powder *	1gm	30	Yelling, Hubert	[id number]	[facility]  A	N	N	S	05/19/2023	05/22/2023	phentermine hcl powder *	1gm	30	Yelling, Hubert	[id number]	[facility]  A	N	N	S	04/17/2023	04/18/2023	phentermine hcl powder *	1gm	30	Yelling, Hubert	[id number]	[facility]  A	N	N	S	03/22/2023	03/22/2023	phentermine hcl powder *	1gm	30	Yelling, Hubert	[id number]	[facility]  B	N	Y	B	10/02/2023	10/02/2023	diazepam 5 mg tablet	60	30	Anshu Méndez MD	[id number]	Medicare	[id number]  B	N	N	O	08/09/2023	08/16/2023	hydrocodone-acetaminophen 5-325 mg tablet	20	2	Juan Wong	[id number]	Medicare	[id number]  B	N	N	B	08/09/2023	08/16/2023	diazepam 5 mg tablet	4	1	Juan Wong	[id number]	Medicare	[id number]  B	N	N	B	07/11/2023	07/12/2023	alprazolam 0.25 mg tablet	60	30	Anshu Méndez MD	[id number]	Medicare	[id number]  B	N	N	O	01/27/2023	01/28/2023	acetaminophen-cod #3 tablet	20	5	Triston Pradora	[id number]	Medicare	[id number]  B	N	N	O	01/25/2023	01/26/2023	acetaminophen-cod #3 tablet	6	1	Starla Oneil	[id number]	Medicare	[id number]  B	N	N	O	01/18/2023	01/18/2023	oxycodone hcl (ir) 5 mg tablet	15	3	Starla Oneil	[id number]	Medicare	[id number]  B	N	N	O	01/14/2023	01/14/2023	oxycodone hcl (ir) 5 mg tablet	20	3	[facility]	[id number]	Medicare	[id number]  B	N	N	B	12/30/2022	12/31/2022	alprazolam 0.25 mg tablet	60	30	Anshu Méndez MD	[id number]	Medicare	[id number]  B	N	N	O	12/28/2022	12/28/2022	oxycodone hcl (ir) 5 mg tablet	8	2	Lucy Adamson NP	[id number]	Medicare	[id number]

## 2023-10-21 NOTE — PROGRESS NOTE ADULT - ASSESSMENT
68F with PMH colon resection, SBR with primary anastomosis with revision x 2.  Admitted to surgery for SBO. Medicine consulted for continued vomiting/diarrhea, allergic rxn, and home medication management.       #stridor, wheezing, respiratory distress - unclear definitive etiology: possible anaphylaxis  - s/p epi, solu-medrol, benadryl, ativan IV - symptoms somewhat improved  - cont bipap but plan to wean O2 requirements quickly pending course  - upgraded to MICU for now    #recurrent SBO, resolved  #intractable nausea and vomiting, persistent  - gastroparesis vs other motility d/o remain in differential  - planning for gastric emptying study Monday 10/23/23 - but interval events likely will delay the assessment  - on standing doses of acetaminophen  - avoid all drugs that affect bowel motility  - reviewed med list from [place] with the patient on 10/19/23  - mostly tolerating FLD until this morning    #hypokalemia  - added K to IVF    #insomnia  - was on benadryl    #hypothyroidism  - cont levothyroxine    #GERD  - pepcid/ppi    #VTE ppx  - lovenox      further updates to follow pending course 68F with PMH colon resection, SBR with primary anastomosis with revision x 2.  Admitted to surgery for SBO. Medicine consulted for continued vomiting/diarrhea, allergic rxn, and home medication management.       #stridor, wheezing, respiratory distress - unclear definitive etiology: possible anaphylaxis  - s/p epi, solu-medrol,  for 2/  5 days  benadryl,   valium 5 mg once  day prn [ istop by icu  pt take 5 mg po bid as needed in home ] - symptoms improved  - s/p  bipap wean  to  nc   - transfer to floor     #recurrent SBO, resolved but  intractable nausea and vomiting, persistent  - gastroparesis vs other motility d/o remain in differential ,   s/p EGD by gi dr rosales  -normal esophagus, biopsied r/o eosinophilic esophagitis  mild gastritis, biopsied both antrum and body  - planning for gastric emptying study Monday 10/23/23 - but interval events likely will delay the assessment  - on standing doses of acetaminophen  - avoid all drugs that affect bowel motility no opioids   - reviewed med list from [place] with the patient on 10/19/23  - tolerating Full liquid diet     #hypokalemia  - added K to IVF-repeat k wnl    #insomnia  - on benadryl prn     #hypothyroidism  - cont levothyroxine    #GERD  - pepcid/ppi    #VTE ppx  - lovenox

## 2023-10-21 NOTE — PROGRESS NOTE ADULT - ATTENDING COMMENTS
68F with colon and small bowel resection with primary anastomosis with revision x2 with recurrent SBO who initially presented for management of SBO, which is now resolved. Pt was being managed on the floor for continued vomiting, diarrhea, and concern for allergic reaction. Pt had RRT yesterday for SOB which is likely 2/2 bronchospasm vs vocal chord dysfunction      Neuro: Now off of opiates prior to gastric emptying study. Requesting dose of valium- reviewed I stop and rx valium 5mg PO. Continue valium 5mg PO daily prn.  Pulm: Pt seen by ENT, no evidence of laryngeal edema. Continue prn albuterol and solumedrol to complete 5 day course  CV: HD stable  Skin/Lines: PIV  GI: Pt being worked up for abdominal pain and GI dysmotility. GI workup per primary team  /Renal: No active issues.  Heme/DVT PPX: lovenox for DVT ppx  Endo: Will dose steroids for 5 days, trend glucose.   ID: Observe off of abx  Ethics: Full code    Pt stable for transfer to floor today

## 2023-10-22 LAB
ALBUMIN SERPL ELPH-MCNC: 3.6 G/DL — SIGNIFICANT CHANGE UP (ref 3.3–5)
ALBUMIN SERPL ELPH-MCNC: 3.6 G/DL — SIGNIFICANT CHANGE UP (ref 3.3–5)
ALP SERPL-CCNC: 152 U/L — HIGH (ref 40–120)
ALP SERPL-CCNC: 152 U/L — HIGH (ref 40–120)
ALT FLD-CCNC: 52 U/L — SIGNIFICANT CHANGE UP (ref 12–78)
ALT FLD-CCNC: 52 U/L — SIGNIFICANT CHANGE UP (ref 12–78)
ANION GAP SERPL CALC-SCNC: 13 MMOL/L — SIGNIFICANT CHANGE UP (ref 5–17)
ANION GAP SERPL CALC-SCNC: 13 MMOL/L — SIGNIFICANT CHANGE UP (ref 5–17)
AST SERPL-CCNC: 22 U/L — SIGNIFICANT CHANGE UP (ref 15–37)
AST SERPL-CCNC: 22 U/L — SIGNIFICANT CHANGE UP (ref 15–37)
BASOPHILS # BLD AUTO: 0.08 K/UL — SIGNIFICANT CHANGE UP (ref 0–0.2)
BASOPHILS # BLD AUTO: 0.08 K/UL — SIGNIFICANT CHANGE UP (ref 0–0.2)
BASOPHILS NFR BLD AUTO: 0.8 % — SIGNIFICANT CHANGE UP (ref 0–2)
BASOPHILS NFR BLD AUTO: 0.8 % — SIGNIFICANT CHANGE UP (ref 0–2)
BILIRUB SERPL-MCNC: 0.4 MG/DL — SIGNIFICANT CHANGE UP (ref 0.2–1.2)
BILIRUB SERPL-MCNC: 0.4 MG/DL — SIGNIFICANT CHANGE UP (ref 0.2–1.2)
BUN SERPL-MCNC: 10 MG/DL — SIGNIFICANT CHANGE UP (ref 7–23)
BUN SERPL-MCNC: 10 MG/DL — SIGNIFICANT CHANGE UP (ref 7–23)
CALCIUM SERPL-MCNC: 9.7 MG/DL — SIGNIFICANT CHANGE UP (ref 8.5–10.1)
CALCIUM SERPL-MCNC: 9.7 MG/DL — SIGNIFICANT CHANGE UP (ref 8.5–10.1)
CHLORIDE SERPL-SCNC: 109 MMOL/L — HIGH (ref 96–108)
CHLORIDE SERPL-SCNC: 109 MMOL/L — HIGH (ref 96–108)
CO2 SERPL-SCNC: 22 MMOL/L — SIGNIFICANT CHANGE UP (ref 22–31)
CO2 SERPL-SCNC: 22 MMOL/L — SIGNIFICANT CHANGE UP (ref 22–31)
CREAT SERPL-MCNC: 1 MG/DL — SIGNIFICANT CHANGE UP (ref 0.5–1.3)
CREAT SERPL-MCNC: 1 MG/DL — SIGNIFICANT CHANGE UP (ref 0.5–1.3)
EGFR: 61 ML/MIN/1.73M2 — SIGNIFICANT CHANGE UP
EGFR: 61 ML/MIN/1.73M2 — SIGNIFICANT CHANGE UP
EOSINOPHIL # BLD AUTO: 0.02 K/UL — SIGNIFICANT CHANGE UP (ref 0–0.5)
EOSINOPHIL # BLD AUTO: 0.02 K/UL — SIGNIFICANT CHANGE UP (ref 0–0.5)
EOSINOPHIL NFR BLD AUTO: 0.2 % — SIGNIFICANT CHANGE UP (ref 0–6)
EOSINOPHIL NFR BLD AUTO: 0.2 % — SIGNIFICANT CHANGE UP (ref 0–6)
GLUCOSE SERPL-MCNC: 130 MG/DL — HIGH (ref 70–99)
GLUCOSE SERPL-MCNC: 130 MG/DL — HIGH (ref 70–99)
HCT VFR BLD CALC: 41.1 % — SIGNIFICANT CHANGE UP (ref 34.5–45)
HCT VFR BLD CALC: 41.1 % — SIGNIFICANT CHANGE UP (ref 34.5–45)
HGB BLD-MCNC: 12.3 G/DL — SIGNIFICANT CHANGE UP (ref 11.5–15.5)
HGB BLD-MCNC: 12.3 G/DL — SIGNIFICANT CHANGE UP (ref 11.5–15.5)
IMM GRANULOCYTES NFR BLD AUTO: 2.4 % — HIGH (ref 0–0.9)
IMM GRANULOCYTES NFR BLD AUTO: 2.4 % — HIGH (ref 0–0.9)
LYMPHOCYTES # BLD AUTO: 1.66 K/UL — SIGNIFICANT CHANGE UP (ref 1–3.3)
LYMPHOCYTES # BLD AUTO: 1.66 K/UL — SIGNIFICANT CHANGE UP (ref 1–3.3)
LYMPHOCYTES # BLD AUTO: 16.8 % — SIGNIFICANT CHANGE UP (ref 13–44)
LYMPHOCYTES # BLD AUTO: 16.8 % — SIGNIFICANT CHANGE UP (ref 13–44)
MAGNESIUM SERPL-MCNC: 2.1 MG/DL — SIGNIFICANT CHANGE UP (ref 1.6–2.6)
MAGNESIUM SERPL-MCNC: 2.1 MG/DL — SIGNIFICANT CHANGE UP (ref 1.6–2.6)
MCHC RBC-ENTMCNC: 29.9 GM/DL — LOW (ref 32–36)
MCHC RBC-ENTMCNC: 29.9 GM/DL — LOW (ref 32–36)
MCHC RBC-ENTMCNC: 30.1 PG — SIGNIFICANT CHANGE UP (ref 27–34)
MCHC RBC-ENTMCNC: 30.1 PG — SIGNIFICANT CHANGE UP (ref 27–34)
MCV RBC AUTO: 100.5 FL — HIGH (ref 80–100)
MCV RBC AUTO: 100.5 FL — HIGH (ref 80–100)
MONOCYTES # BLD AUTO: 0.31 K/UL — SIGNIFICANT CHANGE UP (ref 0–0.9)
MONOCYTES # BLD AUTO: 0.31 K/UL — SIGNIFICANT CHANGE UP (ref 0–0.9)
MONOCYTES NFR BLD AUTO: 3.1 % — SIGNIFICANT CHANGE UP (ref 2–14)
MONOCYTES NFR BLD AUTO: 3.1 % — SIGNIFICANT CHANGE UP (ref 2–14)
NEUTROPHILS # BLD AUTO: 7.57 K/UL — HIGH (ref 1.8–7.4)
NEUTROPHILS # BLD AUTO: 7.57 K/UL — HIGH (ref 1.8–7.4)
NEUTROPHILS NFR BLD AUTO: 76.7 % — SIGNIFICANT CHANGE UP (ref 43–77)
NEUTROPHILS NFR BLD AUTO: 76.7 % — SIGNIFICANT CHANGE UP (ref 43–77)
NRBC # BLD: 0 /100 WBCS — SIGNIFICANT CHANGE UP (ref 0–0)
NRBC # BLD: 0 /100 WBCS — SIGNIFICANT CHANGE UP (ref 0–0)
PHOSPHATE SERPL-MCNC: 3.1 MG/DL — SIGNIFICANT CHANGE UP (ref 2.5–4.5)
PHOSPHATE SERPL-MCNC: 3.1 MG/DL — SIGNIFICANT CHANGE UP (ref 2.5–4.5)
PLATELET # BLD AUTO: 465 K/UL — HIGH (ref 150–400)
PLATELET # BLD AUTO: 465 K/UL — HIGH (ref 150–400)
POTASSIUM SERPL-MCNC: 3.3 MMOL/L — LOW (ref 3.5–5.3)
POTASSIUM SERPL-MCNC: 3.3 MMOL/L — LOW (ref 3.5–5.3)
POTASSIUM SERPL-SCNC: 3.3 MMOL/L — LOW (ref 3.5–5.3)
POTASSIUM SERPL-SCNC: 3.3 MMOL/L — LOW (ref 3.5–5.3)
PROT SERPL-MCNC: 8 G/DL — SIGNIFICANT CHANGE UP (ref 6–8.3)
PROT SERPL-MCNC: 8 G/DL — SIGNIFICANT CHANGE UP (ref 6–8.3)
RBC # BLD: 4.09 M/UL — SIGNIFICANT CHANGE UP (ref 3.8–5.2)
RBC # BLD: 4.09 M/UL — SIGNIFICANT CHANGE UP (ref 3.8–5.2)
RBC # FLD: 13.4 % — SIGNIFICANT CHANGE UP (ref 10.3–14.5)
RBC # FLD: 13.4 % — SIGNIFICANT CHANGE UP (ref 10.3–14.5)
SODIUM SERPL-SCNC: 144 MMOL/L — SIGNIFICANT CHANGE UP (ref 135–145)
SODIUM SERPL-SCNC: 144 MMOL/L — SIGNIFICANT CHANGE UP (ref 135–145)
WBC # BLD: 9.88 K/UL — SIGNIFICANT CHANGE UP (ref 3.8–10.5)
WBC # BLD: 9.88 K/UL — SIGNIFICANT CHANGE UP (ref 3.8–10.5)
WBC # FLD AUTO: 9.88 K/UL — SIGNIFICANT CHANGE UP (ref 3.8–10.5)
WBC # FLD AUTO: 9.88 K/UL — SIGNIFICANT CHANGE UP (ref 3.8–10.5)

## 2023-10-22 PROCEDURE — 99233 SBSQ HOSP IP/OBS HIGH 50: CPT | Mod: GC

## 2023-10-22 RX ORDER — DIPHENHYDRAMINE HCL 50 MG
25 CAPSULE ORAL ONCE
Refills: 0 | Status: COMPLETED | OUTPATIENT
Start: 2023-10-22 | End: 2023-10-22

## 2023-10-22 RX ORDER — DIPHENHYDRAMINE HCL 50 MG
25 CAPSULE ORAL ONCE
Refills: 0 | Status: DISCONTINUED | OUTPATIENT
Start: 2023-10-22 | End: 2023-10-22

## 2023-10-22 RX ORDER — POLYETHYLENE GLYCOL 3350 17 G/17G
17 POWDER, FOR SOLUTION ORAL DAILY
Refills: 0 | Status: DISCONTINUED | OUTPATIENT
Start: 2023-10-22 | End: 2023-10-23

## 2023-10-22 RX ORDER — BACITRACIN ZINC 500 UNIT/G
1 OINTMENT IN PACKET (EA) TOPICAL ONCE
Refills: 0 | Status: COMPLETED | OUTPATIENT
Start: 2023-10-22 | End: 2023-10-22

## 2023-10-22 RX ADMIN — Medication 400 MILLIGRAM(S): at 13:06

## 2023-10-22 RX ADMIN — ENOXAPARIN SODIUM 40 MILLIGRAM(S): 100 INJECTION SUBCUTANEOUS at 23:34

## 2023-10-22 RX ADMIN — PANTOPRAZOLE SODIUM 40 MILLIGRAM(S): 20 TABLET, DELAYED RELEASE ORAL at 07:05

## 2023-10-22 RX ADMIN — Medication 25 MILLIGRAM(S): at 13:12

## 2023-10-22 RX ADMIN — Medication 88 MICROGRAM(S): at 07:06

## 2023-10-22 RX ADMIN — Medication 400 MILLIGRAM(S): at 07:05

## 2023-10-22 RX ADMIN — FAMOTIDINE 20 MILLIGRAM(S): 10 INJECTION INTRAVENOUS at 10:26

## 2023-10-22 RX ADMIN — Medication 25 MILLIGRAM(S): at 07:04

## 2023-10-22 RX ADMIN — SODIUM CHLORIDE 50 MILLILITER(S): 9 INJECTION INTRAMUSCULAR; INTRAVENOUS; SUBCUTANEOUS at 00:22

## 2023-10-22 RX ADMIN — Medication 1000 MILLIGRAM(S): at 14:54

## 2023-10-22 RX ADMIN — Medication 3 MILLILITER(S): at 19:00

## 2023-10-22 RX ADMIN — Medication 25 MILLIGRAM(S): at 23:33

## 2023-10-22 RX ADMIN — PANTOPRAZOLE SODIUM 40 MILLIGRAM(S): 20 TABLET, DELAYED RELEASE ORAL at 17:38

## 2023-10-22 RX ADMIN — Medication 40 MILLIGRAM(S): at 07:05

## 2023-10-22 RX ADMIN — Medication 3 MILLILITER(S): at 13:35

## 2023-10-22 RX ADMIN — Medication 5 MILLIGRAM(S): at 10:26

## 2023-10-22 RX ADMIN — Medication 25 MILLIGRAM(S): at 19:49

## 2023-10-22 RX ADMIN — Medication 1 APPLICATION(S): at 23:34

## 2023-10-22 RX ADMIN — Medication 400 MILLIGRAM(S): at 23:33

## 2023-10-22 RX ADMIN — Medication 3 MILLILITER(S): at 07:24

## 2023-10-22 NOTE — CHART NOTE - NSCHARTNOTEFT_GEN_A_CORE
- Per RN, patient was requesting another dose of benadryl as she believes that her most recent dose leaked out as her IV was removed at the same time  - Next dose is technically at 00:30 but as patient is requesting it and strongly believes she didn't get it, 1 dose 25mg IV push was ordered STAT  - RN to call if any changes in pt status or any acute concerns

## 2023-10-22 NOTE — PROGRESS NOTE ADULT - ASSESSMENT
69 y/o female with hx of multiple bouts of SBO, first surgery July 2022, Ren[facility], pt presents with diffuse abdominal pain, distension    sbo  atelectasis  acute - chr pain   multiple bouts SBO  HLD  hx of small bowel resection  gastritis  thyroid disease    s/p EGD - grossly normal  GI f/u noted  planned for gastric emptying study  s/p RRT  s/p ICU  on steroids -nebs for poss laryngeal spasm

## 2023-10-22 NOTE — PROGRESS NOTE ADULT - TIME BILLING
RRT this morning as detailed above  - I left voicemail for Catalino Ferraro @ [phone] @ 7:57AM during RRT  - I left voicemail for Mara Momin @ [phone] around 8:45AM while writing this nnote  - I left my direct contact information on the voicemail
Reviewing chart notes and data, face to face time counseling the patient and , communicating with Dr. aguirre surgery, dr sheikh oshea,  coordinating care with SW/CM at [facility].
Reviewing chart notes and data, face to face time counseling the patient, communicating with Dr Sheikh SANTIAGO, Dr Yanez surgery, coordinating care with SW/CM at [facility].
pt seen and examine today see above plan . downgrade from icu care  - labs wnl  Monday gastric emptying study to r/o gi  motility disorder  npo mid night  except meds   /   no opioids    .
pt seen and examine today see above plan . downgrade from icu care  - labs wnl  Monday gastric emptying study to r/o gi  motility disorder.
as above
Reviewing chart notes and data, face to face time counseling the patient, communicating with GI Dr Carpenter, coordinating care with SW/CM at [facility].

## 2023-10-22 NOTE — PROGRESS NOTE ADULT - SUBJECTIVE AND OBJECTIVE BOX
Patient is a 68y old  Female who presents with a chief complaint of SBO (22 Oct 2023 06:37)    pt seen and examine today  state tolerating  full liquid  diet  no further vomiting  , no fever ambulatory .     room air 94   INTERVAL HPI/OVERNIGHT EVENTS:     T(C): 36.7 (10-22-23 @ 06:03), Max: 37.2 (10-21-23 @ 20:11)  HR: 77 (10-22-23 @ 06:03) (77 - 120)  BP: 120/72 (10-22-23 @ 06:03) (109/55 - 163/70)  RR: 18 (10-22-23 @ 06:03) (18 - 28)  SpO2: 94% (10-22-23 @ 06:03) (93% - 99%)  Wt(kg): --  I&O's Summary    21 Oct 2023 07:01  -  22 Oct 2023 07:00  --------------------------------------------------------  IN: 1175 mL / OUT: 0 mL / NET: 1175 mL        REVIEW OF SYSTEMS:  CONSTITUTIONAL: No fever, weight loss, or fatigue  EYES: No eye pain, visual disturbances, or discharge  ENMT:  No difficulty hearing, tinnitus, vertigo; No sinus or throat pain  NECK: No pain or stiffness  BREASTS: No pain, no masses  RESPIRATORY: No cough, wheezing, chills  No shortness of breath  CARDIOVASCULAR: No chest pain, palpitations, dizziness, or leg swelling  GASTROINTESTINAL: No abdominal or epigastric pain. No nausea, vomiting, or hematemesis; No diarrhea or constipation..  GENITOURINARY: No dysuria, frequency, hematuria, or incontinence  NEUROLOGICAL: No headaches, memory loss, loss of strength, numbness, or tremors  SKIN: No itching, burning, rashes, or lesions   MUSCULOSKELETAL: No joint pain or swelling; No muscle, back, or extremity pain    PHYSICAL EXAM:  GENERAL: NAD, well-groomed, well-developed  HEAD:  Atraumatic, Normocephalic  EYES: EOMI, PERRLA, conjunctiva and sclera clear  ENMT:  Moist mucous membranes  NECK: Supple, No JVD  NERVOUS SYSTEM:  Alert & Oriented X3; Motor Strength 5/5 B/L upper and lower extremities; DTRs 2+ intact and symmetric  CHEST/LUNG:  percussion bilaterally; No rales, rhonchi, wheezing,   HEART: Regular rate and rhythm; No murmurs,  no tachy   ABDOMEN: Soft, Nontender, Nondistended; Bowel sounds present  EXTREMITIES:  2+ Peripheral Pulses, No clubbing, cyanosis, or edema  SKIN: No rashes or lesions    MEDICATIONS  (STANDING):  acetaminophen   IVPB .. 1000 milliGRAM(s) IV Intermittent every 6 hours  albuterol/ipratropium for Nebulization 3 milliLiter(s) Nebulizer every 4 hours  enoxaparin Injectable 40 milliGRAM(s) SubCutaneous every 24 hours  famotidine    Tablet 20 milliGRAM(s) Oral daily  influenza  Vaccine (HIGH DOSE) 0.7 milliLiter(s) IntraMuscular once  levothyroxine 88 MICROGram(s) Oral daily  methylPREDNISolone sodium succinate Injectable 40 milliGRAM(s) IV Push daily  pantoprazole    Tablet 40 milliGRAM(s) Oral two times a day  polyethylene glycol 3350 17 Gram(s) Oral daily  sodium chloride 0.9%. 1000 milliLiter(s) (50 mL/Hr) IV Continuous <Continuous>    MEDICATIONS  (PRN):  albuterol/ipratropium for Nebulization 3 milliLiter(s) Nebulizer every 2 hours PRN Shortness of Breath  aluminum hydroxide/magnesium hydroxide/simethicone Suspension 30 milliLiter(s) Oral every 4 hours PRN Dyspepsia  diazepam    Tablet 5 milliGRAM(s) Oral daily PRN Spasm  diphenhydrAMINE Injectable 25 milliGRAM(s) IV Push every 6 hours PRN Insomnia or pruritis  guaiFENesin Oral Liquid (Sugar-Free) 100 milliGRAM(s) Oral every 6 hours PRN Cough  ondansetron Injectable 4 milliGRAM(s) IV Push every 6 hours PRN Nausea and/or Vomiting      LABS:                        12.3   9.88  )-----------( 465      ( 22 Oct 2023 09:27 )             41.1     10-21    143  |  107  |  6<L>  ----------------------------<  84  4.3   |  29  |  0.69    Ca    9.5      21 Oct 2023 05:45  Phos  3.8     10-21  Mg     2.2     10-21    TPro  6.6  /  Alb  3.1<L>  /  TBili  0.4  /  DBili  x   /  AST  32  /  ALT  56  /  AlkPhos  138<H>  10-21      Urinalysis Basic - ( 21 Oct 2023 05:45 )    Color: x / Appearance: x / SG: x / pH: x  Gluc: 84 mg/dL / Ketone: x  / Bili: x / Urobili: x   Blood: x / Protein: x / Nitrite: x   Leuk Esterase: x / RBC: x / WBC x   Sq Epi: x / Non Sq Epi: x / Bacteria: x                        RADIOLOGY & ADDITIONAL TESTS:    Imaging Personally Reviewed:     no new test  Advance Directives:  full code

## 2023-10-22 NOTE — PROGRESS NOTE ADULT - SUBJECTIVE AND OBJECTIVE BOX
Date/Time Patient Seen:  		  Referring MD:   Data Reviewed	       Patient is a 68y old  Female who presents with a chief complaint of SBO (21 Oct 2023 09:46)      Subjective/HPI     PAST MEDICAL & SURGICAL HISTORY:  Hypothyroidism    HLD (hyperlipidemia)    SBO (small bowel obstruction)    History of colon resection    S/P small bowel resection    S/P small bowel resection    History of facelift          Medication list         MEDICATIONS  (STANDING):  acetaminophen   IVPB .. 1000 milliGRAM(s) IV Intermittent every 6 hours  albuterol/ipratropium for Nebulization 3 milliLiter(s) Nebulizer every 4 hours  enoxaparin Injectable 40 milliGRAM(s) SubCutaneous every 24 hours  famotidine    Tablet 20 milliGRAM(s) Oral daily  influenza  Vaccine (HIGH DOSE) 0.7 milliLiter(s) IntraMuscular once  levothyroxine 88 MICROGram(s) Oral daily  methylPREDNISolone sodium succinate Injectable 40 milliGRAM(s) IV Push daily  pantoprazole    Tablet 40 milliGRAM(s) Oral two times a day  sodium chloride 0.9%. 1000 milliLiter(s) (50 mL/Hr) IV Continuous <Continuous>    MEDICATIONS  (PRN):  albuterol/ipratropium for Nebulization 3 milliLiter(s) Nebulizer every 2 hours PRN Shortness of Breath  diazepam    Tablet 5 milliGRAM(s) Oral daily PRN Spasm  diphenhydrAMINE Injectable 25 milliGRAM(s) IV Push every 6 hours PRN Insomnia or pruritis  guaiFENesin Oral Liquid (Sugar-Free) 100 milliGRAM(s) Oral every 6 hours PRN Cough  ondansetron Injectable 4 milliGRAM(s) IV Push every 6 hours PRN Nausea and/or Vomiting         Vitals log        ICU Vital Signs Last 24 Hrs  T(C): 37.2 (21 Oct 2023 20:11), Max: 37.2 (21 Oct 2023 20:11)  T(F): 99 (21 Oct 2023 20:11), Max: 99 (21 Oct 2023 20:11)  HR: 79 (21 Oct 2023 23:07) (79 - 120)  BP: 131/84 (21 Oct 2023 20:11) (109/55 - 163/70)  BP(mean): 80 (21 Oct 2023 18:00) (76 - 101)  ABP: --  ABP(mean): --  RR: 26 (21 Oct 2023 18:00) (18 - 28)  SpO2: 99% (21 Oct 2023 23:07) (93% - 99%)    O2 Parameters below as of 21 Oct 2023 23:07  Patient On (Oxygen Delivery Method): room air                 Input and Output:  I&O's Detail    20 Oct 2023 07:01  -  21 Oct 2023 07:00  --------------------------------------------------------  IN:    IV PiggyBack: 200 mL    Oral Fluid: 360 mL    sodium chloride 0.9% w/ Additives: 450 mL    sodium chloride 0.9% w/ Additives: 1125 mL  Total IN: 2135 mL    OUT:  Total OUT: 0 mL    Total NET: 2135 mL      21 Oct 2023 07:01  -  22 Oct 2023 06:38  --------------------------------------------------------  IN:    IV PiggyBack: 100 mL    Oral Fluid: 250 mL    sodium chloride 0.9% w/ Additives: 825 mL  Total IN: 1175 mL    OUT:  Total OUT: 0 mL    Total NET: 1175 mL          Lab Data                        10.2   9.59  )-----------( 380      ( 21 Oct 2023 05:45 )             32.8     10-21    143  |  107  |  6<L>  ----------------------------<  84  4.3   |  29  |  0.69    Ca    9.5      21 Oct 2023 05:45  Phos  3.8     10-21  Mg     2.2     10-21    TPro  6.6  /  Alb  3.1<L>  /  TBili  0.4  /  DBili  x   /  AST  32  /  ALT  56  /  AlkPhos  138<H>  10-21            Review of Systems	      Objective     Physical Examination    heart s1s2  lung dc BS  headnc      Pertinent Lab findings & Imaging      Saurav:  NO   Adequate UO     I&O's Detail    20 Oct 2023 07:01  -  21 Oct 2023 07:00  --------------------------------------------------------  IN:    IV PiggyBack: 200 mL    Oral Fluid: 360 mL    sodium chloride 0.9% w/ Additives: 450 mL    sodium chloride 0.9% w/ Additives: 1125 mL  Total IN: 2135 mL    OUT:  Total OUT: 0 mL    Total NET: 2135 mL      21 Oct 2023 07:01  -  22 Oct 2023 06:38  --------------------------------------------------------  IN:    IV PiggyBack: 100 mL    Oral Fluid: 250 mL    sodium chloride 0.9% w/ Additives: 825 mL  Total IN: 1175 mL    OUT:  Total OUT: 0 mL    Total NET: 1175 mL               Discussed with:     Cultures:	        Radiology

## 2023-10-22 NOTE — PROGRESS NOTE ADULT - ASSESSMENT
68F with PMH colon resection, SBR with primary anastomosis with revision x 2.  Admitted to surgery for SBO. Medicine consulted for continued vomiting/diarrhea, allergic rxn, and home medication management.       #stridor, wheezing, respiratory distress - unclear definitive etiology: possible anaphylaxis  - s/p epi, solu-medrol,  for 3/  5 days  benadryl,   valium 5 mg once  day prn [ istop by icu  pt take 5 mg po bid as needed in home ] - symptoms improved  - s/p  bipap wean  to  nc   - transfer to floor from icu.     #recurrent SBO, resolved but  intractable nausea and vomiting, persistent  - gastroparesis vs other motility d/o remain in differential ,   s/p EGD by gi dr rosales  -normal esophagus, biopsied r/o eosinophilic esophagitis  mild gastritis, biopsied both antrum and body  - planning for gastric emptying study Monday 10/23/23 - but interval events likely will delay the assessment  - on standing doses of acetaminophen  - avoid all drugs that affect bowel motility no opioids   - reviewed med list from [place] with the patient on 10/19/23  - tolerating Full liquid diet     #hypokalemia  - added K to IVF-repeat k wnl    #insomnia  - on benadryl prn     #hypothyroidism  - cont levothyroxine    #GERD  - pepcid /ppi    #VTE ppx  - Lovenox

## 2023-10-22 NOTE — CHART NOTE - NSCHARTNOTEFT_GEN_A_CORE
- RN called to inform me that patient's IV was removed as she was uncomfortable with it and described that it was "smelly".  - ICU had changed IV the day prior, but patient was concern for infection of line  - MD assessed, line was removed, area was clean with no pus or discharge. No skin changes noted other than mild erythema as would be expected   - no acute concern for infection at this time, RN will call if any changes POD #1  PT  pain control   likely will need JOSE

## 2023-10-23 ENCOUNTER — TRANSCRIPTION ENCOUNTER (OUTPATIENT)
Age: 68
End: 2023-10-23

## 2023-10-23 VITALS
DIASTOLIC BLOOD PRESSURE: 75 MMHG | OXYGEN SATURATION: 97 % | TEMPERATURE: 98 F | HEART RATE: 105 BPM | RESPIRATION RATE: 18 BRPM | SYSTOLIC BLOOD PRESSURE: 114 MMHG

## 2023-10-23 LAB
ANION GAP SERPL CALC-SCNC: 6 MMOL/L — SIGNIFICANT CHANGE UP (ref 5–17)
ANION GAP SERPL CALC-SCNC: 6 MMOL/L — SIGNIFICANT CHANGE UP (ref 5–17)
BASOPHILS # BLD AUTO: 0.07 K/UL — SIGNIFICANT CHANGE UP (ref 0–0.2)
BASOPHILS # BLD AUTO: 0.07 K/UL — SIGNIFICANT CHANGE UP (ref 0–0.2)
BASOPHILS NFR BLD AUTO: 0.6 % — SIGNIFICANT CHANGE UP (ref 0–2)
BASOPHILS NFR BLD AUTO: 0.6 % — SIGNIFICANT CHANGE UP (ref 0–2)
BUN SERPL-MCNC: 10 MG/DL — SIGNIFICANT CHANGE UP (ref 7–23)
BUN SERPL-MCNC: 10 MG/DL — SIGNIFICANT CHANGE UP (ref 7–23)
CALCIUM SERPL-MCNC: 10.2 MG/DL — HIGH (ref 8.5–10.1)
CALCIUM SERPL-MCNC: 10.2 MG/DL — HIGH (ref 8.5–10.1)
CHLORIDE SERPL-SCNC: 105 MMOL/L — SIGNIFICANT CHANGE UP (ref 96–108)
CHLORIDE SERPL-SCNC: 105 MMOL/L — SIGNIFICANT CHANGE UP (ref 96–108)
CO2 SERPL-SCNC: 32 MMOL/L — HIGH (ref 22–31)
CO2 SERPL-SCNC: 32 MMOL/L — HIGH (ref 22–31)
COPROPORPHYRIN I - RANDOM URINE: 6 UG/L — SIGNIFICANT CHANGE UP (ref 0–15)
COPROPORPHYRIN I - RANDOM URINE: 6 UG/L — SIGNIFICANT CHANGE UP (ref 0–15)
COPROPORPHYRIN III - RANDOM URINE: 37 UG/L — SIGNIFICANT CHANGE UP (ref 0–49)
COPROPORPHYRIN III - RANDOM URINE: 37 UG/L — SIGNIFICANT CHANGE UP (ref 0–49)
CREAT SERPL-MCNC: 0.87 MG/DL — SIGNIFICANT CHANGE UP (ref 0.5–1.3)
CREAT SERPL-MCNC: 0.87 MG/DL — SIGNIFICANT CHANGE UP (ref 0.5–1.3)
EGFR: 73 ML/MIN/1.73M2 — SIGNIFICANT CHANGE UP
EGFR: 73 ML/MIN/1.73M2 — SIGNIFICANT CHANGE UP
EOSINOPHIL # BLD AUTO: 0.04 K/UL — SIGNIFICANT CHANGE UP (ref 0–0.5)
EOSINOPHIL # BLD AUTO: 0.04 K/UL — SIGNIFICANT CHANGE UP (ref 0–0.5)
EOSINOPHIL NFR BLD AUTO: 0.4 % — SIGNIFICANT CHANGE UP (ref 0–6)
EOSINOPHIL NFR BLD AUTO: 0.4 % — SIGNIFICANT CHANGE UP (ref 0–6)
GLUCOSE SERPL-MCNC: 82 MG/DL — SIGNIFICANT CHANGE UP (ref 70–99)
GLUCOSE SERPL-MCNC: 82 MG/DL — SIGNIFICANT CHANGE UP (ref 70–99)
HCT VFR BLD CALC: 36.8 % — SIGNIFICANT CHANGE UP (ref 34.5–45)
HCT VFR BLD CALC: 36.8 % — SIGNIFICANT CHANGE UP (ref 34.5–45)
HEPTA-CP UR-MCNC: <1 UG/L — SIGNIFICANT CHANGE UP (ref 0–2)
HEPTA-CP UR-MCNC: <1 UG/L — SIGNIFICANT CHANGE UP (ref 0–2)
HEXA-CP UR-MCNC: <1 UG/L — SIGNIFICANT CHANGE UP (ref 0–1)
HEXA-CP UR-MCNC: <1 UG/L — SIGNIFICANT CHANGE UP (ref 0–1)
HGB BLD-MCNC: 11.7 G/DL — SIGNIFICANT CHANGE UP (ref 11.5–15.5)
HGB BLD-MCNC: 11.7 G/DL — SIGNIFICANT CHANGE UP (ref 11.5–15.5)
IMM GRANULOCYTES NFR BLD AUTO: 2.3 % — HIGH (ref 0–0.9)
IMM GRANULOCYTES NFR BLD AUTO: 2.3 % — HIGH (ref 0–0.9)
LYMPHOCYTES # BLD AUTO: 3.8 K/UL — HIGH (ref 1–3.3)
LYMPHOCYTES # BLD AUTO: 3.8 K/UL — HIGH (ref 1–3.3)
LYMPHOCYTES # BLD AUTO: 34.1 % — SIGNIFICANT CHANGE UP (ref 13–44)
LYMPHOCYTES # BLD AUTO: 34.1 % — SIGNIFICANT CHANGE UP (ref 13–44)
MCHC RBC-ENTMCNC: 30.5 PG — SIGNIFICANT CHANGE UP (ref 27–34)
MCHC RBC-ENTMCNC: 30.5 PG — SIGNIFICANT CHANGE UP (ref 27–34)
MCHC RBC-ENTMCNC: 31.8 GM/DL — LOW (ref 32–36)
MCHC RBC-ENTMCNC: 31.8 GM/DL — LOW (ref 32–36)
MCV RBC AUTO: 95.8 FL — SIGNIFICANT CHANGE UP (ref 80–100)
MCV RBC AUTO: 95.8 FL — SIGNIFICANT CHANGE UP (ref 80–100)
MONOCYTES # BLD AUTO: 1.09 K/UL — HIGH (ref 0–0.9)
MONOCYTES # BLD AUTO: 1.09 K/UL — HIGH (ref 0–0.9)
MONOCYTES NFR BLD AUTO: 9.8 % — SIGNIFICANT CHANGE UP (ref 2–14)
MONOCYTES NFR BLD AUTO: 9.8 % — SIGNIFICANT CHANGE UP (ref 2–14)
NEUTROPHILS # BLD AUTO: 5.9 K/UL — SIGNIFICANT CHANGE UP (ref 1.8–7.4)
NEUTROPHILS # BLD AUTO: 5.9 K/UL — SIGNIFICANT CHANGE UP (ref 1.8–7.4)
NEUTROPHILS NFR BLD AUTO: 52.8 % — SIGNIFICANT CHANGE UP (ref 43–77)
NEUTROPHILS NFR BLD AUTO: 52.8 % — SIGNIFICANT CHANGE UP (ref 43–77)
NRBC # BLD: 0 /100 WBCS — SIGNIFICANT CHANGE UP (ref 0–0)
NRBC # BLD: 0 /100 WBCS — SIGNIFICANT CHANGE UP (ref 0–0)
PENTACARBOXYPORPHRIN, RANDOM URINE: <1 UG/L — SIGNIFICANT CHANGE UP (ref 0–2)
PENTACARBOXYPORPHRIN, RANDOM URINE: <1 UG/L — SIGNIFICANT CHANGE UP (ref 0–2)
PLATELET # BLD AUTO: 447 K/UL — HIGH (ref 150–400)
PLATELET # BLD AUTO: 447 K/UL — HIGH (ref 150–400)
POTASSIUM SERPL-MCNC: 4 MMOL/L — SIGNIFICANT CHANGE UP (ref 3.5–5.3)
POTASSIUM SERPL-MCNC: 4 MMOL/L — SIGNIFICANT CHANGE UP (ref 3.5–5.3)
POTASSIUM SERPL-SCNC: 4 MMOL/L — SIGNIFICANT CHANGE UP (ref 3.5–5.3)
POTASSIUM SERPL-SCNC: 4 MMOL/L — SIGNIFICANT CHANGE UP (ref 3.5–5.3)
RBC # BLD: 3.84 M/UL — SIGNIFICANT CHANGE UP (ref 3.8–5.2)
RBC # BLD: 3.84 M/UL — SIGNIFICANT CHANGE UP (ref 3.8–5.2)
RBC # FLD: 13.2 % — SIGNIFICANT CHANGE UP (ref 10.3–14.5)
RBC # FLD: 13.2 % — SIGNIFICANT CHANGE UP (ref 10.3–14.5)
SODIUM SERPL-SCNC: 143 MMOL/L — SIGNIFICANT CHANGE UP (ref 135–145)
SODIUM SERPL-SCNC: 143 MMOL/L — SIGNIFICANT CHANGE UP (ref 135–145)
UROPORPHYRIN I - RANDOM URINE: 3 UG/L — SIGNIFICANT CHANGE UP (ref 0–20)
UROPORPHYRIN I - RANDOM URINE: 3 UG/L — SIGNIFICANT CHANGE UP (ref 0–20)
WBC # BLD: 11.16 K/UL — HIGH (ref 3.8–10.5)
WBC # BLD: 11.16 K/UL — HIGH (ref 3.8–10.5)
WBC # FLD AUTO: 11.16 K/UL — HIGH (ref 3.8–10.5)
WBC # FLD AUTO: 11.16 K/UL — HIGH (ref 3.8–10.5)

## 2023-10-23 PROCEDURE — 99239 HOSP IP/OBS DSCHRG MGMT >30: CPT

## 2023-10-23 PROCEDURE — 78264 GASTRIC EMPTYING IMG STUDY: CPT | Mod: 26,GC

## 2023-10-23 RX ORDER — METOCLOPRAMIDE HCL 10 MG
1 TABLET ORAL
Qty: 42 | Refills: 0
Start: 2023-10-23 | End: 2023-11-05

## 2023-10-23 RX ORDER — DIAZEPAM 5 MG
1 TABLET ORAL
Qty: 10 | Refills: 0
Start: 2023-10-23 | End: 2023-10-27

## 2023-10-23 RX ADMIN — Medication 3 MILLILITER(S): at 07:18

## 2023-10-23 RX ADMIN — Medication 40 MILLIGRAM(S): at 07:02

## 2023-10-23 RX ADMIN — PANTOPRAZOLE SODIUM 40 MILLIGRAM(S): 20 TABLET, DELAYED RELEASE ORAL at 07:02

## 2023-10-23 RX ADMIN — Medication 1000 MILLIGRAM(S): at 00:23

## 2023-10-23 RX ADMIN — FAMOTIDINE 20 MILLIGRAM(S): 10 INJECTION INTRAVENOUS at 13:30

## 2023-10-23 RX ADMIN — POLYETHYLENE GLYCOL 3350 17 GRAM(S): 17 POWDER, FOR SOLUTION ORAL at 13:30

## 2023-10-23 RX ADMIN — Medication 25 MILLIGRAM(S): at 13:33

## 2023-10-23 RX ADMIN — Medication 25 MILLIGRAM(S): at 07:02

## 2023-10-23 RX ADMIN — Medication 88 MICROGRAM(S): at 07:01

## 2023-10-23 NOTE — PROGRESS NOTE ADULT - ASSESSMENT
SBO    s/p egd; pathology negative  HAE, lead, and porphyria workup negative  GES noted; mild/borderline gastroparesis  Recommend 5mg reglan TID before meals  Recommend DBE as the test which would be highest yield to endoscopically evaluate her anastomosis  Trying to help pt arrange for follow up with specialist for DBE as outpatient  No GI objection to d/c plan  D/w pt

## 2023-10-23 NOTE — DISCHARGE NOTE NURSING/CASE MANAGEMENT/SOCIAL WORK - NSDCFUADDAPPT_GEN_ALL_CORE_FT
Please call to schedule appointment with Dr Yanez, Dr Carpenter/Dr Wei (GI specialists). Follow up with [facility].

## 2023-10-23 NOTE — CONSULT NOTE ADULT - CONSULT REASON
The ethical dilemma of assisting with patient communication. To  assist   in the  ethical dilemma of  a patient  with  discord with  treatment  plan

## 2023-10-23 NOTE — DISCHARGE NOTE NURSING/CASE MANAGEMENT/SOCIAL WORK - PATIENT PORTAL LINK FT
You can access the FollowMyHealth Patient Portal offered by [facility] by registering at the following website: [email or web address]. By joining [employer]’s FollowMyHealth portal, you will also be able to view your health information using other applications (apps) compatible with our system.

## 2023-10-23 NOTE — PROGRESS NOTE ADULT - PROVIDER SPECIALTY LIST ADULT
Gastroenterology
Surgery
Surgery
Critical Care
Gastroenterology
Hospitalist
Palliative Care
Palliative Care
Surgery
Gastroenterology
Hospitalist
Infectious Disease
Palliative Care
Surgery
Gastroenterology
Hospitalist
Hospitalist
Palliative Care
Palliative Care
Surgery
Hospitalist
Hospitalist
Internal Medicine
Surgery

## 2023-10-23 NOTE — CONSULT NOTE ADULT - SUBJECTIVE AND OBJECTIVE BOX
Kia Curry    Tele 307 W1     Met with patient at bedside. 930- 10-30, patient was communicative and endorses nausea, vomiting and a difficult stay. The patient has assented to the procedure and is off pain medication at this time. The patient denied pain and seemed able to independently negotiate from her bed to be transported for gastric emptying study. Patient is receptive to discharge, has been able to eat, indicates she would like to go home and agrees to follow up as is medically appropriate.      Met with Dr. Dale Yañez to discuss case. The procedure has been completed. Awaiting results from gastric emptying study.      Full note to follow.          Supportive care was provided to the patient and bedside staff.  HPI:  69 y/o female with hx of multiple bouts of SBO, first surgery July 2022, [facility], pt presents with diffuse abdominal pain, distension started this AM, progressively getting worse, 3 episodes of vomiting, no fever, no chills, no CP, no SOB, + two small BM today, pt requesting Dilaudid for pain, recent facelift on steroids and Levaquin last dose yesterday,  Currently patient states that she has been vomiting x3 episodes.  Notes incomplete bowel emptying.  Has had diarrhea for past few days.  No recent illness that she is aware of.  Also with complaints of urinary symptoms- pt could not elaborate.    (04 Oct 2023 23:01) During  the course of hospitalization  patient required opiates  for pain  and refused EGD and testing. Ethics consult  initiated  initially to  assist in  abiding communication   related to  treatment  of pain and diagnosis  of  pain  syndrome   		:      Prognosis Estimate (survival in days, wks, mos, yrs): guardesd									    Patient Decision-Making Capacity:  [  ] Has capacity  				     Patient Aware of:  Diagnosis:  [ X ] Yes   [  ] No  [  ] Unknown    Prognosis:  [  X] Yes   [  ] No  [  ] Unknown           Name of medical decision-maker should patient lack capacity:                     Relationship:   spouse				       Role:  [  ] Health Care Agent     [  ] Legal Surrogate   Contact #(s):                (c)                            (h)				       Other ‘stakeholders’:  											      Other Decision-Maker (i.e., HCA or Surrogate) Aware of:  Diagnosis: [  X]Yes   [  ]No      Prognosis:  [X  ] Yes    [  ] No     Evidence of Patient’s Preference of Life-Sustaining Treatment (Written or Oral):	none			               	     Resuscitation status:   DNR:  [  ]Yes   [X  ]No      DNI:  [  ]Yes   [ X ]No        Discussion : 10-19-23 Reviewed   chart  and discussed case  with  communication  related to patient. Issues  discussed  prior to today  was ability   to  wean opiates which could be contributing to  pain  to  perform  endoscopy.  During  course of  hospitalization recurrent  SBO  and adhesions ruled  out.  Kia has  history  of nursing  in   dialysis and  she and  her     are  concerned  related to pain  and  obtaining  diagnosis for  recurrent  pain. Team  meeting with patient  resulted   in  consensus to  wean opiates to obtain   EGD     Today  Skyla calderon Met with patient at bedside. 110- 10-30, patient was communicative and endorses nausea, vomiting and a difficult stay. The patient has assented to the procedure and is off pain medication at this time. The patient denied pain and seemed able to independently negotiate from her bed to be transported for gastric emptying study. Patient is receptive to discharge, has been able to eat, indicates she would like to go home and agrees to follow up as is medically appropriate.  Then  met with Dr. Dale Yañez to discuss case. The procedure has been completed. Awaiting results from gastric emptying study.  Palliative  on  consult. Endoscopy  grossly normal       BIOETHICS ANALYSIS:												  	      CONCLUSION: 	Physicians  are not obligated to  maintain  treatments  that have little  expectation  of  benefit. In  this case;  utilizing  shared decision  making  from a patient  centered approach led to  Mrs. Curry's  sense  of control and ability  to  travel through the  trajectory  of   her  pain  syndrome  to  completion  of her  diagnostic  study   and successful  discharge.  The  team  is commended for their   virtue  and  concern.												       MORE THAN 50% OF THE TIME OF THIS CONSULTATION WAS SPENT IN COORDINATION OF CARE OF PATIENT										  											                             References                         Supportive care was provided to the patient and bedside staff.  HPI:  69 y/o female with hx of multiple bouts of SBO, first surgery July 2022, [facility], pt presents with diffuse abdominal pain, distension started this AM, progressively getting worse, 3 episodes of vomiting, no fever, no chills, no CP, no SOB, + two small BM today, pt requesting Dilaudid for pain, recent facelift on steroids and Levaquin last dose yesterday,  Currently patient states that she has been vomiting x3 episodes.  Notes incomplete bowel emptying.  Has had diarrhea for past few days.  No recent illness that she is aware of.  Also with complaints of urinary symptoms- pt could not elaborate.    (04 Oct 2023 23:01) During  the course of hospitalization  patient required opiates  for pain  and refused EGD and testing. Ethics consult  initiated  initially to  assist in  abiding communication   related to  treatment  of pain and diagnosis  of  pain  syndrome   		:      Prognosis Estimate (survival in days, wks, mos, yrs): guardesd									    Patient Decision-Making Capacity:  [  ] Has capacity  				     Patient Aware of:  Diagnosis:  [ X ] Yes   [  ] No  [  ] Unknown    Prognosis:  [  X] Yes   [  ] No  [  ] Unknown           Name of medical decision-maker should patient lack capacity:                     Relationship:   spouse				       Role:  [  ] Health Care Agent     [  ] Legal Surrogate   Contact #(s):                (c)                            (h)				       Other ‘stakeholders’:  											      Other Decision-Maker (i.e., HCA or Surrogate) Aware of:  Diagnosis: [  X]Yes   [  ]No      Prognosis:  [X  ] Yes    [  ] No     Evidence of Patient’s Preference of Life-Sustaining Treatment (Written or Oral):	none			               	     Resuscitation status:   DNR:  [  ]Yes   [X  ]No      DNI:  [  ]Yes   [ X ]No        Discussion : 10-19-23 Reviewed   chart  and discussed case  with  communication  related to patient. Issues  discussed  prior to today  was ability   to  wean opiates which could be contributing to  pain  to  perform  endoscopy.  During  course of  hospitalization recurrent  SBO  and adhesions ruled  out.  Kia has  history  of nursing  in   dialysis and  she and  her     are  concerned  related to pain  and  obtaining  diagnosis for  recurrent  pain. Team  meeting with patient  resulted   in  consensus to  wean opiates to obtain   EGD     Today  Skyla calderon Met with patient at bedside. 830- 10-30, patient was communicative and endorses nausea, vomiting and a difficult stay. The patient has assented to the procedure and is off pain medication at this time. The patient denied pain and seemed able to independently negotiate from her bed to be transported for gastric emptying study. Patient is receptive to discharge, has been able to eat, indicates she would like to go home and agrees to follow up as is medically appropriate.  Then  met with Dr. Dale Yañez to discuss case. The procedure has been completed. Awaiting results from gastric emptying study.  Palliative  on  consult. Endoscopy  grossly normal       BIOETHICS ANALYSIS: This case involves the  dilemma of patient  autonomy  balanced  against physician duty  to beneficence  versus nonmaleficence. Here is a  case  with a patient  who  has experienced recurrent   abdominal  pain  and  past  history  of life  threatening SBO and clinicians whom have no  medical justification  for the intensity of the pain  experienced   authentically by Mrs. Curry    Pain management faces a number of difficult ethical questions, which are perhaps too numerous to elucidate. What role does pain management play in the clinical care of patients? What duties do health care professionals have concerning the pain of their patients? What other duties must be balanced against the duty to provide adequate analgesia?	    Physicians have an ethical duty   to  the individual patient  to treat  pain  and a societal duty to  responsibly  administer  appropriate adjuvant  therapies and diagnostics to  understand the etiology of  the patient's pain.  In fact, the curative model, immersed in this biases of dualism in medicine (treating pain  while  not  contributing  to tolerance/addiction),  is perceived as “competing” with that of person-centered care. In   this case, the  team  is commended   for their  willingness to hear, detect, trust, treat, and report  understanding of the essence and meaning of pain  from the phenomenological perspective  of  Kia Curry. How  healthacre thinks about pain is ideally guided by those unspoken and unconscious assumptions, myths, and metaphors that shape our understanding of the individual sufferer’s reality and experience of pain.  The ideal paradigm requires the integration of the main principles, values, and practices of palliative care with those of person-centered care.     An autonomous patient retains the right  to refuse intervention, however cannot  dictate  treatments  that are  not  within a medical standard of care. During the course of  this hospitalization, the  team  aided  by palliative care, patient  centered  care,  nursing and ethics was  able  to   aid the patient's  acceptance of  diagnostic testing. The origin of the patient's pain is not   completely understood but the patient  is  now  tolerating diet,  ambulating and seeking discharge.										  	      CONCLUSION: 	Physicians  are not obligated to  maintain  treatments  that have little  expectation  of  benefit. In  this case;  utilizing  shared decision  making  from a patient  centered approach led to  Mrs. Curry's  sense  of control and ability  to  travel through the  trajectory  of   her  pain  syndrome  to  completion  of her  diagnostic  study   and successful  discharge.  The  team  is commended for their   virtue  and  concern. Pain clinicians have a moral duty to resolve pain ethical dilemmas thoughtfully to bring about reduced suffering and enhanced human flourishing in collaboration with patients. In this case the  collaborative atmosphere aided this  successful discharge 												       MORE THAN 50% OF THE TIME OF THIS CONSULTATION WAS SPENT IN COORDINATION OF CARE OF PATIENT										  											                             References     1. Aaron AS, Heriberto Lugo S, Erna A, Lopez S, Sergei J, Cash-Combs P, Manuel Nelson C, Ian ME. Ethical decision making in pain management: a conceptual framework. J Pain Res. 2018 May 15;11:967-976. doi: 10.2147/JPR.P948362. PMID: 34406778; PMCID: [id number].    2.Tena SQUIRES. Ethical treatment of people with chronic pain: an application of Vincent's framework for shared decision-making. Br J Anaesth. 2019 Aug;123(2):u152-d321. doi: 10.1016/j.bja.2019.04.042. Epub 2019 May 21. PMID: 23014686; PMCID: [id number].                 Supportive care was provided to the patient and bedside staff.  HPI:  69 y/o female with hx of multiple bouts of SBO, first surgery July 2022, Renata, pt presents with diffuse abdominal pain, distension started this AM, progressively getting worse, 3 episodes of vomiting, no fever, no chills, no CP, no SOB, + two small BM today, pt requesting Dilaudid for pain, recent facelift on steroids and Levaquin last dose yesterday,  Currently patient states that she has been vomiting x3 episodes.  Notes incomplete bowel emptying.  Has had diarrhea for past few days.  No recent illness that she is aware of.  Also with complaints of urinary symptoms- pt could not elaborate.    (04 Oct 2023 23:01) During  the course of hospitalization  patient required opiates  for pain  and refused EGD and testing. Ethics consult  initiated  initially to  assist in  abiding communication   related to  treatment  of pain and diagnosis  of  pain  syndrome   		:      Prognosis Estimate (survival in days, wks, mos, yrs): guardesd									    Patient Decision-Making Capacity:  [  ] Has capacity  				     Patient Aware of:  Diagnosis:  [ X ] Yes   [  ] No  [  ] Unknown    Prognosis:  [  X] Yes   [  ] No  [  ] Unknown           Name of medical decision-maker should patient lack capacity:                     Relationship:   spouse				       Role:  [  ] Health Care Agent     [  ] Legal Surrogate   Contact #(s):                (c)                            (h)				       Other ‘stakeholders’:  											      Other Decision-Maker (i.e., HCA or Surrogate) Aware of:  Diagnosis: [  X]Yes   [  ]No      Prognosis:  [X  ] Yes    [  ] No     Evidence of Patient’s Preference of Life-Sustaining Treatment (Written or Oral):	none			               	     Resuscitation status:   DNR:  [  ]Yes   [X  ]No      DNI:  [  ]Yes   [ X ]No        Discussion : 10-19-23 Reviewed   chart  and discussed case  with  communication  related to patient. Issues  discussed  prior to today  was ability   to  wean opiates which could be contributing to  pain  to  perform  endoscopy.  During  course of  hospitalization recurrent  SBO  and adhesions ruled  out.  Kia has  history  of nursing  in   dialysis and  she and  her     are  concerned  related to pain  and  obtaining  diagnosis for  recurrent  pain. Team  meeting with patient  resulted   in  consensus to  wean opiates to obtain   EGD     Today  Skyla calderon Met with patient at bedside. 270- 10-30, patient was communicative and endorses nausea, vomiting and a difficult stay. The patient has assented to the procedure and is off pain medication at this time. The patient denied pain and seemed able to independently negotiate from her bed to be transported for gastric emptying study. Patient is receptive to discharge, has been able to eat, indicates she would like to go home and agrees to follow up as is medically appropriate.  Then  met with Dr. Dale aYñez to discuss case. The procedure has been completed. Awaiting results from gastric emptying study.  Palliative  on  consult. Endoscopy  grossly normal       BIOETHICS ANALYSIS: This case involves the  dilemma of patient  autonomy  balanced  against physician duty  to beneficence  versus nonmaleficence. Here is a  case  with a patient  who  has experienced recurrent   abdominal  pain  and  past  history  of life  threatening SBO and clinicians whom have no  medical justification  for the intensity of the pain  experienced   authentically by Mrs. Curry    Pain management faces a number of difficult ethical questions, which are perhaps too numerous to elucidate. What role does pain management play in the clinical care of patients? What duties do health care professionals have concerning the pain of their patients? What other duties must be balanced against the duty to provide adequate analgesia?	    Physicians have an ethical duty   to  the individual patient  to treat  pain  and a societal duty to  responsibly  administer  appropriate adjuvant  therapies and diagnostics to  understand the etiology of  the patient's pain.  In fact, the curative model, immersed in this biases of dualism in medicine (treating pain  while  not  contributing  to tolerance/addiction),  is perceived as “competing” with that of person-centered care. In   this case, the  team  is commended   for their  willingness to hear, detect, trust, treat, and report  understanding of the essence and meaning of pain  from the phenomenological perspective  of  Kia Curry. How  healthacre thinks about pain is ideally guided by those unspoken and unconscious assumptions, myths, and metaphors that shape our understanding of the individual sufferer’s reality and experience of pain.  The ideal paradigm requires the integration of the main principles, values, and practices of palliative care with those of person-centered care.     An autonomous patient retains the right  to refuse intervention, however cannot  dictate  treatments  that are  not  within a medical standard of care. During the course of  this hospitalization, the  team  aided  by palliative care, patient  centered  care,  nursing and ethics was  able  to   aid the patient's  acceptance of  diagnostic testing. The origin of the patient's pain is not   completely understood but the patient  is  now  tolerating diet,  ambulating and seeking discharge.										  	      CONCLUSION: 	Physicians  are not obligated to  maintain  treatments  that have little  expectation  of  benefit. In  this case;  utilizing  shared decision  making  from a patient  centered approach led to  Mrs. Curry's  sense  of control and ability  to  travel through the  trajectory  of   her  pain  syndrome  to  completion  of her  diagnostic  study   and successful  discharge.  The  team  is commended for their   virtue  and  concern. Pain clinicians have a moral duty to resolve pain ethical dilemmas thoughtfully to bring about reduced suffering and enhanced human flourishing in collaboration with patients. In this case the  collaborative atmosphere aided this  successful discharge 												       MORE THAN 50% OF THE TIME OF THIS CONSULTATION WAS SPENT IN COORDINATION OF CARE OF PATIENT	    Case discussed with  and written  by   KELLE Xiong DNP, PHD, Director of Medical Ethics									  											                             References     1. Aaron AS, Heriberto Lugo S, Erna A, Lopez S, Sergei J, Cash-Combs P, Manuel Nelson C, Ian ME. Ethical decision making in pain management: a conceptual framework. J Pain Res. 2018 May 15;11:967-976. doi: 10.2147/JPR.K270774. PMID: 33395221; PMCID: [id number].    2.Tena SQUIRES. Ethical treatment of people with chronic pain: an application of Vincent's framework for shared decision-making. Br J Anaesth. 2019 Aug;123(2):j516-y581. doi: 10.1016/j.bja.2019.04.042. Epub 2019 May 21. PMID: 75887204; PMCID: [id number].                 Supportive care was provided to the patient and bedside staff.

## 2023-10-23 NOTE — CONSULT NOTE ADULT - CONSULT REQUESTED DATE/TIME
12-Oct-2023 16:39
20-Oct-2023 08:49
18-Oct-2023
05-Oct-2023 17:14
17-Oct-2023 10:33
05-Oct-2023 09:16

## 2023-10-23 NOTE — PROGRESS NOTE ADULT - SUBJECTIVE AND OBJECTIVE BOX
Date/Time Patient Seen:  		  Referring MD:   Data Reviewed	       Patient is a 68y old  Female who presents with a chief complaint of SBO (22 Oct 2023 10:04)      Subjective/HPI     PAST MEDICAL & SURGICAL HISTORY:  Hypothyroidism    HLD (hyperlipidemia)    SBO (small bowel obstruction)    History of colon resection    S/P small bowel resection    S/P small bowel resection    History of facelift          Medication list         MEDICATIONS  (STANDING):  acetaminophen   IVPB .. 1000 milliGRAM(s) IV Intermittent every 6 hours  albuterol/ipratropium for Nebulization 3 milliLiter(s) Nebulizer every 4 hours  enoxaparin Injectable 40 milliGRAM(s) SubCutaneous every 24 hours  famotidine    Tablet 20 milliGRAM(s) Oral daily  influenza  Vaccine (HIGH DOSE) 0.7 milliLiter(s) IntraMuscular once  levothyroxine 88 MICROGram(s) Oral daily  methylPREDNISolone sodium succinate Injectable 40 milliGRAM(s) IV Push daily  pantoprazole    Tablet 40 milliGRAM(s) Oral two times a day  polyethylene glycol 3350 17 Gram(s) Oral daily  sodium chloride 0.9%. 1000 milliLiter(s) (50 mL/Hr) IV Continuous <Continuous>    MEDICATIONS  (PRN):  albuterol/ipratropium for Nebulization 3 milliLiter(s) Nebulizer every 2 hours PRN Shortness of Breath  aluminum hydroxide/magnesium hydroxide/simethicone Suspension 30 milliLiter(s) Oral every 4 hours PRN Dyspepsia  diazepam    Tablet 5 milliGRAM(s) Oral daily PRN Spasm  diphenhydrAMINE Injectable 25 milliGRAM(s) IV Push every 6 hours PRN Insomnia or pruritis  guaiFENesin Oral Liquid (Sugar-Free) 100 milliGRAM(s) Oral every 6 hours PRN Cough  ondansetron Injectable 4 milliGRAM(s) IV Push every 6 hours PRN Nausea and/or Vomiting         Vitals log        ICU Vital Signs Last 24 Hrs  T(C): 36.8 (23 Oct 2023 04:48), Max: 36.9 (22 Oct 2023 11:17)  T(F): 98.2 (23 Oct 2023 04:48), Max: 98.5 (22 Oct 2023 11:17)  HR: 70 (23 Oct 2023 04:48) (70 - 97)  BP: 116/67 (23 Oct 2023 04:48) (104/60 - 122/77)  BP(mean): --  ABP: --  ABP(mean): --  RR: 16 (23 Oct 2023 04:48) (16 - 18)  SpO2: 93% (23 Oct 2023 04:48) (93% - 98%)    O2 Parameters below as of 23 Oct 2023 04:48  Patient On (Oxygen Delivery Method): room air                 Input and Output:  I&O's Detail    21 Oct 2023 07:01  -  22 Oct 2023 07:00  --------------------------------------------------------  IN:    IV PiggyBack: 100 mL    Oral Fluid: 250 mL    sodium chloride 0.9% w/ Additives: 825 mL  Total IN: 1175 mL    OUT:  Total OUT: 0 mL    Total NET: 1175 mL          Lab Data                        12.3   9.88  )-----------( 465      ( 22 Oct 2023 09:27 )             41.1     10-22    144  |  109<H>  |  10  ----------------------------<  130<H>  3.3<L>   |  22  |  1.00    Ca    9.7      22 Oct 2023 09:27  Phos  3.1     10-22  Mg     2.1     10-22    TPro  8.0  /  Alb  3.6  /  TBili  0.4  /  DBili  x   /  AST  22  /  ALT  52  /  AlkPhos  152<H>  10-22            Review of Systems	      Objective     Physical Examination  heart s1s2  lung dc bS  head nc        Pertinent Lab findings & Imaging      Saurav:  NO   Adequate UO     I&O's Detail    21 Oct 2023 07:01  -  22 Oct 2023 07:00  --------------------------------------------------------  IN:    IV PiggyBack: 100 mL    Oral Fluid: 250 mL    sodium chloride 0.9% w/ Additives: 825 mL  Total IN: 1175 mL    OUT:  Total OUT: 0 mL    Total NET: 1175 mL               Discussed with:     Cultures:	        Radiology

## 2023-10-23 NOTE — CONSULT NOTE ADULT - ATTENDING COMMENTS
68 year old Female a/w SBO with PSHx colon resection w/ rectopexy, SBR w/ primary anastomosis with revision x 2.  Admitted to surgery team for SBO.     Pt with persistent nausea and found on CT to have suggestions of gastritis with no further evidence of SBO.  GI consulted, and recommending balloon enteroscopy to be arranged in outpt setting.  Protonix 40 IV bid started, and pepcid 2- IV qd for epigastric discomfort and evidence of peptic ulcer disease.  Maalox added for additional relief.  Zofran changed to ATC for sustained nausea control with reglan for additional breakthrough relief prn.  Pt appears to have allergic reaction to IV contrast, started on benadryl x 4 doses, and given solumedrol 40 IV x1 dose.      Gil Segovia, Attending Physician
Patient with  chronic pain

## 2023-10-23 NOTE — PROGRESS NOTE ADULT - ASSESSMENT
67 y/o female with hx of multiple bouts of SBO, first surgery July 2022, Renata, pt presents with diffuse abdominal pain, distension    sbo  atelectasis  acute - chr pain   multiple bouts SBO  HLD  hx of small bowel resection  gastritis  thyroid disease    s/p EGD - grossly normal  GI f/u noted  planned for gastric emptying study  s/p RRT  s/p ICU  on steroids -nebs for poss laryngeal spasm   overnight medication admin issues noted

## 2023-10-23 NOTE — CHART NOTE - NSCHARTNOTESELECT_GEN_ALL_CORE
Event Note
Nutrition Services
Nutrition Services
SURGERY/Event Note
pain medication/Event Note
Event Note
I Stop
ICU attending
Nutrition Services
Rapid Response

## 2023-10-23 NOTE — CHART NOTE - NSCHARTNOTEFT_GEN_A_CORE
Assessment: Pt seen for nutrition follow-up. Chart reviewed, hospital course noted.    Brief hx: Pt is a "67 yo F with PMH colon resection, SBR with primary anastomosis with revision x 2.  Admitted to surgery for SBO. Medicine consulted for continued vomiting/diarrhea, allergic rxn, and home medication management."    Visited pt at bedside this am. Pt remains NPO after midnight for gastric emptying study today. Pt previously on full liquid diet. Reports tolerating diet well. Ate well for dinner meal last night. Pt states she is no longer experiencing any N/V or diarrhea. +BM 10/22. Feeling better overall. PO intakes variable; % per nursing documentation. No chewing/swallowing difficulties. Food preferences previously obtained to optimize po intake/tolerance. Pt provided food preferences once diet advances to low-fiber. Diet office made aware. RD remains available and will continue to follow-up.     Factors impacting intake: [ ] none [ ] nausea  [ ] vomiting [ ] diarrhea [ ] constipation  [ ]chewing problems [ ] swallowing issues  [X] other: recurrent SBO    Diet Prescription: Diet, NPO after Midnight:      NPO Start Date: 22-Oct-2023,   NPO Start Time: 23:59  Except Medications (10-22-23 @ 10:04)  Diet, Full Liquid (10-18-23 @ 17:02)    Intake: fair    Current Weight: Weight (kg): 68 (10-20 @ 09:12), admit wt 142#, 10/20 149.9# (no edema noted)  % Weight Change    Pertinent Medications: MEDICATIONS  (STANDING):  albuterol/ipratropium for Nebulization 3 milliLiter(s) Nebulizer every 4 hours  enoxaparin Injectable 40 milliGRAM(s) SubCutaneous every 24 hours  famotidine    Tablet 20 milliGRAM(s) Oral daily  influenza  Vaccine (HIGH DOSE) 0.7 milliLiter(s) IntraMuscular once  levothyroxine 88 MICROGram(s) Oral daily  methylPREDNISolone sodium succinate Injectable 40 milliGRAM(s) IV Push daily  pantoprazole    Tablet 40 milliGRAM(s) Oral two times a day  polyethylene glycol 3350 17 Gram(s) Oral daily    MEDICATIONS  (PRN):  albuterol/ipratropium for Nebulization 3 milliLiter(s) Nebulizer every 2 hours PRN Shortness of Breath  aluminum hydroxide/magnesium hydroxide/simethicone Suspension 30 milliLiter(s) Oral every 4 hours PRN Dyspepsia  diazepam    Tablet 5 milliGRAM(s) Oral daily PRN Spasm  diphenhydrAMINE Injectable 25 milliGRAM(s) IV Push every 6 hours PRN Insomnia or pruritis  guaiFENesin Oral Liquid (Sugar-Free) 100 milliGRAM(s) Oral every 6 hours PRN Cough  ondansetron Injectable 4 milliGRAM(s) IV Push every 6 hours PRN Nausea and/or Vomiting    Pertinent Labs: 10-23 Na143 mmol/L Glu 82 mg/dL K+ 4.0 mmol/L Cr  0.87 mg/dL BUN 10 mg/dL 10-22 Phos 3.1 mg/dL 10-22 Alb 3.6 g/dL    CAPILLARY BLOOD GLUCOSE  POCT Blood Glucose.: 85 mg/dL (23 Oct 2023 08:53)    Skin: ecchymotic, no pressure ulcers    Estimated Needs:   [X] no change since previous assessment: based on 142# 64.4kg   25-30kcals/kg 1610-1932kcals  1-1.2gms protein/kg 64-77gms protein  [ ] recalculated:     Previous Nutrition Diagnosis:   [ ] Inadequate Energy Intake [ ]Inadequate Oral Intake [ ] Excessive Energy Intake   [ ] Underweight [ ] Increased Nutrient Needs [ ] Overweight/Obesity   [X] Altered GI Function [ ] Unintended Weight Loss [ ] Food & Nutrition Related Knowledge Deficit [ ] Malnutrition     Nutrition Diagnosis is [X] ongoing  [ ] resolved [ ] not applicable     New Nutrition Diagnosis: [X] not applicable     Interventions:   Recommend  [ ] Change Diet To:  [ ] Nutrition Supplement  [ ] Nutrition Support  [X] Other: continue diet as ordered, advance diet to low fiber as appropriate  Recommend ensure clear BID  Recommend MVI daily    Monitoring and Evaluation:   [X] PO intake [ x ] Tolerance to diet prescription [ x ] weights [ x ] labs[ x ] follow up per protocol  [X] other: s/s GI distress, bowel function, skin integrity/ edema Assessment: Pt seen for nutrition follow-up. Chart reviewed, hospital course noted.    Brief hx: Pt is a "67 yo F with PMH colon resection, SBR with primary anastomosis with revision x 2.  Admitted to surgery for SBO. Medicine consulted for continued vomiting/diarrhea, allergic rxn, and home medication management."    Visited pt at bedside this am. Pt transferred to ICU 10/20 for SOB. Now transferred back to floors 10/22. Pt remains NPO after midnight for gastric emptying study today. Pt previously on full liquid diet. Reports tolerating diet well. Ate well for dinner meal last night. Pt states she is no longer experiencing any N/V or diarrhea. +BM 10/22. Feeling better overall. PO intakes variable; % per nursing documentation. No chewing/swallowing difficulties. Food preferences previously obtained to optimize po intake/tolerance. Pt provided food preferences once diet advances to low-fiber. Diet office made aware. RD remains available and will continue to follow-up.     Factors impacting intake: [ ] none [ ] nausea  [ ] vomiting [ ] diarrhea [ ] constipation  [ ]chewing problems [ ] swallowing issues  [X] other: recurrent SBO    Diet Prescription: Diet, NPO after Midnight:      NPO Start Date: 22-Oct-2023,   NPO Start Time: 23:59  Except Medications (10-22-23 @ 10:04)  Diet, Full Liquid (10-18-23 @ 17:02)    Intake: fair    Current Weight: Weight (kg): 68 (10-20 @ 09:12), admit wt 142#, 10/20 149.9# (no edema noted)  % Weight Change    Pertinent Medications: MEDICATIONS  (STANDING):  albuterol/ipratropium for Nebulization 3 milliLiter(s) Nebulizer every 4 hours  enoxaparin Injectable 40 milliGRAM(s) SubCutaneous every 24 hours  famotidine    Tablet 20 milliGRAM(s) Oral daily  influenza  Vaccine (HIGH DOSE) 0.7 milliLiter(s) IntraMuscular once  levothyroxine 88 MICROGram(s) Oral daily  methylPREDNISolone sodium succinate Injectable 40 milliGRAM(s) IV Push daily  pantoprazole    Tablet 40 milliGRAM(s) Oral two times a day  polyethylene glycol 3350 17 Gram(s) Oral daily    MEDICATIONS  (PRN):  albuterol/ipratropium for Nebulization 3 milliLiter(s) Nebulizer every 2 hours PRN Shortness of Breath  aluminum hydroxide/magnesium hydroxide/simethicone Suspension 30 milliLiter(s) Oral every 4 hours PRN Dyspepsia  diazepam    Tablet 5 milliGRAM(s) Oral daily PRN Spasm  diphenhydrAMINE Injectable 25 milliGRAM(s) IV Push every 6 hours PRN Insomnia or pruritis  guaiFENesin Oral Liquid (Sugar-Free) 100 milliGRAM(s) Oral every 6 hours PRN Cough  ondansetron Injectable 4 milliGRAM(s) IV Push every 6 hours PRN Nausea and/or Vomiting    Pertinent Labs: 10-23 Na143 mmol/L Glu 82 mg/dL K+ 4.0 mmol/L Cr  0.87 mg/dL BUN 10 mg/dL 10-22 Phos 3.1 mg/dL 10-22 Alb 3.6 g/dL    CAPILLARY BLOOD GLUCOSE  POCT Blood Glucose.: 85 mg/dL (23 Oct 2023 08:53)    Skin: ecchymotic, no pressure ulcers    Estimated Needs:   [X] no change since previous assessment: based on 142# 64.4kg   25-30kcals/kg 1610-1932kcals  1-1.2gms protein/kg 64-77gms protein  [ ] recalculated:     Previous Nutrition Diagnosis:   [ ] Inadequate Energy Intake [ ]Inadequate Oral Intake [ ] Excessive Energy Intake   [ ] Underweight [ ] Increased Nutrient Needs [ ] Overweight/Obesity   [X] Altered GI Function [ ] Unintended Weight Loss [ ] Food & Nutrition Related Knowledge Deficit [ ] Malnutrition     Nutrition Diagnosis is [X] ongoing  [ ] resolved [ ] not applicable     New Nutrition Diagnosis: [X] not applicable     Interventions:   Recommend  [ ] Change Diet To:  [ ] Nutrition Supplement  [ ] Nutrition Support  [X] Other: continue diet as ordered, advance diet to low fiber as appropriate  Recommend ensure clear BID  Recommend MVI daily    Monitoring and Evaluation:   [X] PO intake [ x ] Tolerance to diet prescription [ x ] weights [ x ] labs[ x ] follow up per protocol  [X] other: s/s GI distress, bowel function, skin integrity/ edema

## 2023-10-23 NOTE — DISCHARGE NOTE NURSING/CASE MANAGEMENT/SOCIAL WORK - NSDCPEFALRISK_GEN_ALL_CORE
For information on Fall & Injury Prevention, visit: [email or web address] OR  [email or web address] OR  https://www.cdc.gov/steadi/patient.html

## 2023-10-23 NOTE — PROGRESS NOTE ADULT - REASON FOR ADMISSION
SBO

## 2023-10-23 NOTE — CONSULT NOTE ADULT - NS PANP COMMENT GEN_ALL_CORE FT
69 y/o female with hx of multiple bouts of SBO, first surgery July 2022, [facility], pt presents with diffuse abdominal pain, distension started this AM, progressively getting worse, 3 episodes of vomiting, no fever, no chills, no CP, no SOB, + two small BM today, pt requesting Dilaudid for pain, recent facelift on steroids and Levaquin last dose yesterday,  Currently patient states that she has been vomiting x3 episodes.  Notes incomplete bowel emptying.  Has had diarrhea for past few days.  No recent illness that she is aware of.  Also with complaints of urinary symptoms- pt could not elaborate.    (04 Oct 2023 23:01) During  the course of hospitalization  patient required opiates  for pain  and refused EGD and testing. Ethics consult  initiated  initially to  assist in  abiding communication   related to  treatment  of pain and diagnosis  of  pain  syndrome     Above ethical analysis written  by me

## 2023-10-23 NOTE — CASE MANAGEMENT PROGRESS NOTE - NSCMPROGRESSNOTE_GEN_ALL_CORE
Discussed patient on rounds this morning. [facility]. Scheduled for a gastric emptying study today. CM remains available.

## 2023-10-24 LAB
C1Q AG SERPL RAJI CELL-ACNC: 12.8 MG/DL — SIGNIFICANT CHANGE UP (ref 10.3–20.5)
C1Q AG SERPL RAJI CELL-ACNC: 12.8 MG/DL — SIGNIFICANT CHANGE UP (ref 10.3–20.5)

## 2023-10-29 NOTE — DISCHARGE NOTE PROVIDER - NSDCQMSTROKE_NEU_ALL_CORE
Physical Therapy    Visit Type: initial evaluation  SUBJECTIVE  Patient agreed to participate in therapy this date.      Patient participated in all scheduled therapy time this session.  Pt seen this session for inpt rehab initial evaluation and to establish agreed on therapy goals and plan.  Pt sleeping soundly when therapist arrived but was easily awakened.    Patient / Family Goal: return home    Pain     Location: left hip with movement, right shoulder with ROM.  at rest denies pain.     OBJECTIVE     Cognitive Status   Orientation    - Oriented to: person   - Disoriented to: place, time and situation  Functional Communication   - Overall Status: impaired   - Forms of Communication: verbal  Attention Span    - Attention: - difficulty dividing attention   - Attention impairment: reduced memory  Following Direction   - follows one step commands inconsistently  Transition Between Tasks   - transitions with cues  Executive Function    - Sequencing: impaired   - Problem Solving: impaired   - Initiation: impaired   - Planning: impaired  Memory   - impaired - decreased recall of biographical information - decreased recall of recent events - decreased short term memory  Awareness of Deficits   - assistance required to compensate for deficits    Patient Activity Tolerance: 1 to 2 activity to rest    Hand Dominance: right-handed    Observation   Capped IV to LUE.     Range of Motion (ROM)   (degrees unless noted; active unless noted; norms in ( ); negative=lacking to 0, positive=beyond 0)  Comments: Right shoulder flexion and abduction with pain beginning at approx 75 degrees AAROM. Right elbow and wrist AROM is WFL with full but weak .  Left shoulder PROM-AAROM to 120 degrees;  Full PROM elbow flexion/ extension and wrist flex/extension.  Weak - able to extend fingers out of  80% of ROM actively.      Strength  (out of 5 unless noted, standard test position unless noted)   Comments / Details: Defer to OT  assessment for UEs, however grossly: right UE 3-/5 shoulder, 3+/5 elbow.  Left UE shoulder flexion- pt did not initiate but once therapist initiated did assist 1+/5, left elbow1+ to 2-/5, weak .    Difficulty following commands for formal assessment of strength: right LE grossly 3- to 3+/5 and LLE 2-/5.  Spasm and discomfort with left hip ROM- will discuss with medical team.      Sitting Balance  (JELLY = base of support)  Static sitting with total assist- pt leans posteriorly- heavily with limited balance responses        Standing Balance  (JELLY = base of support)  Standing in Stedy mechanical lift with 2 person assist- pt with left and posterior lean/push.  Once in lift noted to be significantly forward flexed at hips and trunk and head/ cervical spine with limited extension to posture/ positioning.  Maximal assist of 2 for mechanical lift standing.        Sensation/Dermatome Testing:    Difficult to assess due to deficits in command following.  Muscle Tone  LUE: hypo tonic  LLE: hyper reflexive    Perception   - Inattention/Neglect: cues to attend left side of body, cues to maintain midline in sitting and cues to maintain midline in standing  - Initiation: cues to initiate tasks and hand over hand to initiate tasks      Bed Mobility  - Rolling left: total assist - dependent   - Rolling right: total assist - dependent   - Repositioning in bed: 2 persons, total assist - dependent   - Supine to sit: 2 persons, total assist - dependent   - Sit to supine: 2 persons, total assist - dependent   Assist to initiate movement, to complete rotational component, to assist with LUE and LLE positioning/ placement, to assist to motor planning and Right extremity positioning., for trunk wt shifting and control, for balance throughout transitions.  Pt leans /pushes heavily posteriorly.  Once in short sitting push/ lean left and posteriorly.  Transfers  Assistive devices: non-mechanical sit to stand lift, 2 person, gait belt  -  Sit to stand: 2 persons, total assist - dependent   - Stand to sit: 2 persons, total assist - dependent   - Stand pivot: 2 persons, total assist - dependent   stedy mechanical lift for transfers.  2 person assist to rise into stedy lift with hospital bed elevated and assist for upright force production and for anterior wt shift of buttocks for positioning in the lift.  Pt is able to hold onto front horizontal bar with bilat UEs - LUE with assist for arm positioning / safety.      Ambulation / Gait  Pt is not ambulatory      Wheelchair Mobility  Total assist at this time for any w/c locomotion.    Interventions  Skilled input: as detailed above  Verbal Consent: Writer verbally educated and received verbal consent for hand placement, positioning of patient, and techniques to be performed today from patient for clothing adjustments for techniques, therapist position for techniques and hand placement and palpation for techniques as described above and how they are pertinent to the patient's plan of care.         ASSESSMENT   Impairments: abnormal tone, activity tolerance, balance, cognition, coordination, decreased insight into deficits, endurance, pain, safety awareness, range of motion, proprioception, sensation, shortness of breath, skin integrity and strength  Functional Limitations: all functional mobility    Patient seen for Inpatient Rehabilitation program PT evaluation to address impairments and functional limitations related to hospitalization for acute onset left sided weakness and slurred speech.  Pt with PMHx significant for HTN, HLD, bilateral knee replacements, left shoulder surgery, left distal femur ORIF.  . At baseline, patient was functioning at modified independent to minimal assist level and needs to be a supervision to light minimal assist level for safe return to prior living situation with caregiver support from spouse.  Pt spouse uses a walker for mobility but does drive. Patient is currently  functioning at total assist level. Patient will benefit from IRP PT services to address impairments, advance functional mobility, determine equipment needs, and provide caregiver education as appropriate. Patient's prognosis for meeting PT goals is fair due to pt current mobility level of total assist of 2, strong posterior and left lateral pushing tendency in sitting and significant level of assist for standing in stedy lift with 2 person assist, as well as cognition deficits. Potential barriers anticipated at discharge are: stairs, level of assist and cognition          Discharge Recommendations  PT/OT Mobility Equipment for Discharge: anticipate need for wheelchair and ramp for home, has 2WW  PT Identified Barriers to Discharge: level of assist for mobility, home environment/ stairs for entry        • Skilled therapy is required to address these limitations in attempt to maximize the patient's independence.     • Predicted patient presentation: High (unstable) - Patient comorbidities and complexities, as defined above, will have significant impact on steady progress for prescribed plan of care.    Education:   Education provided during session:  - The patient was educated on the role of PT in IRP and the IRP Discharge Folder- will require training /education with spouse.  - Results of above outlined education: Needs reinforcement    Patient at End of Session:   Location: in chair  Safety measures: alarm system in place/re-engaged, call light within reach, equipment intact and lines intact  Handoff to: nurse assistant    PLAN   Suggestions for next session as indicated:   Treatment plan for next session 10/30: bed mobility skills- including rolling, short sitting balance activities including sit to and from lateral forearm wt bearing, midline orientation in sitting, standing trials with Sharon Marasia to promote transfer safety and ease and for safe wt bearing in upright, strengthening activities, when appropriate  pivot/ squat pivot/ sliding board for transfers without device.  Outcome measures:   Additional treatment options:  Plan considerations:  Family/care partner training details:   Therapy equipment in patient room:          Interventions: balance, bed mobility, cognitive reorientation (or training), equipment eval/education, functional transfer training, patient/family training, safety education, ROM and strengthening   Frequency: 5-7 days per week  Frequency Comments: 60 minutes/day at least 5 days/ week.   Duration:     CARE tool scores (i.e. \"IRF items\" flowsheet documentation) reflect assessment of usual performance and level of function before intervention. Therefore, CARE score may differ from functional status documented during skilled therapy intervention.  Agreement to plan and goals: patient agrees with goals and treatment plan      GOALS  Review date: 11/5/2023  Short Term Goals (STGs): to be met 7 days from date established, unless otherwise stated.   - Patient will complete all bed mobility at moderate assist level   - Patient will perform sit to/from stand at maximal assist of 1 level   - Patient will perform modified stand pivot transfers at maximal assist level with small based quad cane  - short sitting at edge of mat or bed with minimal assist for balance with UE propping x3 minutes.     Ambulation goal to be addressed when more appropriately able to stand     Long Term Goals (LTGs): to be met by discharge from rehab program.   - Patient will complete all bed mobility at supervision level   - Patient will perform sit to/from stand at minimal assist level   - Patient will perform stand pivot transfers at minimal assist level with small based quad cane   - Patient will ambulate 20 feet at minimal assist level with small based quad cane   -pt will propel manual wheelchair with right UE/ LE with minimal assist 50' level surfaces.     Documented in the chart in the following areas:  Assessment/Plan.      Therapy procedure time and total treatment time can be found documented on the Time Entry flowsheet   No

## 2023-11-02 NOTE — H&P ADULT - PROBLEM/PLAN-2
MEDICARE WELLNESS VISIT + NOTE    CHIEF COMPLAINT:  Ella Perez presents for her Subsequent Annual Medicare Wellness Visit.   Her additional complaints or concerns are addressed below.     Patient Care Team:  Lan Varela MD as PCP - General (Internal Medicine)        Patient Active Problem List   Diagnosis   • Essential hypertension, benign   • LBP (low back pain)   • Hyperlipidemia   • Myalgia   • Dyspnea   • Chest pain   • Constipation   • Osteoporosis   • DDD (degenerative disc disease), lumbar   • Lumbar radiculopathy   • Weight loss   • PVC's (premature ventricular contractions)   • History of cardiac catheterization   • Disorder of right rotator cuff   • Arthritis of shoulder region, right   • Iron deficiency anemia   • Fatigue   • Bradycardia   • Combined forms of age-related cataract of both eyes         Past Medical History:   Diagnosis Date   • Anemia    • Angina    • Chronic pain     back   • DDD (degenerative disc disease), lumbar     states entire back   • Dizzy spells    • Essential hypertension, benign 2011   • Gastric ulcer, unspecified as acute or chronic, without mention of hemorrhage, perforation, or obstruction    • H/O transfusion of packed red blood cells     gastric hemorrhage   • High blood pressure    • High cholesterol    • History of cardiac catheterization     2002 by Dr. Sen in [place] Impression, normal coronary anatomy, normal left ventricular function, no gross valvular abnormalities.     • Hyperlipidemia 2011   • Kidney stones    • LBP (low back pain) 2011    uses cane PRN   • Osteoarthritis    • Osteoporosis, unspecified 2008   • Pneumonia    • PVC's (premature ventricular contractions) 2015         Past Surgical History:   Procedure Laterality Date   • Appendectomy     • Back surgery     • Cardiac catherization     •  section, classic     • Colonoscopy     • Colonoscopy diagnostic  2017    Dr Leyva   recall 5  "SUBJECTIVE:   Aung is a 72 year old who presents for Preventive Visit.      11/2/2023     8:43 AM   Additional Questions   Roomed by Hali CONNELLY       Are you in the first 12 months of your Medicare coverage?  No    Healthy Habits:     In general, how would you rate your overall health?  Good    Frequency of exercise:  6-7 days/week    Duration of exercise:  Other    Do you usually eat at least 4 servings of fruit and vegetables a day, include whole grains    & fiber and avoid regularly eating high fat or \"junk\" foods?  No    Taking medications regularly:  Yes    Medication side effects:  None    Ability to successfully perform activities of daily living:  No assistance needed    Home Safety:  No safety concerns identified    Hearing Impairment:  Difficulty following a conversation in a noisy restaurant or crowded room, need to ask people to speak up or repeat themselves and difficulty understanding soft or whispered speech    In the past 6 months, have you been bothered by leaking of urine?  No    In general, how would you rate your overall mental or emotional health?  Good    Additional concerns today:  Yes      Today's PHQ-2 Score:       11/1/2023     9:44 AM   PHQ-2 ( 1999 [employer])   Q1: Little interest or pleasure in doing things 0   Q2: Feeling down, depressed or hopeless 0   PHQ-2 Score 0   Q1: Little interest or pleasure in doing things Not at all   Q2: Feeling down, depressed or hopeless Not at all   PHQ-2 Score 0           Have you ever done Advance Care Planning? (For example, a Health Directive, POLST, or a discussion with a medical provider or your loved ones about your wishes): No, advance care planning information given to patient to review.  Patient plans to discuss their wishes with loved ones or provider.      Fall risk  Fallen 2 or more times in the past year?: No  Any fall with injury in the past year?: No    Cognitive Screening   1) Repeat 3 items (Leader, Season, Table)    2) Clock draw: " NORMAL  3) 3 item recall: Recalls 3 objects  Results: 3 items recalled: COGNITIVE IMPAIRMENT LESS LIKELY    Mini-CogTM Copyright DAVIDSON Tinoco. Licensed by the author for use in [facility]; reprinted with permission (taj@[facility]). All rights reserved.          Reviewed and updated as needed this visit by clinical staff   Tobacco  Allergies  Meds              Reviewed and updated as needed this visit by Provider                 Social History     Tobacco Use    Smoking status: Former    Smokeless tobacco: Never    Tobacco comments:     quit in 1986   Substance Use Topics    Alcohol use: Yes     Comment: valeria             10/26/2023     9:00 AM   Alcohol Use   Prescreen: >3 drinks/day or >7 drinks/week? No     Do you have a current opioid prescription? No  Do you use any other controlled substances or medications that are not prescribed by a provider? None        Current providers sharing in care for this patient include:   Patient Care Team:  Eric Miller MD as PCP - General (Family Medicine)  Eric Miller MD as Assigned PCP    The following health maintenance items are reviewed in Epic and correct as of today:  Health Maintenance   Topic Date Due    LUNG CANCER SCREENING  Never done    RSV VACCINE 60+ (1 - 1-dose 60+ series) Never done    AORTIC ANEURYSM SCREENING (SYSTEM ASSIGNED)  Never done    Pneumococcal Vaccine: 65+ Years (2 - PPSV23 or PCV20) 03/30/2018    DTAP/TDAP/TD IMMUNIZATION (2 - Td or Tdap) 04/20/2019    MEDICARE ANNUAL WELLNESS VISIT  11/02/2024    ANNUAL REVIEW OF HM ORDERS  11/02/2024    FALL RISK ASSESSMENT  11/02/2024    LIPID  10/27/2027    COLORECTAL CANCER SCREENING  08/27/2028    ADVANCE CARE PLANNING  11/02/2028    HEPATITIS C SCREENING  Completed    PHQ-2 (once per calendar year)  Completed    INFLUENZA VACCINE  Completed    ZOSTER IMMUNIZATION  Completed    COVID-19 Vaccine  Completed    IPV IMMUNIZATION  Aged Out    HPV IMMUNIZATION  Aged Out    MENINGITIS  years   • D and c  1965   • Ep ablation  04/2015    PVC ablation   • Esophagogastroduodenoscopy      with ulcer   • Esophagogastroduodenoscopy  06/01/2017    Dr Leyva   • Hysterectomy      ovaries intact   • Inject nerv blck,trigeminal  1974   • Lumbar fusion  2008    Dr Pierre/ 7 rods placed   • Obtaining screen pap smear  01/27/2010   • Occult blood test tube  02/07/2011   • Removal of kidney stone     • Tonsillectomy and adenoidectomy  1962   • Total hip replacement  2000    Right         Social History     Tobacco Use   • Smoking status: Never Smoker   • Smokeless tobacco: Never Used   Substance Use Topics   • Alcohol use: No   • Drug use: No     Family History   Problem Relation Age of Onset   • Cancer Mother         stomach   • Diabetes Mother    • Hypertension Mother    • Heart Mother         CHF   • Musculoskeletal Mother    • Cancer Father         renal   • Hypertension Sister    • Cancer Sister         Bladder, diag 2/2020   • Heart Sister         In a nursing home   • Heart Brother    • Cancer Brother         lung   • Diabetes Maternal Grandmother    • Heart Maternal Grandmother    • Heart Paternal Grandmother    • Cancer Paternal Grandfather         stomach   • Heart Maternal Aunt    • Heart Maternal Uncle    • Stroke Maternal Uncle    • Heart Paternal Aunt    • Heart Paternal Uncle    • Stroke Paternal Uncle          Current Outpatient Medications   Medication Sig Dispense Refill   • amLODIPine (NORVASC) 2.5 MG tablet TAKE 1 TABLET BY MOUTH  DAILY 90 tablet 0   • lisinopril (ZESTRIL) 40 MG tablet TAKE 1 TABLET BY MOUTH  DAILY 90 tablet 0   • calcitonin (MIACALCIN) 200 UNIT/ACT nasal spray USE 1 SPRAY IN 1 NOSTRIL  DAILY (ALTERNATING NOSTRILS DAILY) 11.1 mL 3   • tiZANidine (ZANAFLEX) 2 MG tablet TAKE 1 TABLET BY MOUTH  EVERY 6 HOURS AS NEEDED FOR MUSCLE SPASM(S) 360 tablet 0   • morphine SR (MS CONTIN) 15 MG 12 hr tablet Take 1 tablet by mouth every 12 hours. 60 tablet 0   • lovastatin (MEVACOR) 40  "IMMUNIZATION  Aged Out    RSV MONOCLONAL ANTIBODY  Aged Out     Lab work is in process        Review of Systems   Constitutional:  Negative for chills and fever.   HENT:  Negative for congestion, ear pain, hearing loss and sore throat.    Eyes:  Negative for pain and visual disturbance.   Respiratory:  Negative for cough and shortness of breath.    Cardiovascular:  Negative for chest pain, palpitations and peripheral edema.   Gastrointestinal:  Negative for abdominal pain, constipation, diarrhea, heartburn, hematochezia and nausea.   Genitourinary:  Negative for dysuria, frequency, genital sores, hematuria, impotence, penile discharge and urgency.   Musculoskeletal:  Negative for arthralgias, joint swelling and myalgias.   Skin:  Negative for rash.   Neurological:  Negative for dizziness, weakness, headaches and paresthesias.   Psychiatric/Behavioral:  Negative for mood changes. The patient is not nervous/anxious.          OBJECTIVE:   /80 (BP Location: Right arm, Patient Position: Chair)   Pulse 72   Temp 97.7  F (36.5  C) (Temporal)   Resp 16   Ht 1.778 m (5' 10\")   Wt 80.5 kg (177 lb 6.4 oz)   SpO2 97%   BMI 25.45 kg/m   Estimated body mass index is 25.45 kg/m  as calculated from the following:    Height as of this encounter: 1.778 m (5' 10\").    Weight as of this encounter: 80.5 kg (177 lb 6.4 oz).  Physical Exam  GENERAL: healthy, alert and no distress  EYES: Eyes grossly normal to inspection, PERRL and conjunctivae and sclerae normal  HENT: ear canals and TM's normal, nose and mouth without ulcers or lesions  NECK: no adenopathy, no asymmetry, masses, or scars and thyroid normal to palpation  RESP: lungs clear to auscultation - no rales, rhonchi or wheezes  CV: regular rate and rhythm, normal S1 S2, no S3 or S4, no murmur, click or rub, no peripheral edema and peripheral pulses strong  ABDOMEN: soft, nontender, no hepatosplenomegaly, no masses and bowel sounds normal  MS: no gross musculoskeletal " MG tablet TAKE 1 TABLET BY MOUTH  DAILY 90 tablet 0   • metoPROLOL tartrate (LOPRESSOR) 50 MG tablet TAKE 1 TABLET BY MOUTH  EVERY 8 HOURS 270 tablet 0   • ARTIFICIAL TEAR OP Apply to eye as needed.     • ferrous sulfate 325 (65 FE) MG tablet Take 1 tablet by mouth daily (with breakfast). 1 tablet 0   • magnesium oxide 250 MG tablet Take two tablets daily     • cannabidiol (CBD) oil Apply topically daily. Uses on back daily     • Calcium Carbonate (CALCIUM 600 HIGH POTENCY PO) Take by mouth 3 times daily.      • HYDROcodone-acetaminophen (NORCO) 7.5-325 MG per tablet Take 1 tablet by mouth every 4 hours as needed for Pain. Decrease in dose for next fill 180 tablet 0   • azithromycin (ZITHROMAX) 500 MG tablet Take two tabs prior to Dental work     • diphenhydramine-acetaminophen (Tylenol PM Extra Strength)  MG Tab Take 2 tablets by mouth at bedtime.     • diphenhydrAMINE (BENADRYL) 25 MG capsule Take 1 capsule by mouth nightly as needed (takes 2 nightly for allergies). 30 capsule 0   • Cholecalciferol (VITAMIN D-3 SUPER STRENGTH) 2000 UNIT TABS Take 1 tablet by mouth daily.       No current facility-administered medications for this visit.        The following items on the Medicare Health Risk Assessment were found to be positive  3.) During the past 4 weeks, how would you rate your health?: Fair     4.) During the past 4 weeks, what was the hardest physical activity you could do for at least 2 minutes?: Light     5.) Do you do moderate to strenuous exercise (brisk walk) for about 20 minutes for 3 or more days per week?: No, I usually do not exercise this much     6 b.) How many servings of High Fiber / Whole Grain Foods to you have each day ( 1 serving = 1 cup cold cereal, 1/2 cup cooked cereal, 1 slice bread): 1 per day     7b.) Do you feel unsteady when standing or walking?: Yes     11d.) Bodily pain: Always     11e.) Tiredness or Fatigue: Always     11h.) Problems with your hearing: Always     11l.) Sexual  Problems: Always     15.) How confident are you that you can control and manage most of your health problems?: Somewhat confident         Vision and Hearing screens: Not performed    Advance Directive:   The patient has the following documents:  Power of  for Health Care    Cognitive/Functional Status: no evidence of cognitive dysfunction by direct observation    Opioid Review: Ella is taking an opioid that I am prescribing.  Ella shows no change of pain. She reports no other side effects reported.   reviewed, no concerns regarding Opioid Use Disorder or diversion. Risk of opioid therapy reviewed with the patient.  Hydrocodone medication refilled with 30 day supply. She has plans to establish care with Dr. Lafleur in the near future.     Opioid Risk Tool Total Score:    Score 0-3 = Low Risk, 4-7 = Moderate Risk, 8+ = High Risk      Recent PHQ 2/9 Score:    PHQ 2:  Date Adult PHQ 2 Score Adult PHQ 2 Interpretation   3/3/2022 0 No further screening needed       PHQ 9:       DEPRESSION ASSESSMENT/PLAN:  Depression screening is negative no further plan needed.     Body mass index is 30.5 kg/m².    BMI ASSESSMENT/PLAN:  Patient is overweight.    Caloric restriction and Low carbohydrate diet        See Patient Instructions section.   Return in about 4 weeks (around 4/7/2022) for Schedule with Dr. Lafleur to establish care in the next month or so. .      OUTPATIENT PROGRESS NOTE    Subjective   Chief Complaint Medicare Wellness Visit (Subsequent)    HPI Ella is a 77-year-old female here for her Medicare wellness and follow-up on chronic conditions.  We discussed at length the halfway of Dr. Varela and her future care.  She would like to establish with someone that could see her in the hospital if needed.  We discussed that as a nurse practitioner I am unable to meet those needs, despite her wish to continue seeing me she would much prefer to have a physician that could admit her to the hospital.  Her   defects noted, no edema  SKIN: no suspicious lesions or rashes, cyst in middle back that is not draining erythematous or tender.  NEURO: Normal strength and tone, mentation intact and speech normal  PSYCH: mentation appears normal, affect normal/bright        ASSESSMENT / PLAN:   Aung was seen today for wellness visit.    Diagnoses and all orders for this visit:    Encounter for Medicare annual wellness exam    Need for Tdap vaccination    Need for vaccination against respiratory syncytial virus    Malignant neoplasm of prostate (H)  -     PSA, screen; Future    Benign essential hypertension  Stable with lisinopril now.  Plan to repeat labs.  -     Comprehensive metabolic panel (BMP + Alb, Alk Phos, ALT, AST, Total. Bili, TP); Future    S/P arthroscopy of right knee    Peripheral polyneuropathy  Continues on gabapentin    Prediabetes  Previous A1c elevated will repeat today  -     Hemoglobin A1c; Future    Lipid screening  -     Lipid panel reflex to direct LDL Fasting; Future    Encounter for abdominal aortic aneurysm (AAA) screening  -     US Abdominal Aorta Imaging; Future    Other orders  -     Pneumococcal 20 Valent Conjugate (PCV20)  -     REVIEW OF HEALTH MAINTENANCE PROTOCOL ORDERS  -     PRIMARY CARE FOLLOW-UP SCHEDULING; Future    Epidermal cyst  Discussed natural course and continue to monitor now.  No signs of infection       Patient has been advised of split billing requirements and indicates understanding: Yes      COUNSELING:  Reviewed preventive health counseling, as reflected in patient instructions       Consider AAA screening for ages 65-75 and smoking history       Regular exercise       Healthy diet/nutrition       Vision screening       Hearing screening       Dental care       Bladder control       Fall risk prevention       Immunizations       Alcohol Use        Folic Acid       Safe sex practices/STD prevention       Hepatitis C screening       HIV screening for high risk patient        "Consider lung cancer screening for ages 55-80 years (77 for Medicare) and 20 pack-year smoking history        Colon cancer screening       Prostate cancer screening      BMI:   Estimated body mass index is 25.45 kg/m  as calculated from the following:    Height as of this encounter: 1.778 m (5' 10\").    Weight as of this encounter: 80.5 kg (177 lb 6.4 oz).         He reports that he has quit smoking. He has never used smokeless tobacco.      Appropriate preventive services were discussed with this patient, including applicable screening as appropriate for fall prevention, nutrition, physical activity, Tobacco-use cessation, weight loss and cognition.  Checklist reviewing preventive services available has been given to the patient.    Reviewed patients plan of care and provided an AVS. The Basic Care Plan (routine screening as documented in Health Maintenance) for Aung meets the Care Plan requirement. This Care Plan has been established and reviewed with the Patient.          Eric Miller MD  [facility]    Identified Health Risks:  I have reviewed Opioid Use Disorder and Substance Use Disorder risk factors and made any needed referrals. The patient was counseled and encouraged to consider modifying their diet and eating habits. He was provided with information on recommended healthy diet options.  The patient was provided with written information regarding signs of hearing loss.  " is establishing care with Dr. Lafleur and after long discussion she does feel that a physician that sees patients in the hospital would be a better fit for her.    Overall she feels she is stable.  She does have chronic pain with lumbar radiculopathy.  She is managed currently on MS Contin and hydrocodone.  She also uses CBD oil which is helpful.  She did see Pain Management in the past and is unwilling to completely wean off of these medications.  She continued to follow with Dr. Varela for pain management until his detention.  Today she is due for refill of her hydrocodone, which I will provide.  Further refills will need to be done through her next PCP.  She tells me the pain can be quite significant throughout the day, she frequently needs to rest, she cannot do much work in front of her, no lifting.  She does get tired with long ambulation.  She will frequently rest throughout the day.  She and her  manage at home together, sharing the work, each doing what they can.    Medications  Medications were reviewed and updated today.    Histories  I have personally reviewed and updated the patient's past medical, past surgical, family and social histories during today's visit.    Review of Systems:   General: Denies: headache, fever, chills, Endorses: weakness, fatigue  HEENT: Denies: vision changes, hearing changes  Pulmonary: Denies: cough, wheezing and Endorses:  Shortness of breath with over exertion, this is chronic for her  Cardiovascular: Denies: lower extremity edema, syncope and Endorses: chest pain, palpitations, dyspnea on exertion and These are chronic for her, there is no new change in symptoms.  She will have chest pain 1 to 2 times a week if she does not limit her activity.  She will also have racing heart, again these are chronic conditions.  Gastrointestinal: Denies: nausea, vomiting, diarrhea, constipation, abdominal pain, melena and hematochezia  Genitourinary: Denies:  dysuria  Musculoskeletal: Denies: joint swelling, joint erythema and Endorses: myalgia, arthralgia and weakness  Skin: Denies: rashes and itching  Endocrine: Denies: heat or cold intolerance and excessive thirst  Neurologic: Denies: loss of consciousness, change in mental status, migraine and Endorses: Numbness to her feet at night, can have numbness to her legs as well.  Ambulates with cane.      Objective   Visit Vitals  BP (!) 165/80   Pulse (!) 58   Ht 4' 11\" (1.499 m)   Wt 68.5 kg (151 lb)   SpO2 97%   BMI 30.50 kg/m²     Vitals:    03/10/22 0948 03/10/22 1015   BP: (!) 150/90 (!) 165/80   Pulse: (!) 58    SpO2: 97%    Weight: 68.5 kg (151 lb)    Height: 4' 11\" (1.499 m)        Physical Exam  General - NAD (no acute distress), well appearing Female. Patient is in no acute distress.  Patient is conversing freely in full sentences.  HEENT - Atraumatic, normocephalic.  Oropharyngeal mucous membranes are dry.  No scleral icterus or conjunctival pallor, no nasal discharge, oropharynx without erythema or exudate. Ear canals free of cerumen and TMs normal.  Neck - Soft and supple. No cervical or supraclavicular lymphadenopathy. Trachea midline. No thyromegaly.  Cardiovascular - irregular rate and rhythm with soft murmur. PMI (point of maximum impulse) is nondisplaced. Normal S1/S2.  No carotid bruits.  No JVD (jugular venous distention) or HJR (hepatojugular reflux).  Pulmonary - Normal respiratory effort. No retractions or increased work of breathing. Lungs are clear to auscultation bilaterally. No wheezes, rales or rhonchi.  Good aeration throughout.  Abdomen - Soft, nontender and nondistended.  No guarding or rebound tenderness. No hepatosplenomegaly. Positive bowel sounds.  Genitourinary - No suprapubic tenderness.  Musculoskeletal/Extremities - Warm and well perfused.  Peripheral pulses are 2+ and symmetric.  No cyanosis or edema.  Motor strength grossly normal, no joint erythema or swelling.  Skin - Warm. No  rashes, jaundice or other lesions.  Psychiatric - Alert and oriented to person, place and time. Affect and mood appropriate.      Laboratory  I have reviewed the pertinent laboratory tests. These are the pertinent findings:  · Due for CBC, CMP, and lipid panel.      Assessment & Plan   1. Medicare annual wellness visit, subsequent  Completed today.  Multiple concerns with ambulation and ADLs however she and her  are managing okay at home.  They live in a condo and are not responsible for any outside work.  They do manage all other cleaning and cooking independently at this time.  - ANNUAL WELLNESS VISIT SUBSEQUENT VISIT W PPS  - CBC WITH DIFFERENTIAL; Future  - COMPREHENSIVE METABOLIC PANEL; Future  As she is looking for a provider who could take care of her she would be hospitalized in the area, she will not established care with me today.  She will set up an appointment with Dr. Lafleur, her  already has plans to see him and this will be closer to home for her.    2. Chronic low back pain with sciatica, sciatica laterality unspecified, unspecified back pain laterality  3. Lumbar radiculopathy  Chronic low back pain treated with MS Contin and hydrocodone.  She does use CBD oil at times which is helpful.  I did refill her hydrocodone today, her 7.5/325 mg is a decrease over the last several months.  She was also decreased on MS Contin to 15 mg every 12 hours (was q.8 hours in the past, this was decreased a couple months ago).   - HYDROcodone-acetaminophen (NORCO) 7.5-325 MG per tablet; Take 1 tablet by mouth every 4 hours as needed for Pain. Decrease in dose for next fill  Dispense: 180 tablet; Refill: 0    4. Essential hypertension, benign  Blood pressure was elevated on today's office visit.  I am hesitant to make any changes as she was quite nervous during our visit.  In addition she did not take her meds this morning as she wanted to be fasting for the labs that were needed.  This should be  re-evaluated at her next office visit.  - COMPREHENSIVE METABOLIC PANEL; Future    5. Chest pain due to myocardial ischemia, unspecified ischemic chest pain type  Chronic chest pain, can occur 1 to 2 times a week.  This is not new or different for her, she has not had any increase in frequency of pain.  She is not having any increasing duration of pain.  Overall this is stable.    6. PVC's (premature ventricular contractions)  Known irregular heartbeat with PVCs.  Currently this is stable.    7. Hyperlipidemia, unspecified hyperlipidemia type  On lovastatin 40 mg daily.  Will check lipid panel today.  - LIPID PANEL WITH REFLEX; Future    8. Iron deficiency anemia, unspecified iron deficiency anemia type  History of iron-deficiency anemia, does take iron daily.  Check CBC today.  - CBC WITH DIFFERENTIAL; Future    I spent a total of 60 minutes on the day of the visit.  This includes chart review and documenting. 30 minutes was spent on MCW with remaining 30 minutes spent on follow up for chronic conditions.        DISPLAY PLAN FREE TEXT

## 2023-11-13 ENCOUNTER — APPOINTMENT (OUTPATIENT)
Dept: INTERNAL MEDICINE | Facility: CLINIC | Age: 68
End: 2023-11-13

## 2023-11-13 PROCEDURE — 0225U NFCT DS DNA&RNA 21 SARSCOV2: CPT

## 2023-11-13 PROCEDURE — 83690 ASSAY OF LIPASE: CPT

## 2023-11-13 PROCEDURE — 36415 COLL VENOUS BLD VENIPUNCTURE: CPT

## 2023-11-13 PROCEDURE — 87640 STAPH A DNA AMP PROBE: CPT

## 2023-11-13 PROCEDURE — 80048 BASIC METABOLIC PNL TOTAL CA: CPT

## 2023-11-13 PROCEDURE — 85610 PROTHROMBIN TIME: CPT

## 2023-11-13 PROCEDURE — 99285 EMERGENCY DEPT VISIT HI MDM: CPT

## 2023-11-13 PROCEDURE — 83605 ASSAY OF LACTIC ACID: CPT

## 2023-11-13 PROCEDURE — 96374 THER/PROPH/DIAG INJ IV PUSH: CPT

## 2023-11-13 PROCEDURE — 86161 COMPLEMENT/FUNCTION ACTIVITY: CPT

## 2023-11-13 PROCEDURE — 82962 GLUCOSE BLOOD TEST: CPT

## 2023-11-13 PROCEDURE — 78264 GASTRIC EMPTYING IMG STUDY: CPT

## 2023-11-13 PROCEDURE — 74019 RADEX ABDOMEN 2 VIEWS: CPT

## 2023-11-13 PROCEDURE — 83735 ASSAY OF MAGNESIUM: CPT

## 2023-11-13 PROCEDURE — 93005 ELECTROCARDIOGRAM TRACING: CPT

## 2023-11-13 PROCEDURE — 71045 X-RAY EXAM CHEST 1 VIEW: CPT

## 2023-11-13 PROCEDURE — 74177 CT ABD & PELVIS W/CONTRAST: CPT | Mod: MA

## 2023-11-13 PROCEDURE — 88312 SPECIAL STAINS GROUP 1: CPT

## 2023-11-13 PROCEDURE — 88313 SPECIAL STAINS GROUP 2: CPT

## 2023-11-13 PROCEDURE — 74018 RADEX ABDOMEN 1 VIEW: CPT

## 2023-11-13 PROCEDURE — 86160 COMPLEMENT ANTIGEN: CPT

## 2023-11-13 PROCEDURE — 83655 ASSAY OF LEAD: CPT

## 2023-11-13 PROCEDURE — 88305 TISSUE EXAM BY PATHOLOGIST: CPT

## 2023-11-13 PROCEDURE — 85027 COMPLETE CBC AUTOMATED: CPT

## 2023-11-13 PROCEDURE — 85730 THROMBOPLASTIN TIME PARTIAL: CPT

## 2023-11-13 PROCEDURE — 87641 MR-STAPH DNA AMP PROBE: CPT

## 2023-11-13 PROCEDURE — 86850 RBC ANTIBODY SCREEN: CPT

## 2023-11-13 PROCEDURE — 81001 URINALYSIS AUTO W/SCOPE: CPT

## 2023-11-13 PROCEDURE — 86900 BLOOD TYPING SEROLOGIC ABO: CPT

## 2023-11-13 PROCEDURE — 85025 COMPLETE CBC W/AUTO DIFF WBC: CPT

## 2023-11-13 PROCEDURE — 96375 TX/PRO/DX INJ NEW DRUG ADDON: CPT

## 2023-11-13 PROCEDURE — 86901 BLOOD TYPING SEROLOGIC RH(D): CPT

## 2023-11-13 PROCEDURE — 80053 COMPREHEN METABOLIC PANEL: CPT

## 2023-11-13 PROCEDURE — A9541: CPT

## 2023-11-13 PROCEDURE — 74176 CT ABD & PELVIS W/O CONTRAST: CPT

## 2023-11-13 PROCEDURE — 84100 ASSAY OF PHOSPHORUS: CPT

## 2023-11-13 PROCEDURE — 87507 IADNA-DNA/RNA PROBE TQ 12-25: CPT

## 2023-11-13 PROCEDURE — 84120 ASSAY OF URINE PORPHYRINS: CPT

## 2023-11-13 PROCEDURE — 94640 AIRWAY INHALATION TREATMENT: CPT

## 2024-01-09 ENCOUNTER — APPOINTMENT (OUTPATIENT)
Dept: OBGYN | Facility: CLINIC | Age: 69
End: 2024-01-09
Payer: MEDICARE

## 2024-01-09 PROCEDURE — 81002 URINALYSIS NONAUTO W/O SCOPE: CPT

## 2024-01-09 PROCEDURE — G0101: CPT

## 2024-01-09 PROCEDURE — 82270 OCCULT BLOOD FECES: CPT

## 2024-01-09 PROCEDURE — 99213 OFFICE O/P EST LOW 20 MIN: CPT | Mod: 25

## 2024-01-19 ENCOUNTER — APPOINTMENT (OUTPATIENT)
Dept: INTERNAL MEDICINE | Facility: CLINIC | Age: 69
End: 2024-01-19
Payer: MEDICARE

## 2024-01-19 VITALS
OXYGEN SATURATION: 97 % | SYSTOLIC BLOOD PRESSURE: 130 MMHG | WEIGHT: 143 LBS | BODY MASS INDEX: 26.31 KG/M2 | TEMPERATURE: 97.2 F | HEIGHT: 62 IN | DIASTOLIC BLOOD PRESSURE: 90 MMHG | HEART RATE: 92 BPM

## 2024-01-19 DIAGNOSIS — K21.9 GASTRO-ESOPHAGEAL REFLUX DISEASE W/OUT ESOPHAGITIS: ICD-10-CM

## 2024-01-19 DIAGNOSIS — J01.90 ACUTE SINUSITIS, UNSPECIFIED: ICD-10-CM

## 2024-01-19 DIAGNOSIS — K56.609 UNSPECIFIED INTESTINAL OBSTRUCTION, UNSPECIFIED AS TO PARTIAL VERSUS COMPLETE OBSTRUCTION: ICD-10-CM

## 2024-01-19 DIAGNOSIS — Z87.09 PERSONAL HISTORY OF OTHER DISEASES OF THE RESPIRATORY SYSTEM: ICD-10-CM

## 2024-01-19 DIAGNOSIS — E78.5 HYPERLIPIDEMIA, UNSPECIFIED: ICD-10-CM

## 2024-01-19 DIAGNOSIS — E03.9 HYPOTHYROIDISM, UNSPECIFIED: ICD-10-CM

## 2024-01-19 PROCEDURE — 99214 OFFICE O/P EST MOD 30 MIN: CPT

## 2024-01-19 RX ORDER — ROSUVASTATIN CALCIUM 5 MG/1
5 TABLET, FILM COATED ORAL DAILY
Qty: 90 | Refills: 0 | Status: ACTIVE | COMMUNITY
Start: 1900-01-01 | End: 1900-01-01

## 2024-01-19 RX ORDER — DIAZEPAM 5 MG/1
5 TABLET ORAL
Qty: 60 | Refills: 0 | Status: ACTIVE | COMMUNITY
Start: 2023-10-02 | End: 1900-01-01

## 2024-01-19 RX ORDER — ALPRAZOLAM 0.25 MG/1
0.25 TABLET ORAL
Qty: 60 | Refills: 0 | Status: DISCONTINUED | COMMUNITY
Start: 2022-12-30 | End: 2024-01-19

## 2024-01-19 RX ORDER — LEVOFLOXACIN 500 MG/1
500 TABLET, FILM COATED ORAL DAILY
Qty: 10 | Refills: 0 | Status: ACTIVE | COMMUNITY
Start: 2023-10-02 | End: 1900-01-01

## 2024-01-19 RX ORDER — PANTOPRAZOLE 40 MG/1
40 TABLET, DELAYED RELEASE ORAL DAILY
Qty: 90 | Refills: 0 | Status: ACTIVE | COMMUNITY
Start: 1900-01-01 | End: 1900-01-01

## 2024-01-22 NOTE — HISTORY OF PRESENT ILLNESS
[FreeTextEntry1] : follow up [de-identified] : JONNA ESTRADA is a 68 year F who presents today for follow up to discuss medications. Pt was in [facility] for 3 weeks in October due fto persistent nausea and vomiting. Pt had an upper endoscopy while she was in the hospital, but she experienced complications following it. Pt has been seeing a gastroenterologist Dr. Gutierrez after getting out of the hospital, and she says that everything looks good. 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 Pt also complains of green nasal discharge and PND. Pt has a hx of HLD, and hypothyroidism.

## 2024-01-22 NOTE — END OF VISIT
[FreeTextEntry3] :  "I, Otis Fulton, personally scribed the services dictated to me by Dr. Anshu Méndez MD in this documentation on 01/19/2024 "   "I Dr. Anshu Méndez MD, personally performed the services described in this documentation on 01/19/2024 for the patient as scribed by Otis Fulton in my presence. I have reviewed and verified that all the information is accurate and true."

## 2024-01-22 NOTE — REVIEW OF SYSTEMS
[Negative] : Heme/Lymph [Nasal Discharge] : nasal discharge [Postnasal Drip] : postnasal drip [FreeTextEntry4] : green nasal discharge

## 2024-02-09 ENCOUNTER — APPOINTMENT (OUTPATIENT)
Dept: OBGYN | Facility: CLINIC | Age: 69
End: 2024-02-09
Payer: MEDICARE

## 2024-02-09 PROCEDURE — 99213 OFFICE O/P EST LOW 20 MIN: CPT | Mod: 25

## 2024-02-09 PROCEDURE — 76830 TRANSVAGINAL US NON-OB: CPT

## 2024-02-09 PROCEDURE — 58120 DILATION AND CURETTAGE: CPT

## 2024-03-01 NOTE — H&P ADULT - [facility] HIDDEN
Informant: parent    Diet History:  Whole milk?  yes   Amount of milk? 16 ounces per day  Juice? yes   Amount of juice? 8  ounces per day  Intolerances? no  Appetite? excellent   Meats? moderate amount   Fruits? many   Vegetables? few  Pacifier? no  Bottle? no    Sleep History:  Sleeps in:  Own bed? yes    With parents/siblings? no    All night? yes    Problems? no    Developmental Screening:   Imitates housework? Yes   Uses spoon/cup? Yes   Walks well? Yes   Walks backwards? Yes   15-20 words? Yes   Shows affection? Yes   Follows simple instructions? Yes   Points to pictures,body parts? Yes    Medications:  All medications have been reviewed.  Currently is not taking over-the-counter medication(s).  Medication(s) currently being used have been reviewed and added to the medication list.   
months (around 8/1/2024) for well visit.    No orders of the defined types were placed in this encounter.    Orders Placed This Encounter   Procedures    ALLISON Borjas, (age 12m-18y), IM         Electronically signed by Ashleigh Rose MD on 3/1/24 at 4:35 PM CST     
yes

## 2024-03-08 NOTE — ED PROVIDER NOTE - CPE EDP GASTRO NORM
[place] [facility]  [address]/A  PROGRESS NOTE   Patient: Maria Antonia Faustin  Today's Date: 3/8/2024    YOB: 1958  Admission Date: 3/5/2024    MRN: [id number]  Inpatient LOS: 2    Attending: Tanvir Smalls MD  Hospital Day: Hospital Day: 4    Subjective   HISTORY AND SUBJECTIVE COMPLAINTS     Chief Complaint:   Respiratory failure    Interval History / Subjective:   Patient seen and examined is no acute distress.  Interestingly her BAL is positive for strep.  Discussed at this point it appears that she has strep pneumonia.  This was not anticipated and is a bit unusual.  Discussed she is on appropriate treatment.    Hospital Course:  Maria Antonia Faustin is a 65 year old female who presented on 3/5/2024 with complaints of Shortness of Breath  .    ROS:  Pertinent systems negative except as above.    Objective   PHYSICAL EXAMINATION     Vital 24 Hour Range Most Recent Value   Temperature Temp  Min: 98.6 °F (37 °C)  Max: 98.9 °F (37.2 °C) 98.9 °F (37.2 °C)   Pulse Pulse  Min: 89  Max: 142 (!) 142   Respiratory Resp  Min: 18  Max: 20 18   Blood Pressure BP  Min: 105/63  Max: 113/69 113/69   Pulse Oximetry SpO2  Min: 90 %  Max: 95 % 91 %   Arterial BP No data recorded     O2 O2 Flow Rate (L/min)  Av.5 L/min  Min: 6 L/min   Min taken time: 24  Max: 12 L/min   Max taken time: 24 2130       Recorded Intake and Output:No intake or output data in the 24 hours ending 24 1204     Recorded Last Stool Occurrence: 1 (24 1530)     Vital Most Recent Value First Value   Weight 78.6 kg (173 lb 4.5 oz) Weight: 78.6 kg (173 lb 4.5 oz)   Height 5' 4\" (162.6 cm) Height: 5' 4\" (162.6 cm)   BMI 29.74 N/A     General:  Awake, Alert, Oriented x 3.  No acute distress.  Conversational.  HEENT:  NCAT, PERRLA, EOMI.  Sclerae nonicteric.  Conjunctivae pink.  Neck:  Supple.  No masses palpable.  No thyromegaly.   CVS:  Regular rate and rhythm.  S1-S2 normal.  No murmurs, rubs or gallops.  No  JVD.   Pulm:  Lungs are clear to auscultation bilaterally.  No wheezes or crackles heard.   Abd:  Soft, nondistended, nontender, no masses palpable.  No hepatosplenomegaly.  Bowel sounds present.   Ext:  No lower extremity edema, peripheral pulses intact.  Msk:  No joint swelling, redness or tenderness appreciated.   Skin:  Warm and dry to touch.  No rashes or lesions noted.  Neuro:  Cranial nerve 2-12 grossly intact.  Nonfocal.    TEST RESULTS     Labs: The Laboratory values listed below have been reviewed and pertinent findings discussed in the Assessment and Plan.    Laboratory values:   Recent Labs   Lab 03/08/24  1115 03/07/24  0358 03/06/24  0454   WBC 12.2* 17.8* 11.4*   HGB 11.3* 11.4* 12.8   HCT 34.7* 34.5* 39.0    230 250           Recent Labs   Lab 03/08/24  0507 03/07/24  2142 03/07/24  0358 03/06/24  0454 03/06/24  0004 03/05/24  2356   SODIUM  --   --   --  138  --  135   POTASSIUM 3.7 3.1*  --  3.6  --  4.1   CHLORIDE  --   --   --  105  --  100   CO2  --   --   --  27  --  28   CALCIUM  --   --   --  8.6  --  9.0   GLUCOSE  --   --   --  97  --  229*   BUN  --   --   --  14  --  13   CREATININE  --   --   --  0.63 0.60 0.56   MG  --  1.8 2.0  --   --  1.7            Radiology: Imaging studies have been reviewed and pertinent findings discussed in the Assessment and Plan.  Results for orders placed or performed during the hospital encounter of 03/05/24 (from the past 48 hour(s))   XR CHEST AP OR PA    Impression    IMPRESSION:    1. Bibasilar interstitial infiltrates, greater on the left.    Electronically Signed by: Manjit Bhatia MD  Signed on: 3/7/2024 10:14 PM  Created on Workstation ID: [id number]  Signed on Workstation ID: [id number]        ANCILLARY ORDERS     Diet:  Regular Diet  One Time Diet Regular; Boarder Patient: Jose Carlos Send Lunch Tray to Ed Rm P7. Thanks!  Telemetry: On  Consults:    IP CONSULT TO PULMONOLOGY  Therapy Orders:   PT and OT Orders Placed this Encounter   Procedures     Occupational Therapy Evaluation    Occupational Therapy Treatment    Physical Therapy Evaluation    Physical Therapy Treatment       ADVANCED DIRECTIVES     Code Status: Full Resuscitation         ASSESSMENT AND PLAN     Acute on chronic hypoxic respiratory failure:  Wean O2 as tolerated, chronically on 4 L, pulmonary following.  Etiology appears to lean for towards pneumonia  Sepsis secondary Strep Pneumonia: present on admission, positive on BAL, started ancef, transition when appropriate  2.2 cm spiculated lung nodule: Status post intervention on 03/05/2024 pulmonary following, pending path  Chest pain:  Atypical, improved.    GERD: Continue PPI  Severe COPD:  Not an exacerbation, pulmonary on board    Hyperlipidemia:  Continue statin    Prior hiatal hernia   Irritable bowel syndrome   History of bladder cancer status post BCG in 2019  Prior tobacco use       Smoking status: Former Tobacco User    Nutrition status: Does Not Meet Criteria for Malnutrition   Body mass index is 29.74 kg/m². - Patient is overweight with BMI 24-30  DVT Prophylaxis: SCDs       DISCHARGE PLANNING     The patient's treatment plans were discussed with patient and family and RN.    Discharge Planning    Barriers to discharge: Patient is not medically ready and needs to remain in the hospital today due to hypoxia  Anticipated discharge destination: Home  Expected Discharge Date: 3/9/2024  SOB s/p bronch  streph   IV abx   4L O2 down from 9            Tanvir Smalls MDHospitalist  3/8/2024  12:04 PM       - - -

## 2024-03-09 NOTE — DISCHARGE NOTE PROVIDER - DISCHARGE SERVICE FOR PATIENT
on the discharge service for the patient. I have reviewed and made amendments to the documentation where necessary.
IV discontinued, cath removed intact

## 2024-06-03 NOTE — DISCHARGE NOTE PROVIDER - CARE PROVIDER_API CALL
Dustin Yanez (MD)  Surgery  [address], [address]  [place] [address]  Phone: [phone]  Fax: [phone]  Follow Up Time:    Dustin Yanez (MD)  Surgery  [address] [place] [address]  Phone: [phone]  Fax: [phone]  Follow Up Time: 1 week   No

## 2024-07-29 ENCOUNTER — APPOINTMENT (OUTPATIENT)
Dept: INTERNAL MEDICINE | Facility: CLINIC | Age: 69
End: 2024-07-29
Payer: MEDICARE

## 2024-07-29 VITALS
HEART RATE: 81 BPM | WEIGHT: 139 LBS | OXYGEN SATURATION: 95 % | HEIGHT: 62 IN | BODY MASS INDEX: 25.58 KG/M2 | TEMPERATURE: 97.8 F | DIASTOLIC BLOOD PRESSURE: 80 MMHG | SYSTOLIC BLOOD PRESSURE: 126 MMHG | RESPIRATION RATE: 16 BRPM

## 2024-07-29 DIAGNOSIS — E78.5 HYPERLIPIDEMIA, UNSPECIFIED: ICD-10-CM

## 2024-07-29 DIAGNOSIS — H66.90 OTITIS MEDIA, UNSPECIFIED, UNSPECIFIED EAR: ICD-10-CM

## 2024-07-29 DIAGNOSIS — R23.3 SPONTANEOUS ECCHYMOSES: ICD-10-CM

## 2024-07-29 DIAGNOSIS — E03.9 HYPOTHYROIDISM, UNSPECIFIED: ICD-10-CM

## 2024-07-29 LAB
INR PPP: 0.9 RATIO
POCT-PROTHROMBIN TIME: 11 SECS

## 2024-07-29 PROCEDURE — 99214 OFFICE O/P EST MOD 30 MIN: CPT

## 2024-07-29 PROCEDURE — 85610 PROTHROMBIN TIME: CPT | Mod: QW

## 2024-07-29 PROCEDURE — 36415 COLL VENOUS BLD VENIPUNCTURE: CPT

## 2024-07-29 RX ORDER — LEVOFLOXACIN 500 MG/1
500 TABLET, FILM COATED ORAL DAILY
Qty: 10 | Refills: 1 | Status: ACTIVE | COMMUNITY
Start: 2024-07-29 | End: 1900-01-01

## 2024-07-29 RX ORDER — OLMESARTAN MEDOXOMIL 20 MG/1
20 TABLET, FILM COATED ORAL DAILY
Refills: 0 | Status: ACTIVE | COMMUNITY

## 2024-07-29 NOTE — HISTORY OF PRESENT ILLNESS
[FreeTextEntry1] : Patient was seen recently in urgent care center for right earache and sinus pain and given amoxicillin.  She notes improvement but still has some symptoms She is also concerned about her her easy bruisability especially in the forearms.

## 2024-07-29 NOTE — REVIEW OF SYSTEMS
[Earache] : earache [Negative] : Neurological [FreeTextEntry4] : Sinus congestion [de-identified] : Bruising of forearm

## 2024-07-29 NOTE — ASSESSMENT
[FreeTextEntry1] : Bruising of the forearms is probably due to aging process and fragile skin.  Will do CBC with differential to check platelet count and also INR level

## 2024-07-29 NOTE — PHYSICAL EXAM
[Normal] : normal gait, coordination grossly intact, no focal deficits and deep tendon reflexes were 2+ and symmetric [de-identified] : Bruising and age spots of the forearms

## 2024-07-29 NOTE — PLAN
[FreeTextEntry1] : Continue present medications including Synthroid 88 mcg daily pantoprazole 40 mg daily Crestor 5 mg daily olmesartan 20 mg half tablet daily renew diazepam 5 mg as needed Return to office in 3 months or as needed Levaquin 500 mg daily for 10 days 2 follow-up with ENT physician Dr. Cordero if no improvement in 10 days

## 2024-08-01 LAB
BASOPHILS # BLD AUTO: 0.06 K/UL
BASOPHILS NFR BLD AUTO: 0.7 %
EOSINOPHIL # BLD AUTO: 0.32 K/UL
EOSINOPHIL NFR BLD AUTO: 3.9 %
HCT VFR BLD CALC: 38.2 %
HGB BLD-MCNC: 11.8 G/DL
IMM GRANULOCYTES NFR BLD AUTO: 0.7 %
LYMPHOCYTES # BLD AUTO: 1.97 K/UL
LYMPHOCYTES NFR BLD AUTO: 24 %
MAN DIFF?: NORMAL
MCHC RBC-ENTMCNC: 30.9 GM/DL
MCHC RBC-ENTMCNC: 31.6 PG
MCV RBC AUTO: 102.1 FL
MONOCYTES # BLD AUTO: 0.8 K/UL
MONOCYTES NFR BLD AUTO: 9.7 %
NEUTROPHILS # BLD AUTO: 5.01 K/UL
NEUTROPHILS NFR BLD AUTO: 61 %
PLATELET # BLD AUTO: 266 K/UL
RBC # BLD: 3.74 M/UL
RBC # FLD: 12.8 %
WBC # FLD AUTO: 8.22 K/UL

## 2024-08-11 NOTE — CONSULT NOTE ADULT - TIME-BASED BILLING (NON-CRITICAL CARE)
Rotate tylenol and ibuprofen every 4 hours for headache/body aches. Increase fluids and rest.     Time-based billing (NON-critical care)

## 2024-08-20 PROBLEM — N39.0 UTI (URINARY TRACT INFECTION): Status: ACTIVE | Noted: 2024-08-20 | Resolved: 2024-09-19

## 2024-09-03 NOTE — H&P ADULT - RESPIRATORY AND THORAX
Dabigatran/Pradaxa increases your risk for bleeding. Notify your doctor if you experience any of the following side effects: unusual bleeding or bruising, vomiting blood or coffee ground-like material, red or black stool, itching or hives, chest tightness, trouble breathing, swelling in your face or hands, swelling in your mouth or throat, back pain, numbness or weakness in your legs or feet, red, brown, or pink urine, lightheadedness, dizziness, headache, heartburn, nausea or indigestion. When Dabigatran/Pradaxa is taken with other medicines, they can affect how it works. Taking other medications such as aspirin, blood thinners, and nonsteroidal anti-inflammatories increases your risk of bleeding. It is very important to tell your health care provider about all other medicines, including over-the-counter medications, herbs, and vitamins you are taking.  DO NOT start, stop, or change the dosage of any medicine, including over-the-counter medicines, vitamins, and herbal products without your doctor’s approval. Any products containing aspirin or are nonsteroidal anti-inflammatories lessen the blood’s ability to form clots and adds to the effect of Dabigatran/Pradaxa. Never take aspirin or medicines that contain aspirin without speaking to your doctor.
negative

## 2024-11-15 ENCOUNTER — APPOINTMENT (OUTPATIENT)
Dept: INTERNAL MEDICINE | Facility: CLINIC | Age: 69
End: 2024-11-15
Payer: MEDICARE

## 2024-11-15 VITALS
DIASTOLIC BLOOD PRESSURE: 74 MMHG | BODY MASS INDEX: 25.76 KG/M2 | RESPIRATION RATE: 12 BRPM | HEART RATE: 95 BPM | OXYGEN SATURATION: 97 % | HEIGHT: 62 IN | SYSTOLIC BLOOD PRESSURE: 123 MMHG | WEIGHT: 140 LBS

## 2024-11-15 VITALS — SYSTOLIC BLOOD PRESSURE: 120 MMHG | DIASTOLIC BLOOD PRESSURE: 80 MMHG

## 2024-11-15 DIAGNOSIS — E03.9 HYPOTHYROIDISM, UNSPECIFIED: ICD-10-CM

## 2024-11-15 DIAGNOSIS — F41.9 ANXIETY DISORDER, UNSPECIFIED: ICD-10-CM

## 2024-11-15 DIAGNOSIS — E78.5 HYPERLIPIDEMIA, UNSPECIFIED: ICD-10-CM

## 2024-11-15 DIAGNOSIS — K51.90 ULCERATIVE COLITIS, UNSPECIFIED, W/OUT COMPLICATIONS: ICD-10-CM

## 2024-11-15 PROCEDURE — 99214 OFFICE O/P EST MOD 30 MIN: CPT

## 2024-11-15 RX ORDER — LEVOTHYROXINE SODIUM 0.09 MG/1
88 TABLET ORAL
Qty: 90 | Refills: 0 | Status: ACTIVE | COMMUNITY
Start: 2024-11-15

## 2024-11-15 RX ORDER — MESALAMINE 1.2 G/1
1.2 TABLET, DELAYED RELEASE ORAL DAILY
Qty: 120 | Refills: 0 | Status: ACTIVE | COMMUNITY
Start: 2024-11-15

## 2024-11-15 RX ORDER — ALPRAZOLAM 0.25 MG/1
0.25 TABLET ORAL TWICE DAILY
Qty: 60 | Refills: 0 | Status: ACTIVE | COMMUNITY
Start: 2024-11-15 | End: 1900-01-01

## 2025-01-07 ENCOUNTER — APPOINTMENT (OUTPATIENT)
Dept: ALLERGY | Facility: CLINIC | Age: 70
End: 2025-01-07
Payer: MEDICARE

## 2025-01-07 VITALS
HEIGHT: 62 IN | DIASTOLIC BLOOD PRESSURE: 82 MMHG | SYSTOLIC BLOOD PRESSURE: 152 MMHG | BODY MASS INDEX: 25.58 KG/M2 | OXYGEN SATURATION: 96 % | WEIGHT: 139 LBS | HEART RATE: 98 BPM

## 2025-01-07 DIAGNOSIS — L23.5 ALLERGIC CONTACT DERMATITIS DUE TO OTHER CHEMICAL PRODUCTS: ICD-10-CM

## 2025-01-07 PROCEDURE — 99204 OFFICE O/P NEW MOD 45 MIN: CPT

## 2025-01-07 RX ORDER — TACROLIMUS 1 MG/G
0.1 OINTMENT TOPICAL TWICE DAILY
Qty: 1 | Refills: 0 | Status: ACTIVE | COMMUNITY
Start: 2025-01-07 | End: 1900-01-01

## 2025-01-28 ENCOUNTER — APPOINTMENT (OUTPATIENT)
Dept: INTERNAL MEDICINE | Facility: CLINIC | Age: 70
End: 2025-01-28
Payer: MEDICARE

## 2025-01-28 ENCOUNTER — NON-APPOINTMENT (OUTPATIENT)
Age: 70
End: 2025-01-28

## 2025-01-28 VITALS
HEART RATE: 91 BPM | RESPIRATION RATE: 12 BRPM | SYSTOLIC BLOOD PRESSURE: 144 MMHG | DIASTOLIC BLOOD PRESSURE: 85 MMHG | BODY MASS INDEX: 25.03 KG/M2 | WEIGHT: 136 LBS | HEIGHT: 62 IN | OXYGEN SATURATION: 94 %

## 2025-01-28 VITALS — DIASTOLIC BLOOD PRESSURE: 84 MMHG | SYSTOLIC BLOOD PRESSURE: 130 MMHG

## 2025-01-28 DIAGNOSIS — K51.90 ULCERATIVE COLITIS, UNSPECIFIED, W/OUT COMPLICATIONS: ICD-10-CM

## 2025-01-28 DIAGNOSIS — R23.3 SPONTANEOUS ECCHYMOSES: ICD-10-CM

## 2025-01-28 DIAGNOSIS — E03.9 HYPOTHYROIDISM, UNSPECIFIED: ICD-10-CM

## 2025-01-28 DIAGNOSIS — E78.5 HYPERLIPIDEMIA, UNSPECIFIED: ICD-10-CM

## 2025-01-28 DIAGNOSIS — R05.9 COUGH, UNSPECIFIED: ICD-10-CM

## 2025-01-28 DIAGNOSIS — Z00.00 ENCOUNTER FOR GENERAL ADULT MEDICAL EXAMINATION W/OUT ABNORMAL FINDINGS: ICD-10-CM

## 2025-01-28 PROCEDURE — 93000 ELECTROCARDIOGRAM COMPLETE: CPT | Mod: 59

## 2025-01-28 PROCEDURE — G0444 DEPRESSION SCREEN ANNUAL: CPT | Mod: 59

## 2025-01-28 PROCEDURE — 99213 OFFICE O/P EST LOW 20 MIN: CPT | Mod: 25

## 2025-01-28 PROCEDURE — 36415 COLL VENOUS BLD VENIPUNCTURE: CPT

## 2025-01-28 PROCEDURE — G0439: CPT

## 2025-01-28 RX ORDER — BENZONATATE 200 MG/1
200 CAPSULE ORAL TWICE DAILY
Qty: 20 | Refills: 1 | Status: ACTIVE | COMMUNITY
Start: 2025-01-28 | End: 1900-01-01

## 2025-01-29 LAB
25(OH)D3 SERPL-MCNC: 31.4 NG/ML
ALBUMIN SERPL ELPH-MCNC: 4.8 G/DL
ALP BLD-CCNC: 296 U/L
ALT SERPL-CCNC: 60 U/L
ANION GAP SERPL CALC-SCNC: 16 MMOL/L
APPEARANCE: CLEAR
APTT BLD: 33.7 SEC
AST SERPL-CCNC: 84 U/L
BACTERIA: NEGATIVE /HPF
BASOPHILS # BLD AUTO: 0.05 K/UL
BASOPHILS NFR BLD AUTO: 0.8 %
BILIRUB SERPL-MCNC: 0.9 MG/DL
BILIRUBIN URINE: NEGATIVE
BLOOD URINE: NEGATIVE
BUN SERPL-MCNC: 18 MG/DL
CALCIUM SERPL-MCNC: 9.8 MG/DL
CAST: 0 /LPF
CHLORIDE SERPL-SCNC: 99 MMOL/L
CHOLEST SERPL-MCNC: 210 MG/DL
CO2 SERPL-SCNC: 24 MMOL/L
COLOR: YELLOW
CREAT SERPL-MCNC: 0.61 MG/DL
EGFR: 97 ML/MIN/1.73M2
EOSINOPHIL # BLD AUTO: 0.27 K/UL
EOSINOPHIL NFR BLD AUTO: 4.5 %
EPITHELIAL CELLS: 4 /HPF
ESTIMATED AVERAGE GLUCOSE: 105 MG/DL
GLUCOSE QUALITATIVE U: NEGATIVE MG/DL
GLUCOSE SERPL-MCNC: 87 MG/DL
HBA1C MFR BLD HPLC: 5.3 %
HCT VFR BLD CALC: 40.4 %
HDLC SERPL-MCNC: 85 MG/DL
HGB BLD-MCNC: 12.8 G/DL
IMM GRANULOCYTES NFR BLD AUTO: 0.3 %
INR PPP: 0.86 RATIO
KETONES URINE: NEGATIVE MG/DL
LDLC SERPL CALC-MCNC: 99 MG/DL
LEUKOCYTE ESTERASE URINE: ABNORMAL
LYMPHOCYTES # BLD AUTO: 1.58 K/UL
LYMPHOCYTES NFR BLD AUTO: 26.6 %
MAN DIFF?: NORMAL
MCHC RBC-ENTMCNC: 31.1 PG
MCHC RBC-ENTMCNC: 31.7 G/DL
MCV RBC AUTO: 98.1 FL
MICROSCOPIC-UA: NORMAL
MONOCYTES # BLD AUTO: 0.65 K/UL
MONOCYTES NFR BLD AUTO: 10.9 %
NEUTROPHILS # BLD AUTO: 3.37 K/UL
NEUTROPHILS NFR BLD AUTO: 56.9 %
NITRITE URINE: NEGATIVE
NONHDLC SERPL-MCNC: 125 MG/DL
PH URINE: 6
PLATELET # BLD AUTO: 294 K/UL
POTASSIUM SERPL-SCNC: 4 MMOL/L
PROT SERPL-MCNC: 7.1 G/DL
PROTEIN URINE: NEGATIVE MG/DL
PT BLD: 10.2 SEC
RBC # BLD: 4.12 M/UL
RBC # FLD: 12.9 %
RED BLOOD CELLS URINE: 1 /HPF
SODIUM SERPL-SCNC: 139 MMOL/L
SPECIFIC GRAVITY URINE: 1.02
T4 FREE SERPL-MCNC: 1.3 NG/DL
TRIGL SERPL-MCNC: 157 MG/DL
TSH SERPL-ACNC: 1.22 UIU/ML
UROBILINOGEN URINE: 0.2 MG/DL
WBC # FLD AUTO: 5.94 K/UL
WHITE BLOOD CELLS URINE: 3 /HPF

## 2025-02-17 DIAGNOSIS — R74.01 ELEVATION OF LEVELS OF LIVER TRANSAMINASE LEVELS: ICD-10-CM

## 2025-04-22 ENCOUNTER — APPOINTMENT (OUTPATIENT)
Dept: INTERNAL MEDICINE | Facility: CLINIC | Age: 70
End: 2025-04-22
Payer: MEDICARE

## 2025-04-22 VITALS
WEIGHT: 131 LBS | DIASTOLIC BLOOD PRESSURE: 84 MMHG | HEART RATE: 93 BPM | RESPIRATION RATE: 14 BRPM | HEIGHT: 62 IN | BODY MASS INDEX: 24.11 KG/M2 | OXYGEN SATURATION: 99 % | SYSTOLIC BLOOD PRESSURE: 145 MMHG

## 2025-04-22 DIAGNOSIS — E03.9 HYPOTHYROIDISM, UNSPECIFIED: ICD-10-CM

## 2025-04-22 DIAGNOSIS — R74.01 ELEVATION OF LEVELS OF LIVER TRANSAMINASE LEVELS: ICD-10-CM

## 2025-04-22 DIAGNOSIS — E78.5 HYPERLIPIDEMIA, UNSPECIFIED: ICD-10-CM

## 2025-04-22 DIAGNOSIS — K56.609 UNSPECIFIED INTESTINAL OBSTRUCTION, UNSPECIFIED AS TO PARTIAL VERSUS COMPLETE OBSTRUCTION: ICD-10-CM

## 2025-04-22 DIAGNOSIS — K51.90 ULCERATIVE COLITIS, UNSPECIFIED, W/OUT COMPLICATIONS: ICD-10-CM

## 2025-04-22 PROCEDURE — 99214 OFFICE O/P EST MOD 30 MIN: CPT

## 2025-04-22 PROCEDURE — G2211 COMPLEX E/M VISIT ADD ON: CPT

## 2025-04-22 PROCEDURE — 36415 COLL VENOUS BLD VENIPUNCTURE: CPT

## 2025-04-23 LAB
ALP BLD-CCNC: 282 U/L
DSDNA AB SER-ACNC: <1 IU/ML
GGT SERPL-CCNC: 348 U/L

## 2025-04-28 LAB
ALP BLD-CCNC: 285 IU/L
ALP BONE CFR SERPL: 30 %
ALP INTEST CFR SERPL: 0 %
ALP LIVER CFR SERPL: 70 %

## 2025-06-18 NOTE — DIETITIAN INITIAL EVALUATION ADULT - SIGNS/SYMPTOMS
[facility]/Population Health Chart Review & Patient Outreach Details For Medication Adherence Project    Reason for Outreach Encounter: 3rd Party payor non-compliance report ([employer], [employer], [employer], etc)  2.  Patient Outreach Method: Reviewed patient chart   3.   Medication in question:             N/A    4.  Reviewed and or Updates Made To: Patient Chart  5. Outreach Outcomes and/or actions taken: Medication discontinued  Additional Notes:       
as evidenced by dx recurrent SBO, abdominal pain, NPO status.

## 2025-09-02 ENCOUNTER — APPOINTMENT (OUTPATIENT)
Age: 70
End: 2025-09-02
Payer: MEDICARE

## 2025-09-02 VITALS
HEIGHT: 62 IN | SYSTOLIC BLOOD PRESSURE: 137 MMHG | BODY MASS INDEX: 24.48 KG/M2 | DIASTOLIC BLOOD PRESSURE: 86 MMHG | HEART RATE: 55 BPM | WEIGHT: 133 LBS | RESPIRATION RATE: 12 BRPM | OXYGEN SATURATION: 100 %

## 2025-09-02 VITALS — DIASTOLIC BLOOD PRESSURE: 86 MMHG | SYSTOLIC BLOOD PRESSURE: 120 MMHG

## 2025-09-02 DIAGNOSIS — E78.5 HYPERLIPIDEMIA, UNSPECIFIED: ICD-10-CM

## 2025-09-02 DIAGNOSIS — R74.8 ABNORMAL LEVELS OF OTHER SERUM ENZYMES: ICD-10-CM

## 2025-09-02 DIAGNOSIS — K51.90 ULCERATIVE COLITIS, UNSPECIFIED, W/OUT COMPLICATIONS: ICD-10-CM

## 2025-09-02 DIAGNOSIS — E03.9 HYPOTHYROIDISM, UNSPECIFIED: ICD-10-CM

## 2025-09-02 PROCEDURE — 36415 COLL VENOUS BLD VENIPUNCTURE: CPT

## 2025-09-02 PROCEDURE — 99214 OFFICE O/P EST MOD 30 MIN: CPT

## (undated) DEVICE — D HELP - CLEARVIEW CLEARIFY SYSTEM

## (undated) DEVICE — DRAPE TOWEL BLUE 17" X 24"

## (undated) DEVICE — CATH IV SAFE BC 22G X 1" (BLUE)

## (undated) DEVICE — PACK GENERAL LAPAROSCOPY

## (undated) DEVICE — BASIN SET SINGLE

## (undated) DEVICE — ELCTR GROUNDING PAD ADULT COVIDIEN

## (undated) DEVICE — SUT POLYSORB 0 30" GS-21 UNDYED

## (undated) DEVICE — SUT POLYSORB 2-0 30" GS-21 UNDYED

## (undated) DEVICE — TROCAR COVIDIEN VERSAPORT BLADELESS OPTICAL 12MM STANDARD

## (undated) DEVICE — PLV-STRYKER LAPAROSCOPE 5MM 0 DEGREE: Type: DURABLE MEDICAL EQUIPMENT

## (undated) DEVICE — STERIS DEFENDO 3-PIECE KIT (AIR/WATER, SUCTION & BIOPSY VALVES)

## (undated) DEVICE — SOL IRR POUR H2O 500ML

## (undated) DEVICE — STAPLER COVIDIEN ENDO GIA STANDARD HANDLE

## (undated) DEVICE — TUBING IV SET GRAVITY 3Y 100" MACRO

## (undated) DEVICE — DRAPE 3/4 SHEET W REINFORCEMENT 56X77"

## (undated) DEVICE — FOLEY TRAY 16FR LF URINE METER SURESTEP

## (undated) DEVICE — WARMING BLANKET UPPER ADULT

## (undated) DEVICE — ELCTR BOVIE TIP BLADE INSULATED 2.75" EDGE

## (undated) DEVICE — PLV/PSP-ESU FORCEFX F8I7418A: Type: DURABLE MEDICAL EQUIPMENT

## (undated) DEVICE — GLV 7.5 PROTEXIS (WHITE)

## (undated) DEVICE — FORMALIN CUPS 10% BUFFERED

## (undated) DEVICE — SUT SOFSILK 0 30" V-20

## (undated) DEVICE — SUT SOFSILK 2-0 30" V-20

## (undated) DEVICE — SUCTION YANKAUER TAPERED BULBOUS NO VENT

## (undated) DEVICE — SENSOR O2 FINGER XL ADULT 24/BX 6BX/CA

## (undated) DEVICE — STAPLER SKIN PROXIMATE

## (undated) DEVICE — SUT SOFSILK 2-0 30" TIES

## (undated) DEVICE — VENODYNE/SCD SLEEVE CALF MEDIUM

## (undated) DEVICE — STAPLER SKIN INSORB

## (undated) DEVICE — FLOORMAT SURGISAFE ABSORBANT WHITE 36" X 72"

## (undated) DEVICE — VENODYNE/SCD SLEEVE CALF LARGE

## (undated) DEVICE — CATH IV SAFE BC 20G X 1.16" (PINK)

## (undated) DEVICE — TUBING IV SET SECOND 34" W/O LOK-BLUNT

## (undated) DEVICE — RETAINER VICERA FISH LG

## (undated) DEVICE — LAP PAD W RING 18 X 18"

## (undated) DEVICE — DRSG CURITY GAUZE SPONGE 4 X 4" 12-PLY

## (undated) DEVICE — PREP BETADINE KIT

## (undated) DEVICE — TUBING CANNULA SALTER LABS NASAL ADULT 7FT

## (undated) DEVICE — SUT MONOCRYL 3-0 18" PS-2 UNDYED

## (undated) DEVICE — LIGASURE ATLAS 10MM 20CM

## (undated) DEVICE — BRUSH CYTO ENDO

## (undated) DEVICE — Device

## (undated) DEVICE — BRUSH COLONOSCOPY CYTOLOGY

## (undated) DEVICE — TROCAR COVIDIEN VERSAONE FIXATION CANNULA 5MM

## (undated) DEVICE — FOLEY CATH 3-WAY 20FR 30CC LATEX LUBRICATH

## (undated) DEVICE — BITE BLOCK MAXI RUBBER STAMP

## (undated) DEVICE — SYR ASEPTO

## (undated) DEVICE — TROCAR COVIDIEN VERSAPORT BLADELESS OPTICAL 5MM STANDARD

## (undated) DEVICE — TROCAR COVIDIEN BLUNT TIP HASSAN 10MM

## (undated) DEVICE — POSITIONER PINK PAD PIGAZZI SYSTEM

## (undated) DEVICE — SUT PDS II PLUS 1 96" TP-1

## (undated) DEVICE — SOL IRR POUR NS 0.9% 1000ML

## (undated) DEVICE — SUT MONOCRYL 4-0 27" PS-2 UNDYED

## (undated) DEVICE — SNARE LRG

## (undated) DEVICE — LIGASURE IMPACT

## (undated) DEVICE — DRSG DERMABOND 0.7ML

## (undated) DEVICE — GOWN LG

## (undated) DEVICE — SYR LUER LOK 30CC

## (undated) DEVICE — SUT BIOSYN 4-0 27" P-12

## (undated) DEVICE — PACK MAJOR ABDOMINAL WITH LAP

## (undated) DEVICE — SUT POLYSORB 3-0 36" GS-21 UNDYED

## (undated) DEVICE — SET IV PUMP BLOOD 1VALVE 180FILTER NON-DEHP

## (undated) DEVICE — LIGASURE MARYLAND 37CM

## (undated) DEVICE — SUT POLYSORB 0 30" GU-46

## (undated) DEVICE — DRSG TEGADERM 2.5X3"

## (undated) DEVICE — DRAPE FLUID WARMER 44 X 66"

## (undated) DEVICE — SOL IRR NS 0.9% 250ML

## (undated) DEVICE — NDL INJ SCLERO INTERJECT 23G

## (undated) DEVICE — PLV-SCD MACHINE: Type: DURABLE MEDICAL EQUIPMENT

## (undated) DEVICE — DRAPE LAVH 124" X 30" X125"

## (undated) DEVICE — TUBING STRYKER PNEUMOSURE HEATED RTP

## (undated) DEVICE — DRSG TEGADERM 4X4.75"

## (undated) DEVICE — MARKER ENDO SPOT EX

## (undated) DEVICE — GOWN SMARTGOWN RAGLAN XLG

## (undated) DEVICE — SOL IRR POUR H2O 1000ML

## (undated) DEVICE — PACK MINOR WITH LAP

## (undated) DEVICE — APPLICATOR VISTASEAL LAP DUAL 35CM RIGID

## (undated) DEVICE — SUT POLYSORB 2-0 60" REEL

## (undated) DEVICE — PLV/PSP-SUCTION IRRIGATOR STRYKER: Type: DURABLE MEDICAL EQUIPMENT

## (undated) DEVICE — CATH ELECHMSTAT  INJ 7FR 210CM

## (undated) DEVICE — CATH ELCTR GLIDE PRB 7FR

## (undated) DEVICE — SUT SOFSILK 3-0 30" V-20

## (undated) DEVICE — TUBING SUCTION CONN 6FT STERILE

## (undated) DEVICE — FOLEY TRAY 16FR 5CC LF UMETER CLOSED

## (undated) DEVICE — PLV-STRYKER LAPAROSCOPE 10MM 30 DEGREE: Type: DURABLE MEDICAL EQUIPMENT

## (undated) DEVICE — SOL IRR BAG H2O 1000ML

## (undated) DEVICE — SYR CATH TIP 2 OZ

## (undated) DEVICE — SYR IV POSIFLUSH NS 3ML 30/TY

## (undated) DEVICE — POUCH LIQUID LG

## (undated) DEVICE — LUBRICATING JELLY ONESHOT 1.25OZ

## (undated) DEVICE — GLV 8 PROTEXIS (WHITE)

## (undated) DEVICE — LIGASURE ATLAS 10MM 37CM

## (undated) DEVICE — FORCEP RADIAL JAW 4 W NDL 2.2MM 2.8MM 160CM YELLOW DISP

## (undated) DEVICE — SOL BETADINE PRO IOD 4OZ

## (undated) DEVICE — SOL IRR BAG NS 0.9% 3000ML

## (undated) DEVICE — TUBE O2 SUPL CRUSH RESIS CONN SOUTHSIDE ONLY

## (undated) DEVICE — PLV/PSP-ESU FORCE2 FIL 16736T: Type: DURABLE MEDICAL EQUIPMENT

## (undated) DEVICE — SUT SOFSILK 0 30" GS-21